# Patient Record
Sex: MALE | Race: BLACK OR AFRICAN AMERICAN | Employment: UNEMPLOYED | URBAN - METROPOLITAN AREA
[De-identification: names, ages, dates, MRNs, and addresses within clinical notes are randomized per-mention and may not be internally consistent; named-entity substitution may affect disease eponyms.]

---

## 2017-01-06 ENCOUNTER — OFFICE VISIT (OUTPATIENT)
Dept: FAMILY MEDICINE CLINIC | Age: 57
End: 2017-01-06

## 2017-01-06 VITALS
HEART RATE: 80 BPM | OXYGEN SATURATION: 97 % | DIASTOLIC BLOOD PRESSURE: 100 MMHG | WEIGHT: 277 LBS | BODY MASS INDEX: 33.73 KG/M2 | SYSTOLIC BLOOD PRESSURE: 152 MMHG | RESPIRATION RATE: 12 BRPM | HEIGHT: 76 IN

## 2017-01-06 DIAGNOSIS — I10 ESSENTIAL HYPERTENSION: Chronic | ICD-10-CM

## 2017-01-06 DIAGNOSIS — M54.42 CHRONIC MIDLINE LOW BACK PAIN WITH LEFT-SIDED SCIATICA: Primary | ICD-10-CM

## 2017-01-06 DIAGNOSIS — G89.29 CHRONIC MIDLINE LOW BACK PAIN WITH LEFT-SIDED SCIATICA: Primary | ICD-10-CM

## 2017-01-06 PROCEDURE — 99214 OFFICE O/P EST MOD 30 MIN: CPT | Performed by: FAMILY MEDICINE

## 2017-01-06 RX ORDER — METHOCARBAMOL 500 MG/1
500 TABLET, FILM COATED ORAL 3 TIMES DAILY
Qty: 30 TABLET | Refills: 0 | Status: SHIPPED | OUTPATIENT
Start: 2017-01-06 | End: 2017-03-03 | Stop reason: ALTCHOICE

## 2017-01-08 ASSESSMENT — ENCOUNTER SYMPTOMS
GASTROINTESTINAL NEGATIVE: 1
BACK PAIN: 1
RESPIRATORY NEGATIVE: 1

## 2017-01-13 ENCOUNTER — OFFICE VISIT (OUTPATIENT)
Dept: FAMILY MEDICINE CLINIC | Age: 57
End: 2017-01-13

## 2017-01-13 VITALS
BODY MASS INDEX: 33.46 KG/M2 | OXYGEN SATURATION: 98 % | WEIGHT: 274.8 LBS | HEART RATE: 72 BPM | RESPIRATION RATE: 12 BRPM | DIASTOLIC BLOOD PRESSURE: 100 MMHG | HEIGHT: 76 IN | SYSTOLIC BLOOD PRESSURE: 138 MMHG | TEMPERATURE: 98.2 F

## 2017-01-13 DIAGNOSIS — I10 ESSENTIAL HYPERTENSION: Primary | Chronic | ICD-10-CM

## 2017-01-13 PROCEDURE — 99214 OFFICE O/P EST MOD 30 MIN: CPT | Performed by: FAMILY MEDICINE

## 2017-01-13 RX ORDER — AMLODIPINE BESYLATE 5 MG/1
5 TABLET ORAL DAILY
Qty: 30 TABLET | Refills: 1 | Status: SHIPPED | OUTPATIENT
Start: 2017-01-13 | End: 2017-02-28 | Stop reason: SDUPTHER

## 2017-01-14 ASSESSMENT — ENCOUNTER SYMPTOMS
RESPIRATORY NEGATIVE: 1
EYES NEGATIVE: 1

## 2017-02-01 ENCOUNTER — OFFICE VISIT (OUTPATIENT)
Dept: FAMILY MEDICINE CLINIC | Age: 57
End: 2017-02-01

## 2017-02-01 VITALS
DIASTOLIC BLOOD PRESSURE: 88 MMHG | WEIGHT: 279.2 LBS | HEIGHT: 76 IN | SYSTOLIC BLOOD PRESSURE: 108 MMHG | OXYGEN SATURATION: 97 % | RESPIRATION RATE: 12 BRPM | BODY MASS INDEX: 34 KG/M2 | HEART RATE: 96 BPM

## 2017-02-01 DIAGNOSIS — M54.50 ACUTE BILATERAL LOW BACK PAIN WITHOUT SCIATICA: Primary | ICD-10-CM

## 2017-02-01 DIAGNOSIS — R80.9 PROTEINURIA: ICD-10-CM

## 2017-02-01 DIAGNOSIS — N28.1 RENAL CYST: ICD-10-CM

## 2017-02-01 DIAGNOSIS — I10 ESSENTIAL HYPERTENSION: ICD-10-CM

## 2017-02-01 LAB
BILIRUBIN, POC: ABNORMAL
BLOOD URINE, POC: ABNORMAL
CLARITY, POC: CLEAR
COLOR, POC: YELLOW
GLUCOSE URINE, POC: ABNORMAL
KETONES, POC: ABNORMAL
LEUKOCYTE EST, POC: ABNORMAL
NITRITE, POC: ABNORMAL
PH, POC: 7
PROTEIN, POC: 30
SPECIFIC GRAVITY, POC: 1.02
UROBILINOGEN, POC: 0.2

## 2017-02-01 PROCEDURE — 99214 OFFICE O/P EST MOD 30 MIN: CPT | Performed by: FAMILY MEDICINE

## 2017-02-01 PROCEDURE — 81002 URINALYSIS NONAUTO W/O SCOPE: CPT | Performed by: FAMILY MEDICINE

## 2017-02-01 ASSESSMENT — ENCOUNTER SYMPTOMS
BACK PAIN: 1
ABDOMINAL PAIN: 0
COUGH: 0
CHEST TIGHTNESS: 0
SHORTNESS OF BREATH: 0
BLOOD IN STOOL: 0

## 2017-02-28 ENCOUNTER — OFFICE VISIT (OUTPATIENT)
Dept: FAMILY MEDICINE CLINIC | Age: 57
End: 2017-02-28

## 2017-02-28 ENCOUNTER — TELEPHONE (OUTPATIENT)
Dept: FAMILY MEDICINE CLINIC | Age: 57
End: 2017-02-28

## 2017-02-28 VITALS
WEIGHT: 275.6 LBS | TEMPERATURE: 98.1 F | OXYGEN SATURATION: 99 % | SYSTOLIC BLOOD PRESSURE: 126 MMHG | HEART RATE: 82 BPM | DIASTOLIC BLOOD PRESSURE: 70 MMHG | RESPIRATION RATE: 14 BRPM | BODY MASS INDEX: 33.55 KG/M2

## 2017-02-28 DIAGNOSIS — M54.42 CHRONIC MIDLINE LOW BACK PAIN WITH LEFT-SIDED SCIATICA: ICD-10-CM

## 2017-02-28 DIAGNOSIS — I25.10 CORONARY ARTERY DISEASE INVOLVING NATIVE HEART WITHOUT ANGINA PECTORIS, UNSPECIFIED VESSEL OR LESION TYPE: Chronic | ICD-10-CM

## 2017-02-28 DIAGNOSIS — G89.29 CHRONIC MIDLINE LOW BACK PAIN WITH LEFT-SIDED SCIATICA: ICD-10-CM

## 2017-02-28 DIAGNOSIS — R80.9 PROTEINURIA: ICD-10-CM

## 2017-02-28 DIAGNOSIS — I10 ESSENTIAL HYPERTENSION: Primary | Chronic | ICD-10-CM

## 2017-02-28 DIAGNOSIS — E78.2 MIXED HYPERLIPIDEMIA: Chronic | ICD-10-CM

## 2017-02-28 PROBLEM — N28.1 RENAL CYST: Status: ACTIVE | Noted: 2017-02-28

## 2017-02-28 LAB
BILIRUBIN, POC: ABNORMAL
BLOOD URINE, POC: ABNORMAL
CLARITY, POC: CLEAR
COLOR, POC: YELLOW
CREATININE URINE: 398.3 MG/DL (ref 39–259)
GLUCOSE URINE, POC: ABNORMAL
KETONES, POC: ABNORMAL
LEUKOCYTE EST, POC: ABNORMAL
MICROALBUMIN UR-MCNC: 7.7 MG/DL
MICROALBUMIN/CREAT UR-RTO: 19.3 MG/G (ref 0–30)
NITRITE, POC: ABNORMAL
PH, POC: 6
PROTEIN, POC: 100
SPECIFIC GRAVITY, POC: 1.03
UROBILINOGEN, POC: 0.2

## 2017-02-28 PROCEDURE — 99214 OFFICE O/P EST MOD 30 MIN: CPT | Performed by: FAMILY MEDICINE

## 2017-02-28 PROCEDURE — 81002 URINALYSIS NONAUTO W/O SCOPE: CPT | Performed by: FAMILY MEDICINE

## 2017-02-28 RX ORDER — AMLODIPINE BESYLATE 5 MG/1
5 TABLET ORAL DAILY
Qty: 30 TABLET | Refills: 3 | Status: ON HOLD | OUTPATIENT
Start: 2017-02-28 | End: 2017-04-24 | Stop reason: HOSPADM

## 2017-02-28 RX ORDER — ATORVASTATIN CALCIUM 40 MG/1
40 TABLET, FILM COATED ORAL DAILY
Qty: 30 TABLET | Refills: 3 | Status: SHIPPED | OUTPATIENT
Start: 2017-02-28 | End: 2017-09-08 | Stop reason: SDUPTHER

## 2017-02-28 RX ORDER — NAPROXEN 500 MG/1
500 TABLET ORAL 2 TIMES DAILY
Qty: 20 TABLET | Refills: 0 | Status: CANCELLED | OUTPATIENT
Start: 2017-02-28 | End: 2017-03-10

## 2017-02-28 RX ORDER — LISINOPRIL AND HYDROCHLOROTHIAZIDE 25; 20 MG/1; MG/1
1 TABLET ORAL DAILY
Qty: 30 TABLET | Refills: 3 | Status: SHIPPED | OUTPATIENT
Start: 2017-02-28 | End: 2017-09-08 | Stop reason: SDUPTHER

## 2017-02-28 RX ORDER — TIZANIDINE 4 MG/1
4 TABLET ORAL EVERY 6 HOURS PRN
Qty: 30 TABLET | Refills: 0 | Status: SHIPPED | OUTPATIENT
Start: 2017-02-28 | End: 2017-07-12 | Stop reason: SDUPTHER

## 2017-02-28 RX ORDER — METOPROLOL SUCCINATE 25 MG/1
25 TABLET, EXTENDED RELEASE ORAL DAILY
Qty: 30 TABLET | Refills: 3 | Status: ON HOLD | OUTPATIENT
Start: 2017-02-28 | End: 2017-04-24 | Stop reason: HOSPADM

## 2017-02-28 RX ORDER — METHOCARBAMOL 500 MG/1
500 TABLET, FILM COATED ORAL 3 TIMES DAILY PRN
Qty: 30 TABLET | Refills: 0 | Status: CANCELLED | OUTPATIENT
Start: 2017-02-28

## 2017-03-03 ASSESSMENT — ENCOUNTER SYMPTOMS
GASTROINTESTINAL NEGATIVE: 1
BACK PAIN: 1
EYES NEGATIVE: 1
RESPIRATORY NEGATIVE: 1

## 2017-03-06 DIAGNOSIS — E78.2 MIXED HYPERLIPIDEMIA: Chronic | ICD-10-CM

## 2017-03-06 LAB
CHOLESTEROL, TOTAL: 283 MG/DL (ref 0–199)
HDLC SERPL-MCNC: 31 MG/DL (ref 40–60)
LDL CHOLESTEROL CALCULATED: ABNORMAL MG/DL
LDL CHOLESTEROL DIRECT: 195 MG/DL
TRIGL SERPL-MCNC: 372 MG/DL (ref 0–150)
VLDLC SERPL CALC-MCNC: ABNORMAL MG/DL

## 2017-03-07 DIAGNOSIS — E78.2 MIXED HYPERLIPIDEMIA: Primary | Chronic | ICD-10-CM

## 2017-03-07 RX ORDER — FENOFIBRATE 48 MG/1
48 TABLET, COATED ORAL DAILY
Qty: 30 TABLET | Refills: 3 | Status: SHIPPED | OUTPATIENT
Start: 2017-03-07 | End: 2017-06-20 | Stop reason: ALTCHOICE

## 2017-03-08 ENCOUNTER — OFFICE VISIT (OUTPATIENT)
Dept: CARDIOLOGY CLINIC | Age: 57
End: 2017-03-08

## 2017-03-08 VITALS
WEIGHT: 277 LBS | BODY MASS INDEX: 33.73 KG/M2 | HEIGHT: 76 IN | DIASTOLIC BLOOD PRESSURE: 70 MMHG | SYSTOLIC BLOOD PRESSURE: 124 MMHG | HEART RATE: 77 BPM

## 2017-03-08 DIAGNOSIS — E78.2 MIXED HYPERLIPIDEMIA: Chronic | ICD-10-CM

## 2017-03-08 DIAGNOSIS — I25.10 CORONARY ARTERY DISEASE INVOLVING NATIVE CORONARY ARTERY OF NATIVE HEART WITHOUT ANGINA PECTORIS: Primary | Chronic | ICD-10-CM

## 2017-03-08 DIAGNOSIS — R53.83 FATIGUE, UNSPECIFIED TYPE: ICD-10-CM

## 2017-03-08 DIAGNOSIS — I71.21 ASCENDING AORTIC ANEURYSM: ICD-10-CM

## 2017-03-08 DIAGNOSIS — I10 ESSENTIAL HYPERTENSION: Chronic | ICD-10-CM

## 2017-03-08 DIAGNOSIS — R07.9 EXERTIONAL CHEST PAIN: ICD-10-CM

## 2017-03-08 PROCEDURE — 99215 OFFICE O/P EST HI 40 MIN: CPT | Performed by: INTERNAL MEDICINE

## 2017-03-10 PROBLEM — R07.2 PRECORDIAL PAIN: Status: ACTIVE | Noted: 2017-03-10

## 2017-03-10 PROBLEM — R06.02 SHORTNESS OF BREATH: Status: ACTIVE | Noted: 2017-03-10

## 2017-03-14 ENCOUNTER — OFFICE VISIT (OUTPATIENT)
Dept: CARDIOTHORACIC SURGERY | Age: 57
End: 2017-03-14

## 2017-03-14 VITALS
DIASTOLIC BLOOD PRESSURE: 88 MMHG | BODY MASS INDEX: 33.9 KG/M2 | WEIGHT: 278.4 LBS | OXYGEN SATURATION: 98 % | SYSTOLIC BLOOD PRESSURE: 138 MMHG | HEART RATE: 85 BPM | HEIGHT: 76 IN | TEMPERATURE: 98.1 F

## 2017-03-14 DIAGNOSIS — I10 ESSENTIAL HYPERTENSION: ICD-10-CM

## 2017-03-14 DIAGNOSIS — I71.21 ASCENDING AORTIC ANEURYSM: Primary | ICD-10-CM

## 2017-03-14 DIAGNOSIS — I25.10 CORONARY ARTERY DISEASE INVOLVING NATIVE CORONARY ARTERY OF NATIVE HEART WITHOUT ANGINA PECTORIS: ICD-10-CM

## 2017-03-14 DIAGNOSIS — E78.5 DYSLIPIDEMIA: ICD-10-CM

## 2017-03-14 PROCEDURE — 99244 OFF/OP CNSLTJ NEW/EST MOD 40: CPT | Performed by: THORACIC SURGERY (CARDIOTHORACIC VASCULAR SURGERY)

## 2017-03-24 ENCOUNTER — OFFICE VISIT (OUTPATIENT)
Dept: CARDIOLOGY CLINIC | Age: 57
End: 2017-03-24

## 2017-03-24 VITALS
HEART RATE: 78 BPM | SYSTOLIC BLOOD PRESSURE: 128 MMHG | WEIGHT: 281 LBS | HEIGHT: 76 IN | OXYGEN SATURATION: 99 % | BODY MASS INDEX: 34.22 KG/M2 | DIASTOLIC BLOOD PRESSURE: 72 MMHG

## 2017-03-24 DIAGNOSIS — I71.21 ASCENDING AORTIC ANEURYSM: ICD-10-CM

## 2017-03-24 DIAGNOSIS — I10 ESSENTIAL HYPERTENSION: ICD-10-CM

## 2017-03-24 DIAGNOSIS — E78.2 MIXED HYPERLIPIDEMIA: ICD-10-CM

## 2017-03-24 DIAGNOSIS — I25.10 CORONARY ARTERY DISEASE INVOLVING NATIVE CORONARY ARTERY OF NATIVE HEART WITHOUT ANGINA PECTORIS: Primary | ICD-10-CM

## 2017-03-24 PROCEDURE — 99214 OFFICE O/P EST MOD 30 MIN: CPT | Performed by: NURSE PRACTITIONER

## 2017-04-17 ENCOUNTER — OFFICE VISIT (OUTPATIENT)
Dept: ORTHOPEDIC SURGERY | Age: 57
End: 2017-04-17

## 2017-04-17 VITALS
BODY MASS INDEX: 34.23 KG/M2 | HEIGHT: 76 IN | SYSTOLIC BLOOD PRESSURE: 115 MMHG | HEART RATE: 68 BPM | WEIGHT: 281.09 LBS | DIASTOLIC BLOOD PRESSURE: 82 MMHG

## 2017-04-17 DIAGNOSIS — R20.2 ARM PARESTHESIA, LEFT: ICD-10-CM

## 2017-04-17 DIAGNOSIS — Z98.1 HISTORY OF LUMBAR FUSION: ICD-10-CM

## 2017-04-17 DIAGNOSIS — M47.812 SPONDYLOSIS OF CERVICAL REGION WITHOUT MYELOPATHY OR RADICULOPATHY: ICD-10-CM

## 2017-04-17 DIAGNOSIS — M54.50 PAIN OF LUMBAR SPINE: ICD-10-CM

## 2017-04-17 DIAGNOSIS — M47.816 SPONDYLOSIS OF LUMBAR REGION WITHOUT MYELOPATHY OR RADICULOPATHY: Primary | ICD-10-CM

## 2017-04-17 PROCEDURE — 99243 OFF/OP CNSLTJ NEW/EST LOW 30: CPT | Performed by: PHYSICAL MEDICINE & REHABILITATION

## 2017-04-17 PROCEDURE — 72100 X-RAY EXAM L-S SPINE 2/3 VWS: CPT | Performed by: PHYSICAL MEDICINE & REHABILITATION

## 2017-04-21 ENCOUNTER — TELEPHONE (OUTPATIENT)
Dept: SLEEP MEDICINE | Age: 57
End: 2017-04-21

## 2017-04-23 PROBLEM — I48.91 ATRIAL FIBRILLATION WITH RVR (HCC): Status: ACTIVE | Noted: 2017-04-23

## 2017-04-25 ENCOUNTER — OFFICE VISIT (OUTPATIENT)
Dept: FAMILY MEDICINE CLINIC | Age: 57
End: 2017-04-25

## 2017-04-25 VITALS
TEMPERATURE: 97.7 F | HEART RATE: 78 BPM | SYSTOLIC BLOOD PRESSURE: 128 MMHG | BODY MASS INDEX: 33.63 KG/M2 | RESPIRATION RATE: 14 BRPM | OXYGEN SATURATION: 98 % | WEIGHT: 276.2 LBS | HEIGHT: 76 IN | DIASTOLIC BLOOD PRESSURE: 82 MMHG

## 2017-04-25 DIAGNOSIS — I48.0 PAROXYSMAL ATRIAL FIBRILLATION (HCC): Primary | ICD-10-CM

## 2017-04-25 DIAGNOSIS — J30.2 SEASONAL ALLERGIC RHINITIS, UNSPECIFIED ALLERGIC RHINITIS TRIGGER: ICD-10-CM

## 2017-04-25 PROCEDURE — 99214 OFFICE O/P EST MOD 30 MIN: CPT | Performed by: FAMILY MEDICINE

## 2017-04-25 RX ORDER — CETIRIZINE HYDROCHLORIDE 10 MG/1
TABLET ORAL
COMMUNITY
Start: 2017-03-19 | End: 2017-12-11

## 2017-04-27 ASSESSMENT — ENCOUNTER SYMPTOMS
RESPIRATORY NEGATIVE: 1
GASTROINTESTINAL NEGATIVE: 1
EYES NEGATIVE: 1

## 2017-05-01 ENCOUNTER — OFFICE VISIT (OUTPATIENT)
Dept: SLEEP MEDICINE | Age: 57
End: 2017-05-01

## 2017-05-01 ENCOUNTER — HOSPITAL ENCOUNTER (OUTPATIENT)
Dept: PHYSICAL THERAPY | Age: 57
Discharge: HOME | End: 2017-05-31
Admitting: PHYSICAL MEDICINE & REHABILITATION

## 2017-05-01 ENCOUNTER — HOSPITAL ENCOUNTER (OUTPATIENT)
Dept: PHYSICAL THERAPY | Age: 57
Discharge: HOME | End: 2017-04-30
Admitting: PHYSICAL MEDICINE & REHABILITATION

## 2017-05-01 VITALS
BODY MASS INDEX: 33.97 KG/M2 | HEART RATE: 79 BPM | HEIGHT: 76 IN | SYSTOLIC BLOOD PRESSURE: 124 MMHG | OXYGEN SATURATION: 98 % | WEIGHT: 279 LBS | DIASTOLIC BLOOD PRESSURE: 86 MMHG

## 2017-05-01 DIAGNOSIS — G43.009 MIGRAINE WITHOUT AURA AND WITHOUT STATUS MIGRAINOSUS, NOT INTRACTABLE: Chronic | ICD-10-CM

## 2017-05-01 DIAGNOSIS — I25.10 CORONARY ARTERY DISEASE INVOLVING NATIVE CORONARY ARTERY OF NATIVE HEART WITHOUT ANGINA PECTORIS: Chronic | ICD-10-CM

## 2017-05-01 DIAGNOSIS — G47.10 HYPERSOMNIA: Primary | ICD-10-CM

## 2017-05-01 DIAGNOSIS — I10 HYPERTENSION, ESSENTIAL: Chronic | ICD-10-CM

## 2017-05-01 DIAGNOSIS — I71.9 AORTIC ANEURYSM WITHOUT RUPTURE (HCC): ICD-10-CM

## 2017-05-01 DIAGNOSIS — I48.0 PAROXYSMAL ATRIAL FIBRILLATION (HCC): Chronic | ICD-10-CM

## 2017-05-01 DIAGNOSIS — E66.9 NON MORBID OBESITY, UNSPECIFIED OBESITY TYPE: Chronic | ICD-10-CM

## 2017-05-01 DIAGNOSIS — R06.83 SNORING: ICD-10-CM

## 2017-05-01 PROBLEM — I48.91 ATRIAL FIBRILLATION (HCC): Chronic | Status: ACTIVE | Noted: 2017-04-23

## 2017-05-01 PROCEDURE — 99244 OFF/OP CNSLTJ NEW/EST MOD 40: CPT | Performed by: INTERNAL MEDICINE

## 2017-05-01 ASSESSMENT — ENCOUNTER SYMPTOMS
ABDOMINAL DISTENTION: 0
NAUSEA: 0
ABDOMINAL PAIN: 0
VOMITING: 0
ALLERGIC/IMMUNOLOGIC NEGATIVE: 1
CHOKING: 0
CHEST TIGHTNESS: 0
APNEA: 1
PHOTOPHOBIA: 0
SHORTNESS OF BREATH: 0
EYE PAIN: 0
RHINORRHEA: 0

## 2017-05-01 ASSESSMENT — SLEEP AND FATIGUE QUESTIONNAIRES
HOW LIKELY ARE YOU TO NOD OFF OR FALL ASLEEP WHILE SITTING QUIETLY AFTER LUNCH WITHOUT ALCOHOL: 1
HOW LIKELY ARE YOU TO NOD OFF OR FALL ASLEEP WHILE LYING DOWN TO REST IN THE AFTERNOON WHEN CIRCUMSTANCES PERMIT: 1
HOW LIKELY ARE YOU TO NOD OFF OR FALL ASLEEP IN A CAR, WHILE STOPPED FOR A FEW MINUTES IN TRAFFIC: 0
ESS TOTAL SCORE: 4
HOW LIKELY ARE YOU TO NOD OFF OR FALL ASLEEP WHILE SITTING AND READING: 2
NECK CIRCUMFERENCE (INCHES): 17.5
HOW LIKELY ARE YOU TO NOD OFF OR FALL ASLEEP WHEN YOU ARE A PASSENGER IN A CAR FOR AN HOUR WITHOUT A BREAK: 0
HOW LIKELY ARE YOU TO NOD OFF OR FALL ASLEEP WHILE SITTING INACTIVE IN A PUBLIC PLACE: 0
HOW LIKELY ARE YOU TO NOD OFF OR FALL ASLEEP WHILE WATCHING TV: 0
HOW LIKELY ARE YOU TO NOD OFF OR FALL ASLEEP WHILE SITTING AND TALKING TO SOMEONE: 0

## 2017-05-03 ENCOUNTER — OFFICE VISIT (OUTPATIENT)
Dept: CARDIOLOGY CLINIC | Age: 57
End: 2017-05-03

## 2017-05-03 ENCOUNTER — HOSPITAL ENCOUNTER (OUTPATIENT)
Dept: SLEEP MEDICINE | Age: 57
Discharge: HOME | End: 2017-05-03
Attending: INTERNAL MEDICINE | Admitting: INTERNAL MEDICINE

## 2017-05-03 VITALS
HEART RATE: 74 BPM | HEIGHT: 76 IN | BODY MASS INDEX: 33.49 KG/M2 | WEIGHT: 275 LBS | DIASTOLIC BLOOD PRESSURE: 82 MMHG | SYSTOLIC BLOOD PRESSURE: 122 MMHG

## 2017-05-03 DIAGNOSIS — I10 ESSENTIAL HYPERTENSION: ICD-10-CM

## 2017-05-03 DIAGNOSIS — G47.10 HYPERSOMNIA: ICD-10-CM

## 2017-05-03 DIAGNOSIS — I25.10 CORONARY ARTERY DISEASE INVOLVING NATIVE CORONARY ARTERY OF NATIVE HEART WITHOUT ANGINA PECTORIS: ICD-10-CM

## 2017-05-03 DIAGNOSIS — I48.0 PAROXYSMAL ATRIAL FIBRILLATION (HCC): Primary | ICD-10-CM

## 2017-05-03 DIAGNOSIS — R06.83 SNORING: ICD-10-CM

## 2017-05-03 DIAGNOSIS — I71.21 ASCENDING AORTIC ANEURYSM: ICD-10-CM

## 2017-05-03 PROCEDURE — 99214 OFFICE O/P EST MOD 30 MIN: CPT | Performed by: NURSE PRACTITIONER

## 2017-05-03 PROCEDURE — 93000 ELECTROCARDIOGRAM COMPLETE: CPT | Performed by: NURSE PRACTITIONER

## 2017-05-03 PROCEDURE — 95810 POLYSOM 6/> YRS 4/> PARAM: CPT | Performed by: INTERNAL MEDICINE

## 2017-05-03 RX ORDER — AMLODIPINE BESYLATE 5 MG/1
5 TABLET ORAL DAILY
COMMUNITY
Start: 2017-03-27 | End: 2017-06-20 | Stop reason: ALTCHOICE

## 2017-05-04 ENCOUNTER — HOSPITAL ENCOUNTER (OUTPATIENT)
Dept: PHYSICAL THERAPY | Age: 57
Discharge: HOME | End: 2017-05-04
Admitting: PHYSICAL MEDICINE & REHABILITATION

## 2017-05-04 DIAGNOSIS — I71.9 AORTIC ANEURYSM WITHOUT RUPTURE (HCC): ICD-10-CM

## 2017-05-08 ENCOUNTER — HOSPITAL ENCOUNTER (OUTPATIENT)
Dept: SLEEP MEDICINE | Age: 57
Discharge: HOME | End: 2017-05-08
Attending: INTERNAL MEDICINE | Admitting: INTERNAL MEDICINE

## 2017-05-08 DIAGNOSIS — R06.83 SNORING: ICD-10-CM

## 2017-05-08 DIAGNOSIS — G47.10 HYPERSOMNIA: ICD-10-CM

## 2017-05-08 PROCEDURE — 95811 POLYSOM 6/>YRS CPAP 4/> PARM: CPT | Performed by: INTERNAL MEDICINE

## 2017-05-09 ENCOUNTER — HOSPITAL ENCOUNTER (OUTPATIENT)
Dept: PHYSICAL THERAPY | Age: 57
Discharge: HOME | End: 2017-05-09
Admitting: PHYSICAL MEDICINE & REHABILITATION

## 2017-05-09 DIAGNOSIS — I71.9 AORTIC ANEURYSM WITHOUT RUPTURE (HCC): ICD-10-CM

## 2017-05-11 ENCOUNTER — HOSPITAL ENCOUNTER (OUTPATIENT)
Dept: PHYSICAL THERAPY | Age: 57
Discharge: HOME | End: 2017-05-11
Admitting: PHYSICAL MEDICINE & REHABILITATION

## 2017-05-11 DIAGNOSIS — I71.9 AORTIC ANEURYSM WITHOUT RUPTURE (HCC): ICD-10-CM

## 2017-05-12 ENCOUNTER — TELEPHONE (OUTPATIENT)
Dept: PULMONOLOGY | Age: 57
End: 2017-05-12

## 2017-05-15 ENCOUNTER — HOSPITAL ENCOUNTER (OUTPATIENT)
Dept: PHYSICAL THERAPY | Age: 57
Discharge: HOME | End: 2017-05-15
Admitting: PHYSICAL MEDICINE & REHABILITATION

## 2017-05-15 DIAGNOSIS — I71.9 AORTIC ANEURYSM WITHOUT RUPTURE (HCC): ICD-10-CM

## 2017-05-22 ENCOUNTER — HOSPITAL ENCOUNTER (OUTPATIENT)
Dept: PHYSICAL THERAPY | Age: 57
Discharge: HOME | End: 2017-05-22
Admitting: PHYSICAL MEDICINE & REHABILITATION

## 2017-05-22 DIAGNOSIS — I71.9 AORTIC ANEURYSM WITHOUT RUPTURE (HCC): ICD-10-CM

## 2017-05-26 ENCOUNTER — OFFICE VISIT (OUTPATIENT)
Dept: ORTHOPEDIC SURGERY | Age: 57
End: 2017-05-26

## 2017-05-26 VITALS
BODY MASS INDEX: 34.1 KG/M2 | WEIGHT: 280 LBS | DIASTOLIC BLOOD PRESSURE: 80 MMHG | HEIGHT: 76 IN | SYSTOLIC BLOOD PRESSURE: 135 MMHG

## 2017-05-26 DIAGNOSIS — G89.29 CHRONIC PAIN OF BOTH KNEES: ICD-10-CM

## 2017-05-26 DIAGNOSIS — M48.02 CERVICAL STENOSIS OF SPINE: Primary | ICD-10-CM

## 2017-05-26 DIAGNOSIS — M96.1 LUMBAR POST-LAMINECTOMY SYNDROME: ICD-10-CM

## 2017-05-26 DIAGNOSIS — M54.12 CERVICAL RADICULOPATHY: ICD-10-CM

## 2017-05-26 DIAGNOSIS — M54.16 LUMBAR RADICULOPATHY: ICD-10-CM

## 2017-05-26 DIAGNOSIS — M25.561 CHRONIC PAIN OF BOTH KNEES: ICD-10-CM

## 2017-05-26 DIAGNOSIS — M25.562 CHRONIC PAIN OF BOTH KNEES: ICD-10-CM

## 2017-05-26 PROCEDURE — 99214 OFFICE O/P EST MOD 30 MIN: CPT | Performed by: PHYSICAL MEDICINE & REHABILITATION

## 2017-05-30 ENCOUNTER — TELEPHONE (OUTPATIENT)
Dept: ORTHOPEDIC SURGERY | Age: 57
End: 2017-05-30

## 2017-06-12 ENCOUNTER — OFFICE VISIT (OUTPATIENT)
Dept: ORTHOPEDIC SURGERY | Age: 57
End: 2017-06-12

## 2017-06-12 VITALS
HEIGHT: 76 IN | BODY MASS INDEX: 34.1 KG/M2 | SYSTOLIC BLOOD PRESSURE: 127 MMHG | DIASTOLIC BLOOD PRESSURE: 82 MMHG | WEIGHT: 280 LBS

## 2017-06-12 DIAGNOSIS — M25.561 ACUTE PAIN OF BOTH KNEES: Primary | ICD-10-CM

## 2017-06-12 DIAGNOSIS — M25.562 ACUTE PAIN OF BOTH KNEES: Primary | ICD-10-CM

## 2017-06-12 PROCEDURE — 99204 OFFICE O/P NEW MOD 45 MIN: CPT | Performed by: ORTHOPAEDIC SURGERY

## 2017-06-12 PROCEDURE — 73564 X-RAY EXAM KNEE 4 OR MORE: CPT | Performed by: ORTHOPAEDIC SURGERY

## 2017-06-15 ENCOUNTER — HOSPITAL ENCOUNTER (OUTPATIENT)
Dept: PHYSICAL THERAPY | Age: 57
Discharge: HOME | End: 2017-06-15
Admitting: PHYSICAL MEDICINE & REHABILITATION

## 2017-06-15 DIAGNOSIS — I71.9 AORTIC ANEURYSM WITHOUT RUPTURE (HCC): ICD-10-CM

## 2017-06-20 RX ORDER — SILDENAFIL 50 MG/1
50 TABLET, FILM COATED ORAL PRN
Qty: 10 TABLET | Refills: 0 | Status: SHIPPED | OUTPATIENT
Start: 2017-06-20 | End: 2017-12-11

## 2017-07-05 ENCOUNTER — TELEPHONE (OUTPATIENT)
Dept: ORTHOPEDIC SURGERY | Age: 57
End: 2017-07-05

## 2017-07-05 ENCOUNTER — TELEPHONE (OUTPATIENT)
Dept: FAMILY MEDICINE CLINIC | Age: 57
End: 2017-07-05

## 2017-07-12 DIAGNOSIS — M54.42 CHRONIC MIDLINE LOW BACK PAIN WITH LEFT-SIDED SCIATICA: ICD-10-CM

## 2017-07-12 DIAGNOSIS — G89.29 CHRONIC MIDLINE LOW BACK PAIN WITH LEFT-SIDED SCIATICA: ICD-10-CM

## 2017-07-12 RX ORDER — TIZANIDINE 4 MG/1
4 TABLET ORAL EVERY 6 HOURS PRN
Qty: 30 TABLET | Refills: 0 | Status: SHIPPED | OUTPATIENT
Start: 2017-07-12 | End: 2017-08-25 | Stop reason: SDUPTHER

## 2017-07-13 ENCOUNTER — HOSPITAL ENCOUNTER (OUTPATIENT)
Dept: MRI IMAGING | Age: 57
Discharge: HOME | End: 2017-07-13
Attending: PHYSICAL MEDICINE & REHABILITATION | Admitting: PHYSICAL MEDICINE & REHABILITATION

## 2017-07-13 DIAGNOSIS — M48.02 SPINAL STENOSIS OF CERVICAL REGION: ICD-10-CM

## 2017-07-13 DIAGNOSIS — M96.1 LUMBAR POSTLAMINECTOMY SYNDROME: ICD-10-CM

## 2017-07-13 DIAGNOSIS — M48.02 CERVICAL SPINAL STENOSIS: ICD-10-CM

## 2017-07-13 DIAGNOSIS — M54.12 CERVICAL RADICULITIS: ICD-10-CM

## 2017-07-13 DIAGNOSIS — M54.16 LUMBAR RADICULOPATHY: ICD-10-CM

## 2017-07-13 LAB
BUN BLDV-MCNC: 14 MG/DL (ref 7–20)
CREAT SERPL-MCNC: 0.9 MG/DL (ref 0.9–1.3)
GFR AFRICAN AMERICAN: >60
GFR NON-AFRICAN AMERICAN: >60

## 2017-07-13 RX ORDER — SODIUM CHLORIDE 0.9 % (FLUSH) 0.9 %
10 SYRINGE (ML) INJECTION ONCE
Status: COMPLETED | OUTPATIENT
Start: 2017-07-13 | End: 2017-07-13

## 2017-07-13 RX ADMIN — Medication 10 ML: at 15:53

## 2017-07-14 ENCOUNTER — HOSPITAL ENCOUNTER (OUTPATIENT)
Dept: MRI IMAGING | Age: 57
Discharge: HOME | End: 2017-07-14
Attending: ORTHOPAEDIC SURGERY | Admitting: ORTHOPAEDIC SURGERY

## 2017-07-14 DIAGNOSIS — M48.02 SPINAL STENOSIS OF CERVICAL REGION: ICD-10-CM

## 2017-07-14 DIAGNOSIS — M25.561 RIGHT KNEE PAIN, UNSPECIFIED CHRONICITY: ICD-10-CM

## 2017-07-17 ENCOUNTER — OFFICE VISIT (OUTPATIENT)
Dept: FAMILY MEDICINE CLINIC | Age: 57
End: 2017-07-17

## 2017-07-17 VITALS
HEIGHT: 76 IN | RESPIRATION RATE: 12 BRPM | WEIGHT: 281.4 LBS | OXYGEN SATURATION: 98 % | TEMPERATURE: 98.4 F | SYSTOLIC BLOOD PRESSURE: 122 MMHG | HEART RATE: 76 BPM | DIASTOLIC BLOOD PRESSURE: 80 MMHG | BODY MASS INDEX: 34.27 KG/M2

## 2017-07-17 DIAGNOSIS — Z00.00 ANNUAL PHYSICAL EXAM: ICD-10-CM

## 2017-07-17 DIAGNOSIS — Z00.00 ANNUAL PHYSICAL EXAM: Primary | ICD-10-CM

## 2017-07-17 LAB
HEPATITIS C ANTIBODY INTERPRETATION: NORMAL
PROSTATE SPECIFIC ANTIGEN: 0.75 NG/ML (ref 0–4)

## 2017-07-17 PROCEDURE — 90715 TDAP VACCINE 7 YRS/> IM: CPT | Performed by: FAMILY MEDICINE

## 2017-07-17 PROCEDURE — 90471 IMMUNIZATION ADMIN: CPT | Performed by: FAMILY MEDICINE

## 2017-07-17 PROCEDURE — 99396 PREV VISIT EST AGE 40-64: CPT | Performed by: FAMILY MEDICINE

## 2017-07-18 LAB — HIV-1 AND HIV-2 ANTIBODIES: NORMAL

## 2017-07-22 ASSESSMENT — ENCOUNTER SYMPTOMS
RESPIRATORY NEGATIVE: 1
EYES NEGATIVE: 1
GASTROINTESTINAL NEGATIVE: 1

## 2017-07-31 ENCOUNTER — OFFICE VISIT (OUTPATIENT)
Dept: SLEEP MEDICINE | Age: 57
End: 2017-07-31

## 2017-07-31 VITALS
BODY MASS INDEX: 34.82 KG/M2 | HEIGHT: 75 IN | HEART RATE: 74 BPM | DIASTOLIC BLOOD PRESSURE: 86 MMHG | WEIGHT: 280 LBS | OXYGEN SATURATION: 97 % | SYSTOLIC BLOOD PRESSURE: 120 MMHG

## 2017-07-31 DIAGNOSIS — I10 HYPERTENSION, ESSENTIAL: Chronic | ICD-10-CM

## 2017-07-31 DIAGNOSIS — G47.33 OBSTRUCTIVE SLEEP APNEA (ADULT) (PEDIATRIC): Primary | ICD-10-CM

## 2017-07-31 DIAGNOSIS — E66.9 NON MORBID OBESITY, UNSPECIFIED OBESITY TYPE: Chronic | ICD-10-CM

## 2017-07-31 DIAGNOSIS — I48.0 PAROXYSMAL ATRIAL FIBRILLATION (HCC): Chronic | ICD-10-CM

## 2017-07-31 DIAGNOSIS — G47.33 OBSTRUCTIVE SLEEP APNEA SYNDROME: Primary | ICD-10-CM

## 2017-07-31 DIAGNOSIS — G43.009 MIGRAINE WITHOUT AURA AND WITHOUT STATUS MIGRAINOSUS, NOT INTRACTABLE: Chronic | ICD-10-CM

## 2017-07-31 DIAGNOSIS — I25.10 CORONARY ARTERY DISEASE INVOLVING NATIVE CORONARY ARTERY OF NATIVE HEART WITHOUT ANGINA PECTORIS: Chronic | ICD-10-CM

## 2017-07-31 PROCEDURE — 99214 OFFICE O/P EST MOD 30 MIN: CPT | Performed by: INTERNAL MEDICINE

## 2017-07-31 ASSESSMENT — SLEEP AND FATIGUE QUESTIONNAIRES
HOW LIKELY ARE YOU TO NOD OFF OR FALL ASLEEP WHILE WATCHING TV: 1
HOW LIKELY ARE YOU TO NOD OFF OR FALL ASLEEP IN A CAR, WHILE STOPPED FOR A FEW MINUTES IN TRAFFIC: 0
ESS TOTAL SCORE: 8
HOW LIKELY ARE YOU TO NOD OFF OR FALL ASLEEP WHEN YOU ARE A PASSENGER IN A CAR FOR AN HOUR WITHOUT A BREAK: 0
HOW LIKELY ARE YOU TO NOD OFF OR FALL ASLEEP WHILE SITTING AND READING: 2
HOW LIKELY ARE YOU TO NOD OFF OR FALL ASLEEP WHILE SITTING INACTIVE IN A PUBLIC PLACE: 0
HOW LIKELY ARE YOU TO NOD OFF OR FALL ASLEEP WHILE SITTING AND TALKING TO SOMEONE: 0
HOW LIKELY ARE YOU TO NOD OFF OR FALL ASLEEP WHILE SITTING QUIETLY AFTER LUNCH WITHOUT ALCOHOL: 2
HOW LIKELY ARE YOU TO NOD OFF OR FALL ASLEEP WHILE LYING DOWN TO REST IN THE AFTERNOON WHEN CIRCUMSTANCES PERMIT: 3

## 2017-07-31 ASSESSMENT — ENCOUNTER SYMPTOMS
EYE PAIN: 0
SINUS PRESSURE: 0
STRIDOR: 0
PHOTOPHOBIA: 0
CHEST TIGHTNESS: 0
SHORTNESS OF BREATH: 0

## 2017-08-25 DIAGNOSIS — G89.29 CHRONIC MIDLINE LOW BACK PAIN WITH LEFT-SIDED SCIATICA: ICD-10-CM

## 2017-08-25 DIAGNOSIS — M54.42 CHRONIC MIDLINE LOW BACK PAIN WITH LEFT-SIDED SCIATICA: ICD-10-CM

## 2017-08-27 RX ORDER — TIZANIDINE 4 MG/1
TABLET ORAL
Qty: 30 TABLET | Refills: 0 | Status: SHIPPED | OUTPATIENT
Start: 2017-08-27 | End: 2017-11-21 | Stop reason: SDUPTHER

## 2017-09-08 ENCOUNTER — OFFICE VISIT (OUTPATIENT)
Dept: FAMILY MEDICINE CLINIC | Age: 57
End: 2017-09-08

## 2017-09-08 VITALS
DIASTOLIC BLOOD PRESSURE: 102 MMHG | HEART RATE: 80 BPM | TEMPERATURE: 97.4 F | BODY MASS INDEX: 35.11 KG/M2 | SYSTOLIC BLOOD PRESSURE: 150 MMHG | WEIGHT: 282.4 LBS | HEIGHT: 75 IN | OXYGEN SATURATION: 98 % | RESPIRATION RATE: 16 BRPM

## 2017-09-08 DIAGNOSIS — I71.21 ASCENDING AORTIC ANEURYSM: Chronic | ICD-10-CM

## 2017-09-08 DIAGNOSIS — G47.33 OBSTRUCTIVE SLEEP APNEA (ADULT) (PEDIATRIC): ICD-10-CM

## 2017-09-08 DIAGNOSIS — E78.2 MIXED HYPERLIPIDEMIA: Chronic | ICD-10-CM

## 2017-09-08 DIAGNOSIS — I48.0 PAROXYSMAL ATRIAL FIBRILLATION (HCC): ICD-10-CM

## 2017-09-08 DIAGNOSIS — Z12.11 COLON CANCER SCREENING: ICD-10-CM

## 2017-09-08 DIAGNOSIS — N50.89 TESTICULAR SWELLING, RIGHT: ICD-10-CM

## 2017-09-08 DIAGNOSIS — I10 ESSENTIAL HYPERTENSION: Primary | Chronic | ICD-10-CM

## 2017-09-08 PROCEDURE — 99215 OFFICE O/P EST HI 40 MIN: CPT | Performed by: FAMILY MEDICINE

## 2017-09-08 PROCEDURE — 81002 URINALYSIS NONAUTO W/O SCOPE: CPT | Performed by: FAMILY MEDICINE

## 2017-09-08 RX ORDER — ATORVASTATIN CALCIUM 40 MG/1
40 TABLET, FILM COATED ORAL EVERY EVENING
Qty: 30 TABLET | Refills: 3 | Status: SHIPPED | OUTPATIENT
Start: 2017-09-08 | End: 2017-12-15 | Stop reason: SDUPTHER

## 2017-09-08 RX ORDER — AMLODIPINE BESYLATE 5 MG/1
5 TABLET ORAL DAILY
Qty: 30 TABLET | Refills: 3 | Status: SHIPPED | OUTPATIENT
Start: 2017-09-08 | End: 2017-12-15 | Stop reason: SDUPTHER

## 2017-09-08 RX ORDER — METOPROLOL SUCCINATE 50 MG/1
50 TABLET, EXTENDED RELEASE ORAL DAILY
Qty: 30 TABLET | Refills: 3 | Status: SHIPPED | OUTPATIENT
Start: 2017-09-08 | End: 2017-12-15 | Stop reason: SDUPTHER

## 2017-09-08 RX ORDER — LISINOPRIL AND HYDROCHLOROTHIAZIDE 25; 20 MG/1; MG/1
1 TABLET ORAL DAILY
Qty: 30 TABLET | Refills: 3 | Status: SHIPPED | OUTPATIENT
Start: 2017-09-08 | End: 2017-12-15 | Stop reason: SDUPTHER

## 2017-09-13 PROBLEM — I71.20 THORACIC AORTIC ANEURYSM WITHOUT RUPTURE (HCC): Status: ACTIVE | Noted: 2017-09-13

## 2017-09-13 ASSESSMENT — ENCOUNTER SYMPTOMS
GASTROINTESTINAL NEGATIVE: 1
EYES NEGATIVE: 1
RESPIRATORY NEGATIVE: 1

## 2017-09-15 ENCOUNTER — OFFICE VISIT (OUTPATIENT)
Dept: FAMILY MEDICINE CLINIC | Age: 57
End: 2017-09-15

## 2017-09-15 ENCOUNTER — HOSPITAL ENCOUNTER (OUTPATIENT)
Dept: ULTRASOUND IMAGING | Age: 57
Discharge: HOME | End: 2017-09-15
Attending: FAMILY MEDICINE | Admitting: FAMILY MEDICINE

## 2017-09-15 VITALS
HEIGHT: 75 IN | SYSTOLIC BLOOD PRESSURE: 136 MMHG | OXYGEN SATURATION: 96 % | TEMPERATURE: 97.8 F | BODY MASS INDEX: 35.19 KG/M2 | DIASTOLIC BLOOD PRESSURE: 88 MMHG | HEART RATE: 88 BPM | WEIGHT: 283 LBS | RESPIRATION RATE: 16 BRPM

## 2017-09-15 DIAGNOSIS — I10 ESSENTIAL HYPERTENSION: Primary | Chronic | ICD-10-CM

## 2017-09-15 DIAGNOSIS — N50.811 RIGHT TESTICULAR PAIN: ICD-10-CM

## 2017-09-15 DIAGNOSIS — I71.9 AORTIC ANEURYSM WITHOUT RUPTURE (HCC): ICD-10-CM

## 2017-09-15 PROCEDURE — 99213 OFFICE O/P EST LOW 20 MIN: CPT | Performed by: FAMILY MEDICINE

## 2017-09-19 DIAGNOSIS — N50.3 EPIDIDYMAL CYST: Primary | ICD-10-CM

## 2017-09-19 ASSESSMENT — ENCOUNTER SYMPTOMS: RESPIRATORY NEGATIVE: 1

## 2017-09-21 ENCOUNTER — OFFICE VISIT (OUTPATIENT)
Dept: CARDIOLOGY CLINIC | Age: 57
End: 2017-09-21

## 2017-09-21 VITALS
BODY MASS INDEX: 33.97 KG/M2 | HEART RATE: 82 BPM | WEIGHT: 279 LBS | SYSTOLIC BLOOD PRESSURE: 116 MMHG | HEIGHT: 76 IN | DIASTOLIC BLOOD PRESSURE: 70 MMHG | OXYGEN SATURATION: 96 %

## 2017-09-21 DIAGNOSIS — I71.21 ASCENDING AORTIC ANEURYSM: Chronic | ICD-10-CM

## 2017-09-21 DIAGNOSIS — I25.10 CORONARY ARTERY DISEASE DUE TO LIPID RICH PLAQUE: Primary | ICD-10-CM

## 2017-09-21 DIAGNOSIS — I48.0 PAROXYSMAL ATRIAL FIBRILLATION (HCC): Chronic | ICD-10-CM

## 2017-09-21 DIAGNOSIS — R42 LIGHTHEADEDNESS: ICD-10-CM

## 2017-09-21 DIAGNOSIS — I25.83 CORONARY ARTERY DISEASE DUE TO LIPID RICH PLAQUE: Primary | ICD-10-CM

## 2017-09-21 DIAGNOSIS — I10 ESSENTIAL HYPERTENSION: ICD-10-CM

## 2017-09-21 PROBLEM — I71.20 THORACIC AORTIC ANEURYSM WITHOUT RUPTURE: Chronic | Status: ACTIVE | Noted: 2017-09-13

## 2017-09-21 PROCEDURE — 99214 OFFICE O/P EST MOD 30 MIN: CPT | Performed by: INTERNAL MEDICINE

## 2017-10-13 ENCOUNTER — OFFICE VISIT (OUTPATIENT)
Dept: NEUROLOGY | Age: 57
End: 2017-10-13

## 2017-10-13 VITALS
HEART RATE: 60 BPM | WEIGHT: 278 LBS | BODY MASS INDEX: 33.85 KG/M2 | HEIGHT: 76 IN | DIASTOLIC BLOOD PRESSURE: 87 MMHG | SYSTOLIC BLOOD PRESSURE: 126 MMHG

## 2017-10-13 DIAGNOSIS — R42 DIZZINESS: ICD-10-CM

## 2017-10-13 DIAGNOSIS — R55 SYNCOPE AND COLLAPSE: Primary | ICD-10-CM

## 2017-10-13 DIAGNOSIS — I48.0 PAROXYSMAL ATRIAL FIBRILLATION (HCC): Chronic | ICD-10-CM

## 2017-10-13 PROCEDURE — 99245 OFF/OP CONSLTJ NEW/EST HI 55: CPT | Performed by: PSYCHIATRY & NEUROLOGY

## 2017-10-13 NOTE — PATIENT INSTRUCTIONS
Please call with any questions or concerns:   SSGAYATHRI [facility]  @ [phone]. LAB RESULTS:  Please obtain any labs or diagnostic tests as discussed today. You may call the office to check the results. Please allow  3 to 7 days for us to get these results. MEDICATION LIST:  Please bring an accurate list of your medications to every visit. APPOINTMENT CONFIRMATION:  We will call you the day before your scheduled appointment to confirm. If we are unable to reach you, you MUST call back by the end of the day to confirm the appointment or we may be forced to cancel.

## 2017-10-13 NOTE — LETTER
Hearing loss       Hoarseness        Snoring      Tinnitus        Denies all of the above     Respiratory:     Cough      Shortness of breath          Denies all of the above     Cardiovascular:     Chest pain      Exertional chest pressure/discomfort            Palpitations      Syncope      Denies all of the above    Gastrointestinal:     Abdominal pain     Constipation      Diarrhea       Dysphagia                       Denies all of the above    Genitourinary:        Frequency     Hematuria       Urinary incontinence            Denies all of the above     Hematologic/lymphatic:    Bleeding      Easy bruising     Anemia   Denies all of the above     Musculoskeletal:    Back pain         Myalgias      Neck pain            Denies all of the above    Neurological: As noted in HPI    Behavioral/Psych:     Anxiety      Depression       Mood swings      Denies all of the above     Endocrine:     Temperature intolerance      Fatigue       Denies all of the above     Allergic/Immunologic:    Hay fever     Denies all of the above     Past Medical History:   Diagnosis Date    Aneurysm of ascending aorta (HCC)     Atopic dermatitis     Back pain     Facial paralysis/[place] palsy     Hyperlipidemia     Hypertension     Imprisonment and other incarceration [id number]    Migraine     Obstructive sleep apnea (adult) (pediatric)     Prediabetes     Psoriasis-like skin disease     Tinea cruris      Family History   Problem Relation Age of Onset    Other Mother      Polio    Thyroid Disease Mother     Heart Failure Father     Stroke Father      Social History     Social History    Marital status:      Spouse name: N/A    Number of children: 0    Years of education: N/A     Occupational History    unemployed     former [employer] work       Social History Main Topics    Smoking status: Never Smoker    Smokeless tobacco: Never Used    Alcohol use 0.6 oz/week     1 Standard drinks or equivalent per week Comment: Rarely; socially    Drug use: No    Sexual activity: Yes     Partners: Female     Birth control/ protection: Condom     Other Topics Concern    None     Social History Narrative    None       PHYSICAL EXAMINATION:  /87   Pulse 60   Ht 6' 4\" (1.93 m)   Wt 278 lb (126.1 kg)   BMI 33.84 kg/m²    Appearance: Well appearing, well nourished and in no distress  Mental Status Exam: Patient is alert, oriented to person, place and time. Recent and remote memory is normal  Fund of Knowledge is normal  Attention/concentration is normal.   Speech : No dysarthria  Language : No aphasia  Funduscopic Exam: sharp disc margins  Cranial Nerves:   II: Visual fields:  Full to confrontation  III: Pupils:  equal, round, reactive to light  III,IV,VI: Extra Ocular Movements are intact. No nystagmus  V: Facial sensation is intact to pin prick and light touch  VII: Facial strength and movements: intact and symmetric smile,cheek puffing and eyebrow elevation  VIII: Hearing:  Intact to finger rub bilaterally  IX: Palate  elevation is symmetric  XI: Shoulder shrug is intact  XII: Tongue movements are normal  Motor:  Muscle tone and bulk are normal.   Strength is symmetrical 5/5 in all four extremities. Sensory: Intact to light touch and  pin prick in all four extremities  Coordination:  Normal  Finger to Nose and Heel to Shin bilaterally    . Reflexes:  DTR 1 and symmetric bilaterally  Plantar response: Flexor bilaterally  Gait: Gait and station is normal.   Romberg: negative  Vascular: No carotid bruit bilaterally        DATA:  LABS:  General Labs:    CBC:   Lab Results   Component Value Date    WBC 7.7 04/24/2017    RBC 4.24 04/24/2017    HGB 12.2 04/24/2017    HCT 36.6 04/24/2017    MCV 86.3 04/24/2017    MCH 28.9 04/24/2017    MCHC 33.5 04/24/2017    RDW 14.0 04/24/2017     04/24/2017    MPV 8.2 04/24/2017     BMP:    Lab Results   Component Value Date     04/24/2017    K 4.0 04/24/2017

## 2017-10-13 NOTE — PROGRESS NOTES
NEUROLOGY CONSULTATION     Chief Complaint   Patient presents with    Dizziness     Patient is here today to establish care. Patient has been having some dizziness. Patient has a hx of neck problems. HISTORY OF PRESENT ILLNESS :    Jackie Box is a 56 y.o. male who is referred by Dr. Germain Montoya   History was obtained from patient  Patient was referred for evaluation of episodes of dizziness and blackout spells. The first episode happened about 7 years ago  Since then patient has had 3 episodes where he totally blacked out  He has had multiple other episodes in which he gets dizziness lightheadedness and feels as if he is going to lose consciousness. Most of the spells happen when he is standing in the upright position. He cannot remember any spells in the sitting position or lying position.   Symptom onset is fairly acute abrupt and moderately severe without any other aggravating or relieving factors  Patient has had previous back surgery  Denies any focal weakness numbness true vertigo or diplopia  Patient has known atrial fibrillation and is on anticoagulation with Xarelto      REVIEW OF SYSTEMS    Constitutional:  []   Chills   [x]  Fatigue   []  Fevers   []  Malaise   []  Weight loss     [] Denies all of the above    Eyes:  []  Double vision   []  Blurry vision     [x] Denies all of the above    Ears, nose, mouth, throat, and face:   [] Hearing loss    []   Hoarseness      [x]  Snoring    []  Tinnitus       [] Denies all of the above     Respiratory:   [x]  Cough    [x]  Shortness of breath         [] Denies all of the above     Cardiovascular:   []  Chest pain    [x]  Exertional chest pressure/discomfort           [x] Palpitations    []  Syncope     [] Denies all of the above    Gastrointestinal:   []  Abdominal pain   [x]  Constipation    []  Diarrhea    []   Dysphagia                      [] Denies all of the above    Genitourinary:      []  Frequency   []  Hematuria     []  Urinary incontinence           [x] Denies all of the above     Hematologic/lymphatic:  []  Bleeding    []  Easy bruising   []  Anemia  [x] Denies all of the above     Musculoskeletal:   [x] Back pain       []  Myalgias    [x]  Neck pain           [] Denies all of the above    Neurological: As noted in HPI    Behavioral/Psych:   [] Anxiety    []  Depression     []  Mood swings     [x] Denies all of the above     Endocrine:   []  Temperature intolerance     [x] Fatigue      [] Denies all of the above     Allergic/Immunologic:   [x] Hay fever    [] Denies all of the above     Past Medical History:   Diagnosis Date    Aneurysm of ascending aorta (HCC)     Atopic dermatitis     Back pain     Facial paralysis/[place] palsy     Hyperlipidemia     Hypertension     Imprisonment and other incarceration [id number]    Migraine     Obstructive sleep apnea (adult) (pediatric)     Prediabetes     Psoriasis-like skin disease     Tinea cruris      Family History   Problem Relation Age of Onset    Other Mother      Polio    Thyroid Disease Mother     Heart Failure Father     Stroke Father      Social History     Social History    Marital status:      Spouse name: N/A    Number of children: 0    Years of education: N/A     Occupational History    unemployed     former [employer] work       Social History Main Topics    Smoking status: Never Smoker    Smokeless tobacco: Never Used    Alcohol use 0.6 oz/week     1 Standard drinks or equivalent per week      Comment: Rarely; socially    Drug use: No    Sexual activity: Yes     Partners: Female     Birth control/ protection: Condom     Other Topics Concern    None     Social History Narrative    None       PHYSICAL EXAMINATION:  /87   Pulse 60   Ht 6' 4\" (1.93 m)   Wt 278 lb (126.1 kg)   BMI 33.84 kg/m²   Appearance: Well appearing, well nourished and in no distress  Mental Status Exam: Patient is alert, oriented to person, place and time. Recent and remote memory is normal  Fund of Knowledge is normal  Attention/concentration is normal.   Speech : No dysarthria  Language : No aphasia  Funduscopic Exam: sharp disc margins  Cranial Nerves:   II: Visual fields:  Full to confrontation  III: Pupils:  equal, round, reactive to light  III,IV,VI: Extra Ocular Movements are intact. No nystagmus  V: Facial sensation is intact to pin prick and light touch  VII: Facial strength and movements: intact and symmetric smile,cheek puffing and eyebrow elevation  VIII: Hearing:  Intact to finger rub bilaterally  IX: Palate  elevation is symmetric  XI: Shoulder shrug is intact  XII: Tongue movements are normal  Motor:  Muscle tone and bulk are normal.   Strength is symmetrical 5/5 in all four extremities. Sensory: Intact to light touch and  pin prick in all four extremities  Coordination:  Normal  Finger to Nose and Heel to Shin bilaterally    . Reflexes:  DTR 1 and symmetric bilaterally  Plantar response: Flexor bilaterally  Gait: Gait and station is normal.   Romberg: negative  Vascular: No carotid bruit bilaterally        DATA:  LABS:  General Labs:    CBC:   Lab Results   Component Value Date    WBC 7.7 04/24/2017    RBC 4.24 04/24/2017    HGB 12.2 04/24/2017    HCT 36.6 04/24/2017    MCV 86.3 04/24/2017    MCH 28.9 04/24/2017    MCHC 33.5 04/24/2017    RDW 14.0 04/24/2017     04/24/2017    MPV 8.2 04/24/2017     BMP:    Lab Results   Component Value Date     04/24/2017    K 4.0 04/24/2017     04/24/2017    CO2 28 04/24/2017    BUN 14 07/13/2017    LABALBU 4.4 04/23/2017    CREATININE 0.9 07/13/2017    CALCIUM 8.8 04/24/2017    GFRAA >60 07/13/2017    LABGLOM >60 07/13/2017    GLUCOSE 91 04/24/2017       IMPRESSION :  Recurrent episodes of dizziness and syncope  Vertebrobasilar ischemia seems less likely but should be considered and ruled out  Partial seizures will be considered in the

## 2017-10-30 ENCOUNTER — OFFICE VISIT (OUTPATIENT)
Dept: SLEEP MEDICINE | Age: 57
End: 2017-10-30

## 2017-10-30 VITALS
HEART RATE: 105 BPM | BODY MASS INDEX: 34.34 KG/M2 | DIASTOLIC BLOOD PRESSURE: 86 MMHG | WEIGHT: 282 LBS | SYSTOLIC BLOOD PRESSURE: 132 MMHG | HEIGHT: 76 IN | OXYGEN SATURATION: 98 %

## 2017-10-30 DIAGNOSIS — G43.009 MIGRAINE WITHOUT AURA AND WITHOUT STATUS MIGRAINOSUS, NOT INTRACTABLE: Chronic | ICD-10-CM

## 2017-10-30 DIAGNOSIS — G47.33 OBSTRUCTIVE SLEEP APNEA SYNDROME: Primary | Chronic | ICD-10-CM

## 2017-10-30 DIAGNOSIS — I10 HYPERTENSION, ESSENTIAL: Chronic | ICD-10-CM

## 2017-10-30 DIAGNOSIS — I48.0 PAROXYSMAL ATRIAL FIBRILLATION (HCC): Chronic | ICD-10-CM

## 2017-10-30 DIAGNOSIS — I25.10 CORONARY ARTERY DISEASE INVOLVING NATIVE CORONARY ARTERY OF NATIVE HEART WITHOUT ANGINA PECTORIS: Chronic | ICD-10-CM

## 2017-10-30 DIAGNOSIS — E66.9 NON MORBID OBESITY, UNSPECIFIED OBESITY TYPE: Chronic | ICD-10-CM

## 2017-10-30 PROCEDURE — 3017F COLORECTAL CA SCREEN DOC REV: CPT | Performed by: NURSE PRACTITIONER

## 2017-10-30 PROCEDURE — 1036F TOBACCO NON-USER: CPT | Performed by: NURSE PRACTITIONER

## 2017-10-30 PROCEDURE — G8427 DOCREV CUR MEDS BY ELIG CLIN: HCPCS | Performed by: NURSE PRACTITIONER

## 2017-10-30 PROCEDURE — G8598 ASA/ANTIPLAT THER USED: HCPCS | Performed by: NURSE PRACTITIONER

## 2017-10-30 PROCEDURE — 99214 OFFICE O/P EST MOD 30 MIN: CPT | Performed by: NURSE PRACTITIONER

## 2017-10-30 PROCEDURE — G8417 CALC BMI ABV UP PARAM F/U: HCPCS | Performed by: NURSE PRACTITIONER

## 2017-10-30 PROCEDURE — G8484 FLU IMMUNIZE NO ADMIN: HCPCS | Performed by: NURSE PRACTITIONER

## 2017-10-30 ASSESSMENT — ENCOUNTER SYMPTOMS
COUGH: 0
ABDOMINAL PAIN: 0
APNEA: 0
SINUS PRESSURE: 0
ABDOMINAL DISTENTION: 0
RHINORRHEA: 0
SHORTNESS OF BREATH: 0

## 2017-10-30 ASSESSMENT — SLEEP AND FATIGUE QUESTIONNAIRES
HOW LIKELY ARE YOU TO NOD OFF OR FALL ASLEEP WHILE SITTING AND READING: 1
HOW LIKELY ARE YOU TO NOD OFF OR FALL ASLEEP WHEN YOU ARE A PASSENGER IN A CAR FOR AN HOUR WITHOUT A BREAK: 0
HOW LIKELY ARE YOU TO NOD OFF OR FALL ASLEEP WHILE SITTING AND TALKING TO SOMEONE: 1
ESS TOTAL SCORE: 6
HOW LIKELY ARE YOU TO NOD OFF OR FALL ASLEEP WHILE LYING DOWN TO REST IN THE AFTERNOON WHEN CIRCUMSTANCES PERMIT: 1
HOW LIKELY ARE YOU TO NOD OFF OR FALL ASLEEP WHILE SITTING INACTIVE IN A PUBLIC PLACE: 1
HOW LIKELY ARE YOU TO NOD OFF OR FALL ASLEEP IN A CAR, WHILE STOPPED FOR A FEW MINUTES IN TRAFFIC: 0
HOW LIKELY ARE YOU TO NOD OFF OR FALL ASLEEP WHILE WATCHING TV: 0
HOW LIKELY ARE YOU TO NOD OFF OR FALL ASLEEP WHILE SITTING QUIETLY AFTER LUNCH WITHOUT ALCOHOL: 2

## 2017-10-30 NOTE — PROGRESS NOTES
Review of Systems   Constitutional: Positive for fatigue. Negative for appetite change, chills and fever. HENT: Negative for congestion, nosebleeds, rhinorrhea and sinus pressure. Respiratory: Negative for apnea, cough and shortness of breath. Cardiovascular: Negative for chest pain and palpitations. Gastrointestinal: Negative for abdominal distention and abdominal pain. Neurological: Negative for dizziness and headaches. Social History     Social History    Marital status:      Spouse name: N/A    Number of children: 0    Years of education: N/A     Occupational History    unemployed     former [employer] work       Social History Main Topics    Smoking status: Never Smoker    Smokeless tobacco: Never Used    Alcohol use 0.6 oz/week     1 Standard drinks or equivalent per week      Comment: Rarely; socially    Drug use: No    Sexual activity: Yes     Partners: Female     Birth control/ protection: Condom     Other Topics Concern    Not on file     Social History Narrative    No narrative on file       Prior to Admission medications    Medication Sig Start Date End Date Taking?  Authorizing Provider   lisinopril-hydrochlorothiazide (PRINZIDE;ZESTORETIC) 20-25 MG per tablet Take 1 tablet by mouth daily 9/8/17  Yes Amaury Julien MD   metoprolol succinate (TOPROL XL) 50 MG extended release tablet Take 1 tablet by mouth daily 9/8/17  Yes Amaury Julien MD   rivaroxaban (XARELTO) 20 MG TABS tablet Take 1 tablet by mouth daily 9/8/17  Yes Amaury Julien MD   atorvastatin (LIPITOR) 40 MG tablet Take 1 tablet by mouth every evening 9/8/17  Yes Amaury Julien MD   amLODIPine (NORVASC) 5 MG tablet Take 1 tablet by mouth daily 9/8/17  Yes Amaury Julien MD   tiZANidine (ZANAFLEX) 4 MG tablet TAKE ONE TABLET BY MOUTH EVERY 6 HOURS AS NEEDED FOR MUSCLE SPASMS 8/27/17  Yes Amaury Julien MD   sildenafil (VIAGRA) 50 MG tablet Take 1 tablet by mouth as needed for Erectile Dysfunction 6/20/17  Yes Yong Garcia MD   cetirizine (ZYRTEC) 10 MG tablet  3/19/17  Yes Historical Provider, MD   acetaminophen (TYLENOL) 325 MG tablet Take 2 tablets by mouth every 4 hours as needed for Pain 4/24/17  Yes Jacquelyn Alexander MD   Blood Pressure KIT Use as directed to check blood pressure once a daily and as needed. 10/3/16  Yes Yong Garcia MD       Allergies as of 10/30/2017 - Review Complete 10/30/2017   Allergen Reaction Noted    Sugar-protein-starch  11/25/2016       Patient Active Problem List   Diagnosis    Essential hypertension    Psoriasis-like skin disease    Hyperlipidemia    Prediabetes    Ascending aortic aneurysm ([facility] [address]    Coronary artery disease due to lipid rich plaque    Migraine without aura and without status migrainosus, not intractable    Chronic midline low back pain with sciatica    Proteinuria    Renal cysts    Atrial fibrillation (HCC)    Obstructive sleep apnea    Aortic aneurysm without rupture (HCC)    Epididymal cyst    Lightheadedness       Past Medical History:   Diagnosis Date    Aneurysm of ascending aorta (HCC)     Atopic dermatitis     Back pain     Facial paralysis/[place] palsy     Hyperlipidemia     Hypertension     Imprisonment and other incarceration [id number]    Migraine     Obstructive sleep apnea (adult) (pediatric)     Prediabetes     Psoriasis-like skin disease     Tinea cruris        Past Surgical History:   Procedure Laterality Date    BACK SURGERY  February and October 2008    CORONARY ANGIOPLASTY         Family History   Problem Relation Age of Onset    Other Mother      Polio    Thyroid Disease Mother     Heart Failure Father     Stroke Father        Vitals:  Weight BMI   Wt Readings from Last 3 Encounters:   10/30/17 282 lb (127.9 kg)   10/13/17 278 lb (126.1 kg)   09/21/17 279 lb (126.6 kg)    Body mass index is 34.33 kg/m².      BP HR SaO2   BP Readings from Last 3 Encounters:   10/30/17 132/86   10/13/17

## 2017-11-09 ENCOUNTER — HOSPITAL ENCOUNTER (OUTPATIENT)
Dept: CT IMAGING | Age: 57
Discharge: HOME | End: 2017-11-09
Attending: PSYCHIATRY & NEUROLOGY | Admitting: PSYCHIATRY & NEUROLOGY

## 2017-11-09 DIAGNOSIS — R55 SYNCOPE AND COLLAPSE: ICD-10-CM

## 2017-11-09 DIAGNOSIS — R42 DIZZINESS: ICD-10-CM

## 2017-11-09 DIAGNOSIS — I71.9 AORTIC ANEURYSM WITHOUT RUPTURE (HCC): ICD-10-CM

## 2017-11-13 ENCOUNTER — OFFICE VISIT (OUTPATIENT)
Dept: NEUROLOGY | Age: 57
End: 2017-11-13

## 2017-11-13 VITALS
SYSTOLIC BLOOD PRESSURE: 151 MMHG | DIASTOLIC BLOOD PRESSURE: 93 MMHG | HEIGHT: 76 IN | HEART RATE: 87 BPM | WEIGHT: 283 LBS | BODY MASS INDEX: 34.46 KG/M2

## 2017-11-13 DIAGNOSIS — R55 SYNCOPE AND COLLAPSE: ICD-10-CM

## 2017-11-13 DIAGNOSIS — G47.33 OBSTRUCTIVE SLEEP APNEA: ICD-10-CM

## 2017-11-13 DIAGNOSIS — M54.81 OCCIPITAL NEURALGIA OF RIGHT SIDE: Primary | ICD-10-CM

## 2017-11-13 PROCEDURE — 1036F TOBACCO NON-USER: CPT | Performed by: PSYCHIATRY & NEUROLOGY

## 2017-11-13 PROCEDURE — 3017F COLORECTAL CA SCREEN DOC REV: CPT | Performed by: PSYCHIATRY & NEUROLOGY

## 2017-11-13 PROCEDURE — 99214 OFFICE O/P EST MOD 30 MIN: CPT | Performed by: PSYCHIATRY & NEUROLOGY

## 2017-11-13 PROCEDURE — G8417 CALC BMI ABV UP PARAM F/U: HCPCS | Performed by: PSYCHIATRY & NEUROLOGY

## 2017-11-13 PROCEDURE — G8484 FLU IMMUNIZE NO ADMIN: HCPCS | Performed by: PSYCHIATRY & NEUROLOGY

## 2017-11-13 PROCEDURE — G8598 ASA/ANTIPLAT THER USED: HCPCS | Performed by: PSYCHIATRY & NEUROLOGY

## 2017-11-13 PROCEDURE — G8427 DOCREV CUR MEDS BY ELIG CLIN: HCPCS | Performed by: PSYCHIATRY & NEUROLOGY

## 2017-11-14 PROCEDURE — 93228 REMOTE 30 DAY ECG REV/REPORT: CPT | Performed by: INTERNAL MEDICINE

## 2017-11-20 ENCOUNTER — TELEPHONE (OUTPATIENT)
Dept: CARDIOLOGY CLINIC | Age: 57
End: 2017-11-20

## 2017-11-20 DIAGNOSIS — I10 ESSENTIAL HYPERTENSION: ICD-10-CM

## 2017-11-20 DIAGNOSIS — I71.21 ASCENDING AORTIC ANEURYSM: Chronic | ICD-10-CM

## 2017-11-20 DIAGNOSIS — I48.0 PAROXYSMAL ATRIAL FIBRILLATION (HCC): Chronic | ICD-10-CM

## 2017-11-20 DIAGNOSIS — R42 LIGHTHEADEDNESS: ICD-10-CM

## 2017-11-20 DIAGNOSIS — I25.10 CORONARY ARTERY DISEASE DUE TO LIPID RICH PLAQUE: ICD-10-CM

## 2017-11-20 DIAGNOSIS — I25.83 CORONARY ARTERY DISEASE DUE TO LIPID RICH PLAQUE: ICD-10-CM

## 2017-11-20 NOTE — TELEPHONE ENCOUNTER
I called pt and let him know what [facility] said about his MCOT. I let him know IR heart rhythm at times and that he should see EP. PER [facility]. I let him know that we do not have any openings right now. Pt gave a verbal understanding.       please schedule pt when schedule becomes available

## 2017-11-21 ENCOUNTER — OFFICE VISIT (OUTPATIENT)
Dept: FAMILY MEDICINE CLINIC | Age: 57
End: 2017-11-21

## 2017-11-21 VITALS
DIASTOLIC BLOOD PRESSURE: 100 MMHG | TEMPERATURE: 97.5 F | SYSTOLIC BLOOD PRESSURE: 138 MMHG | HEIGHT: 76 IN | OXYGEN SATURATION: 97 % | HEART RATE: 70 BPM | WEIGHT: 283.8 LBS | BODY MASS INDEX: 34.56 KG/M2 | RESPIRATION RATE: 16 BRPM

## 2017-11-21 DIAGNOSIS — I10 ESSENTIAL HYPERTENSION: Chronic | ICD-10-CM

## 2017-11-21 DIAGNOSIS — R51.9 CHRONIC INTRACTABLE HEADACHE, UNSPECIFIED HEADACHE TYPE: Primary | ICD-10-CM

## 2017-11-21 DIAGNOSIS — G89.29 CHRONIC INTRACTABLE HEADACHE, UNSPECIFIED HEADACHE TYPE: Primary | ICD-10-CM

## 2017-11-21 PROCEDURE — 99214 OFFICE O/P EST MOD 30 MIN: CPT | Performed by: FAMILY MEDICINE

## 2017-11-21 PROCEDURE — G8598 ASA/ANTIPLAT THER USED: HCPCS | Performed by: FAMILY MEDICINE

## 2017-11-21 PROCEDURE — 3017F COLORECTAL CA SCREEN DOC REV: CPT | Performed by: FAMILY MEDICINE

## 2017-11-21 PROCEDURE — 1036F TOBACCO NON-USER: CPT | Performed by: FAMILY MEDICINE

## 2017-11-21 PROCEDURE — G8427 DOCREV CUR MEDS BY ELIG CLIN: HCPCS | Performed by: FAMILY MEDICINE

## 2017-11-21 PROCEDURE — G8417 CALC BMI ABV UP PARAM F/U: HCPCS | Performed by: FAMILY MEDICINE

## 2017-11-21 PROCEDURE — G8484 FLU IMMUNIZE NO ADMIN: HCPCS | Performed by: FAMILY MEDICINE

## 2017-11-21 RX ORDER — AMITRIPTYLINE HYDROCHLORIDE 25 MG/1
25 TABLET, FILM COATED ORAL NIGHTLY
Qty: 30 TABLET | Refills: 1 | Status: SHIPPED | OUTPATIENT
Start: 2017-11-21 | End: 2017-12-15 | Stop reason: SDUPTHER

## 2017-11-21 RX ORDER — TIZANIDINE 4 MG/1
4 TABLET ORAL EVERY 6 HOURS PRN
Qty: 30 TABLET | Refills: 1 | Status: SHIPPED | OUTPATIENT
Start: 2017-11-21 | End: 2017-12-15 | Stop reason: SDUPTHER

## 2017-11-26 ASSESSMENT — ENCOUNTER SYMPTOMS
RESPIRATORY NEGATIVE: 1
EYES NEGATIVE: 1

## 2017-11-26 NOTE — PROGRESS NOTES
Jai Bermudez   YOB: 1960    Date of Visit:  11/21/2017        Chief Complaint   Patient presents with    Headache     Cant seem to get rid of headache x for months now    Hypertension         HPI:    Patient presents for follow-up of chronic right-sided headache that has been ongoing for months. Headache is unchanged in nature. Pain occurs daily and varies in intensity. He currently follows with neurology. CT angiogram of head and neck unremarkable. Is anticipating brain MRI and EEG. Reports to some improvement with tizanidine. Has been off medication for the past few days due to running out. Reports to some elevated BP readings with headache. Endorsing adherence to antihypertensive medication regimen. He denies new neurological or cardiac symptom. Allergies   Allergen Reactions    Sugar-Protein-Starch          Outpatient Prescriptions Marked as Taking for the 11/21/17 encounter (Office Visit) with Luh Polanco MD   Medication Sig Dispense Refill    tiZANidine (ZANAFLEX) 4 MG tablet Take 1 tablet by mouth every 6 hours as needed (for muscle spasm) 30 tablet 1    lisinopril-hydrochlorothiazide (PRINZIDE;ZESTORETIC) 20-25 MG per tablet Take 1 tablet by mouth daily 30 tablet 3    metoprolol succinate (TOPROL XL) 50 MG extended release tablet Take 1 tablet by mouth daily 30 tablet 3    rivaroxaban (XARELTO) 20 MG TABS tablet Take 1 tablet by mouth daily 30 tablet 3    atorvastatin (LIPITOR) 40 MG tablet Take 1 tablet by mouth every evening 30 tablet 3    amLODIPine (NORVASC) 5 MG tablet Take 1 tablet by mouth daily 30 tablet 3    sildenafil (VIAGRA) 50 MG tablet Take 1 tablet by mouth as needed for Erectile Dysfunction 10 tablet 0    cetirizine (ZYRTEC) 10 MG tablet       acetaminophen (TYLENOL) 325 MG tablet Take 2 tablets by mouth every 4 hours as needed for Pain 120 tablet 3    Blood Pressure KIT Use as directed to check blood pressure once a daily and as needed.  1 about 2 months (around 1/21/2018) for migraine.

## 2017-11-30 ENCOUNTER — HOSPITAL ENCOUNTER (OUTPATIENT)
Dept: NEUROLOGY | Age: 57
Discharge: HOME | End: 2017-11-30
Attending: PSYCHIATRY & NEUROLOGY | Admitting: PSYCHIATRY & NEUROLOGY

## 2017-11-30 DIAGNOSIS — R55 SYNCOPE AND COLLAPSE: ICD-10-CM

## 2017-11-30 DIAGNOSIS — I71.9 AORTIC ANEURYSM WITHOUT RUPTURE (HCC): ICD-10-CM

## 2017-11-30 DIAGNOSIS — R42 DIZZINESS: ICD-10-CM

## 2017-12-11 ENCOUNTER — OFFICE VISIT (OUTPATIENT)
Dept: NEUROLOGY | Age: 57
End: 2017-12-11

## 2017-12-11 VITALS
SYSTOLIC BLOOD PRESSURE: 125 MMHG | HEIGHT: 76 IN | DIASTOLIC BLOOD PRESSURE: 81 MMHG | BODY MASS INDEX: 34.83 KG/M2 | HEART RATE: 80 BPM | WEIGHT: 286 LBS

## 2017-12-11 DIAGNOSIS — M54.81 OCCIPITAL NEURALGIA OF RIGHT SIDE: Primary | ICD-10-CM

## 2017-12-11 DIAGNOSIS — I48.0 PAROXYSMAL ATRIAL FIBRILLATION (HCC): Chronic | ICD-10-CM

## 2017-12-11 DIAGNOSIS — R55 SYNCOPE AND COLLAPSE: ICD-10-CM

## 2017-12-11 PROCEDURE — G8417 CALC BMI ABV UP PARAM F/U: HCPCS | Performed by: PSYCHIATRY & NEUROLOGY

## 2017-12-11 PROCEDURE — 99214 OFFICE O/P EST MOD 30 MIN: CPT | Performed by: PSYCHIATRY & NEUROLOGY

## 2017-12-11 PROCEDURE — G8427 DOCREV CUR MEDS BY ELIG CLIN: HCPCS | Performed by: PSYCHIATRY & NEUROLOGY

## 2017-12-11 PROCEDURE — 3017F COLORECTAL CA SCREEN DOC REV: CPT | Performed by: PSYCHIATRY & NEUROLOGY

## 2017-12-11 PROCEDURE — G8598 ASA/ANTIPLAT THER USED: HCPCS | Performed by: PSYCHIATRY & NEUROLOGY

## 2017-12-11 PROCEDURE — G8484 FLU IMMUNIZE NO ADMIN: HCPCS | Performed by: PSYCHIATRY & NEUROLOGY

## 2017-12-11 PROCEDURE — 1036F TOBACCO NON-USER: CPT | Performed by: PSYCHIATRY & NEUROLOGY

## 2017-12-11 RX ORDER — POLYETHYLENE GLYCOL-3350, SODIUM CHLORIDE, POTASSIUM CHLORIDE AND SODIUM BICARBONATE 420; 11.2; 5.72; 1.48 G/438.4G; G/438.4G; G/438.4G; G/438.4G
POWDER, FOR SOLUTION ORAL
COMMUNITY
Start: 2017-12-05 | End: 2018-06-15 | Stop reason: ALTCHOICE

## 2017-12-11 NOTE — PROGRESS NOTES
Tinea judits      Family History   Problem Relation Age of Onset    Other Mother      Polio    Thyroid Disease Mother     Heart Failure Father     Stroke Father      Social History     Social History    Marital status:      Spouse name: N/A    Number of children: 0    Years of education: N/A     Occupational History    unemployed     former [employer] work       Social History Main Topics    Smoking status: Never Smoker    Smokeless tobacco: Never Used    Alcohol use 0.6 oz/week     1 Standard drinks or equivalent per week      Comment: Rarely; socially    Drug use: No    Sexual activity: Yes     Partners: Female     Birth control/ protection: Condom     Other Topics Concern    None     Social History Narrative    None        Objective:  Exam:  /81   Pulse 80   Ht 6' 4\" (1.93 m)   Wt 286 lb (129.7 kg)   BMI 34.81 kg/m²   This is a well-nourished patient in no acute distress  Patient is awake, alert and oriented x3. Speech is normal.  Pupils are equal round reacting to light. Extraocular movements intact. Face symmetrical. Tongue midline. Motor exam shows normal symmetrical strength. Deep tendon reflexes normal. Plantar reflexes downgoing. Sensory exam normal. Coordination normal. Gait normal. No carotid bruit. No neck stiffness.       Data :  LABS:  General Labs:    CBC:   Lab Results   Component Value Date    WBC 7.7 04/24/2017    RBC 4.24 04/24/2017    HGB 12.2 04/24/2017    HCT 36.6 04/24/2017    MCV 86.3 04/24/2017    MCH 28.9 04/24/2017    MCHC 33.5 04/24/2017    RDW 14.0 04/24/2017     04/24/2017    MPV 8.2 04/24/2017     BMP:    Lab Results   Component Value Date     04/24/2017    K 4.0 04/24/2017     04/24/2017    CO2 28 04/24/2017    BUN 14 07/13/2017    LABALBU 4.4 04/23/2017    CREATININE 0.9 07/13/2017    CALCIUM 8.8 04/24/2017    GFRAA >60 07/13/2017    LABGLOM >60 07/13/2017    GLUCOSE 91 04/24/2017     RADIOLOGY REVIEW:  I have reviewed radiology image(s) and reports(s) of:  MRI brain    Impression :  Occipital neuralgia, especially on the right side with tenderness over the right greater occipital nerve   MRI brain images were independently reviewed and showed minimal white matter changes. EEG was normal.    Recurrent episodes of dizziness and syncope, Now resolved  Vertebrobasilar ischemia was considered. However this seems less likely since the CT angiogram images did not show any significant vascular stenosis   Partial seizures will be considered in the differential diagnosis but again or less likely  Nonfocal neurological examination  Atrial fibrillation on anticoagulation with the Xarelto    Plan :  Discussed with patient  Explained MRI brain and EEG results to patient. I'm reluctant to do an occipital nerve block since patient is on anticoagulation with Xarelto. I have recommended that he continue Tizanidine 4 mg at nighttime. He will call me for refills. I will see him back in 4 months for follow-up. Please note a portion of  this chart was generated using dragon dictation software. Although every effort was made to ensure the accuracy of this automated transcription, some errors in transcription may have occurred.

## 2017-12-15 ENCOUNTER — OFFICE VISIT (OUTPATIENT)
Dept: FAMILY MEDICINE CLINIC | Age: 57
End: 2017-12-15

## 2017-12-15 VITALS
BODY MASS INDEX: 34.78 KG/M2 | RESPIRATION RATE: 16 BRPM | WEIGHT: 285.6 LBS | HEART RATE: 67 BPM | DIASTOLIC BLOOD PRESSURE: 90 MMHG | SYSTOLIC BLOOD PRESSURE: 138 MMHG | HEIGHT: 76 IN | OXYGEN SATURATION: 97 % | TEMPERATURE: 98.6 F

## 2017-12-15 DIAGNOSIS — I48.0 PAROXYSMAL ATRIAL FIBRILLATION (HCC): ICD-10-CM

## 2017-12-15 DIAGNOSIS — E78.2 MIXED HYPERLIPIDEMIA: Chronic | ICD-10-CM

## 2017-12-15 DIAGNOSIS — Z12.11 COLON CANCER SCREENING: ICD-10-CM

## 2017-12-15 DIAGNOSIS — I10 ESSENTIAL HYPERTENSION: Chronic | ICD-10-CM

## 2017-12-15 DIAGNOSIS — G89.29 CHRONIC INTRACTABLE HEADACHE, UNSPECIFIED HEADACHE TYPE: Primary | ICD-10-CM

## 2017-12-15 DIAGNOSIS — R51.9 CHRONIC INTRACTABLE HEADACHE, UNSPECIFIED HEADACHE TYPE: Primary | ICD-10-CM

## 2017-12-15 LAB
A/G RATIO: 1.3 (ref 1.1–2.2)
ALBUMIN SERPL-MCNC: 4.3 G/DL (ref 3.4–5)
ALP BLD-CCNC: 71 U/L (ref 40–129)
ALT SERPL-CCNC: 30 U/L (ref 10–40)
ANION GAP SERPL CALCULATED.3IONS-SCNC: 14 MMOL/L (ref 3–16)
AST SERPL-CCNC: 27 U/L (ref 15–37)
BASOPHILS ABSOLUTE: 0.1 K/UL (ref 0–0.2)
BASOPHILS RELATIVE PERCENT: 0.7 %
BILIRUB SERPL-MCNC: <0.2 MG/DL (ref 0–1)
BUN BLDV-MCNC: 14 MG/DL (ref 7–20)
CALCIUM SERPL-MCNC: 9.5 MG/DL (ref 8.3–10.6)
CHLORIDE BLD-SCNC: 100 MMOL/L (ref 99–110)
CHOLESTEROL, TOTAL: 301 MG/DL (ref 0–199)
CO2: 28 MMOL/L (ref 21–32)
CREAT SERPL-MCNC: 1 MG/DL (ref 0.9–1.3)
EOSINOPHILS ABSOLUTE: 0.5 K/UL (ref 0–0.6)
EOSINOPHILS RELATIVE PERCENT: 6.9 %
GFR AFRICAN AMERICAN: >60
GFR NON-AFRICAN AMERICAN: >60
GLOBULIN: 3.2 G/DL
GLUCOSE BLD-MCNC: 91 MG/DL (ref 70–99)
HCT VFR BLD CALC: 42.4 % (ref 40.5–52.5)
HDLC SERPL-MCNC: 35 MG/DL (ref 40–60)
HEMOGLOBIN: 13.9 G/DL (ref 13.5–17.5)
LDL CHOLESTEROL CALCULATED: ABNORMAL MG/DL
LDL CHOLESTEROL DIRECT: 183 MG/DL
LYMPHOCYTES ABSOLUTE: 3.8 K/UL (ref 1–5.1)
LYMPHOCYTES RELATIVE PERCENT: 51.6 %
MCH RBC QN AUTO: 29 PG (ref 26–34)
MCHC RBC AUTO-ENTMCNC: 32.9 G/DL (ref 31–36)
MCV RBC AUTO: 88.1 FL (ref 80–100)
MONOCYTES ABSOLUTE: 0.3 K/UL (ref 0–1.3)
MONOCYTES RELATIVE PERCENT: 4.6 %
NEUTROPHILS ABSOLUTE: 2.7 K/UL (ref 1.7–7.7)
NEUTROPHILS RELATIVE PERCENT: 36.2 %
PDW BLD-RTO: 14.2 % (ref 12.4–15.4)
PLATELET # BLD: 192 K/UL (ref 135–450)
PMV BLD AUTO: 9.1 FL (ref 5–10.5)
POTASSIUM SERPL-SCNC: 4.1 MMOL/L (ref 3.5–5.1)
RBC # BLD: 4.81 M/UL (ref 4.2–5.9)
SODIUM BLD-SCNC: 142 MMOL/L (ref 136–145)
TOTAL PROTEIN: 7.5 G/DL (ref 6.4–8.2)
TRIGL SERPL-MCNC: 464 MG/DL (ref 0–150)
VLDLC SERPL CALC-MCNC: ABNORMAL MG/DL
WBC # BLD: 7.4 K/UL (ref 4–11)

## 2017-12-15 PROCEDURE — G8417 CALC BMI ABV UP PARAM F/U: HCPCS | Performed by: FAMILY MEDICINE

## 2017-12-15 PROCEDURE — G8484 FLU IMMUNIZE NO ADMIN: HCPCS | Performed by: FAMILY MEDICINE

## 2017-12-15 PROCEDURE — 1036F TOBACCO NON-USER: CPT | Performed by: FAMILY MEDICINE

## 2017-12-15 PROCEDURE — 99214 OFFICE O/P EST MOD 30 MIN: CPT | Performed by: FAMILY MEDICINE

## 2017-12-15 PROCEDURE — G8598 ASA/ANTIPLAT THER USED: HCPCS | Performed by: FAMILY MEDICINE

## 2017-12-15 PROCEDURE — G8427 DOCREV CUR MEDS BY ELIG CLIN: HCPCS | Performed by: FAMILY MEDICINE

## 2017-12-15 PROCEDURE — 3017F COLORECTAL CA SCREEN DOC REV: CPT | Performed by: FAMILY MEDICINE

## 2017-12-15 RX ORDER — LISINOPRIL AND HYDROCHLOROTHIAZIDE 25; 20 MG/1; MG/1
1 TABLET ORAL DAILY
Qty: 30 TABLET | Refills: 5 | Status: SHIPPED | OUTPATIENT
Start: 2017-12-15 | End: 2018-06-15 | Stop reason: SDUPTHER

## 2017-12-15 RX ORDER — METOPROLOL SUCCINATE 50 MG/1
50 TABLET, EXTENDED RELEASE ORAL DAILY
Qty: 30 TABLET | Refills: 5 | Status: SHIPPED | OUTPATIENT
Start: 2017-12-15 | End: 2018-06-15 | Stop reason: SDUPTHER

## 2017-12-15 RX ORDER — AMLODIPINE BESYLATE 5 MG/1
5 TABLET ORAL DAILY
Qty: 30 TABLET | Refills: 5 | Status: SHIPPED | OUTPATIENT
Start: 2017-12-15 | End: 2018-06-15 | Stop reason: SDUPTHER

## 2017-12-15 RX ORDER — ATORVASTATIN CALCIUM 40 MG/1
40 TABLET, FILM COATED ORAL EVERY EVENING
Qty: 30 TABLET | Refills: 5 | Status: SHIPPED | OUTPATIENT
Start: 2017-12-15 | End: 2018-06-15 | Stop reason: SDUPTHER

## 2017-12-15 RX ORDER — AMITRIPTYLINE HYDROCHLORIDE 25 MG/1
25 TABLET, FILM COATED ORAL NIGHTLY
Qty: 30 TABLET | Refills: 5 | Status: SHIPPED | OUTPATIENT
Start: 2017-12-15 | End: 2018-04-02 | Stop reason: CLARIF

## 2017-12-15 RX ORDER — TIZANIDINE 4 MG/1
4 TABLET ORAL EVERY 6 HOURS PRN
Qty: 30 TABLET | Refills: 5 | Status: SHIPPED | OUTPATIENT
Start: 2017-12-15 | End: 2018-06-15 | Stop reason: SDUPTHER

## 2017-12-15 ASSESSMENT — ENCOUNTER SYMPTOMS
EYES NEGATIVE: 1
RESPIRATORY NEGATIVE: 1

## 2017-12-15 NOTE — PROGRESS NOTES
Sodium (mmol/L)   Date Value   04/24/2017 140    BUN (mg/dL)   Date Value   07/13/2017 14    Glucose (mg/dL)   Date Value   04/24/2017 91      Potassium (mmol/L)   Date Value   04/24/2017 4.0    CREATININE (mg/dL)   Date Value   07/13/2017 0.9           BP Readings from Last 2 Encounters:   12/11/17 125/81   11/21/17 (!) 138/100       Is patient currently taking any antihypertensive medications? Yes   If yes, see med list as above    Is the patient reporting any side effects of antihypertensive medications? No    Is the patient taking any over the counter medications? No   If yes, see med list as above    Is the patient taking a daily aspirin? No  Self- Management Goals for the Patient with Hypertension: It is important to have goals to work towards when you have Hypertension. Below is a list of goals your doctor would like you to work towards to help control your hypertension and also maintain and improve your overall health. Please select one of these goals to try before your next follow up visit:    Goal: I will take all medications as prescribed by my doctor, and I will call the office if I am having any medication problems. Guess your barriers before they happen. Everyone runs into barriers to their goals. You may already know what's going to get in your way. Write down these problems (cost? time? stress? fear?), and think of ways to get around them.      Barriers to success: none  Plan for overcoming my barriers: N/A     Confidence: 10/10  Date goal set: 12/15/17  Date goal attained:

## 2017-12-15 NOTE — PROGRESS NOTES
Cardiovascular: Negative. Genitourinary: Negative. Musculoskeletal: Negative. Neurological: Negative. Physical Exam   Constitutional: He appears well-developed and well-nourished. No distress. HENT:   Head: Normocephalic and atraumatic. Right Ear: Hearing and external ear normal.   Left Ear: Hearing and external ear normal.   Nose: Nose normal. No rhinorrhea. Eyes: Conjunctivae and EOM are normal. No scleral icterus. Neck: Neck supple. No JVD present. No thyromegaly present. Cardiovascular: Normal rate, regular rhythm and intact distal pulses. Pulmonary/Chest: Effort normal and breath sounds normal.   Abdominal: Soft. Bowel sounds are normal.   Musculoskeletal: He exhibits no edema or tenderness. Neurological: He is alert. He is not disoriented. Coordination normal.   Psychiatric: He has a normal mood and affect. His behavior is normal.   Vitals reviewed. Vitals:    12/15/17 1024   BP: (!) 138/90   Site: Left Arm   Position: Sitting   Cuff Size: Large Adult   Pulse: 67   Resp: 16   Temp: 98.6 °F (37 °C)   TempSrc: Oral   SpO2: 97%   Weight: 285 lb 9.6 oz (129.5 kg)   Height: 6' 4\" (1.93 m)     Body mass index is 34.76 kg/m². Wt Readings from Last 3 Encounters:   12/15/17 285 lb 9.6 oz (129.5 kg)   12/11/17 286 lb (129.7 kg)   11/21/17 283 lb 12.8 oz (128.7 kg)     BP Readings from Last 3 Encounters:   12/15/17 (!) 138/90   12/11/17 125/81   11/21/17 (!) 138/100            Assessment/Plan     1. Chronic intractable headache  - Manageable on tizanidine and amitriptyline. - Follows with neurology. 2. Essential hypertension  - Borderline elevated today. Goal is less than 140/90. Observe for now given report of normal readings at home and at prior in-office checks. - Continue lisinopril-hydrochlorothiazide, metoprolol succinate and amlodipine. - Check labs.      3. Mixed hyperlipidemia  - Uncontrolled based on lab from 3/2017, recheck lipid panel.  - Continue atorvastatin, dose adjustment based on updated results. 4. Paroxysmal atrial fibrillation (HCC)  - Rate and rhythm controlled on exam.  - Continue metoprolol succinate and rivaroxaban. 5. Colon cancer screening  - Scheduled for 1/30/18. Discussed medications with patient, who voiced understanding of their use and indications. All questions answered. Return in about 6 months (around 6/15/2018) for chronic conditions. Please note that some or all of this record was generated using voice recognition software. If there are any questions about the content of this document, please contact the author as some errors in transcription may have occurred.

## 2017-12-27 ENCOUNTER — OFFICE VISIT (OUTPATIENT)
Dept: CARDIOLOGY CLINIC | Age: 57
End: 2017-12-27

## 2017-12-27 VITALS
DIASTOLIC BLOOD PRESSURE: 86 MMHG | HEIGHT: 76 IN | HEART RATE: 80 BPM | OXYGEN SATURATION: 97 % | WEIGHT: 286 LBS | BODY MASS INDEX: 34.83 KG/M2 | SYSTOLIC BLOOD PRESSURE: 134 MMHG

## 2017-12-27 DIAGNOSIS — I25.10 CORONARY ARTERY DISEASE DUE TO LIPID RICH PLAQUE: Chronic | ICD-10-CM

## 2017-12-27 DIAGNOSIS — E78.2 MIXED HYPERLIPIDEMIA: Chronic | ICD-10-CM

## 2017-12-27 DIAGNOSIS — I10 ESSENTIAL HYPERTENSION: Primary | Chronic | ICD-10-CM

## 2017-12-27 DIAGNOSIS — I71.21 ASCENDING AORTIC ANEURYSM: Chronic | ICD-10-CM

## 2017-12-27 DIAGNOSIS — I25.83 CORONARY ARTERY DISEASE DUE TO LIPID RICH PLAQUE: Chronic | ICD-10-CM

## 2017-12-27 DIAGNOSIS — I48.0 PAROXYSMAL ATRIAL FIBRILLATION (HCC): Chronic | ICD-10-CM

## 2017-12-27 PROCEDURE — G8427 DOCREV CUR MEDS BY ELIG CLIN: HCPCS | Performed by: INTERNAL MEDICINE

## 2017-12-27 PROCEDURE — G8417 CALC BMI ABV UP PARAM F/U: HCPCS | Performed by: INTERNAL MEDICINE

## 2017-12-27 PROCEDURE — 99244 OFF/OP CNSLTJ NEW/EST MOD 40: CPT | Performed by: INTERNAL MEDICINE

## 2017-12-27 PROCEDURE — 3017F COLORECTAL CA SCREEN DOC REV: CPT | Performed by: INTERNAL MEDICINE

## 2017-12-27 PROCEDURE — 93000 ELECTROCARDIOGRAM COMPLETE: CPT | Performed by: INTERNAL MEDICINE

## 2017-12-27 PROCEDURE — G8484 FLU IMMUNIZE NO ADMIN: HCPCS | Performed by: INTERNAL MEDICINE

## 2017-12-27 NOTE — PATIENT INSTRUCTIONS
Patient Education        Fainting: Care Instructions  Your Care Instructions    When you faint, or pass out, you lose consciousness for a short time. A brief drop in blood flow to the brain often causes it. When you fall or lie down, more blood flows to your brain and you regain consciousness. Emotional stress, pain, or overheating-especially if you have been standing-can make you faint. In these cases, fainting is usually not serious. But fainting can be a sign of a more serious problem. Your doctor may want you to have more tests to rule out other causes. The treatment you need depends on the reason why you fainted. The doctor has checked you carefully, but problems can develop later. If you notice any problems or new symptoms, get medical treatment right away. Follow-up care is a key part of your treatment and safety. Be sure to make and go to all appointments, and call your doctor if you are having problems. It's also a good idea to know your test results and keep a list of the medicines you take. How can you care for yourself at home? · Drink plenty of fluids to prevent dehydration. If you have kidney, heart, or liver disease and have to limit fluids, talk with your doctor before you increase your fluid intake. When should you call for help? Call 911 anytime you think you may need emergency care. For example, call if:  ? · You have symptoms of a heart problem. These may include:  ¨ Chest pain or pressure. ¨ Severe trouble breathing. ¨ A fast or irregular heartbeat. ¨ Lightheadedness or sudden weakness. ¨ Coughing up pink, foamy mucus. ¨ Passing out. After you call 911, the  may tell you to chew 1 adult-strength or 2 to 4 low-dose aspirin. Wait for an ambulance. Do not try to drive yourself. ? · You have symptoms of a stroke. These may include:  ¨ Sudden numbness, tingling, weakness, or loss of movement in your face, arm, or leg, especially on only one side of your body.   ¨ Sudden vision

## 2017-12-27 NOTE — PROGRESS NOTES
has a past medical history of Aneurysm of ascending aorta ([facility] [address] Atopic dermatitis; Back pain; Facial paralysis/[place] palsy; Hyperlipidemia; Hypertension; Imprisonment and other incarceration; Migraine; Obstructive sleep apnea (adult) (pediatric); Prediabetes; Psoriasis-like skin disease; and Tinea cruris. Surgical History:   has a past surgical history that includes Coronary angioplasty and back surgery (February and October 2008). Social History:   reports that he has never smoked. He has never used smokeless tobacco. He reports that he drinks about 0.6 oz of alcohol per week . He reports that he does not use drugs. Family History:  family history includes Heart Failure in his father; Other in his mother; Stroke in his father; Thyroid Disease in his mother. Home Medications:  Outpatient Encounter Prescriptions as of 12/27/2017   Medication Sig Dispense Refill    tiZANidine (ZANAFLEX) 4 MG tablet Take 1 tablet by mouth every 6 hours as needed (for muscle spasm) 30 tablet 5    amitriptyline (ELAVIL) 25 MG tablet Take 1 tablet by mouth nightly for migraine prophylaxis 30 tablet 5    lisinopril-hydrochlorothiazide (PRINZIDE;ZESTORETIC) 20-25 MG per tablet Take 1 tablet by mouth daily 30 tablet 5    metoprolol succinate (TOPROL XL) 50 MG extended release tablet Take 1 tablet by mouth daily 30 tablet 5    rivaroxaban (XARELTO) 20 MG TABS tablet Take 1 tablet by mouth daily 30 tablet 5    atorvastatin (LIPITOR) 40 MG tablet Take 1 tablet by mouth every evening 30 tablet 5    amLODIPine (NORVASC) 5 MG tablet Take 1 tablet by mouth daily 30 tablet 5    GAVILYTE-N WITH FLAVOR PACK 420 g solution       Blood Pressure KIT Use as directed to check blood pressure once a daily and as needed. 1 kit 0     No facility-administered encounter medications on file as of 12/27/2017. Allergies:  Sugar-protein-starch     Review of Systems   Constitutional: Negative for activity change and appetite change. lipitor    Plan:    -Follow up with Bharathi Sams for chest pain/sob  -Follow up with Vern Cochran as arranged  ILR if agrees     Elisabeth Gee.  Herbert RUSS

## 2018-01-02 DIAGNOSIS — E78.2 MIXED HYPERLIPIDEMIA: Primary | Chronic | ICD-10-CM

## 2018-01-02 RX ORDER — FENOFIBRATE 48 MG/1
48 TABLET, COATED ORAL DAILY
Qty: 30 TABLET | Refills: 5 | Status: SHIPPED | OUTPATIENT
Start: 2018-01-02 | End: 2018-01-18 | Stop reason: ALTCHOICE

## 2018-01-17 ENCOUNTER — TELEPHONE (OUTPATIENT)
Dept: CARDIOTHORACIC SURGERY | Age: 58
End: 2018-01-17

## 2018-01-17 DIAGNOSIS — I77.810 DILATATION OF THORACIC AORTA (HCC): Primary | ICD-10-CM

## 2018-01-17 NOTE — TELEPHONE ENCOUNTER
Spoke with patient regarding schedule of surveillance CT chest without contrast for assessment of dilated ascending aorta on 2/6/2018 @ 1:30 pm with arrival time of 12:45 pm @ [facility]. Patient instructed to avoid wearing metal or jewelry.

## 2018-01-17 NOTE — TELEPHONE ENCOUNTER
Pt is in CT surveillance for dilated ascending aorta. He is scheduled for CT chest w/o contrast on 2/6/18 at 12:45 at [facility]. Order placed in [address] [place] [address] Rd.

## 2018-01-18 ENCOUNTER — OFFICE VISIT (OUTPATIENT)
Dept: FAMILY MEDICINE CLINIC | Age: 58
End: 2018-01-18

## 2018-01-18 VITALS
RESPIRATION RATE: 16 BRPM | OXYGEN SATURATION: 98 % | DIASTOLIC BLOOD PRESSURE: 80 MMHG | BODY MASS INDEX: 34.73 KG/M2 | HEIGHT: 76 IN | SYSTOLIC BLOOD PRESSURE: 122 MMHG | WEIGHT: 285.2 LBS | HEART RATE: 82 BPM

## 2018-01-18 DIAGNOSIS — R51.9 HEADACHE DISORDER: Primary | ICD-10-CM

## 2018-01-18 DIAGNOSIS — E78.2 MIXED HYPERLIPIDEMIA: Chronic | ICD-10-CM

## 2018-01-18 PROCEDURE — 3017F COLORECTAL CA SCREEN DOC REV: CPT | Performed by: FAMILY MEDICINE

## 2018-01-18 PROCEDURE — G8427 DOCREV CUR MEDS BY ELIG CLIN: HCPCS | Performed by: FAMILY MEDICINE

## 2018-01-18 PROCEDURE — 99214 OFFICE O/P EST MOD 30 MIN: CPT | Performed by: FAMILY MEDICINE

## 2018-01-18 PROCEDURE — G8484 FLU IMMUNIZE NO ADMIN: HCPCS | Performed by: FAMILY MEDICINE

## 2018-01-18 PROCEDURE — G8598 ASA/ANTIPLAT THER USED: HCPCS | Performed by: FAMILY MEDICINE

## 2018-01-18 PROCEDURE — 1036F TOBACCO NON-USER: CPT | Performed by: FAMILY MEDICINE

## 2018-01-18 PROCEDURE — G8417 CALC BMI ABV UP PARAM F/U: HCPCS | Performed by: FAMILY MEDICINE

## 2018-01-18 NOTE — PATIENT INSTRUCTIONS
for good. How is high cholesterol treated? The goal of treatment is to reduce your chances of having a heart attack or stroke. The goal is not to lower your cholesterol numbers only. · You may make lifestyle changes, such as eating healthy foods, not smoking, losing weight, and being more active. · You may have to take medicine. Follow-up care is a key part of your treatment and safety. Be sure to make and go to all appointments, and call your doctor if you are having problems. It's also a good idea to know your test results and keep a list of the medicines you take. Where can you learn more? Go to https://chpepiceweb.[email or web address] org and sign in to your [employer] account. Enter Z169 in the [employer] box to learn more about \"Learning About High Cholesterol. \"     If you do not have an account, please click on the \"Sign Up Now\" link. Current as of: September 21, 2016  Content Version: 11.5  © 3836-3422 [employer]. Care instructions adapted under license by [facility] ([facility]). If you have questions about a medical condition or this instruction, always ask your healthcare professional. [place] [address] any warranty or liability for your use of this information. Patient Education        High Cholesterol: Care Instructions  Your Care Instructions    Cholesterol is a type of fat in your blood. It is needed for many body functions, such as making new cells. Cholesterol is made by your body. It also comes from food you eat. High cholesterol means that you have too much of the fat in your blood. This raises your risk of a heart attack and stroke. LDL and HDL are part of your total cholesterol. LDL is the \"bad\" cholesterol. High LDL can raise your risk for heart disease, heart attack, and stroke. HDL is the \"good\" cholesterol. It helps clear bad cholesterol from the body. High HDL is linked with a lower risk of heart disease, heart attack, and stroke.   Your cholesterol your medicine. Where can you learn more? Go to https://chpepiceweb.[email or web address]. org and sign in to your [employer] account. Enter E802 in the [employer] box to learn more about \"High Cholesterol: Care Instructions. \"     If you do not have an account, please click on the \"Sign Up Now\" link. Current as of: September 21, 2016  Content Version: 11.5  © 3385-5130 Healthwise[employer] Inc[employer]. Care instructions adapted under license by [facility] ([facility]). If you have questions about a medical condition or this instruction, always ask your healthcare professional. [address] any warranty or liability for your use of this information.

## 2018-01-24 ASSESSMENT — ENCOUNTER SYMPTOMS: RESPIRATORY NEGATIVE: 1

## 2018-01-24 NOTE — PROGRESS NOTES
mass index is 34.72 kg/m². Wt Readings from Last 3 Encounters:   01/18/18 285 lb 3.2 oz (129.4 kg)   12/27/17 286 lb (129.7 kg)   12/15/17 285 lb 9.6 oz (129.5 kg)     BP Readings from Last 3 Encounters:   01/18/18 122/80   12/27/17 134/86   12/15/17 (!) 138/90            Assessment/Plan     1. Headache disorder  - Improved on tizanidine and amitriptyline. Follows with neurology. 2. Mixed hyperlipidemia  - Worsened from previous. Previously prescribed Lipitor. Lengthy discussion regarding importance of medication adherence. Follow-up in office as scheduled.

## 2018-01-30 ENCOUNTER — HOSPITAL ENCOUNTER (OUTPATIENT)
Dept: ENDOSCOPY | Age: 58
Discharge: HOME | End: 2018-01-30
Attending: INTERNAL MEDICINE | Admitting: INTERNAL MEDICINE

## 2018-01-30 DIAGNOSIS — I71.9 AORTIC ANEURYSM WITHOUT RUPTURE (HCC): ICD-10-CM

## 2018-01-30 NOTE — ANESTHESIA PRE-OP
Department of Anesthesiology  Preprocedure Note       Name:  Francois Matthew   Age:  56 y.o.  :  1960                                          MRN:  [id number]         Date:  2018      Surgeon:    Procedure:    Medications prior to admission:   Prior to Admission medications    Medication Sig Start Date End Date Taking? Authorizing Provider   tiZANidine (ZANAFLEX) 4 MG tablet Take 1 tablet by mouth every 6 hours as needed (for muscle spasm) 12/15/17   Jacques Victor MD   amitriptyline (ELAVIL) 25 MG tablet Take 1 tablet by mouth nightly for migraine prophylaxis 12/15/17   Jacques Victor MD   lisinopril-hydrochlorothiazide (PRINZIDE;ZESTORETIC) 20-25 MG per tablet Take 1 tablet by mouth daily 12/15/17   Jacques Victor MD   metoprolol succinate (TOPROL XL) 50 MG extended release tablet Take 1 tablet by mouth daily 12/15/17   Jacques Victor MD   rivaroxaban (XARELTO) 20 MG TABS tablet Take 1 tablet by mouth daily 12/15/17   Jacques Victor MD   atorvastatin (LIPITOR) 40 MG tablet Take 1 tablet by mouth every evening 12/15/17   Jacques Victor MD   amLODIPine (NORVASC) 5 MG tablet Take 1 tablet by mouth daily 12/15/17   Jacques Victor MD   GAVILYTE-N WITH FLAVOR PACK 420 g solution  17   Historical Provider, MD   Blood Pressure KIT Use as directed to check blood pressure once a daily and as needed.  10/3/16   Jacques Victor MD       Current medications:    Current Outpatient Prescriptions   Medication Sig Dispense Refill    tiZANidine (ZANAFLEX) 4 MG tablet Take 1 tablet by mouth every 6 hours as needed (for muscle spasm) 30 tablet 5    amitriptyline (ELAVIL) 25 MG tablet Take 1 tablet by mouth nightly for migraine prophylaxis 30 tablet 5    lisinopril-hydrochlorothiazide (PRINZIDE;ZESTORETIC) 20-25 MG per tablet Take 1 tablet by mouth daily 30 tablet 5    metoprolol succinate (TOPROL XL) 50 MG extended release tablet Take 1 tablet by mouth daily 30 tablet 5    rivaroxaban risks discussed with patient. Plan discussed with CRNA.                   Nicolás Dennison MD   1/30/2018

## 2018-02-05 ENCOUNTER — TELEPHONE (OUTPATIENT)
Dept: CARDIOTHORACIC SURGERY | Age: 58
End: 2018-02-05

## 2018-02-05 NOTE — TELEPHONE ENCOUNTER
Call received from [facility] with Erna Hidden [phone] stating that the CT chest without contrast sched for pt tomorrow now requires a peer to peer review. Contacted  Peer lorna, Dr. Samuel Lubin, who agreed to discuss tracking #[id number]. Following conversation, provision of information, it was determined that the pt is authorized to proceed with the CT. PA #[id number]. Call placed to Sofya to notify her of #.   Requested call back.  Garrett Giang

## 2018-02-06 ENCOUNTER — HOSPITAL ENCOUNTER (OUTPATIENT)
Dept: CT IMAGING | Age: 58
Discharge: HOME | End: 2018-02-06
Attending: THORACIC SURGERY (CARDIOTHORACIC VASCULAR SURGERY) | Admitting: THORACIC SURGERY (CARDIOTHORACIC VASCULAR SURGERY)

## 2018-02-06 DIAGNOSIS — I77.810 DILATATION OF THORACIC AORTA (HCC): ICD-10-CM

## 2018-02-06 DIAGNOSIS — I71.9 AORTIC ANEURYSM WITHOUT RUPTURE (HCC): ICD-10-CM

## 2018-02-11 NOTE — PROGRESS NOTES
[facility]   Cardiac Consultation    Referring Provider:  Mary Anne Tong MD     Chief Complaint   Patient presents with    Follow-up     no cardiac complaints     Coronary Artery Disease        History of Present Illness:   Mr. Marley Muñiz presents for evaluation for Ascending aortic aneurysm and CAD. The Aneurysm was found in 2011. It was being followed yearly thru 2015 with MRA/CTA. He had a prior angiogram in 2014 while hospitalized in [facility] (? Results). 11/2016 MFF nuclear stress test done to evaluate chest pain was normal. Due to persistent chest discomfort, LHC was done 3/2017 that showed nonobstructive CAD. He was seen by Dr. Alma Hall, CVT surgery, for his aortic aneurysm and recommended blood pressure control and serial CTs. He was hospitalized with shortness of breath and heart racing in 4/2017 and found to be in AF with RVR. He spontaneously converted to SR after IV Dilt gtt started. He is anticoagulated with Xarelto. Sleep apnea is treated with CPAP. He has a history of dizziness and black out spells over the past 7 years. Neuro evaluation by Dr. Arcadio Juarez showed no significant vascular stenosis, normal EEG, and MRI of brain showing minimal white matter. He was evaluated by Dr. Wagner Syed as well. MCOT showed dizziness associated with NSR/ST (had NSVT and short PAT not associated with symptoms). ILR was offered to further evaluate for arrhythmias as the etiology of symptoms. Today, he is doing well from the cardiac standpoint. He denies chest pain, shortness of breath, dizziness or black out spells. He feels occasional flip flop or heart rate speeds up for 1-2 seconds. He is tolerating Xarelto well without bleeding or unusual bruising. He is working on weight loss by drinking honey and vinegar nightly. He uses CPAP machine regularly. He admits he was not taking Lipitor regularly at the time of December lipid check.   He is changing is diet and trying to reduce red meat

## 2018-02-12 ENCOUNTER — OFFICE VISIT (OUTPATIENT)
Dept: CARDIOLOGY CLINIC | Age: 58
End: 2018-02-12

## 2018-02-12 VITALS
DIASTOLIC BLOOD PRESSURE: 90 MMHG | OXYGEN SATURATION: 98 % | BODY MASS INDEX: 34.33 KG/M2 | WEIGHT: 282 LBS | HEART RATE: 78 BPM | SYSTOLIC BLOOD PRESSURE: 140 MMHG

## 2018-02-12 DIAGNOSIS — I71.21 ASCENDING AORTIC ANEURYSM: Chronic | ICD-10-CM

## 2018-02-12 DIAGNOSIS — I25.83 CORONARY ARTERY DISEASE DUE TO LIPID RICH PLAQUE: Primary | Chronic | ICD-10-CM

## 2018-02-12 DIAGNOSIS — I48.0 PAROXYSMAL ATRIAL FIBRILLATION (HCC): Chronic | ICD-10-CM

## 2018-02-12 DIAGNOSIS — E78.2 MIXED HYPERLIPIDEMIA: Chronic | ICD-10-CM

## 2018-02-12 DIAGNOSIS — I10 ESSENTIAL HYPERTENSION: Chronic | ICD-10-CM

## 2018-02-12 DIAGNOSIS — I25.10 CORONARY ARTERY DISEASE DUE TO LIPID RICH PLAQUE: Primary | Chronic | ICD-10-CM

## 2018-02-12 DIAGNOSIS — G47.33 OBSTRUCTIVE SLEEP APNEA: ICD-10-CM

## 2018-02-12 PROCEDURE — G8484 FLU IMMUNIZE NO ADMIN: HCPCS | Performed by: INTERNAL MEDICINE

## 2018-02-12 PROCEDURE — 1036F TOBACCO NON-USER: CPT | Performed by: INTERNAL MEDICINE

## 2018-02-12 PROCEDURE — G8417 CALC BMI ABV UP PARAM F/U: HCPCS | Performed by: INTERNAL MEDICINE

## 2018-02-12 PROCEDURE — G8427 DOCREV CUR MEDS BY ELIG CLIN: HCPCS | Performed by: INTERNAL MEDICINE

## 2018-02-12 PROCEDURE — 3017F COLORECTAL CA SCREEN DOC REV: CPT | Performed by: INTERNAL MEDICINE

## 2018-02-12 PROCEDURE — G8598 ASA/ANTIPLAT THER USED: HCPCS | Performed by: INTERNAL MEDICINE

## 2018-02-12 PROCEDURE — 99214 OFFICE O/P EST MOD 30 MIN: CPT | Performed by: INTERNAL MEDICINE

## 2018-02-12 NOTE — PATIENT INSTRUCTIONS
-12/2017 TC- 301, TG- 464, HDL- 35. LCL (direct)- 183.   -Goal LDL < 100. - BP goal goal < 130/80, preferably around 120/70.    - Work on diet, weight loss, and exercise to improve BP and bad cholesterol.

## 2018-02-15 ENCOUNTER — TELEPHONE (OUTPATIENT)
Dept: CARDIOTHORACIC SURGERY | Age: 58
End: 2018-02-15

## 2018-04-02 ENCOUNTER — OFFICE VISIT (OUTPATIENT)
Dept: NEUROLOGY | Age: 58
End: 2018-04-02

## 2018-04-02 VITALS
BODY MASS INDEX: 34.95 KG/M2 | SYSTOLIC BLOOD PRESSURE: 150 MMHG | WEIGHT: 287 LBS | HEIGHT: 76 IN | HEART RATE: 90 BPM | DIASTOLIC BLOOD PRESSURE: 95 MMHG

## 2018-04-02 DIAGNOSIS — M54.81 OCCIPITAL NEURALGIA OF RIGHT SIDE: Primary | ICD-10-CM

## 2018-04-02 PROCEDURE — 3017F COLORECTAL CA SCREEN DOC REV: CPT | Performed by: PSYCHIATRY & NEUROLOGY

## 2018-04-02 PROCEDURE — 99213 OFFICE O/P EST LOW 20 MIN: CPT | Performed by: PSYCHIATRY & NEUROLOGY

## 2018-04-02 PROCEDURE — G8427 DOCREV CUR MEDS BY ELIG CLIN: HCPCS | Performed by: PSYCHIATRY & NEUROLOGY

## 2018-04-02 PROCEDURE — 1036F TOBACCO NON-USER: CPT | Performed by: PSYCHIATRY & NEUROLOGY

## 2018-04-02 PROCEDURE — G8417 CALC BMI ABV UP PARAM F/U: HCPCS | Performed by: PSYCHIATRY & NEUROLOGY

## 2018-04-02 PROCEDURE — G8598 ASA/ANTIPLAT THER USED: HCPCS | Performed by: PSYCHIATRY & NEUROLOGY

## 2018-06-15 ENCOUNTER — OFFICE VISIT (OUTPATIENT)
Dept: FAMILY MEDICINE CLINIC | Age: 58
End: 2018-06-15

## 2018-06-15 VITALS
BODY MASS INDEX: 34.33 KG/M2 | RESPIRATION RATE: 16 BRPM | HEART RATE: 78 BPM | TEMPERATURE: 98.7 F | WEIGHT: 282 LBS | SYSTOLIC BLOOD PRESSURE: 138 MMHG | DIASTOLIC BLOOD PRESSURE: 86 MMHG

## 2018-06-15 DIAGNOSIS — E78.2 MIXED HYPERLIPIDEMIA: Chronic | ICD-10-CM

## 2018-06-15 DIAGNOSIS — I48.0 PAROXYSMAL ATRIAL FIBRILLATION (HCC): ICD-10-CM

## 2018-06-15 DIAGNOSIS — M54.81 OCCIPITAL NEURALGIA, UNSPECIFIED LATERALITY: ICD-10-CM

## 2018-06-15 DIAGNOSIS — I10 ESSENTIAL HYPERTENSION: Chronic | ICD-10-CM

## 2018-06-15 DIAGNOSIS — I10 ESSENTIAL HYPERTENSION: Primary | Chronic | ICD-10-CM

## 2018-06-15 LAB
A/G RATIO: 1.5 (ref 1.1–2.2)
ALBUMIN SERPL-MCNC: 4.4 G/DL (ref 3.4–5)
ALP BLD-CCNC: 67 U/L (ref 40–129)
ALT SERPL-CCNC: 37 U/L (ref 10–40)
ANION GAP SERPL CALCULATED.3IONS-SCNC: 15 MMOL/L (ref 3–16)
AST SERPL-CCNC: 34 U/L (ref 15–37)
BASOPHILS ABSOLUTE: 0 K/UL (ref 0–0.2)
BASOPHILS RELATIVE PERCENT: 0.7 %
BILIRUB SERPL-MCNC: 0.3 MG/DL (ref 0–1)
BUN BLDV-MCNC: 12 MG/DL (ref 7–20)
CALCIUM SERPL-MCNC: 9.7 MG/DL (ref 8.3–10.6)
CHLORIDE BLD-SCNC: 99 MMOL/L (ref 99–110)
CHOLESTEROL, TOTAL: 283 MG/DL (ref 0–199)
CO2: 26 MMOL/L (ref 21–32)
CREAT SERPL-MCNC: 0.9 MG/DL (ref 0.9–1.3)
EOSINOPHILS ABSOLUTE: 0.4 K/UL (ref 0–0.6)
EOSINOPHILS RELATIVE PERCENT: 6.9 %
GFR AFRICAN AMERICAN: >60
GFR NON-AFRICAN AMERICAN: >60
GLOBULIN: 2.9 G/DL
GLUCOSE BLD-MCNC: 98 MG/DL (ref 70–99)
HCT VFR BLD CALC: 42.3 % (ref 40.5–52.5)
HDLC SERPL-MCNC: 32 MG/DL (ref 40–60)
HEMOGLOBIN: 14.2 G/DL (ref 13.5–17.5)
LDL CHOLESTEROL CALCULATED: ABNORMAL MG/DL
LYMPHOCYTES ABSOLUTE: 2.8 K/UL (ref 1–5.1)
LYMPHOCYTES RELATIVE PERCENT: 51.4 %
MCH RBC QN AUTO: 29.4 PG (ref 26–34)
MCHC RBC AUTO-ENTMCNC: 33.6 G/DL (ref 31–36)
MCV RBC AUTO: 87.4 FL (ref 80–100)
MONOCYTES ABSOLUTE: 0.3 K/UL (ref 0–1.3)
MONOCYTES RELATIVE PERCENT: 6 %
NEUTROPHILS ABSOLUTE: 1.9 K/UL (ref 1.7–7.7)
NEUTROPHILS RELATIVE PERCENT: 35 %
PDW BLD-RTO: 13.7 % (ref 12.4–15.4)
PLATELET # BLD: 160 K/UL (ref 135–450)
PMV BLD AUTO: 9.2 FL (ref 5–10.5)
POTASSIUM SERPL-SCNC: 3.9 MMOL/L (ref 3.5–5.1)
RBC # BLD: 4.84 M/UL (ref 4.2–5.9)
SODIUM BLD-SCNC: 140 MMOL/L (ref 136–145)
TOTAL PROTEIN: 7.3 G/DL (ref 6.4–8.2)
TRIGL SERPL-MCNC: 329 MG/DL (ref 0–150)
VLDLC SERPL CALC-MCNC: ABNORMAL MG/DL
WBC # BLD: 5.4 K/UL (ref 4–11)

## 2018-06-15 PROCEDURE — G8598 ASA/ANTIPLAT THER USED: HCPCS | Performed by: FAMILY MEDICINE

## 2018-06-15 PROCEDURE — 1036F TOBACCO NON-USER: CPT | Performed by: FAMILY MEDICINE

## 2018-06-15 PROCEDURE — 99214 OFFICE O/P EST MOD 30 MIN: CPT | Performed by: FAMILY MEDICINE

## 2018-06-15 PROCEDURE — 3017F COLORECTAL CA SCREEN DOC REV: CPT | Performed by: FAMILY MEDICINE

## 2018-06-15 PROCEDURE — G8428 CUR MEDS NOT DOCUMENT: HCPCS | Performed by: FAMILY MEDICINE

## 2018-06-15 PROCEDURE — G8417 CALC BMI ABV UP PARAM F/U: HCPCS | Performed by: FAMILY MEDICINE

## 2018-06-15 RX ORDER — AMITRIPTYLINE HYDROCHLORIDE 25 MG/1
TABLET, FILM COATED ORAL
COMMUNITY
Start: 2018-04-15 | End: 2018-06-15 | Stop reason: ALTCHOICE

## 2018-06-15 RX ORDER — AMLODIPINE BESYLATE 5 MG/1
5 TABLET ORAL DAILY
Qty: 90 TABLET | Refills: 1 | Status: SHIPPED | OUTPATIENT
Start: 2018-06-15 | End: 2019-06-03 | Stop reason: SDUPTHER

## 2018-06-15 RX ORDER — TIZANIDINE 4 MG/1
4 TABLET ORAL EVERY 8 HOURS PRN
Qty: 90 TABLET | Refills: 1 | Status: SHIPPED | OUTPATIENT
Start: 2018-06-15 | End: 2019-06-03 | Stop reason: SDUPTHER

## 2018-06-15 RX ORDER — ATORVASTATIN CALCIUM 40 MG/1
40 TABLET, FILM COATED ORAL EVERY EVENING
Qty: 90 TABLET | Refills: 1 | Status: SHIPPED | OUTPATIENT
Start: 2018-06-15 | End: 2018-06-20 | Stop reason: SDUPTHER

## 2018-06-15 RX ORDER — LISINOPRIL AND HYDROCHLOROTHIAZIDE 25; 20 MG/1; MG/1
1 TABLET ORAL DAILY
Qty: 90 TABLET | Refills: 1 | Status: SHIPPED | OUTPATIENT
Start: 2018-06-15 | End: 2019-06-03 | Stop reason: SDUPTHER

## 2018-06-15 RX ORDER — METOPROLOL SUCCINATE 50 MG/1
50 TABLET, EXTENDED RELEASE ORAL DAILY
Qty: 90 TABLET | Refills: 1 | Status: SHIPPED | OUTPATIENT
Start: 2018-06-15 | End: 2019-06-03 | Stop reason: SDUPTHER

## 2018-06-16 LAB
ESTIMATED AVERAGE GLUCOSE: 125.5 MG/DL
HBA1C MFR BLD: 6 %
LDL CHOLESTEROL DIRECT: 191 MG/DL
TSH REFLEX: 1.26 UIU/ML (ref 0.27–4.2)

## 2018-06-20 DIAGNOSIS — E78.2 MIXED HYPERLIPIDEMIA: Chronic | ICD-10-CM

## 2018-06-20 RX ORDER — ATORVASTATIN CALCIUM 80 MG/1
80 TABLET, FILM COATED ORAL EVERY EVENING
Qty: 90 TABLET | Refills: 1 | Status: SHIPPED | OUTPATIENT
Start: 2018-06-20 | End: 2019-06-03 | Stop reason: SDUPTHER

## 2018-06-21 ASSESSMENT — ENCOUNTER SYMPTOMS
GASTROINTESTINAL NEGATIVE: 1
RESPIRATORY NEGATIVE: 1
EYES NEGATIVE: 1

## 2018-10-22 ENCOUNTER — OFFICE VISIT (OUTPATIENT)
Dept: PULMONOLOGY | Age: 58
End: 2018-10-22
Payer: COMMERCIAL

## 2018-10-22 VITALS
DIASTOLIC BLOOD PRESSURE: 90 MMHG | WEIGHT: 283 LBS | BODY MASS INDEX: 34.46 KG/M2 | OXYGEN SATURATION: 98 % | HEART RATE: 92 BPM | HEIGHT: 76 IN | SYSTOLIC BLOOD PRESSURE: 150 MMHG

## 2018-10-22 DIAGNOSIS — I10 ESSENTIAL HYPERTENSION: Chronic | ICD-10-CM

## 2018-10-22 DIAGNOSIS — I25.10 CORONARY ARTERY DISEASE DUE TO LIPID RICH PLAQUE: Chronic | ICD-10-CM

## 2018-10-22 DIAGNOSIS — I25.83 CORONARY ARTERY DISEASE DUE TO LIPID RICH PLAQUE: Chronic | ICD-10-CM

## 2018-10-22 DIAGNOSIS — E66.9 CLASS 1 OBESITY WITH BODY MASS INDEX (BMI) OF 34.0 TO 34.9 IN ADULT, UNSPECIFIED OBESITY TYPE, UNSPECIFIED WHETHER SERIOUS COMORBIDITY PRESENT: Chronic | ICD-10-CM

## 2018-10-22 DIAGNOSIS — G47.33 OBSTRUCTIVE SLEEP APNEA: Primary | Chronic | ICD-10-CM

## 2018-10-22 PROCEDURE — G8484 FLU IMMUNIZE NO ADMIN: HCPCS | Performed by: NURSE PRACTITIONER

## 2018-10-22 PROCEDURE — G8598 ASA/ANTIPLAT THER USED: HCPCS | Performed by: NURSE PRACTITIONER

## 2018-10-22 PROCEDURE — 3017F COLORECTAL CA SCREEN DOC REV: CPT | Performed by: NURSE PRACTITIONER

## 2018-10-22 PROCEDURE — 1036F TOBACCO NON-USER: CPT | Performed by: NURSE PRACTITIONER

## 2018-10-22 PROCEDURE — G8427 DOCREV CUR MEDS BY ELIG CLIN: HCPCS | Performed by: NURSE PRACTITIONER

## 2018-10-22 PROCEDURE — 99214 OFFICE O/P EST MOD 30 MIN: CPT | Performed by: NURSE PRACTITIONER

## 2018-10-22 PROCEDURE — G8417 CALC BMI ABV UP PARAM F/U: HCPCS | Performed by: NURSE PRACTITIONER

## 2018-10-22 ASSESSMENT — SLEEP AND FATIGUE QUESTIONNAIRES
HOW LIKELY ARE YOU TO NOD OFF OR FALL ASLEEP WHILE WATCHING TV: 1
HOW LIKELY ARE YOU TO NOD OFF OR FALL ASLEEP WHEN YOU ARE A PASSENGER IN A CAR FOR AN HOUR WITHOUT A BREAK: 0
HOW LIKELY ARE YOU TO NOD OFF OR FALL ASLEEP WHILE SITTING INACTIVE IN A PUBLIC PLACE: 0
HOW LIKELY ARE YOU TO NOD OFF OR FALL ASLEEP WHILE LYING DOWN TO REST IN THE AFTERNOON WHEN CIRCUMSTANCES PERMIT: 1
HOW LIKELY ARE YOU TO NOD OFF OR FALL ASLEEP IN A CAR, WHILE STOPPED FOR A FEW MINUTES IN TRAFFIC: 0
HOW LIKELY ARE YOU TO NOD OFF OR FALL ASLEEP WHILE SITTING AND TALKING TO SOMEONE: 0
HOW LIKELY ARE YOU TO NOD OFF OR FALL ASLEEP WHILE SITTING QUIETLY AFTER LUNCH WITHOUT ALCOHOL: 0
ESS TOTAL SCORE: 4
HOW LIKELY ARE YOU TO NOD OFF OR FALL ASLEEP WHILE SITTING AND READING: 2

## 2018-10-22 ASSESSMENT — ENCOUNTER SYMPTOMS
EYE REDNESS: 0
EYE PAIN: 0
SINUS PRESSURE: 0
SHORTNESS OF BREATH: 0
RHINORRHEA: 0
ABDOMINAL DISTENTION: 0
APNEA: 0
ABDOMINAL PAIN: 0
COUGH: 0

## 2018-10-22 NOTE — PROGRESS NOTES
former [employer] work       Social History Main Topics    Smoking status: Never Smoker    Smokeless tobacco: Never Used    Alcohol use 0.6 oz/week     1 Standard drinks or equivalent per week      Comment: Rarely; socially    Drug use: No    Sexual activity: Yes     Partners: Female     Birth control/ protection: Condom     Other Topics Concern    Not on file     Social History Narrative    No narrative on file       Prior to Admission medications    Medication Sig Start Date End Date Taking?  Authorizing Provider   atorvastatin (LIPITOR) 80 MG tablet Take 1 tablet by mouth every evening for cholesterol 6/20/18  Yes Gail Hernandez MD   tiZANidine (ZANAFLEX) 4 MG tablet Take 1 tablet by mouth every 8 hours as needed (for muscle spasm) 6/15/18  Yes Gail Hernandez MD   lisinopril-hydrochlorothiazide (PRINZIDE;ZESTORETIC) 20-25 MG per tablet Take 1 tablet by mouth daily 6/15/18  Yes Gail Hernandez MD   metoprolol succinate (TOPROL XL) 50 MG extended release tablet Take 1 tablet by mouth daily 6/15/18  Yes Gail Hernandez MD   rivaroxaban (XARELTO) 20 MG TABS tablet Take 1 tablet by mouth daily 6/15/18  Yes Gail Hernandez MD   amLODIPine (NORVASC) 5 MG tablet Take 1 tablet by mouth daily 6/15/18  Yes Gail Hernandez MD       Allergies as of 10/22/2018 - Review Complete 10/22/2018   Allergen Reaction Noted    Sugar-protein-starch  11/25/2016       Patient Active Problem List   Diagnosis    Essential hypertension    Psoriasis-like skin disease    Hyperlipidemia    Prediabetes    [facility]    Coronary artery disease due to lipid rich plaque    Migraine without aura and without status migrainosus, not intractable    Chronic midline low back pain with sciatica    Proteinuria    Renal cysts    Atrial fibrillation ([address]    Obstructive sleep apnea    Epididymal cyst    Lightheadedness    Facial paralysis/[place] palsy       Past Medical History:   Diagnosis Date    Aneurysm of ascending aorta (HCC)     Atopic dermatitis     Back pain     Facial paralysis/[place] palsy     Hyperlipidemia     Hypertension     Imprisonment and other incarceration [id number]    Migraine     Obstructive sleep apnea (adult) (pediatric)     Prediabetes     Psoriasis-like skin disease     Tinea cruris        Past Surgical History:   Procedure Laterality Date    BACK SURGERY  February and October 2008    COLONOSCOPY  01/28/2018    CORONARY ANGIOPLASTY         Family History   Problem Relation Age of Onset    Other Mother         [place]    Thyroid Disease Mother     Heart Failure Father     Stroke Father        Vitals:  Weight BMI   Wt Readings from Last 3 Encounters:   10/22/18 283 lb (128.4 kg)   08/22/18 280 lb (127 kg)   06/15/18 282 lb (127.9 kg)    Body mass index is 34.45 kg/m². BP HR SaO2   BP Readings from Last 3 Encounters:   10/22/18 (!) 150/90   08/22/18 135/78   06/15/18 138/86    Pulse Readings from Last 3 Encounters:   10/22/18 92   08/22/18 91   06/15/18 78    SpO2 Readings from Last 3 Encounters:   10/22/18 98%   08/22/18 98%   02/12/18 98%        Assessment:     Visit Diagnoses and Associated Orders     Obstructive sleep apnea    -  Primary    Needing to use the machine nightly. Will work with DME on mask fit          Essential hypertension   (Stable)      Controlled by primary service. Maintain medication regimen         Coronary artery disease due to lipid rich plaque   (Stable)      Controlled by primary service. Maintain medication regimen         Class 1 obesity with body mass index (BMI) of 34.0 to 34.9 in adult, unspecified obesity type, unspecified whether serious comorbidity present   (Stable)      needs to work on protion control                The primary encounter diagnosis was Obstructive sleep apnea.  Diagnoses of Essential hypertension, Coronary artery disease due to lipid rich plaque, and Class 1 obesity with body mass index (BMI) of 34.0 to 34.9 in

## 2018-11-06 NOTE — PROGRESS NOTES
significant change when compared to the study of 02/04/2017.  Maximum   transverse diameter of the ascending aorta is 4.9 cm as described above.  No   evidence of aortic dissection.         Cardiac Cath 3/10/17:  Anatomy:   LM-normal  LAD-normal  Cx-40% mid  OM1- normal  RCA-normal  RPDA- normal   LVEF- 55%, LVEDP 9 mmHg     Impression:  1. Mild non-obstructive CAD  2. Normal LV systolic function     CTA aorta 2/4/17:  1. Aneurysm of the ascending aorta has decreased in size.  Remainder of the   aorta demonstrates normal caliber.  There is no dissection. 2. No acute findings.      Myoview stress test 11/15/16:  Summary    There is normal isotope uptake at stress and rest. There is no evidence of    myocardial ischemia or scar.    Normal LV function.    Low risk scan.      CT chest 11/6/16:  Aneurysmal dilatation of the ascending aorta to approximately 5.2 cm.  No   evidence of dissection. 2 mm indeterminate left upper lobe pulmonary nodule and indeterminate   hyperdense lesion at the upper pole of the right kidney, compatible with   hyperdense cyst or potentially renal neoplasm.  Comparison with any outside   prior would be helpful to determine any interval change.  Otherwise, in the   nonacute setting renal protocol CT could be performed for further   characterization of the renal lesion.      Echo 10/14/16:  Summary  Normal left ventricle size, wall thickness and systolic function with an  estimated ejection fraction of 55%. here is mild left ventricular hypertrophy. There is reversal of E/A inflow velocities across the mitral valve  suggesting impaired left ventricular relaxation. Trivial mitral regurgitation is present. There is trivial tricuspid regurgitation with RVSP estimated at 21 mmHg.       ASSESSMENT/PLAN:  1. Coronary artery disease due to lipid rich plaque  ~ 3/2017 [facility] showed Lcx with 40% stenosis, otherwise normal cors.     ~ no ASA due to Xarelto, on statin and Metoprolol XL.    ~ no was scribed in the presence of Allyn Jara M.D. by Sharon Jon RN    Physician Attestation: The scribe's documentation has been prepared under my direction and personally reviewed by me in its entirety. I confirm that the note above accurately reflects all work, treatment, procedures, and medical decision making performed by me. An  electronic signature was used to authenticate this note. Salma Yo MD, [facility] [place], [address]

## 2018-11-07 ENCOUNTER — OFFICE VISIT (OUTPATIENT)
Dept: CARDIOLOGY CLINIC | Age: 58
End: 2018-11-07
Payer: COMMERCIAL

## 2018-11-07 VITALS
HEART RATE: 88 BPM | SYSTOLIC BLOOD PRESSURE: 138 MMHG | HEIGHT: 76 IN | DIASTOLIC BLOOD PRESSURE: 96 MMHG | BODY MASS INDEX: 34.22 KG/M2 | WEIGHT: 281 LBS

## 2018-11-07 DIAGNOSIS — I10 ESSENTIAL HYPERTENSION: Chronic | ICD-10-CM

## 2018-11-07 DIAGNOSIS — I48.0 PAROXYSMAL ATRIAL FIBRILLATION (HCC): Chronic | ICD-10-CM

## 2018-11-07 DIAGNOSIS — E78.2 MIXED HYPERLIPIDEMIA: Chronic | ICD-10-CM

## 2018-11-07 DIAGNOSIS — I25.10 CORONARY ARTERY DISEASE DUE TO LIPID RICH PLAQUE: Primary | Chronic | ICD-10-CM

## 2018-11-07 DIAGNOSIS — I25.83 CORONARY ARTERY DISEASE DUE TO LIPID RICH PLAQUE: Primary | Chronic | ICD-10-CM

## 2018-11-07 DIAGNOSIS — I71.21 ASCENDING AORTIC ANEURYSM: Chronic | ICD-10-CM

## 2018-11-07 PROCEDURE — 99214 OFFICE O/P EST MOD 30 MIN: CPT | Performed by: INTERNAL MEDICINE

## 2018-11-07 PROCEDURE — 1036F TOBACCO NON-USER: CPT | Performed by: INTERNAL MEDICINE

## 2018-11-07 PROCEDURE — G8427 DOCREV CUR MEDS BY ELIG CLIN: HCPCS | Performed by: INTERNAL MEDICINE

## 2018-11-07 PROCEDURE — G8417 CALC BMI ABV UP PARAM F/U: HCPCS | Performed by: INTERNAL MEDICINE

## 2018-11-07 PROCEDURE — G8484 FLU IMMUNIZE NO ADMIN: HCPCS | Performed by: INTERNAL MEDICINE

## 2018-11-07 PROCEDURE — G8598 ASA/ANTIPLAT THER USED: HCPCS | Performed by: INTERNAL MEDICINE

## 2018-11-07 PROCEDURE — 3017F COLORECTAL CA SCREEN DOC REV: CPT | Performed by: INTERNAL MEDICINE

## 2019-02-20 ENCOUNTER — TELEPHONE (OUTPATIENT)
Dept: CARDIOTHORACIC SURGERY | Age: 59
End: 2019-02-20

## 2019-02-26 ENCOUNTER — TELEPHONE (OUTPATIENT)
Dept: CARDIOTHORACIC SURGERY | Age: 59
End: 2019-02-26

## 2019-02-26 DIAGNOSIS — I77.819 AORTIC DILATATION (HCC): Primary | ICD-10-CM

## 2019-03-01 ENCOUNTER — HOSPITAL ENCOUNTER (OUTPATIENT)
Dept: CT IMAGING | Age: 59
Discharge: HOME | End: 2019-03-01
Payer: COMMERCIAL

## 2019-03-01 DIAGNOSIS — I77.819 AORTIC DILATATION (HCC): ICD-10-CM

## 2019-03-01 PROCEDURE — 71250 CT THORAX DX C-: CPT

## 2019-04-09 ENCOUNTER — TELEPHONE (OUTPATIENT)
Dept: CARDIOTHORACIC SURGERY | Age: 59
End: 2019-04-09

## 2019-04-09 NOTE — TELEPHONE ENCOUNTER
Called Mr. Kirke Kawasaki, notified that Dr. Terrance Ceron has reviewed his scan, aortic dilation unchanged at 4.8, to get repeat CT of chest WO in 6 months. Will ask Mirna Lopez to put in recall.

## 2019-05-27 NOTE — PROGRESS NOTES
[address]    2019    Angelique Hernandez (:  1960) is a 64 y.o. male who is here for six month follow up for the management of coronary artery disease, modifiable risk factors, and paroxysmal atrial fibrillation. Referring Provider: No primary care provider on file. HISTORY:  Mr. Lesli Rivera has a history of coronary artery disease (nonobstructive by [facility] 3/2017), ascending aortic aneurysm (blood pressure management and surveillance with CT scans per Dr. Rama Barbour). Additional history includes HTN, Dyslipidemia, BUSTER treated with CPAP, PAF (dx 2017, self limiting, anticoagulated with Xarelto). He has a history of dizziness/black out spells over the past 7 years. Neuro evaluation by Dr. Casey Franz showed no significant vascular stenosis, normal EEG, and MRI of brain showing minimal white matter. EP evaluation included MCOT which showed dizziness associated with NSR/ST although patient had NSVT and short PAT not associated with symptoms. Today, he denies exertional chest pain, but has had breathing difficulties related to sinus congestion. BP usually runs 130's/low to mid 90's at home. He did not take his medications this morning as he was rushing taking care of his grandchildren. Patient is compliant with medications for CAD, HTN, HLD, PAF and is tolerating them well without side effects. He continues to take Xarelto and bleeds easily when he scraps or cuts himself. He had burger and french fries yesterday which likely had salt on them. He does not sleep well because of significant back pain. He questions whether back pain was related to his kidney cyst.  He will be seeing a new PCP in early . He has not used CPAP over the past few days because he needs a new filter. He also admits not being consistently compliant with CPAP use. He has occasional palpitations.      REVIEW OF SYSTEMS:  A complete review of systems was reviewed and is negative except as noted in the history of 05/29/19 1145 05/29/19 1215   BP: (!) 158/110 (!) 148/110 (!) 160/100   Site: Right Upper Arm Right Upper Arm    Position: Sitting Sitting    Cuff Size: Large Adult Large Adult    Pulse: 84     SpO2: 96%     Weight: 281 lb (127.5 kg)     Height: 6' 4\" (1.93 m)       Estimated body mass index is 34.2 kg/m² as calculated from the following:    Height as of this encounter: 6' 4\" (1.93 m). Weight as of this encounter: 281 lb (127.5 kg). General Appearance: No apparent distress  Eyes:  · Conjunctiva clear  · Pupils equal, round, reactive to light  ENT:  · External Ears and Nose unremarkable  · Oral mucosa is moist  Respiratory:  · Normal excursion and expansion without use of accessory muscles  · Resp Auscultation: Normal breath sounds without dullness  Cardiovascular:  · JVD is normal  · The carotid upstroke is normal in amplitude and contour without delay or bruit  · Apical impulse is not displaced  · Normal S1, S2. No S3. No Murmur. Regular rate and rhythm  · No edema  · Pedal Pulses: 2+ and equal   Abdomen:  · No masses or tenderness  · Liver/Spleen: No Abnormalities Noted  Musculoskeletal:  · Fingers without clubbing or cyanosis  · Normal Gait  Skin:  · No rash  · Normal skin turgor   Neurologic/Psychiatric:  · Alert and oriented in all spheres  · Normal mood and affect  · Memory and mentation intact    I have reviewed all pertinent lab results and diagnostic testing.         Lab Results   Component Value Date    CHOL 283 06/15/2018    TRIG 329 06/15/2018    HDL 32 06/15/2018    LDLCALC see below 06/15/2018     Lab Results   Component Value Date     06/15/2018    K 3.9 06/15/2018    CL 99 06/15/2018    CO2 26 06/15/2018    GLUCOSE 98 06/15/2018    BUN 12 06/15/2018    CREATININE 0.9 06/15/2018    CALCIUM 9.7 06/15/2018    GFRAA >60 06/15/2018     Lab Results   Component Value Date    ALT 37 06/15/2018    AST 34 06/15/2018       CT of chest wo contrast 3/1/19:   1. Unchanged 4.8 cm ascending thoracic renal lesion.      Echo 10/14/16:  Summary  Normal left ventricle size, wall thickness and systolic function with an  estimated ejection fraction of 55%. here is mild left ventricular hypertrophy. There is reversal of E/A inflow velocities across the mitral valve  suggesting impaired left ventricular relaxation. Trivial mitral regurgitation is present. There is trivial tricuspid regurgitation with RVSP estimated at 21 mmHg.       ASSESSMENT/PLAN:    1. Coronary artery disease due to lipid rich plaque  ~ 3/2017 LHC showed Lcx with 40% stenosis, otherwise normal cors. ~ no ASA due to Xarelto, on statin and Metoprolol XL.    ~ no exertional chest pain, breathing issues likely related to respiratory congestion    Plan > monitor for anginal symptoms, no change in medication    2. Ascending aortic aneurysm ([address]  ~4/2017 CTA of chest and 2/2018 CT of chest showed maximum transverse diameter of Asc Aorta 4.9 cm.     ~ 3/1/19 CT of chest - 4.8 cm ascending thoracic aortic aneurysm    Plan> surveillance per Dr. Letty Naranjo    3. Paroxysmal atrial fibrillation ([address]  ~ Hospitalized 4/2017 with SOB and palpitations - found to be in AF with RVR, converted after IV Dilt gtt started  ~ Anticoagulated with Xarelto. ~ patient admits to palpitations and is not using CPAP consistently (discussed that consistent use of CPAP machine will help control palpitations)    Plan> continue Xarelto, reinforced use of CPAP, monitor for worsening palpitations. 4. Essential hypertension  ~ treated with Lisinopril- HCTZ, amlodipine, Metoprolol XL  ~ goal BP < 130/80 but definitely want BP < 140/90  ~ stressed importance of improving BP given his history of aortic aneurysm  ~ Blood pressure (!) 160/100, pulse 84, height 6' 4\" (1.93 m), weight 281 lb (127.5 kg), SpO2 96 %. ~ patient has not taken medication yet today    Plan> no change in medications, continue to monitor BP at home.       5. Mixed hyperlipidemia  ~ treated with Atorvastatin 80 mg although was not taking statin when lipids checked in 12/2017 (direct )  ~ 6/2018 TC- 283, TG- 329, HDL- 32, LDL- 191 - he admits he does not take this medication regularly. ~ recommended repeat fasting lipids after three months of consistently use of Atorvastatin    Plan> repeat fasting lipids and liver enzymes soon. Recommend waiting to check labs until after he sees new physician. Plan:  1. Goal BP is less than 130/80, but we definitely want BP less than 140/90.    2.  Call office in three weeks with an update on BP, but call sooner if BP runs consistently higher than 140/90  3. Repeat fasting lipids and liver enzymes soon  4. Discuss back pain to new PCP   5. Follow up in six months     Scribe's attestation: This note was scribed in the presence of Legih Lopez M.D. by Raquel Zhang RN    Physician Attestation: The scribe's documentation has been prepared under my direction and personally reviewed by me in its entirety. I confirm that the note above accurately reflects all work, treatment, procedures, and medical decision making performed by me. An  electronic signature was used to authenticate this note. Karly Prakash MD, [facility], [address]

## 2019-05-29 ENCOUNTER — OFFICE VISIT (OUTPATIENT)
Dept: CARDIOLOGY CLINIC | Age: 59
End: 2019-05-29
Payer: COMMERCIAL

## 2019-05-29 VITALS
DIASTOLIC BLOOD PRESSURE: 100 MMHG | HEART RATE: 84 BPM | BODY MASS INDEX: 34.22 KG/M2 | WEIGHT: 281 LBS | OXYGEN SATURATION: 96 % | HEIGHT: 76 IN | SYSTOLIC BLOOD PRESSURE: 160 MMHG

## 2019-05-29 DIAGNOSIS — I10 ESSENTIAL HYPERTENSION: Chronic | ICD-10-CM

## 2019-05-29 DIAGNOSIS — I25.10 CORONARY ARTERY DISEASE DUE TO LIPID RICH PLAQUE: Primary | Chronic | ICD-10-CM

## 2019-05-29 DIAGNOSIS — E78.2 MIXED HYPERLIPIDEMIA: Chronic | ICD-10-CM

## 2019-05-29 DIAGNOSIS — I48.0 PAROXYSMAL ATRIAL FIBRILLATION (HCC): Chronic | ICD-10-CM

## 2019-05-29 DIAGNOSIS — I25.83 CORONARY ARTERY DISEASE DUE TO LIPID RICH PLAQUE: Primary | Chronic | ICD-10-CM

## 2019-05-29 DIAGNOSIS — I71.21 ASCENDING AORTIC ANEURYSM: Chronic | ICD-10-CM

## 2019-05-29 PROCEDURE — 3017F COLORECTAL CA SCREEN DOC REV: CPT | Performed by: INTERNAL MEDICINE

## 2019-05-29 PROCEDURE — G8417 CALC BMI ABV UP PARAM F/U: HCPCS | Performed by: INTERNAL MEDICINE

## 2019-05-29 PROCEDURE — 99213 OFFICE O/P EST LOW 20 MIN: CPT | Performed by: INTERNAL MEDICINE

## 2019-05-29 PROCEDURE — 1036F TOBACCO NON-USER: CPT | Performed by: INTERNAL MEDICINE

## 2019-05-29 PROCEDURE — G8427 DOCREV CUR MEDS BY ELIG CLIN: HCPCS | Performed by: INTERNAL MEDICINE

## 2019-05-29 PROCEDURE — G8599 NO ASA/ANTIPLAT THER USE RNG: HCPCS | Performed by: INTERNAL MEDICINE

## 2019-05-29 SDOH — HEALTH STABILITY: MENTAL HEALTH: HOW OFTEN DO YOU HAVE A DRINK CONTAINING ALCOHOL?: MONTHLY OR LESS

## 2019-05-29 SDOH — HEALTH STABILITY: MENTAL HEALTH: HOW MANY STANDARD DRINKS CONTAINING ALCOHOL DO YOU HAVE ON A TYPICAL DAY?: 1 OR 2

## 2019-06-02 ASSESSMENT — ENCOUNTER SYMPTOMS
COUGH: 0
WHEEZING: 0
SHORTNESS OF BREATH: 0
CONSTIPATION: 0
ABDOMINAL PAIN: 0
CHEST TIGHTNESS: 0
VOMITING: 0
NAUSEA: 0
DIARRHEA: 0

## 2019-06-03 ENCOUNTER — OFFICE VISIT (OUTPATIENT)
Dept: FAMILY MEDICINE CLINIC | Age: 59
End: 2019-06-03
Payer: COMMERCIAL

## 2019-06-03 VITALS
TEMPERATURE: 98.1 F | DIASTOLIC BLOOD PRESSURE: 80 MMHG | OXYGEN SATURATION: 98 % | BODY MASS INDEX: 34.46 KG/M2 | HEART RATE: 74 BPM | SYSTOLIC BLOOD PRESSURE: 150 MMHG | RESPIRATION RATE: 17 BRPM | WEIGHT: 283 LBS | HEIGHT: 76 IN

## 2019-06-03 DIAGNOSIS — I48.0 PAROXYSMAL ATRIAL FIBRILLATION (HCC): Chronic | ICD-10-CM

## 2019-06-03 DIAGNOSIS — G47.33 OBSTRUCTIVE SLEEP APNEA: ICD-10-CM

## 2019-06-03 DIAGNOSIS — I10 ESSENTIAL HYPERTENSION: Primary | Chronic | ICD-10-CM

## 2019-06-03 DIAGNOSIS — N28.1 RENAL CYST: ICD-10-CM

## 2019-06-03 DIAGNOSIS — I25.83 CORONARY ARTERY DISEASE DUE TO LIPID RICH PLAQUE: Chronic | ICD-10-CM

## 2019-06-03 DIAGNOSIS — G89.29 CHRONIC MIDLINE LOW BACK PAIN WITH BILATERAL SCIATICA: ICD-10-CM

## 2019-06-03 DIAGNOSIS — Z13.21 ENCOUNTER FOR VITAMIN DEFICIENCY SCREENING: ICD-10-CM

## 2019-06-03 DIAGNOSIS — E78.2 MIXED HYPERLIPIDEMIA: Chronic | ICD-10-CM

## 2019-06-03 DIAGNOSIS — I71.21 ASCENDING AORTIC ANEURYSM: Chronic | ICD-10-CM

## 2019-06-03 DIAGNOSIS — I25.10 CORONARY ARTERY DISEASE DUE TO LIPID RICH PLAQUE: Chronic | ICD-10-CM

## 2019-06-03 DIAGNOSIS — Z76.89 ENCOUNTER TO ESTABLISH CARE: ICD-10-CM

## 2019-06-03 DIAGNOSIS — M54.42 CHRONIC MIDLINE LOW BACK PAIN WITH BILATERAL SCIATICA: ICD-10-CM

## 2019-06-03 DIAGNOSIS — M54.41 CHRONIC MIDLINE LOW BACK PAIN WITH BILATERAL SCIATICA: ICD-10-CM

## 2019-06-03 DIAGNOSIS — R73.03 PREDIABETES: ICD-10-CM

## 2019-06-03 PROCEDURE — G8417 CALC BMI ABV UP PARAM F/U: HCPCS | Performed by: CLINICAL NURSE SPECIALIST

## 2019-06-03 PROCEDURE — 99203 OFFICE O/P NEW LOW 30 MIN: CPT | Performed by: CLINICAL NURSE SPECIALIST

## 2019-06-03 PROCEDURE — G8427 DOCREV CUR MEDS BY ELIG CLIN: HCPCS | Performed by: CLINICAL NURSE SPECIALIST

## 2019-06-03 PROCEDURE — 1036F TOBACCO NON-USER: CPT | Performed by: CLINICAL NURSE SPECIALIST

## 2019-06-03 PROCEDURE — 3017F COLORECTAL CA SCREEN DOC REV: CPT | Performed by: CLINICAL NURSE SPECIALIST

## 2019-06-03 PROCEDURE — G8598 ASA/ANTIPLAT THER USED: HCPCS | Performed by: CLINICAL NURSE SPECIALIST

## 2019-06-03 RX ORDER — LISINOPRIL AND HYDROCHLOROTHIAZIDE 25; 20 MG/1; MG/1
1 TABLET ORAL DAILY
Qty: 90 TABLET | Refills: 0 | Status: SHIPPED | OUTPATIENT
Start: 2019-06-03 | End: 2019-12-17 | Stop reason: SDUPTHER

## 2019-06-03 RX ORDER — AMLODIPINE BESYLATE 5 MG/1
5 TABLET ORAL DAILY
Qty: 90 TABLET | Refills: 0 | Status: SHIPPED | OUTPATIENT
Start: 2019-06-03 | End: 2019-12-17 | Stop reason: SDUPTHER

## 2019-06-03 RX ORDER — TIZANIDINE 4 MG/1
4 TABLET ORAL EVERY 8 HOURS PRN
Qty: 90 TABLET | Refills: 0 | Status: SHIPPED | OUTPATIENT
Start: 2019-06-03 | End: 2019-09-12 | Stop reason: SDUPTHER

## 2019-06-03 RX ORDER — METOPROLOL SUCCINATE 50 MG/1
50 TABLET, EXTENDED RELEASE ORAL DAILY
Qty: 90 TABLET | Refills: 0 | Status: SHIPPED | OUTPATIENT
Start: 2019-06-03 | End: 2019-12-17 | Stop reason: SDUPTHER

## 2019-06-03 RX ORDER — ATORVASTATIN CALCIUM 80 MG/1
80 TABLET, FILM COATED ORAL EVERY EVENING
Qty: 90 TABLET | Refills: 0 | Status: SHIPPED | OUTPATIENT
Start: 2019-06-03 | End: 2019-10-09

## 2019-06-03 ASSESSMENT — ENCOUNTER SYMPTOMS
BOWEL INCONTINENCE: 0
BACK PAIN: 1

## 2019-06-03 NOTE — PROGRESS NOTES
SUBJECTIVE:    Patient ID:  Carlitos Arce is a 64 y.o. male      Patient is here to establish care and for medication check for hypertension, hyperlipidemia, prediabetes, CAD, atrial fibrillation, AAA and BUSTER. He is doing well on current regimen and has multiple other concerns including chronic back pain, renal cysts and rash. States he was in an accident at while working in [place], which required back surgery approximately 12 years ago. He has seen Dr. Felix Morales in the past. He is concerned that the renal cyst is causing back pain. He is followed by cardiology on a regular bases. Patient's allergies, medication, medical, surgical, family and social history were reviewed and updated accordingly. Hypertension   This is a chronic problem. The current episode started more than 1 year ago. The problem is unchanged. The problem is controlled. Pertinent negatives include no anxiety, blurred vision, chest pain, headaches, malaise/fatigue, orthopnea, palpitations, peripheral edema or shortness of breath. Agents associated with hypertension include NSAIDs. Risk factors for coronary artery disease include obesity, male gender, dyslipidemia and sedentary lifestyle (prediabetes). Past treatments include angiotensin blockers, diuretics, beta blockers and calcium channel blockers. The current treatment provides significant improvement. Hypertensive end-organ damage includes CAD/MI. Hyperlipidemia   This is a chronic problem. The current episode started more than 1 year ago. The problem is controlled. Recent lipid tests were reviewed and are normal. Exacerbating diseases include obesity. He has no history of hypothyroidism, liver disease or nephrotic syndrome. prediabetes. Associated symptoms include leg pain (  occ). Pertinent negatives include no chest pain, myalgias or shortness of breath. Current antihyperlipidemic treatment includes statins. The current treatment provides significant improvement of lipids.  Risk factors for coronary artery disease include hypertension, male sex and dyslipidemia (prediabetes). Back Pain   This is a chronic problem. Episode onset: years. The problem occurs constantly. The problem has been gradually worsening since onset. The pain is present in the lumbar spine. The quality of the pain is described as aching. The pain radiates to the left knee, left thigh, left foot and right foot. The pain is moderate. The pain is the same all the time. Associated symptoms include leg pain (  occ). Pertinent negatives include no abdominal pain, bladder incontinence, bowel incontinence, chest pain, dysuria, fever, headaches, numbness, paresthesias, pelvic pain, perianal numbness, tingling, weakness or weight loss. Risk factors include obesity and sedentary lifestyle. He has tried muscle relaxant, ice and analgesics for the symptoms. The treatment provided mild relief. No current outpatient medications on file prior to visit. No current facility-administered medications on file prior to visit.        Past Medical History:   Diagnosis Date    Aneurysm of ascending aorta (HCC)     Atopic dermatitis     Back pain     Facial paralysis/[place] palsy     Hyperlipidemia     Hypertension     Imprisonment and other incarceration [id number]    Migraine     Obstructive sleep apnea (adult) (pediatric)     Prediabetes     Psoriasis-like skin disease     Tinea cruris      Past Surgical History:   Procedure Laterality Date    BACK SURGERY  February and October 2008    COLONOSCOPY  01/28/2018    CORONARY ANGIOPLASTY       Family History   Problem Relation Age of Onset    Other Mother         [place]    Thyroid Disease Mother     Heart Failure Father     Stroke Father      Social History     Socioeconomic History    Marital status:      Spouse name: Not on file    Number of children: 0    Years of education: Not on file    Highest education level: Not on file   Occupational History    Occupation: unemployed    Occupation: former [employer] work    Social Needs    Financial resource strain: Not on file    Food insecurity:     Worry: Not on file     Inability: Not on file   [employer] needs:     Medical: Not on file     Non-medical: Not on file   Tobacco Use    Smoking status: Never Smoker    Smokeless tobacco: Never Used   Substance and Sexual Activity    Alcohol use: Yes     Alcohol/week: 1.2 oz     Types: 1 Standard drinks or equivalent, 1 Cans of beer per week     Frequency: Monthly or less     Drinks per session: 1 or 2     Binge frequency: Less than monthly     Comment: Rarely; socially    Drug use: No    Sexual activity: Yes     Partners: Female     Birth control/protection: Condom   Lifestyle    Physical activity:     Days per week: Not on file     Minutes per session: Not on file    Stress: Not on file   Relationships    Social connections:     Talks on phone: Not on file     Gets together: Not on file     Attends Yazidism service: Not on file     Active member of club or organization: Not on file     Attends meetings of clubs or organizations: Not on file     Relationship status: Not on file    Intimate partner violence:     Fear of current or ex partner: Not on file     Emotionally abused: Not on file     Physically abused: Not on file     Forced sexual activity: Not on file   Other Topics Concern    Not on file   Social History Narrative    Not on file       Review of Systems   Constitutional: Negative for chills, fever, malaise/fatigue and weight loss. Eyes: Negative for blurred vision and visual disturbance. Respiratory: Negative for cough, chest tightness, shortness of breath and wheezing. Cardiovascular: Negative for chest pain, palpitations and orthopnea. Gastrointestinal: Negative for abdominal pain, bowel incontinence, constipation, diarrhea, nausea and vomiting. Genitourinary: Negative for bladder incontinence, dysuria and pelvic pain. Musculoskeletal: Positive for back pain. Negative for arthralgias and myalgias. Skin: Negative for rash. Neurological: Negative for tingling, weakness, numbness, headaches and paresthesias. OBJECTIVE:    Physical Exam   Constitutional: He is oriented to person, place, and time. He appears well-developed and well-nourished. No distress. HENT:   Head: Normocephalic and atraumatic. Right Ear: External ear normal.   Left Ear: External ear normal.   Nose: Nose normal.   Eyes: Pupils are equal, round, and reactive to light. Conjunctivae are normal.   Neck: Normal range of motion. Neck supple. No tracheal deviation present. Cardiovascular: Normal rate, regular rhythm and normal heart sounds. Pulmonary/Chest: Effort normal and breath sounds normal. No respiratory distress. He has no wheezes. He has no rales. He exhibits no tenderness. Abdominal: Soft. Bowel sounds are normal. He exhibits no distension and no mass. There is no tenderness. There is no guarding. Musculoskeletal: Normal range of motion. He exhibits no edema. Decreased ROM of the back due to discomfort, able to walk on heels and toes, no spinal tenderness with palpation   Neurological: He is alert and oriented to person, place, and time. Skin: Skin is warm and dry. No rash noted. Psychiatric: He has a normal mood and affect. His behavior is normal.   Nursing note and vitals reviewed. BP (!) 150/80 (Site: Left Upper Arm, Position: Sitting, Cuff Size: Large Adult)   Pulse 74   Temp 98.1 °F (36.7 °C)   Resp 17   Ht 6' 4\" (1.93 m)   Wt 283 lb (128.4 kg)   SpO2 98%   BMI 34.45 kg/m²    BP Readings from Last 3 Encounters:   06/07/19 122/84   06/03/19 (!) 150/80   05/29/19 (!) 160/100      Wt Readings from Last 3 Encounters:   06/07/19 281 lb 3.2 oz (127.6 kg)   06/03/19 283 lb (128.4 kg)   05/29/19 281 lb (127.5 kg)       ASSESSMENT & PLAN:    1.  Essential hypertension  - Stable, continue current regimen  - CBC Auto Differential; Future  - Comprehensive Metabolic Panel; Future  - TSH with Reflex; Future  - lisinopril-hydrochlorothiazide (PRINZIDE;ZESTORETIC) 20-25 MG per tablet; Take 1 tablet by mouth daily  Dispense: 90 tablet; Refill: 0  - metoprolol succinate (TOPROL XL) 50 MG extended release tablet; Take 1 tablet by mouth daily  Dispense: 90 tablet; Refill: 0  - amLODIPine (NORVASC) 5 MG tablet; Take 1 tablet by mouth daily  Dispense: 90 tablet; Refill: 0    2. Mixed hyperlipidemia  - Stable, continue current regimen  - CBC Auto Differential; Future  - Comprehensive Metabolic Panel; Future  - Lipid Panel; Future  - CK; Future  - Vitamin D 25 Hydroxy; Future  - atorvastatin (LIPITOR) 80 MG tablet; Take 1 tablet by mouth every evening for cholesterol  Dispense: 90 tablet; Refill: 0    3. Prediabetes  - Stable, continue lifestyle modifications  - CBC Auto Differential; Future  - Comprehensive Metabolic Panel; Future  - Hemoglobin A1C; Future    4. Coronary artery disease due to lipid rich plaque  - atorvastatin (LIPITOR) 80 MG tablet; Take 1 tablet by mouth every evening for cholesterol  Dispense: 90 tablet; Refill: 0  - lisinopril-hydrochlorothiazide (PRINZIDE;ZESTORETIC) 20-25 MG per tablet; Take 1 tablet by mouth daily  Dispense: 90 tablet; Refill: 0  - metoprolol succinate (TOPROL XL) 50 MG extended release tablet; Take 1 tablet by mouth daily  Dispense: 90 tablet; Refill: 0  - rivaroxaban (XARELTO) 20 MG TABS tablet; Take 1 tablet by mouth daily  Dispense: 90 tablet; Refill: 0  - amLODIPine (NORVASC) 5 MG tablet; Take 1 tablet by mouth daily  Dispense: 90 tablet; Refill: 0  - Follow-up with cardiology as directed    5. Paroxysmal atrial fibrillation (HCC)  - metoprolol succinate (TOPROL XL) 50 MG extended release tablet; Take 1 tablet by mouth daily  Dispense: 90 tablet; Refill: 0  - rivaroxaban (XARELTO) 20 MG TABS tablet; Take 1 tablet by mouth daily  Dispense: 90 tablet;  Refill: 0  - Follow up with cardiology as directed    6. Ascending aortic aneurysm (HCC)  - Stable, last CT was 3/1/19   - Follow-up with cardiology as directed    7. Obstructive sleep apnea  - Encourage nightly CPAP usage  - Follow-up with pulmonology as directed    8. Encounter for vitamin deficiency screening  - Vitamin D 25 Hydroxy; Future    9. Encounter to establish care  - Records reviewed and updated accordingly    10. Chronic midline low back pain with bilateral sciatica  - tiZANidine (ZANAFLEX) 4 MG tablet; Take 1 tablet by mouth every 8 hours as needed (for muscle spasm)  Dispense: 90 tablet; Refill: 0  - XR LUMBAR SPINE (2-3 VIEWS); Future  - Ambulatory referral to Spine Surgery    11. Renal cyst  - Continue to monitor       Continue current treatment plan. Current Outpatient Medications   Medication Sig Dispense Refill    atorvastatin (LIPITOR) 80 MG tablet Take 1 tablet by mouth every evening for cholesterol 90 tablet 0    tiZANidine (ZANAFLEX) 4 MG tablet Take 1 tablet by mouth every 8 hours as needed (for muscle spasm) 90 tablet 0    lisinopril-hydrochlorothiazide (PRINZIDE;ZESTORETIC) 20-25 MG per tablet Take 1 tablet by mouth daily 90 tablet 0    metoprolol succinate (TOPROL XL) 50 MG extended release tablet Take 1 tablet by mouth daily 90 tablet 0    rivaroxaban (XARELTO) 20 MG TABS tablet Take 1 tablet by mouth daily 90 tablet 0    amLODIPine (NORVASC) 5 MG tablet Take 1 tablet by mouth daily 90 tablet 0    hydrocortisone (WESTCORT) 0.2 % cream Apply topically 2 times daily. 45 g 0     No current facility-administered medications for this visit. Return in about 1 month (around 7/1/2019), or if symptoms worsen or fail to improve, for HTN, lipidemia, prediabetes, CAD, A-fib, AAA, BUSTER, screening, establish care, back pain, renal cysts.     Rosalia Burkitt received counseling on the following healthy behaviors: nutrition, exercise and medication adherence    Patient given educational materials on Diabetes, Hyperlipidemia, Nutrition, Exercise and Hypertension    Discussed use, benefit, and side effects of prescribed medications. Barriers to medication compliance addressed. All patient questions answered. Pt voiced understanding. Call office if experience side effects from medications. Please note that some or all of this record was generated using voice recognition software. If there are any questions about the content of this document, please contact the author as some errors in transcription may have occurred.

## 2019-06-03 NOTE — PATIENT INSTRUCTIONS
Fasting labs ordered    Medications refilled    Continue with cardiologist    Lumbar x ray    Tylenol arthritis as needed/directed for back pain    Referral to [id number] [facility]     Trial of Claritin 10 mg daily for allergies    Referral to opthalmology   Patient Education        Atrial Fibrillation: Care Instructions  Your Care Instructions    Atrial fibrillation is an irregular and often fast heartbeat. Treating this condition is important for several reasons. It can cause blood clots, which can travel from your heart to your brain and cause a stroke. If you have a fast heartbeat, you may feel lightheaded, dizzy, and weak. An irregular heartbeat can also increase your risk for heart failure. Atrial fibrillation is often the result of another heart condition, such as high blood pressure or coronary artery disease. Making changes to improve your heart condition will help you stay healthy and active. Follow-up care is a key part of your treatment and safety. Be sure to make and go to all appointments, and call your doctor if you are having problems. It's also a good idea to know your test results and keep a list of the medicines you take. How can you care for yourself at home? Medicines    · Take your medicines exactly as prescribed. Call your doctor if you think you are having a problem with your medicine. You will get more details on the specific medicines your doctor prescribes.     · If your doctor has given you a blood thinner to prevent a stroke, be sure you get instructions about how to take your medicine safely. Blood thinners can cause serious bleeding problems.     · Do not take any vitamins, over-the-counter drugs, or herbal products without talking to your doctor first.    Lifestyle changes    · Do not smoke. Smoking can increase your chance of a stroke and heart attack. If you need help quitting, talk to your doctor about stop-smoking programs and medicines.  These can increase your chances of quitting for good.     · Eat a heart-healthy diet.     · Stay at a healthy weight. Lose weight if you need to.     · Limit alcohol to 2 drinks a day for men and 1 drink a day for women. Too much alcohol can cause health problems.     · Avoid colds and flu. Get a pneumococcal vaccine shot. If you have had one before, ask your doctor whether you need another dose. Get a flu shot every year. If you must be around people with colds or flu, wash your hands often. Activity    · If your doctor recommends it, get more exercise. Walking is a good choice. Bit by bit, increase the amount you walk every day. Try for at least 30 minutes on most days of the week. You also may want to swim, bike, or do other activities. Your doctor may suggest that you join a cardiac rehabilitation program so that you can have help increasing your physical activity safely.     · Start light exercise if your doctor says it is okay. Even a small amount will help you get stronger, have more energy, and manage stress. Walking is an easy way to get exercise. Start out by walking a little more than you did in the hospital. Gradually increase the amount you walk.     · When you exercise, watch for signs that your heart is working too hard. You are pushing too hard if you cannot talk while you are exercising. If you become short of breath or dizzy or have chest pain, sit down and rest immediately.     · Check your pulse regularly. Place two fingers on the artery at the palm side of your wrist, in line with your thumb. If your heartbeat seems uneven or fast, talk to your doctor. When should you call for help? Call 911 anytime you think you may need emergency care. For example, call if:    · You have symptoms of a heart attack. These may include:  ? Chest pain or pressure, or a strange feeling in the chest.  ? Sweating. ? Shortness of breath. ? Nausea or vomiting.   ? Pain, pressure, or a strange feeling in the back, neck, jaw, or upper belly or in one or both shoulders or arms. ? Lightheadedness or sudden weakness. ? A fast or irregular heartbeat. After you call 911, the  may tell you to chew 1 adult-strength or 2 to 4 low-dose aspirin. Wait for an ambulance. Do not try to drive yourself.     · You have symptoms of a stroke. These may include:  ? Sudden numbness, tingling, weakness, or loss of movement in your face, arm, or leg, especially on only one side of your body. ? Sudden vision changes. ? Sudden trouble speaking. ? Sudden confusion or trouble understanding simple statements. ? Sudden problems with walking or balance. ? A sudden, severe headache that is different from past headaches.     · You passed out (lost consciousness).    Call your doctor now or seek immediate medical care if:    · You have new or increased shortness of breath.     · You feel dizzy or lightheaded, or you feel like you may faint.     · Your heart rate becomes irregular.     · You can feel your heart flutter in your chest or skip heartbeats. Tell your doctor if these symptoms are new or worse.    Watch closely for changes in your health, and be sure to contact your doctor if you have any problems. Where can you learn more? Go to https://Carlotz.Glooko. org and sign in to your Cinemur account. Enter U020 in the [employer] Machines box to learn more about \"Atrial Fibrillation: Care Instructions. \"     If you do not have an account, please click on the \"Sign Up Now\" link. Current as of: July 22, 2018  Content Version: 12.0  © 5066-0097 Healthwise, Incorporated. Care instructions adapted under license by [facility] ([facility]). If you have questions about a medical condition or this instruction, always ask your healthcare professional. [place] [address] any warranty or liability for your use of this information. Patient Education        Learning About Coronary Artery Disease (CAD)  What is coronary artery disease?     Coronary artery disease (CAD) occurs when plaque builds up in the arteries that bring oxygen-rich blood to your heart. Plaque is a fatty substance made of cholesterol, calcium, and other substances in the blood. This process is called hardening of the arteries, or atherosclerosis. What happens when you have coronary artery disease? · Plaque may narrow the coronary arteries. Narrowed arteries cause poor blood flow. This can lead to angina symptoms such as chest pain or discomfort. If blood flow is completely blocked, you could have a heart attack. · You can slow CAD and reduce the risk of future problems by making changes in your lifestyle. These include quitting smoking and eating heart-healthy foods. · Treatments for CAD, along with changes in your lifestyle, can help you live a longer and healthier life. How can you prevent coronary artery disease? · Do not smoke. It may be the best thing you can do to prevent heart disease. If you need help quitting, talk to your doctor about stop-smoking programs and medicines. These can increase your chances of quitting for good. · Be active. Get at least 30 minutes of exercise on most days of the week. Walking is a good choice. You also may want to do other activities, such as running, swimming, cycling, or playing tennis or team sports. · Eat heart-healthy foods. Eat more fruits and vegetables and less foods that contain saturated and trans fats. Limit alcohol, sodium, and sweets. · Stay at a healthy weight. Lose weight if you need to. · Manage other health problems such as diabetes, high blood pressure, and high cholesterol. · Manage stress. Stress can hurt your heart. To keep stress low, talk about your problems and feelings. Don't keep your feelings hidden. · If you have talked about it with your doctor, take a low-dose aspirin every day. Aspirin can help certain people lower their risk of a heart attack or stroke.  But taking aspirin isn't right for everyone, because it can cause serious bleeding. Do not start taking daily aspirin unless your doctor knows about it. How is coronary artery disease treated? · Your doctor will suggest that you make lifestyle changes. For example, your doctor may ask you to eat healthy foods, quit smoking, lose extra weight, and be more active. · You will have to take medicines. · Your doctor may suggest a procedure to open narrowed or blocked arteries. This is called angioplasty. Or your doctor may suggest using healthy blood vessels to create detours around narrowed or blocked arteries. This is called bypass surgery. Follow-up care is a key part of your treatment and safety. Be sure to make and go to all appointments, and call your doctor if you are having problems. It's also a good idea to know your test results and keep a list of the medicines you take. Where can you learn more? Go to https://Prime Focus Technologiesadam.besomebody.. org and sign in to your Open Source Food account. Enter [phone] in the [facility] box to learn more about \"Learning About Coronary Artery Disease (CAD). \"     If you do not have an account, please click on the \"Sign Up Now\" link. Current as of: July 22, 2018  Content Version: 12.0  © 1455-2600 Healthwise, [employer]. Care instructions adapted under license by [facility] ([facility]). If you have questions about a medical condition or this instruction, always ask your healthcare professional. [place] [address] any warranty or liability for your use of this information. Patient Education        DASH Diet: Care Instructions  Your Care Instructions    The DASH diet is an eating plan that can help lower your blood pressure. DASH stands for Dietary Approaches to Stop Hypertension. Hypertension is high blood pressure. The DASH diet focuses on eating foods that are high in calcium, potassium, and magnesium. These nutrients can lower blood pressure.  The foods that are highest in these nutrients are fruits, vegetables, low-fat dairy products, nuts, seeds, and legumes. But taking calcium, potassium, and magnesium supplements instead of eating foods that are high in those nutrients does not have the same effect. The DASH diet also includes whole grains, fish, and poultry. The DASH diet is one of several lifestyle changes your doctor may recommend to lower your high blood pressure. Your doctor may also want you to decrease the amount of sodium in your diet. Lowering sodium while following the DASH diet can lower blood pressure even further than just the DASH diet alone. Follow-up care is a key part of your treatment and safety. Be sure to make and go to all appointments, and call your doctor if you are having problems. It's also a good idea to know your test results and keep a list of the medicines you take. How can you care for yourself at home? Following the DASH diet  · Eat 4 to 5 servings of fruit each day. A serving is 1 medium-sized piece of fruit, ½ cup chopped or canned fruit, 1/4 cup dried fruit, or 4 ounces (½ cup) of fruit juice. Choose fruit more often than fruit juice. · Eat 4 to 5 servings of vegetables each day. A serving is 1 cup of lettuce or raw leafy vegetables, ½ cup of chopped or cooked vegetables, or 4 ounces (½ cup) of vegetable juice. Choose vegetables more often than vegetable juice. · Get 2 to 3 servings of low-fat and fat-free dairy each day. A serving is 8 ounces of milk, 1 cup of yogurt, or 1 ½ ounces of cheese. · Eat 6 to 8 servings of grains each day. A serving is 1 slice of bread, 1 ounce of dry cereal, or ½ cup of cooked rice, pasta, or cooked cereal. Try to choose whole-grain products as much as possible. · Limit lean meat, poultry, and fish to 2 servings each day. A serving is 3 ounces, about the size of a deck of cards. · Eat 4 to 5 servings of nuts, seeds, and legumes (cooked dried beans, lentils, and split peas) each week.  A serving is 1/3 cup of nuts, 2 tablespoons of seeds, or ½ cup of cooked beans or peas. · Limit fats and oils to 2 to 3 servings each day. A serving is 1 teaspoon of vegetable oil or 2 tablespoons of salad dressing. · Limit sweets and added sugars to 5 servings or less a week. A serving is 1 tablespoon jelly or jam, ½ cup sorbet, or 1 cup of lemonade. · Eat less than 2,300 milligrams (mg) of sodium a day. If you limit your sodium to 1,500 mg a day, you can lower your blood pressure even more. Tips for success  · Start small. Do not try to make dramatic changes to your diet all at once. You might feel that you are missing out on your favorite foods and then be more likely to not follow the plan. Make small changes, and stick with them. Once those changes become habit, add a few more changes. · Try some of the following:  ? Make it a goal to eat a fruit or vegetable at every meal and at snacks. This will make it easy to get the recommended amount of fruits and vegetables each day. ? Try yogurt topped with fruit and nuts for a snack or healthy dessert. ? Add lettuce, tomato, cucumber, and onion to sandwiches. ? Combine a ready-made pizza crust with low-fat mozzarella cheese and lots of vegetable toppings. Try using tomatoes, squash, spinach, broccoli, carrots, cauliflower, and onions. ? Have a variety of cut-up vegetables with a low-fat dip as an appetizer instead of chips and dip. ? Sprinkle sunflower seeds or chopped almonds over salads. Or try adding chopped walnuts or almonds to cooked vegetables. ? Try some vegetarian meals using beans and peas. Add garbanzo or kidney beans to salads. Make burritos and tacos with mashed person beans or black beans. Where can you learn more? Go to https://rodrigue.Usbek & Rica. org and sign in to your B[employer]hez [employer] account. Enter J551 in the [employer] box to learn more about \"DASH Diet: Care Instructions. \"     If you do not have an account, please click on the \"Sign Up Now\" link.   Current as of: July 22, 2018  Content Version: 12.0  © 2738-9264 Healthwise, [employer]. Care instructions adapted under license by [facility] ([facility]). If you have questions about a medical condition or this instruction, always ask your healthcare professional. [address] any warranty or liability for your use of this information. Patient Education        Low Sodium Diet (2,000 Milligram): Care Instructions  Your Care Instructions    Too much sodium causes your body to hold on to extra water. This can raise your blood pressure and force your heart and kidneys to work harder. In very serious cases, this could cause you to be put in the hospital. It might even be life-threatening. By limiting sodium, you will feel better and lower your risk of serious problems. The most common source of sodium is salt. People get most of the salt in their diet from canned, prepared, and packaged foods. Fast food and restaurant meals also are very high in sodium. Your doctor will probably limit your sodium to less than 2,000 milligrams (mg) a day. This limit counts all the sodium in prepared and packaged foods and any salt you add to your food. Follow-up care is a key part of your treatment and safety. Be sure to make and go to all appointments, and call your doctor if you are having problems. It's also a good idea to know your test results and keep a list of the medicines you take. How can you care for yourself at home? Read food labels  · Read labels on cans and food packages. The labels tell you how much sodium is in each serving. Make sure that you look at the serving size. If you eat more than the serving size, you have eaten more sodium. · Food labels also tell you the Percent Daily Value for sodium. Choose products with low Percent Daily Values for sodium. · Be aware that sodium can come in forms other than salt, including monosodium glutamate (MSG), sodium citrate, and sodium bicarbonate (baking soda).  MSG is often added to Asian food. When you eat out, you can sometimes ask for food without MSG or added salt. Buy low-sodium foods  · Buy foods that are labeled \"unsalted\" (no salt added), \"sodium-free\" (less than 5 mg of sodium per serving), or \"low-sodium\" (less than 140 mg of sodium per serving). Foods labeled \"reduced-sodium\" and \"light sodium\" may still have too much sodium. Be sure to read the label to see how much sodium you are getting. · Buy fresh vegetables, or frozen vegetables without added sauces. Buy low-sodium versions of canned vegetables, soups, and other canned goods. Prepare low-sodium meals  · Cut back on the amount of salt you use in cooking. This will help you adjust to the taste. Do not add salt after cooking. One teaspoon of salt has about 2,300 mg of sodium. · Take the salt shaker off the table. · Flavor your food with garlic, lemon juice, onion, vinegar, herbs, and spices. Do not use soy sauce, lite soy sauce, steak sauce, onion salt, garlic salt, celery salt, mustard, or ketchup on your food. · Use low-sodium salad dressings, sauces, and ketchup. Or make your own salad dressings and sauces without adding salt. · Use less salt (or none) when recipes call for it. You can often use half the salt a recipe calls for without losing flavor. Other foods such as rice, pasta, and grains do not need added salt. · Rinse canned vegetables, and cook them in fresh water. This removes some--but not all--of the salt. · Avoid water that is naturally high in sodium or that has been treated with water softeners, which add sodium. Call your local water company to find out the sodium content of your water supply. If you buy bottled water, read the label and choose a sodium-free brand. Avoid high-sodium foods  · Avoid eating:  ? Smoked, cured, salted, and canned meat, fish, and poultry. ? Ham, willis, hot dogs, and luncheon meats. ? Regular, hard, and processed cheese and regular peanut butter.   ? Crackers with salted tops, and other salted snack foods such as pretzels, chips, and salted popcorn. ? Frozen prepared meals, unless labeled low-sodium. ? Canned and dried soups, broths, and bouillon, unless labeled sodium-free or low-sodium. ? Canned vegetables, unless labeled sodium-free or low-sodium. ? Western Shayna fries, pizza, tacos, and other fast foods. ? Pickles, olives, ketchup, and other condiments, especially soy sauce, unless labeled sodium-free or low-sodium. Where can you learn more? Go to https://chpepiceweb.Power2SME. org and sign in to your Silverpop account. Enter F484 in the ApplyKit box to learn more about \"Low Sodium Diet (2,000 Milligram): Care Instructions. \"     If you do not have an account, please click on the \"Sign Up Now\" link. Current as of: November 7, 2018  Content Version: 12.0  © 0351-7606 [employer]. Care instructions adapted under license by [facility] ([facility]). If you have questions about a medical condition or this instruction, always ask your healthcare professional. [place] [address] any warranty or liability for your use of this information. Patient Education        High Cholesterol: Care Instructions  Your Care Instructions    Cholesterol is a type of fat in your blood. It is needed for many body functions, such as making new cells. Cholesterol is made by your body. It also comes from food you eat. High cholesterol means that you have too much of the fat in your blood. This raises your risk of a heart attack and stroke. LDL and HDL are part of your total cholesterol. LDL is the \"bad\" cholesterol. High LDL can raise your risk for heart disease, heart attack, and stroke. HDL is the \"good\" cholesterol. It helps clear bad cholesterol from the body. High HDL is linked with a lower risk of heart disease, heart attack, and stroke. Your cholesterol levels help your doctor find out your risk for having a heart attack or stroke.  You and your doctor can talk about whether you need to lower your risk and what treatment is best for you. A heart-healthy lifestyle along with medicines can help lower your cholesterol and your risk. The way you choose to lower your risk will depend on how high your risk is for heart attack and stroke. It will also depend on how you feel about taking medicines. Follow-up care is a key part of your treatment and safety. Be sure to make and go to all appointments, and call your doctor if you are having problems. It's also a good idea to know your test results and keep a list of the medicines you take. How can you care for yourself at home? · Eat a variety of foods every day. Good choices include fruits, vegetables, whole grains (like oatmeal), dried beans and peas, nuts and seeds, soy products (like tofu), and fat-free or low-fat dairy products. · Replace butter, margarine, and hydrogenated or partially hydrogenated oils with olive and canola oils. (Canola oil margarine without trans fat is fine.)  · Replace red meat with fish, poultry, and soy protein (like tofu). · Limit processed and packaged foods like chips, crackers, and cookies. · Bake, broil, or steam foods. Don't dao them. · Be physically active. Get at least 30 minutes of exercise on most days of the week. Walking is a good choice. You also may want to do other activities, such as running, swimming, cycling, or playing tennis or team sports. · Stay at a healthy weight or lose weight by making the changes in eating and physical activity listed above. Losing just a small amount of weight, even 5 to 10 pounds, can reduce your risk for having a heart attack or stroke. · Do not smoke. When should you call for help? Watch closely for changes in your health, and be sure to contact your doctor if:    · You need help making lifestyle changes.     · You have questions about your medicine. Where can you learn more? Go to https://chdayanaeb.health-partners. org and sign in to your eXelate account. Enter H557 in the [facility] box to learn more about \"High Cholesterol: Care Instructions. \"     If you do not have an account, please click on the \"Sign Up Now\" link. Current as of: July 22, 2018  Content Version: 12.0  © 0967-2119 [employer]. Care instructions adapted under license by [facility] ([facility]). If you have questions about a medical condition or this instruction, always ask your healthcare professional. [address] any warranty or liability for your use of this information. Patient Education        Learning About High Cholesterol  What is high cholesterol? Cholesterol is a type of fat in your blood. It is needed for many body functions, such as making new cells. Cholesterol is made by your body. It also comes from food you eat. If you have too much cholesterol, it starts to build up in your arteries. This is called hardening of the arteries, or atherosclerosis. High cholesterol raises your risk of a heart attack and stroke. There are different types of cholesterol. LDL is the \"bad\" cholesterol. High LDL can raise your risk for heart disease, heart attack, and stroke. HDL is the \"good\" cholesterol. High HDL is linked with a lower risk for heart disease, heart attack, and stroke. Your cholesterol levels help your doctor find out your risk for having a heart attack or stroke. How can you prevent high cholesterol? A heart-healthy lifestyle can help you prevent high cholesterol. This lifestyle helps lower your risk for a heart attack and stroke. · Eat heart-healthy foods. ? Eat fruits, vegetables, whole grains (like oatmeal), dried beans and peas, nuts and seeds, soy products (like tofu), and fat-free or low-fat dairy products. ? Replace butter, margarine, and hydrogenated or partially hydrogenated oils with olive and canola oils. (Canola oil margarine without trans fat is fine.)  ?  Replace red meat with fish, poultry, and soy stays up, you have high blood pressure. Another name for high blood pressure is hypertension. Despite what a lot of people think, high blood pressure usually doesn't cause headaches or make you feel dizzy or lightheaded. It usually has no symptoms. But it does increase your risk of stroke, heart attack, and other problems. You and your doctor will talk about your risks of these problems based on your blood pressure. Your doctor will give you a goal for your blood pressure. Your goal will be based on your health and your age. Lifestyle changes, such as eating healthy and being active, are always important to help lower blood pressure. You might also take medicine to reach your blood pressure goal.  Follow-up care is a key part of your treatment and safety. Be sure to make and go to all appointments, and call your doctor if you are having problems. It's also a good idea to know your test results and keep a list of the medicines you take. How can you care for yourself at home? Medical treatment  · If you stop taking your medicine, your blood pressure will go back up. You may take one or more types of medicine to lower your blood pressure. Be safe with medicines. Take your medicine exactly as prescribed. Call your doctor if you think you are having a problem with your medicine. · Talk to your doctor before you start taking aspirin every day. Aspirin can help certain people lower their risk of a heart attack or stroke. But taking aspirin isn't right for everyone, because it can cause serious bleeding. · See your doctor regularly. You may need to see the doctor more often at first or until your blood pressure comes down. · If you are taking blood pressure medicine, talk to your doctor before you take decongestants or anti-inflammatory medicine, such as ibuprofen. Some of these medicines can raise blood pressure. · Learn how to check your blood pressure at home. Lifestyle changes  · Stay at a healthy weight.  This especially on only one side of your body. ? Sudden vision changes. ? Sudden trouble speaking. ? Sudden confusion or trouble understanding simple statements. ? Sudden problems with walking or balance. ? A sudden, severe headache that is different from past headaches.     · You have severe back or belly pain.    Do not wait until your blood pressure comes down on its own. Get help right away.   Call your doctor now or seek immediate care if:    · Your blood pressure is much higher than normal (such as 180/120 or higher), but you don't have symptoms.     · You think high blood pressure is causing symptoms, such as:  ? Severe headache.  ? Blurry vision.    Watch closely for changes in your health, and be sure to contact your doctor if:    · Your blood pressure measures higher than your doctor recommends at least 2 times. That means the top number is higher or the bottom number is higher, or both.     · You think you may be having side effects from your blood pressure medicine. Where can you learn more? Go to https://LokalitepeBay Dynamics.Phantom. org and sign in to your [employer] account. Enter D433 in the Blendspace box to learn more about \"High Blood Pressure: Care Instructions. \"     If you do not have an account, please click on the \"Sign Up Now\" link. Current as of: July 22, 2018  Content Version: 12.0  © 9779-9103 Healthwise, Incorporated. Care instructions adapted under license by [facility] ([facility]). If you have questions about a medical condition or this instruction, always ask your healthcare professional. [place] [address] any warranty or liability for your use of this information. Patient Education        Migraine Headache: Care Instructions  Your Care Instructions  Migraines are painful, throbbing headaches that often start on one side of the head. They may cause nausea and vomiting and make you sensitive to light, sound, or smell.   Without treatment, migraines can last from 4 hours to a few days. Medicines can help prevent migraines or stop them after they have started. Your doctor can help you find which ones work best for you. Follow-up care is a key part of your treatment and safety. Be sure to make and go to all appointments, and call your doctor if you are having problems. It's also a good idea to know your test results and keep a list of the medicines you take. How can you care for yourself at home? · Do not drive if you have taken a prescription pain medicine. · Rest in a quiet, dark room until your headache is gone. Close your eyes, and try to relax or go to sleep. Don't watch TV or read. · Put a cold, moist cloth or cold pack on the painful area for 10 to 20 minutes at a time. Put a thin cloth between the cold pack and your skin. · Use a warm, moist towel or a heating pad set on low to relax tight shoulder and neck muscles. · Have someone gently massage your neck and shoulders. · Take your medicines exactly as prescribed. Call your doctor if you think you are having a problem with your medicine. You will get more details on the specific medicines your doctor prescribes. · Be careful not to take pain medicine more often than the instructions allow. You could get worse or more frequent headaches when the medicine wears off. To prevent migraines  · Keep a headache diary so you can figure out what triggers your headaches. Avoiding triggers may help you prevent headaches. Record when each headache began, how long it lasted, and what the pain was like. (Was it throbbing, aching, stabbing, or dull?) Write down any other symptoms you had with the headache, such as nausea, flashing lights or dark spots, or sensitivity to bright light or loud noise. Note if the headache occurred near your period. List anything that might have triggered the headache. Triggers may include certain foods (chocolate, cheese, wine) or odors, smoke, bright light, stress, or lack of sleep.   · If your doctor has prescribed medicine for your migraines, take it as directed. You may have medicine that you take only when you get a migraine and medicine that you take all the time to help prevent migraines. ? If your doctor has prescribed medicine for when you get a headache, take it at the first sign of a migraine, unless your doctor has given you other instructions. ? If your doctor has prescribed medicine to prevent migraines, take it exactly as prescribed. Call your doctor if you think you are having a problem with your medicine. · Find healthy ways to deal with stress. Migraines are most common during or right after stressful times. Take time to relax before and after you do something that has caused a migraine in the past.  · Try to keep your muscles relaxed by keeping good posture. Check your jaw, face, neck, and shoulder muscles for tension. Try to relax them. When you sit at a desk, change positions often. And make sure to stretch for 30 seconds each hour. · Get plenty of sleep and exercise. · Eat meals on a regular schedule. Avoid foods and drinks that often trigger migraines. These include chocolate, alcohol (especially red wine and port), aspartame, monosodium glutamate (MSG), and some additives found in foods (such as hot dogs, willis, cold cuts, aged cheeses, and pickled foods). · Limit caffeine. Don't drink too much coffee, tea, or soda. But don't quit caffeine suddenly. That can also give you migraines. · Do not smoke or allow others to smoke around you. If you need help quitting, talk to your doctor about stop-smoking programs and medicines. These can increase your chances of quitting for good. · If you are taking birth control pills or hormone therapy, talk to your doctor about whether they are triggering your migraines. When should you call for help? Call 911 anytime you think you may need emergency care. For example, call if:    · You have signs of a stroke.  These may include:  ? Sudden numbness, paralysis, or weakness in your face, arm, or leg, especially on only one side of your body. ? Sudden vision changes. ? Sudden trouble speaking. ? Sudden confusion or trouble understanding simple statements. ? Sudden problems with walking or balance. ? A sudden, severe headache that is different from past headaches.    Call your doctor now or seek immediate medical care if:    · You have new or worse nausea and vomiting.     · You have a new or higher fever.     · Your headache gets much worse.    Watch closely for changes in your health, and be sure to contact your doctor if:    · You are not getting better after 2 days (48 hours). Where can you learn more? Go to https://AkaRx.CoinJar. org and sign in to your [employer] account. Enter L365 in the SolarReserve box to learn more about \"Migraine Headache: Care Instructions. \"     If you do not have an account, please click on the \"Sign Up Now\" link. Current as of: Latanya 3, 2018  Content Version: 12.0  © 2415-7246 [employer]undance Generation. Care instructions adapted under license by [facility] ([facility]). If you have questions about a medical condition or this instruction, always ask your healthcare professional. [place] [address] any warranty or liability for your use of this information. Patient Education        Learning About Diabetes Food Guidelines  Your Care Instructions    Meal planning is important to manage diabetes. It helps keep your blood sugar at a target level (which you set with your doctor). You don't have to eat special foods. You can eat what your family eats, including sweets once in a while. But you do have to pay attention to how often you eat and how much you eat of certain foods. You may want to work with a dietitian or a certified diabetes educator (CDE) to help you plan meals and snacks. A dietitian or CDE can also help you lose weight if that is one of your goals.   What carbs per serving. Examples of proteins are beef, chicken, turkey, fish, eggs, tofu, cheese, cottage cheese, and peanut butter. A serving size of meat is 3 ounces, which is about the size of a deck of cards. Examples of meat substitute serving sizes (equal to 1 ounce of meat) are 1/4 cup of cottage cheese, 1 egg, 1 tablespoon of peanut butter, and ½ cup of tofu. How can you eat out and still eat healthy? · Learn to estimate the serving sizes of foods that have carbohydrate. If you measure food at home, it will be easier to estimate the amount in a serving of restaurant food. · If the meal you order has too much carbohydrate (such as potatoes, corn, or baked beans), ask to have a low-carbohydrate food instead. Ask for a salad or green vegetables. · If you use insulin, check your blood sugar before and after eating out to help you plan how much to eat in the future. · If you eat more carbohydrate at a meal than you had planned, take a walk or do other exercise. This will help lower your blood sugar. What else should you know? · Limit saturated fat, such as the fat from meat and dairy products. This is a healthy choice because people who have diabetes are at higher risk of heart disease. So choose lean cuts of meat and nonfat or low-fat dairy products. Use olive or canola oil instead of butter or shortening when cooking. · Don't skip meals. Your blood sugar may drop too low if you skip meals and take insulin or certain medicines for diabetes. · Check with your doctor before you drink alcohol. Alcohol can cause your blood sugar to drop too low. Alcohol can also cause a bad reaction if you take certain diabetes medicines. Follow-up care is a key part of your treatment and safety. Be sure to make and go to all appointments, and call your doctor if you are having problems. It's also a good idea to know your test results and keep a list of the medicines you take. Where can you learn more?   Go to https://chpepiceweb.healthY Combinator. org and sign in to your Jumping Nuts account. Enter B833 in the [facility] box to learn more about \"Learning About Diabetes Food Guidelines. \"     If you do not have an account, please click on the \"Sign Up Now\" link. Current as of: July 25, 2018  Content Version: 12.0  © 6721-1841 [employer]ani Works. Care instructions adapted under license by [facility] ([facility]). If you have questions about a medical condition or this instruction, always ask your healthcare professional. [address] any warranty or liability for your use of this information. Patient Education        Learning About Meal Planning for Diabetes  Why plan your meals? Meal planning can be a key part of managing diabetes. Planning meals and snacks with the right balance of carbohydrate, protein, and fat can help you keep your blood sugar at the target level you set with your doctor. You don't have to eat special foods. You can eat what your family eats, including sweets once in a while. But you do have to pay attention to how often you eat and how much you eat of certain foods. You may want to work with a dietitian or a certified diabetes educator. He or she can give you tips and meal ideas and can answer your questions about meal planning. This health professional can also help you reach a healthy weight if that is one of your goals. What plan is right for you? Your dietitian or diabetes educator may suggest that you start with the plate format or carbohydrate counting. The plate format  The plate format is a simple way to help you manage how you eat. You plan meals by learning how much space each food should take on a plate. Using the plate format helps you spread carbohydrate throughout the day. It can make it easier to keep your blood sugar level within your target range. It also helps you see if you're eating healthy portion sizes.   To use the plate format, you put food label is based on that serving size. So if you eat more or less than that, you'll need to adjust the other numbers. Total carbohydrate is the next thing you need to look for on the label. If you count carbohydrate servings, one serving of carbohydrate is 15 grams. For foods that don't come with labels, such as fresh fruits and vegetables, you'll need a guide that lists carbohydrate in these foods. Ask your doctor, dietitian, or diabetes educator about books or other nutrition guides you can use. If you take insulin, you need to know how many grams of carbohydrate are in a meal. This lets you know how much rapid-acting insulin to take before you eat. If you use an insulin pump, you get a constant rate of insulin during the day. So the pump must be programmed at meals to give you extra insulin to cover the rise in blood sugar after meals. When you know how much carbohydrate you will eat, you can take the right amount of insulin. Or, if you always use the same amount of insulin, you need to make sure that you eat the same amount of carbohydrate at meals. If you need more help to understand carbohydrate counting and food labels, ask your doctor, dietitian, or diabetes educator. How do you get started with meal planning? Here are some tips to get started:  · Plan your meals a week at a time. Don't forget to include snacks too. · Use cookbooks or online recipes to plan several main meals. Plan some quick meals for busy nights. You also can double some recipes that freeze well. Then you can save half for other busy nights when you don't have time to cook. · Make sure you have the ingredients you need for your recipes. If you're running low on basic items, put these items on your shopping list too. · List foods that you use to make breakfasts, lunches, and snacks. List plenty of fruits and vegetables. · Post this list on the refrigerator. Add to it as you think of more things you need.   · Take the list to the store to do your weekly shopping. Follow-up care is a key part of your treatment and safety. Be sure to make and go to all appointments, and call your doctor if you are having problems. It's also a good idea to know your test results and keep a list of the medicines you take. Where can you learn more? Go to https://chpepiceweb.One World Virtual. org and sign in to your [employer] account. Enter O206 in the Punchd box to learn more about \"Learning About Meal Planning for Diabetes. \"     If you do not have an account, please click on the \"Sign Up Now\" link. Current as of: July 25, 2018  Content Version: 12.0  © 2229-4087 Healthwise, Clipsure. Care instructions adapted under license by [facility] ([facility]). If you have questions about a medical condition or this instruction, always ask your healthcare professional. [place] [address] any warranty or liability for your use of this information. Patient Education        Counting Carbohydrates: Care Instructions  Your Care Instructions    You don't have to eat special foods when you have diabetes. You just have to be careful to eat healthy foods. Carbohydrates (carbs) raise blood sugar higher and quicker than any other nutrient. Carbs are found in desserts, breads and cereals, and fruit. They're also in starchy vegetables. These include potatoes, corn, and grains such as rice and pasta. Carbs are also in milk and yogurt. The more carbs you eat at one time, the higher your blood sugar will rise. Spreading carbs all through the day helps keep your blood sugar levels within your target range. Counting carbs is one of the best ways to keep your blood sugar under control. If you use insulin, counting carbs helps you match the right amount of insulin to the number of grams of carbs in a meal. Then you can change your diet and insulin dose as needed.  Testing your blood sugar several times a day can help you learn how carbs affect your blood sugar.  A registered dietitian or certified diabetes educator can help you plan meals and snacks. Follow-up care is a key part of your treatment and safety. Be sure to make and go to all appointments, and call your doctor if you are having problems. It's also a good idea to know your test results and keep a list of the medicines you take. How can you care for yourself at home? Know your daily amount of carbohydrates  Your daily amount depends on several things, such as your weight, how active you are, which diabetes medicines you take, and what your goals are for your blood sugar levels. A registered dietitian or certified diabetes educator can help you plan how many carbs to include in each meal and snack. For most adults, a guideline for the daily amount of carbs is:  · 45 to 60 grams at each meal. That's about the same as 3 to 4 carbohydrate servings. · 15 to 20 grams at each snack. That's about the same as 1 carbohydrate serving. Count carbs  Counting carbs lets you know how much rapid-acting insulin to take before you eat. If you use an insulin pump, you get a constant rate of insulin during the day. So the pump must be programmed at meals. This gives you extra insulin to cover the rise in blood sugar after meals. If you take insulin:  · Learn your own insulin-to-carb ratio. You and your diabetes health professional will figure out the ratio. You can do this by testing your blood sugar after meals. For example, you may need a certain amount of insulin for every 15 grams of carbs. · Add up the carb grams in a meal. Then you can figure out how many units of insulin to take based on your insulin-to-carb ratio. · Exercise lowers blood sugar. You can use less insulin than you would if you were not doing exercise. Keep in mind that timing matters.  If you exercise within 1 hour after a meal, your body may need less insulin for that meal than it would if you exercised 3 hours after the meal. Test your blood sugar to find out how exercise affects your need for insulin. If you do or don't take insulin:  · Look at labels on packaged foods. This can tell you how many carbs are in a serving. You can also use guides from the American Diabetes Association. · Be aware of portions, or serving sizes. If a package has two servings and you eat the whole package, you need to double the number of grams of carbohydrate listed for one serving. · Protein, fat, and fiber do not raise blood sugar as much as carbs do. If you eat a lot of these nutrients in a meal, your blood sugar will rise more slowly than it would otherwise. Eat from all food groups  · Eat at least three meals a day. · Plan meals to include food from all the food groups. The food groups include grains, fruits, dairy, proteins, and vegetables. · Talk to your dietitian or diabetes educator about ways to add limited amounts of sweets into your meal plan. · If you drink alcohol, talk to your doctor. It may not be recommended when you are taking certain diabetes medicines. Where can you learn more? Go to https://OmniPV."Beartooth Radio, INC". org and sign in to your M360LOHAS outdoors account. Enter I477 in the NVC Lighting box to learn more about \"Counting Carbohydrates: Care Instructions. \"     If you do not have an account, please click on the \"Sign Up Now\" link. Current as of: July 25, 2018  Content Version: 12.0  © 0299-4477 [employer]ani. Care instructions adapted under license by [facility] ([facility]). If you have questions about a medical condition or this instruction, always ask your healthcare professional. [place] [address] any warranty or liability for your use of this information. Patient Education        Learning About How to Have a Healthy Back  What causes back pain? Back pain is often caused by overuse, strain, or injury.  For example, people often hurt their backs playing sports or working in the yard, being jolted in a drive with both hands on the steering wheel. Your arms should be in a slightly bent position. Reduce stress on your back through careful lifting  · Squat down, bending at the hips and knees only. If you need to, put one knee to the floor and extend your other knee in front of you, bent at a right angle (half kneeling). · Press your chest straight forward. This helps keep your upper back straight while keeping a slight arch in your low back. · Hold the load as close to your body as possible, at the level of your belly button (navel). · Use your feet to change direction, taking small steps. · Lead with your hips as you change direction. Keep your shoulders in line with your hips as you move. · Set down your load carefully, squatting with your knees and hips only. Exercise and stretch your back  · Do some exercise on most days of the week, if your doctor says it is okay. You can walk, run, swim, or cycle. · Stretch your back muscles. Here are a few exercises to try:  ? Lie on your back, and gently pull one bent knee to your chest. Put that foot back on the floor, and then pull the other knee to your chest.  ? Do pelvic tilts. Lie on your back with your knees bent. Tighten your stomach muscles. Pull your belly button (navel) in and up toward your ribs. You should feel like your back is pressing to the floor and your hips and pelvis are slightly lifting off the floor. Hold for 6 seconds while breathing smoothly. ? Sit with your back flat against a wall. · Keep your core muscles strong. The muscles of your back, belly (abdomen), and buttocks support your spine. ? Pull in your belly and imagine pulling your navel toward your spine. Hold this for 6 seconds, then relax. Remember to keep breathing normally as you tense your muscles. ? Do curl-ups. Always do them with your knees bent. Keep your low back on the floor, and curl your shoulders toward your knees using a smooth, slow motion.  Keep your arms folded across your chest. If this bothers your neck, try putting your hands behind your neck (not your head), with your elbows spread apart. ? Lie on your back with your knees bent and your feet flat on the floor. Tighten your belly muscles, and then push with your feet and raise your buttocks up a few inches. Hold this position 6 seconds as you continue to breathe normally, then lower yourself slowly to the floor. Repeat 8 to 12 times. ? If you like group exercise, try Pilates or yoga. These classes have poses that strengthen the core muscles. Lead a healthy lifestyle  · Stay at a healthy weight to avoid strain on your back. · Do not smoke. Smoking increases the risk of osteoporosis, which weakens the spine. If you need help quitting, talk to your doctor about stop-smoking programs and medicines. These can increase your chances of quitting for good. Where can you learn more? Go to https://DiabetOmicspeClubJumpr.com.Bandcamp. org and sign in to your [employer] account. Enter L315 in the mmCHANNEL box to learn more about \"Learning About How to Have a Healthy Back. \"     If you do not have an account, please click on the \"Sign Up Now\" link. Current as of: September 20, 2018  Content Version: 12.0  © 2526-2121 Healthwise, Incorporated. Care instructions adapted under license by [facility] ([facility]). If you have questions about a medical condition or this instruction, always ask your healthcare professional. [place] [address] any warranty or liability for your use of this information. Patient Education        Learning About How to Have a Healthy Back  What causes back pain? Back pain is often caused by overuse, strain, or injury. For example, people often hurt their backs playing sports or working in the yard, being jolted in a car accident, or lifting something too heavy. Aging plays a part too. Your bones and muscles tend to lose strength as you age, which makes injury more likely.  The spongy discs between the bones of the spine (vertebrae) may suffer from wear and tear and no longer provide enough cushion between the bones. A disc that bulges or breaks open (herniated disc) can press on nerves, causing back pain. In some people, back pain is the result of arthritis, broken vertebrae caused by bone loss (osteoporosis), illness, or a spine problem. Although most people have back pain at one time or another, there are steps you can take to make it less likely. How can you have a healthy back? Reduce stress on your back through good posture  Slumping or slouching alone may not cause low back pain. But after the back has been strained or injured, bad posture can make pain worse. · Sleep in a position that maintains your back's normal curves and on a mattress that feels comfortable. Sleep on your side with a pillow between your knees, or sleep on your back with a pillow under your knees. These positions can reduce strain on your back. · Stand and sit up straight. \"Good posture\" generally means your ears, shoulders, and hips are in a straight line. · If you must stand for a long time, put one foot on a stool, ledge, or box. Switch feet every now and then. · Sit in a chair that is low enough to let you place both feet flat on the floor with both knees nearly level with your hips. If your chair or desk is too high, use a footrest to raise your knees. Place a small pillow, a rolled-up towel, or a lumbar roll in the curve of your back if you need extra support. · Try a kneeling chair, which helps tilt your hips forward. This takes pressure off your lower back. · Try sitting on an exercise ball. It can rock from side to side, which helps keep your back loose. · When driving, keep your knees nearly level with your hips. Sit straight, and drive with both hands on the steering wheel. Your arms should be in a slightly bent position.   Reduce stress on your back through careful lifting  · Squat down, bending at the hips and knees only. If you need to, put one knee to the floor and extend your other knee in front of you, bent at a right angle (half kneeling). · Press your chest straight forward. This helps keep your upper back straight while keeping a slight arch in your low back. · Hold the load as close to your body as possible, at the level of your belly button (navel). · Use your feet to change direction, taking small steps. · Lead with your hips as you change direction. Keep your shoulders in line with your hips as you move. · Set down your load carefully, squatting with your knees and hips only. Exercise and stretch your back  · Do some exercise on most days of the week, if your doctor says it is okay. You can walk, run, swim, or cycle. · Stretch your back muscles. Here are a few exercises to try:  ? Lie on your back, and gently pull one bent knee to your chest. Put that foot back on the floor, and then pull the other knee to your chest.  ? Do pelvic tilts. Lie on your back with your knees bent. Tighten your stomach muscles. Pull your belly button (navel) in and up toward your ribs. You should feel like your back is pressing to the floor and your hips and pelvis are slightly lifting off the floor. Hold for 6 seconds while breathing smoothly. ? Sit with your back flat against a wall. · Keep your core muscles strong. The muscles of your back, belly (abdomen), and buttocks support your spine. ? Pull in your belly and imagine pulling your navel toward your spine. Hold this for 6 seconds, then relax. Remember to keep breathing normally as you tense your muscles. ? Do curl-ups. Always do them with your knees bent. Keep your low back on the floor, and curl your shoulders toward your knees using a smooth, slow motion. Keep your arms folded across your chest. If this bothers your neck, try putting your hands behind your neck (not your head), with your elbows spread apart.   ? Lie on your back with your knees bent and your feet flat on the floor. Tighten your belly muscles, and then push with your feet and raise your buttocks up a few inches. Hold this position 6 seconds as you continue to breathe normally, then lower yourself slowly to the floor. Repeat 8 to 12 times. ? If you like group exercise, try Pilates or yoga. These classes have poses that strengthen the core muscles. Lead a healthy lifestyle  · Stay at a healthy weight to avoid strain on your back. · Do not smoke. Smoking increases the risk of osteoporosis, which weakens the spine. If you need help quitting, talk to your doctor about stop-smoking programs and medicines. These can increase your chances of quitting for good. Where can you learn more? Go to https://Upstart LabspeFathomDBeb.TheTake. org and sign in to your [employer] account. Enter L315 in the [place] box to learn more about \"Learning About How to Have a Healthy Back. \"     If you do not have an account, please click on the \"Sign Up Now\" link. Current as of: September 20, 2018  Content Version: 12.0  © 4121-6242 Healthwise, Incorporated. Care instructions adapted under license by [facility] ([facility]). If you have questions about a medical condition or this instruction, always ask your healthcare professional. [address] any warranty or liability for your use of this information.

## 2019-06-05 DIAGNOSIS — R73.03 PREDIABETES: ICD-10-CM

## 2019-06-05 DIAGNOSIS — Z13.21 ENCOUNTER FOR VITAMIN DEFICIENCY SCREENING: ICD-10-CM

## 2019-06-05 DIAGNOSIS — E78.2 MIXED HYPERLIPIDEMIA: Chronic | ICD-10-CM

## 2019-06-05 DIAGNOSIS — I10 ESSENTIAL HYPERTENSION: Chronic | ICD-10-CM

## 2019-06-05 LAB
A/G RATIO: 1.5 (ref 1.1–2.2)
ALBUMIN SERPL-MCNC: 4.6 G/DL (ref 3.4–5)
ALP BLD-CCNC: 79 U/L (ref 40–129)
ALT SERPL-CCNC: 21 U/L (ref 10–40)
ANION GAP SERPL CALCULATED.3IONS-SCNC: 15 MMOL/L (ref 3–16)
AST SERPL-CCNC: 20 U/L (ref 15–37)
BASOPHILS ABSOLUTE: 0 K/UL (ref 0–0.2)
BASOPHILS RELATIVE PERCENT: 0.5 %
BILIRUB SERPL-MCNC: 0.4 MG/DL (ref 0–1)
BUN BLDV-MCNC: 17 MG/DL (ref 7–20)
CALCIUM SERPL-MCNC: 9.9 MG/DL (ref 8.3–10.6)
CHLORIDE BLD-SCNC: 102 MMOL/L (ref 99–110)
CHOLESTEROL, TOTAL: 230 MG/DL (ref 0–199)
CO2: 26 MMOL/L (ref 21–32)
CREAT SERPL-MCNC: 1 MG/DL (ref 0.9–1.3)
EOSINOPHILS ABSOLUTE: 0.4 K/UL (ref 0–0.6)
EOSINOPHILS RELATIVE PERCENT: 6.5 %
GFR AFRICAN AMERICAN: >60
GFR NON-AFRICAN AMERICAN: >60
GLOBULIN: 3 G/DL
GLUCOSE BLD-MCNC: 103 MG/DL (ref 70–99)
HCT VFR BLD CALC: 42.8 % (ref 40.5–52.5)
HDLC SERPL-MCNC: 34 MG/DL (ref 40–60)
HEMOGLOBIN: 13.8 G/DL (ref 13.5–17.5)
LDL CHOLESTEROL CALCULATED: 143 MG/DL
LYMPHOCYTES ABSOLUTE: 2.8 K/UL (ref 1–5.1)
LYMPHOCYTES RELATIVE PERCENT: 50.8 %
MCH RBC QN AUTO: 28.4 PG (ref 26–34)
MCHC RBC AUTO-ENTMCNC: 32.3 G/DL (ref 31–36)
MCV RBC AUTO: 87.7 FL (ref 80–100)
MONOCYTES ABSOLUTE: 0.3 K/UL (ref 0–1.3)
MONOCYTES RELATIVE PERCENT: 5.2 %
NEUTROPHILS ABSOLUTE: 2 K/UL (ref 1.7–7.7)
NEUTROPHILS RELATIVE PERCENT: 37 %
PDW BLD-RTO: 13.7 % (ref 12.4–15.4)
PLATELET # BLD: 188 K/UL (ref 135–450)
PMV BLD AUTO: 9.2 FL (ref 5–10.5)
POTASSIUM SERPL-SCNC: 4.4 MMOL/L (ref 3.5–5.1)
RBC # BLD: 4.88 M/UL (ref 4.2–5.9)
SODIUM BLD-SCNC: 143 MMOL/L (ref 136–145)
TOTAL CK: 544 U/L (ref 39–308)
TOTAL PROTEIN: 7.6 G/DL (ref 6.4–8.2)
TRIGL SERPL-MCNC: 265 MG/DL (ref 0–150)
TSH REFLEX: 1.46 UIU/ML (ref 0.27–4.2)
VITAMIN D 25-HYDROXY: 11.8 NG/ML
VLDLC SERPL CALC-MCNC: 53 MG/DL
WBC # BLD: 5.4 K/UL (ref 4–11)

## 2019-06-06 LAB
ESTIMATED AVERAGE GLUCOSE: 125.5 MG/DL
HBA1C MFR BLD: 6 %

## 2019-06-06 ASSESSMENT — ENCOUNTER SYMPTOMS
DIARRHEA: 0
CONSTIPATION: 0
VOMITING: 0
COUGH: 0
WHEEZING: 0
SHORTNESS OF BREATH: 0
CHEST TIGHTNESS: 0
NAUSEA: 0
ABDOMINAL PAIN: 0

## 2019-06-06 NOTE — PROGRESS NOTES
file     Gets together: Not on file     Attends Yazdanism service: Not on file     Active member of club or organization: Not on file     Attends meetings of clubs or organizations: Not on file     Relationship status: Not on file    Intimate partner violence:     Fear of current or ex partner: Not on file     Emotionally abused: Not on file     Physically abused: Not on file     Forced sexual activity: Not on file   Other Topics Concern    Not on file   Social History Narrative    Not on file       Review of Systems   Constitutional: Negative for chills, fatigue and fever. HENT: Negative for congestion and rhinorrhea. Eyes: Negative for pain and visual disturbance. Dry eyes   Respiratory: Negative for cough, chest tightness, shortness of breath and wheezing. Cardiovascular: Negative for chest pain and palpitations. Gastrointestinal: Negative for abdominal pain, anorexia, constipation, diarrhea, nausea and vomiting. Musculoskeletal: Positive for back pain (unchanged). Negative for arthralgias and myalgias. Skin: Positive for rash. Neurological: Negative for headaches. OBJECTIVE:    Physical Exam   Constitutional: He is oriented to person, place, and time. He appears well-developed and well-nourished. No distress. HENT:   Head: Normocephalic and atraumatic. Right Ear: External ear normal.   Left Ear: External ear normal.   Nose: Nose normal.   Eyes: Pupils are equal, round, and reactive to light. Conjunctivae are normal.   Neck: Normal range of motion. Neck supple. No tracheal deviation present. Cardiovascular: Normal rate, regular rhythm and normal heart sounds. Pulmonary/Chest: Effort normal and breath sounds normal. No respiratory distress. He has no wheezes. He has no rales. He exhibits no tenderness. Abdominal: Soft. Bowel sounds are normal. He exhibits no distension. There is no tenderness. There is no guarding. Musculoskeletal: Normal range of motion.  He exhibits no edema. Slightly decreased range of motion due to discomfort, able to walk on heels and toes, no spinal tenderness with palpation   Neurological: He is alert and oriented to person, place, and time. Skin: Skin is warm and dry. Rash noted. Dry flaky rash noted of the left antecubital   Psychiatric: He has a normal mood and affect. His behavior is normal.   Nursing note and vitals reviewed. /84 (Site: Left Upper Arm, Position: Sitting, Cuff Size: Large Adult)   Pulse 88   Temp 97.7 °F (36.5 °C)   Resp 17   Ht 6' 4\" (1.93 m)   Wt 281 lb 3.2 oz (127.6 kg)   SpO2 98%   BMI 34.23 kg/m²     BP Readings from Last 3 Encounters:   06/07/19 122/84   06/03/19 (!) 150/80   05/29/19 (!) 160/100      Wt Readings from Last 3 Encounters:   06/07/19 281 lb 3.2 oz (127.6 kg)   06/03/19 283 lb (128.4 kg)   05/29/19 281 lb (127.5 kg)       ASSESSMENT & PLAN:    1. Eczema, unspecified type  - hydrocortisone (WESTCORT) 0.2 % cream; Apply topically 2 times daily. Dispense: 45 g; Refill: 0  - Westcort cream twice a day for 1-2 days  - Luke warm showers, pat dry, moisturize right after with Cetaphil lotion or cream  - Can use Eucerin original for really dry areas    2. Renal cysts  - US RENAL COMPLETE; Future    3. Dry eyes, bilateral  - Continue wetting drops as needed (GEnteal)  - Encourage eye exam    4. Chronic bilateral low back pain, with sciatica presence unspecified  - Lalitha Brown MD, Spine Surgery, [place]    5. Essential hypertension  - Stable, continue current regimen      Continue current treatment plan. Current Outpatient Medications   Medication Sig Dispense Refill    hydrocortisone (WESTCORT) 0.2 % cream Apply topically 2 times daily.  45 g 0    atorvastatin (LIPITOR) 80 MG tablet Take 1 tablet by mouth every evening for cholesterol 90 tablet 0    tiZANidine (ZANAFLEX) 4 MG tablet Take 1 tablet by mouth every 8 hours as needed (for muscle spasm) 90 tablet 0    lisinopril-hydrochlorothiazide (PRINZIDE;ZESTORETIC) 20-25 MG per tablet Take 1 tablet by mouth daily 90 tablet 0    metoprolol succinate (TOPROL XL) 50 MG extended release tablet Take 1 tablet by mouth daily 90 tablet 0    rivaroxaban (XARELTO) 20 MG TABS tablet Take 1 tablet by mouth daily 90 tablet 0    amLODIPine (NORVASC) 5 MG tablet Take 1 tablet by mouth daily 90 tablet 0     No current facility-administered medications for this visit. Return in about 1 month (around 7/5/2019), or if symptoms worsen or fail to improve, for eczema, renal cyst, dry eyes, chronic back pain, HTN. Danny Borden received counseling on the following healthy behaviors: nutrition, exercise and medication adherence    Patient given educational materials on Nutrition, Exercise and Hypertension    Discussed use, benefit, and side effects of prescribed medications. Barriers to medication compliance addressed. All patient questions answered. Pt voiced understanding. Call office if experience side effects from medications. Please note that some or all of this record was generated using voice recognition software. If there are any questions about the content of this document, please contact the author as some errors in transcription may have occurred.

## 2019-06-07 ENCOUNTER — HOSPITAL ENCOUNTER (OUTPATIENT)
Dept: GENERAL RADIOLOGY | Age: 59
Discharge: HOME | End: 2019-06-07
Payer: COMMERCIAL

## 2019-06-07 ENCOUNTER — OFFICE VISIT (OUTPATIENT)
Dept: FAMILY MEDICINE CLINIC | Age: 59
End: 2019-06-07
Payer: COMMERCIAL

## 2019-06-07 ENCOUNTER — HOSPITAL ENCOUNTER (OUTPATIENT)
Age: 59
Discharge: HOME | End: 2019-06-07
Payer: COMMERCIAL

## 2019-06-07 VITALS
TEMPERATURE: 97.7 F | OXYGEN SATURATION: 98 % | BODY MASS INDEX: 34.24 KG/M2 | SYSTOLIC BLOOD PRESSURE: 122 MMHG | DIASTOLIC BLOOD PRESSURE: 84 MMHG | WEIGHT: 281.2 LBS | HEIGHT: 76 IN | HEART RATE: 88 BPM | RESPIRATION RATE: 17 BRPM

## 2019-06-07 DIAGNOSIS — G89.29 CHRONIC MIDLINE LOW BACK PAIN WITH BILATERAL SCIATICA: ICD-10-CM

## 2019-06-07 DIAGNOSIS — I10 ESSENTIAL HYPERTENSION: Chronic | ICD-10-CM

## 2019-06-07 DIAGNOSIS — M54.5 CHRONIC BILATERAL LOW BACK PAIN, WITH SCIATICA PRESENCE UNSPECIFIED: ICD-10-CM

## 2019-06-07 DIAGNOSIS — L30.9 ECZEMA, UNSPECIFIED TYPE: ICD-10-CM

## 2019-06-07 DIAGNOSIS — M54.42 CHRONIC MIDLINE LOW BACK PAIN WITH BILATERAL SCIATICA: ICD-10-CM

## 2019-06-07 DIAGNOSIS — M54.41 CHRONIC MIDLINE LOW BACK PAIN WITH BILATERAL SCIATICA: ICD-10-CM

## 2019-06-07 DIAGNOSIS — N28.1 RENAL CYST: Primary | ICD-10-CM

## 2019-06-07 DIAGNOSIS — H04.123 DRY EYES, BILATERAL: ICD-10-CM

## 2019-06-07 DIAGNOSIS — G89.29 CHRONIC BILATERAL LOW BACK PAIN, WITH SCIATICA PRESENCE UNSPECIFIED: ICD-10-CM

## 2019-06-07 PROCEDURE — 1036F TOBACCO NON-USER: CPT | Performed by: CLINICAL NURSE SPECIALIST

## 2019-06-07 PROCEDURE — G8427 DOCREV CUR MEDS BY ELIG CLIN: HCPCS | Performed by: CLINICAL NURSE SPECIALIST

## 2019-06-07 PROCEDURE — 99214 OFFICE O/P EST MOD 30 MIN: CPT | Performed by: CLINICAL NURSE SPECIALIST

## 2019-06-07 PROCEDURE — 72100 X-RAY EXAM L-S SPINE 2/3 VWS: CPT

## 2019-06-07 PROCEDURE — 3017F COLORECTAL CA SCREEN DOC REV: CPT | Performed by: CLINICAL NURSE SPECIALIST

## 2019-06-07 PROCEDURE — G8417 CALC BMI ABV UP PARAM F/U: HCPCS | Performed by: CLINICAL NURSE SPECIALIST

## 2019-06-07 PROCEDURE — G8598 ASA/ANTIPLAT THER USED: HCPCS | Performed by: CLINICAL NURSE SPECIALIST

## 2019-06-07 ASSESSMENT — ENCOUNTER SYMPTOMS
RHINORRHEA: 0
EYE PAIN: 0

## 2019-06-07 NOTE — PATIENT INSTRUCTIONS
follow all instructions on the label. When should you call for help? Call your doctor now or seek immediate medical care if:    · Your rash gets worse and you have a fever.     · You have new blisters or bruises, or the rash spreads and looks like a sunburn.     · You have signs of infection, such as:  ? Increased pain, swelling, warmth, or redness. ? Red streaks leading from the rash. ? Pus draining from the rash. ? A fever.     · You have crusting or oozing sores.     · You have joint aches or body aches along with your rash.    Watch closely for changes in your health, and be sure to contact your doctor if:    · Your rash does not clear up after 2 to 3 weeks of home treatment.     · Itching interferes with your sleep or daily activities. Where can you learn more? Go to https://depictpepiceweb.DarkWorks. org and sign in to your Mobile [employer] Software account. Enter M945 in the Command Information box to learn more about \"Atopic Dermatitis: Care Instructions. \"     If you do not have an account, please click on the \"Sign Up Now\" link. Current as of: April 17, 2018  Content Version: 12.0  © 2618-6656 Healthwise, Incorporated. Care instructions adapted under license by [facility] ([facility]). If you have questions about a medical condition or this instruction, always ask your healthcare professional. [address]  any warranty or liability for your use of this information.

## 2019-06-08 ENCOUNTER — TELEPHONE (OUTPATIENT)
Dept: FAMILY MEDICINE CLINIC | Age: 59
End: 2019-06-08

## 2019-06-09 ASSESSMENT — ENCOUNTER SYMPTOMS
BLURRED VISION: 0
ORTHOPNEA: 0

## 2019-06-10 NOTE — TELEPHONE ENCOUNTER
Called patient [phone] (M) advised per [address] ordered an ultrasound to monitor his right renal (kidney) cyst. Patient did not have further questions

## 2019-06-12 ENCOUNTER — HOSPITAL ENCOUNTER (OUTPATIENT)
Dept: ULTRASOUND IMAGING | Age: 59
Discharge: HOME | End: 2019-06-12
Payer: COMMERCIAL

## 2019-06-12 DIAGNOSIS — N28.1 RENAL CYST: ICD-10-CM

## 2019-06-12 PROCEDURE — 76770 US EXAM ABDO BACK WALL COMP: CPT

## 2019-06-13 ASSESSMENT — ENCOUNTER SYMPTOMS: BACK PAIN: 1

## 2019-06-21 ENCOUNTER — TELEPHONE (OUTPATIENT)
Dept: ORTHOPEDIC SURGERY | Age: 59
End: 2019-06-21

## 2019-06-26 ASSESSMENT — ENCOUNTER SYMPTOMS
COUGH: 0
ABDOMINAL PAIN: 0
WHEEZING: 0
NAUSEA: 0
CHEST TIGHTNESS: 0
DIARRHEA: 0
VOMITING: 0
CONSTIPATION: 0

## 2019-06-27 ENCOUNTER — OFFICE VISIT (OUTPATIENT)
Dept: FAMILY MEDICINE CLINIC | Age: 59
End: 2019-06-27
Payer: COMMERCIAL

## 2019-06-27 ENCOUNTER — APPOINTMENT (OUTPATIENT)
Dept: GENERAL RADIOLOGY | Age: 59
End: 2019-06-27
Payer: COMMERCIAL

## 2019-06-27 ENCOUNTER — TELEPHONE (OUTPATIENT)
Dept: CARDIOLOGY CLINIC | Age: 59
End: 2019-06-27

## 2019-06-27 ENCOUNTER — HOSPITAL ENCOUNTER (OUTPATIENT)
Age: 59
Setting detail: OBSERVATION
Discharge: HOME | End: 2019-06-28
Attending: EMERGENCY MEDICINE | Admitting: INTERNAL MEDICINE
Payer: COMMERCIAL

## 2019-06-27 VITALS
OXYGEN SATURATION: 98 % | HEART RATE: 105 BPM | BODY MASS INDEX: 34.15 KG/M2 | WEIGHT: 280.4 LBS | DIASTOLIC BLOOD PRESSURE: 74 MMHG | RESPIRATION RATE: 17 BRPM | HEIGHT: 76 IN | TEMPERATURE: 98.5 F | SYSTOLIC BLOOD PRESSURE: 128 MMHG

## 2019-06-27 DIAGNOSIS — R07.9 ACUTE CHEST PAIN: Primary | ICD-10-CM

## 2019-06-27 DIAGNOSIS — R07.9 CHEST PAIN, UNSPECIFIED TYPE: Primary | ICD-10-CM

## 2019-06-27 DIAGNOSIS — R00.2 PALPITATION: ICD-10-CM

## 2019-06-27 DIAGNOSIS — R74.8 ELEVATED CK: ICD-10-CM

## 2019-06-27 DIAGNOSIS — N28.1 RENAL CYST: ICD-10-CM

## 2019-06-27 DIAGNOSIS — I71.21 ASCENDING AORTIC ANEURYSM: ICD-10-CM

## 2019-06-27 DIAGNOSIS — R73.03 PREDIABETES: ICD-10-CM

## 2019-06-27 DIAGNOSIS — I48.0 PAROXYSMAL ATRIAL FIBRILLATION (HCC): ICD-10-CM

## 2019-06-27 DIAGNOSIS — E55.9 VITAMIN D DEFICIENCY: ICD-10-CM

## 2019-06-27 DIAGNOSIS — E78.2 MIXED HYPERLIPIDEMIA: ICD-10-CM

## 2019-06-27 DIAGNOSIS — I25.10 CORONARY ARTERY DISEASE DUE TO LIPID RICH PLAQUE: ICD-10-CM

## 2019-06-27 DIAGNOSIS — I25.83 CORONARY ARTERY DISEASE DUE TO LIPID RICH PLAQUE: ICD-10-CM

## 2019-06-27 DIAGNOSIS — I10 ESSENTIAL HYPERTENSION: ICD-10-CM

## 2019-06-27 LAB
A/G RATIO: 1.4 (ref 1.1–2.2)
ALBUMIN SERPL-MCNC: 4.5 G/DL (ref 3.4–5)
ALP BLD-CCNC: 76 U/L (ref 40–129)
ALT SERPL-CCNC: 24 U/L (ref 10–40)
ANION GAP SERPL CALCULATED.3IONS-SCNC: 12 MMOL/L (ref 3–16)
APTT: 52.5 SEC (ref 26–36)
AST SERPL-CCNC: 22 U/L (ref 15–37)
BASOPHILS ABSOLUTE: 0 K/UL (ref 0–0.2)
BASOPHILS RELATIVE PERCENT: 0.7 %
BILIRUB SERPL-MCNC: 0.4 MG/DL (ref 0–1)
BUN BLDV-MCNC: 14 MG/DL (ref 7–20)
CALCIUM SERPL-MCNC: 9.4 MG/DL (ref 8.3–10.6)
CHLORIDE BLD-SCNC: 102 MMOL/L (ref 99–110)
CO2: 25 MMOL/L (ref 21–32)
CREAT SERPL-MCNC: 1.1 MG/DL (ref 0.9–1.3)
EOSINOPHILS ABSOLUTE: 0.4 K/UL (ref 0–0.6)
EOSINOPHILS RELATIVE PERCENT: 6.4 %
GFR AFRICAN AMERICAN: >60
GFR NON-AFRICAN AMERICAN: >60
GLOBULIN: 3.3 G/DL
GLUCOSE BLD-MCNC: 94 MG/DL (ref 70–99)
HCT VFR BLD CALC: 41.1 % (ref 40.5–52.5)
HEMOGLOBIN: 13.6 G/DL (ref 13.5–17.5)
INR BLD: 1.78 (ref 0.86–1.14)
LYMPHOCYTES ABSOLUTE: 3.6 K/UL (ref 1–5.1)
LYMPHOCYTES RELATIVE PERCENT: 52.7 %
MCH RBC QN AUTO: 29 PG (ref 26–34)
MCHC RBC AUTO-ENTMCNC: 33.1 G/DL (ref 31–36)
MCV RBC AUTO: 87.7 FL (ref 80–100)
MONOCYTES ABSOLUTE: 0.4 K/UL (ref 0–1.3)
MONOCYTES RELATIVE PERCENT: 5.1 %
NEUTROPHILS ABSOLUTE: 2.4 K/UL (ref 1.7–7.7)
NEUTROPHILS RELATIVE PERCENT: 35.1 %
PDW BLD-RTO: 13.7 % (ref 12.4–15.4)
PLATELET # BLD: 208 K/UL (ref 135–450)
PMV BLD AUTO: 8.2 FL (ref 5–10.5)
POTASSIUM SERPL-SCNC: 4 MMOL/L (ref 3.5–5.1)
PROTHROMBIN TIME: 20.3 SEC (ref 9.8–13)
RBC # BLD: 4.69 M/UL (ref 4.2–5.9)
SODIUM BLD-SCNC: 139 MMOL/L (ref 136–145)
TOTAL CK: 548 U/L (ref 39–308)
TOTAL PROTEIN: 7.8 G/DL (ref 6.4–8.2)
TROPONIN: <0.01 NG/ML
WBC # BLD: 6.9 K/UL (ref 4–11)

## 2019-06-27 PROCEDURE — G0378 HOSPITAL OBSERVATION PER HR: HCPCS

## 2019-06-27 PROCEDURE — G8417 CALC BMI ABV UP PARAM F/U: HCPCS | Performed by: CLINICAL NURSE SPECIALIST

## 2019-06-27 PROCEDURE — 36415 COLL VENOUS BLD VENIPUNCTURE: CPT

## 2019-06-27 PROCEDURE — 94760 N-INVAS EAR/PLS OXIMETRY 1: CPT

## 2019-06-27 PROCEDURE — 1036F TOBACCO NON-USER: CPT | Performed by: CLINICAL NURSE SPECIALIST

## 2019-06-27 PROCEDURE — 85025 COMPLETE CBC W/AUTO DIFF WBC: CPT

## 2019-06-27 PROCEDURE — 2580000003 HC RX 258: Performed by: HOSPITALIST

## 2019-06-27 PROCEDURE — G8427 DOCREV CUR MEDS BY ELIG CLIN: HCPCS | Performed by: CLINICAL NURSE SPECIALIST

## 2019-06-27 PROCEDURE — 93005 ELECTROCARDIOGRAM TRACING: CPT | Performed by: EMERGENCY MEDICINE

## 2019-06-27 PROCEDURE — 85730 THROMBOPLASTIN TIME PARTIAL: CPT

## 2019-06-27 PROCEDURE — 84484 ASSAY OF TROPONIN QUANT: CPT

## 2019-06-27 PROCEDURE — 85610 PROTHROMBIN TIME: CPT

## 2019-06-27 PROCEDURE — 93000 ELECTROCARDIOGRAM COMPLETE: CPT | Performed by: CLINICAL NURSE SPECIALIST

## 2019-06-27 PROCEDURE — 6370000000 HC RX 637 (ALT 250 FOR IP): Performed by: NURSE PRACTITIONER

## 2019-06-27 PROCEDURE — 99214 OFFICE O/P EST MOD 30 MIN: CPT | Performed by: CLINICAL NURSE SPECIALIST

## 2019-06-27 PROCEDURE — 99285 EMERGENCY DEPT VISIT HI MDM: CPT

## 2019-06-27 PROCEDURE — G8598 ASA/ANTIPLAT THER USED: HCPCS | Performed by: CLINICAL NURSE SPECIALIST

## 2019-06-27 PROCEDURE — 80053 COMPREHEN METABOLIC PANEL: CPT

## 2019-06-27 PROCEDURE — 3017F COLORECTAL CA SCREEN DOC REV: CPT | Performed by: CLINICAL NURSE SPECIALIST

## 2019-06-27 PROCEDURE — 71045 X-RAY EXAM CHEST 1 VIEW: CPT

## 2019-06-27 RX ORDER — ATORVASTATIN CALCIUM 40 MG/1
40 TABLET, FILM COATED ORAL NIGHTLY
Status: DISCONTINUED | OUTPATIENT
Start: 2019-06-27 | End: 2019-06-27 | Stop reason: DRUGHIGH

## 2019-06-27 RX ORDER — AMLODIPINE BESYLATE 5 MG/1
5 TABLET ORAL DAILY
Status: DISCONTINUED | OUTPATIENT
Start: 2019-06-28 | End: 2019-06-28 | Stop reason: HOSPADM

## 2019-06-27 RX ORDER — NITROGLYCERIN 0.4 MG/1
0.4 TABLET SUBLINGUAL EVERY 5 MIN PRN
Status: DISCONTINUED | OUTPATIENT
Start: 2019-06-27 | End: 2019-06-28 | Stop reason: HOSPADM

## 2019-06-27 RX ORDER — SODIUM CHLORIDE 0.9 % (FLUSH) 0.9 %
10 SYRINGE (ML) INJECTION EVERY 12 HOURS SCHEDULED
Status: DISCONTINUED | OUTPATIENT
Start: 2019-06-27 | End: 2019-06-28 | Stop reason: HOSPADM

## 2019-06-27 RX ORDER — ONDANSETRON 2 MG/ML
4 INJECTION INTRAMUSCULAR; INTRAVENOUS EVERY 6 HOURS PRN
Status: DISCONTINUED | OUTPATIENT
Start: 2019-06-27 | End: 2019-06-28 | Stop reason: HOSPADM

## 2019-06-27 RX ORDER — METOPROLOL SUCCINATE 50 MG/1
50 TABLET, EXTENDED RELEASE ORAL DAILY
Status: DISCONTINUED | OUTPATIENT
Start: 2019-06-28 | End: 2019-06-28 | Stop reason: HOSPADM

## 2019-06-27 RX ORDER — TIZANIDINE 4 MG/1
4 TABLET ORAL EVERY 8 HOURS PRN
Status: DISCONTINUED | OUTPATIENT
Start: 2019-06-27 | End: 2019-06-28 | Stop reason: HOSPADM

## 2019-06-27 RX ORDER — ATORVASTATIN CALCIUM 80 MG/1
80 TABLET, FILM COATED ORAL EVERY EVENING
Status: DISCONTINUED | OUTPATIENT
Start: 2019-06-28 | End: 2019-06-28 | Stop reason: HOSPADM

## 2019-06-27 RX ORDER — LISINOPRIL 20 MG/1
20 TABLET ORAL DAILY
Status: DISCONTINUED | OUTPATIENT
Start: 2019-06-28 | End: 2019-06-28 | Stop reason: HOSPADM

## 2019-06-27 RX ORDER — ASPIRIN 81 MG/1
324 TABLET, CHEWABLE ORAL ONCE
Status: COMPLETED | OUTPATIENT
Start: 2019-06-27 | End: 2019-06-27

## 2019-06-27 RX ORDER — LISINOPRIL AND HYDROCHLOROTHIAZIDE 25; 20 MG/1; MG/1
1 TABLET ORAL DAILY
Status: DISCONTINUED | OUTPATIENT
Start: 2019-06-27 | End: 2019-06-27 | Stop reason: SDUPTHER

## 2019-06-27 RX ORDER — ASPIRIN 81 MG/1
81 TABLET, CHEWABLE ORAL DAILY
Status: DISCONTINUED | OUTPATIENT
Start: 2019-06-28 | End: 2019-06-28 | Stop reason: HOSPADM

## 2019-06-27 RX ORDER — HYDROCHLOROTHIAZIDE 25 MG/1
25 TABLET ORAL DAILY
Status: DISCONTINUED | OUTPATIENT
Start: 2019-06-28 | End: 2019-06-28 | Stop reason: HOSPADM

## 2019-06-27 RX ORDER — NITROGLYCERIN 0.4 MG/1
0.4 TABLET SUBLINGUAL ONCE
Status: COMPLETED | OUTPATIENT
Start: 2019-06-27 | End: 2019-06-27

## 2019-06-27 RX ORDER — SODIUM CHLORIDE 0.9 % (FLUSH) 0.9 %
10 SYRINGE (ML) INJECTION PRN
Status: DISCONTINUED | OUTPATIENT
Start: 2019-06-27 | End: 2019-06-28 | Stop reason: HOSPADM

## 2019-06-27 RX ADMIN — Medication 10 ML: at 21:23

## 2019-06-27 RX ADMIN — ASPIRIN 81 MG 324 MG: 81 TABLET ORAL at 16:48

## 2019-06-27 RX ADMIN — NITROGLYCERIN 0.4 MG: 0.4 TABLET, ORALLY DISINTEGRATING SUBLINGUAL at 16:49

## 2019-06-27 ASSESSMENT — ENCOUNTER SYMPTOMS
DIARRHEA: 0
HEMOPTYSIS: 0
VOMITING: 0
NAUSEA: 0
ORTHOPNEA: 0
CHEST TIGHTNESS: 0
SHORTNESS OF BREATH: 1
ABDOMINAL PAIN: 0

## 2019-06-27 ASSESSMENT — PAIN DESCRIPTION - LOCATION: LOCATION: CHEST

## 2019-06-27 ASSESSMENT — HEART SCORE: ECG: 1

## 2019-06-27 ASSESSMENT — PAIN SCALES - GENERAL
PAINLEVEL_OUTOF10: 0
PAINLEVEL_OUTOF10: 0
PAINLEVEL_OUTOF10: 5

## 2019-06-27 ASSESSMENT — PAIN DESCRIPTION - PAIN TYPE: TYPE: ACUTE PAIN

## 2019-06-27 NOTE — PROGRESS NOTES
SUBJECTIVE:    Patient ID:  Cheri Matute is a 64 y.o. male      Patient is here for complaints of chest pain and fluttering that started this morning. States he has gone about daily routine, chest pain and shortness of shortness are present at rest and increase with activity. Denies dizziness, vision changes or weakness. Patient states he was just at his cardiologist on 5/29/19 and everything was stable. Discussed case with patient's cardiologist and he advised to send patient to the ED. Discussed with patient that with given history and current symptoms that would prefer further work up at the ED. Patient is agreeable and will go to the ED as advised. Chest Pain    This is a new problem. The current episode started today. The onset quality is sudden. The problem occurs constantly. The problem has been gradually worsening. The pain is present in the lateral region (right). The pain is at a severity of 5/10. The pain is moderate. The quality of the pain is described as dull (deep bruise soreness). The pain does not radiate. Associated symptoms include irregular heartbeat (flutters), palpitations (fluttering) and shortness of breath (at times). Pertinent negatives include no abdominal pain, claudication, cough, diaphoresis, dizziness, exertional chest pressure, fever, headaches, hemoptysis, lower extremity edema, nausea, near-syncope, numbness, orthopnea, syncope, vomiting or weakness. Back pain: unchanged. The pain is aggravated by deep breathing, lifting arm and exertion. He has tried nothing for the symptoms. Risk factors include male gender and obesity. His past medical history is significant for CAD, hyperlipidemia and hypertension. His family medical history is significant for CAD, hyperlipidemia, hypertension and stroke. Current Outpatient Medications on File Prior to Visit   Medication Sig Dispense Refill    hydrocortisone (WESTCORT) 0.2 % cream Apply topically 2 times daily.  45 g 0   

## 2019-06-27 NOTE — TELEPHONE ENCOUNTER
Spoke to Katarina SANCHEZ from [place] [address] office. She said patient came in to office for appointment complaining of chest discomfort and was holding his chest.  States he appeared uncomfortable. Patient was not having these symptoms when he saw Dr. Magdiel Sams on 5/29/19. He also reported palpitations which he had his recent office visit. ECG was done and was normal from NP assessment. MA said that NP- Karley Gabriel wanted patient to be seen today by Dr. Magdiel Sams or go to ER. Dr. Magdiel Sams does not have office today (rounding in hospital). I advised them to send patient to ER for evaluation.

## 2019-06-27 NOTE — ED PROVIDER NOTES
Non-medical: None   Tobacco Use    Smoking status: Never Smoker    Smokeless tobacco: Never Used   Substance and Sexual Activity    Alcohol use: Yes     Alcohol/week: 1.2 oz     Types: 1 Standard drinks or equivalent, 1 Cans of beer per week     Frequency: Monthly or less     Drinks per session: 1 or 2     Binge frequency: Less than monthly     Comment: Rarely; socially    Drug use: No    Sexual activity: Yes     Partners: Female     Birth control/protection: Condom   Lifestyle    Physical activity:     Days per week: None     Minutes per session: None    Stress: None   Relationships    Social connections:     Talks on phone: None     Gets together: None     Attends Restorationism service: None     Active member of club or organization: None     Attends meetings of clubs or organizations: None     Relationship status: None    Intimate partner violence:     Fear of current or ex partner: None     Emotionally abused: None     Physically abused: None     Forced sexual activity: None   Other Topics Concern    None   Social History Narrative    None       SCREENINGS      Heart Score for chest pain patients  History: Highly Suspicious  ECG: Non-Specifc repolarization disturbance/LBTB/PM  Patient Age: > 39 and < 65 years  *Risk factors for Atherosclerotic disease: Coronary Artery Disease  Risk Factors: > 3 Risk factors or history of atherosclerotic disease*  Troponin: < 1X normal limit  Heart Score Total: 6      PHYSICAL EXAM    (up to 7 for level 4, 8 or more for level 5)     ED Triage Vitals [06/27/19 1525]   BP Temp Temp Source Pulse Resp SpO2 Height Weight   (!) 143/92 98 °F (36.7 °C) Oral 67 17 97 % -- --       Physical Exam   Constitutional: He is oriented to person, place, and time. He appears well-developed and well-nourished. No distress. HENT:   Head: Normocephalic and atraumatic. Right Ear: External ear normal.   Left Ear: External ear normal.   Eyes: Right eye exhibits no discharge.  Left eye

## 2019-06-27 NOTE — PROGRESS NOTES
Patient admitted to [phone] from ER for chest pain that began this morning. Describes the pain as a \"sore\" feeling with inspiration. Denies pain at this time. Vitals are stable. Oriented to unit and routine, admission completed.

## 2019-06-27 NOTE — H&P
effort  Gastrointestinal: Abdomen soft, non-tender, not distended, normal bowel sounds  Musculoskeletal: No cyanosis in digits, neck supple  Neurology: Cranial nerves grossly intact. Alert and oriented in time, place and person. No speech or motor deficits  Psychiatry: Appropriate affect. Not agitated  Skin: Warm, dry, normal turgor, no rash    Labs:  CBC:   Lab Results   Component Value Date    WBC 6.9 06/27/2019    RBC 4.69 06/27/2019    HGB 13.6 06/27/2019    HCT 41.1 06/27/2019    MCV 87.7 06/27/2019    MCH 29.0 06/27/2019    MCHC 33.1 06/27/2019    RDW 13.7 06/27/2019     06/27/2019    MPV 8.2 06/27/2019     BMP:    Lab Results   Component Value Date     06/27/2019    K 4.0 06/27/2019     06/27/2019    CO2 25 06/27/2019    BUN 14 06/27/2019    CREATININE 1.1 06/27/2019    CALCIUM 9.4 06/27/2019    GFRAA >60 06/27/2019    LABGLOM >60 06/27/2019    GLUCOSE 94 06/27/2019       Chest Xray:   EKG:    I visualized CXR images and EKG strips-         Problem List  Active Problems:    Essential hypertension    Coronary artery disease due to lipid rich plaque    Obstructive sleep apnea    Chest pain  Resolved Problems:    * No resolved hospital problems. *        Assessment/Plan:   Assessment:     1. Chest pain  Chest pain  - unclear etilogy. ddx iclude NYDIA, angina, MSK, GI cause  -He does have significant cardiac history with last cardiac cath back in 2017 with 40% occlusion of circumference  - intial work up no acute ischemia  - does have risk factors and warrants further work up  - stress test,  If positive consult cardiogly x  - negative trops. Nikko trops x2.   - ASA, statin BB, morphine and oxygen therapy. - risk factor discussed.   Continue medical management     Resume chronic home medication occluding Xarelto for Austin An MD    6/27/2019 4:43 PM

## 2019-06-27 NOTE — PATIENT INSTRUCTIONS
changes in your lifestyle. These include quitting smoking and eating heart-healthy foods. · Treatments for CAD, along with changes in your lifestyle, can help you live a longer and healthier life. How can you prevent coronary artery disease? · Do not smoke. It may be the best thing you can do to prevent heart disease. If you need help quitting, talk to your doctor about stop-smoking programs and medicines. These can increase your chances of quitting for good. · Be active. Get at least 30 minutes of exercise on most days of the week. Walking is a good choice. You also may want to do other activities, such as running, swimming, cycling, or playing tennis or team sports. · Eat heart-healthy foods. Eat more fruits and vegetables and less foods that contain saturated and trans fats. Limit alcohol, sodium, and sweets. · Stay at a healthy weight. Lose weight if you need to. · Manage other health problems such as diabetes, high blood pressure, and high cholesterol. · Manage stress. Stress can hurt your heart. To keep stress low, talk about your problems and feelings. Don't keep your feelings hidden. · If you have talked about it with your doctor, take a low-dose aspirin every day. Aspirin can help certain people lower their risk of a heart attack or stroke. But taking aspirin isn't right for everyone, because it can cause serious bleeding. Do not start taking daily aspirin unless your doctor knows about it. How is coronary artery disease treated? · Your doctor will suggest that you make lifestyle changes. For example, your doctor may ask you to eat healthy foods, quit smoking, lose extra weight, and be more active. · You will have to take medicines. · Your doctor may suggest a procedure to open narrowed or blocked arteries. This is called angioplasty. Or your doctor may suggest using healthy blood vessels to create detours around narrowed or blocked arteries. This is called bypass surgery.   Follow-up care is a key part of your treatment and safety. Be sure to make and go to all appointments, and call your doctor if you are having problems. It's also a good idea to know your test results and keep a list of the medicines you take. Where can you learn more? Go to https://chpeadonis.GoChime. org and sign in to your Clixtr account. Enter [phone] in the Zoop box to learn more about \"Learning About Coronary Artery Disease (CAD). \"     If you do not have an account, please click on the \"Sign Up Now\" link. Current as of: July 22, 2018  Content Version: 12.0  © 9159-2392 Healthwise, [employer]. Care instructions adapted under license by [facility]. If you have questions about a medical condition or this instruction, always ask your healthcare professional. [address] any warranty or liability for your use of this information. Patient Education        Chest Pain: Care Instructions  Your Care Instructions    There are many things that can cause chest pain. Some are not serious and will get better on their own in a few days. But some kinds of chest pain need more testing and treatment. Your doctor may have recommended a follow-up visit in the next 8 to 12 hours. If you are not getting better, you may need more tests or treatment. Even though your doctor has released you, you still need to watch for any problems. The doctor carefully checked you, but sometimes problems can develop later. If you have new symptoms or if your symptoms do not get better, get medical care right away. If you have worse or different chest pain or pressure that lasts more than 5 minutes or you passed out (lost consciousness), call 911 or seek other emergency help right away. A medical visit is only one step in your treatment.  Even if you feel better, you still need to do what your doctor recommends, such as going to all suggested follow-up appointments and taking medicines exactly as Stretch just after you have applied heat. · As your pain gets better, slowly return to your normal activities. Any increased pain may be a sign that you need to rest a while longer. When should you call for help? Call 911 anytime you think you may need emergency care. For example, call if:    · You have chest pain or pressure. This may occur with:  ? Sweating. ? Shortness of breath. ? Nausea or vomiting. ? Pain that spreads from the chest to the neck, jaw, or one or both shoulders or arms. ? Dizziness or lightheadedness. ? A fast or uneven pulse. After calling 911, chew 1 adult-strength aspirin. Wait for an ambulance. Do not try to drive yourself.     · You have sudden chest pain and shortness of breath, or you cough up blood.    Call your doctor now or seek immediate medical care if:    · You have any trouble breathing.     · Your chest pain gets worse.     · Your chest pain occurs consistently with exercise and is relieved by rest.    Watch closely for changes in your health, and be sure to contact your doctor if:    · Your chest pain does not get better after 1 week. Where can you learn more? Go to https://[place].compemilog.Arithmatica. org and sign in to your Codeanywhere account. Enter 5634 [id number] in the App Annie box to learn more about \"Musculoskeletal Chest Pain: Care Instructions. \"     If you do not have an account, please click on the \"Sign Up Now\" link. Current as of: September 23, 2018  Content Version: 12.0  © 3571-6495 BYTEGRID. Care instructions adapted under license by [facility] ([facility]). If you have questions about a medical condition or this instruction, always ask your healthcare professional. [place] [address] any warranty or liability for your use of this information. Patient Education        DASH Diet: Care Instructions  Your Care Instructions    The DASH diet is an eating plan that can help lower your blood pressure.  DASH stands for Dietary stroke. HDL is the \"good\" cholesterol. It helps clear bad cholesterol from the body. High HDL is linked with a lower risk of heart disease, heart attack, and stroke. Your cholesterol levels help your doctor find out your risk for having a heart attack or stroke. You and your doctor can talk about whether you need to lower your risk and what treatment is best for you. A heart-healthy lifestyle along with medicines can help lower your cholesterol and your risk. The way you choose to lower your risk will depend on how high your risk is for heart attack and stroke. It will also depend on how you feel about taking medicines. Follow-up care is a key part of your treatment and safety. Be sure to make and go to all appointments, and call your doctor if you are having problems. It's also a good idea to know your test results and keep a list of the medicines you take. How can you care for yourself at home? · Eat a variety of foods every day. Good choices include fruits, vegetables, whole grains (like oatmeal), dried beans and peas, nuts and seeds, soy products (like tofu), and fat-free or low-fat dairy products. · Replace butter, margarine, and hydrogenated or partially hydrogenated oils with olive and canola oils. (Canola oil margarine without trans fat is fine.)  · Replace red meat with fish, poultry, and soy protein (like tofu). · Limit processed and packaged foods like chips, crackers, and cookies. · Bake, broil, or steam foods. Don't dao them. · Be physically active. Get at least 30 minutes of exercise on most days of the week. Walking is a good choice. You also may want to do other activities, such as running, swimming, cycling, or playing tennis or team sports. · Stay at a healthy weight or lose weight by making the changes in eating and physical activity listed above. Losing just a small amount of weight, even 5 to 10 pounds, can reduce your risk for having a heart attack or stroke. · Do not smoke.   When should you call for help? Watch closely for changes in your health, and be sure to contact your doctor if:    · You need help making lifestyle changes.     · You have questions about your medicine. Where can you learn more? Go to https://rodrigue.Shoes4you. org and sign in to your GameWorld Assocites account. Enter Z189 in the [employer] box to learn more about \"High Cholesterol: Care Instructions. \"     If you do not have an account, please click on the \"Sign Up Now\" link. Current as of: July 22, 2018  Content Version: 12.0  © 7484-3093 Yorder. Care instructions adapted under license by [facility] ([facility]). If you have questions about a medical condition or this instruction, always ask your healthcare professional. [address] any warranty or liability for your use of this information. Patient Education        Learning About High Cholesterol  What is high cholesterol? Cholesterol is a type of fat in your blood. It is needed for many body functions, such as making new cells. Cholesterol is made by your body. It also comes from food you eat. If you have too much cholesterol, it starts to build up in your arteries. This is called hardening of the arteries, or atherosclerosis. High cholesterol raises your risk of a heart attack and stroke. There are different types of cholesterol. LDL is the \"bad\" cholesterol. High LDL can raise your risk for heart disease, heart attack, and stroke. HDL is the \"good\" cholesterol. High HDL is linked with a lower risk for heart disease, heart attack, and stroke. Your cholesterol levels help your doctor find out your risk for having a heart attack or stroke. How can you prevent high cholesterol? A heart-healthy lifestyle can help you prevent high cholesterol. This lifestyle helps lower your risk for a heart attack and stroke. · Eat heart-healthy foods.   ? Eat fruits, vegetables, whole grains (like oatmeal), dried beans and peas, nuts and seeds, soy products (like tofu), and fat-free or low-fat dairy products. ? Replace butter, margarine, and hydrogenated or partially hydrogenated oils with olive and canola oils. (Canola oil margarine without trans fat is fine.)  ? Replace red meat with fish, poultry, and soy protein (like tofu). ? Limit processed and packaged foods like chips, crackers, and cookies. · Be active. Exercise can improve your cholesterol level. Get at least 30 minutes of exercise on most days of the week. Walking is a good choice. You also may want to do other activities, such as running, swimming, cycling, or playing tennis or team sports. · Stay at a healthy weight. Lose weight if you need to. · Don't smoke. If you need help quitting, talk to your doctor about stop-smoking programs and medicines. These can increase your chances of quitting for good. How is high cholesterol treated? The goal of treatment is to reduce your chances of having a heart attack or stroke. The goal is not to lower your cholesterol numbers only. · You may make lifestyle changes, such as eating healthy foods, not smoking, losing weight, and being more active. · You may have to take medicine. Follow-up care is a key part of your treatment and safety. Be sure to make and go to all appointments, and call your doctor if you are having problems. It's also a good idea to know your test results and keep a list of the medicines you take. Where can you learn more? Go to https://ZeviapesariPrestoSports.Klooff. org and sign in to your CV Ingenuity account. Enter X850 in the [facility] box to learn more about \"Learning About High Cholesterol. \"     If you do not have an account, please click on the \"Sign Up Now\" link. Current as of: July 22, 2018  Content Version: 12.0  © 5859-1622 Healthwise, Incorporated. Care instructions adapted under license by [facility] ([facility]).  If you have questions about a medical condition or this instruction, always ask

## 2019-06-28 VITALS
RESPIRATION RATE: 16 BRPM | HEART RATE: 83 BPM | TEMPERATURE: 97.6 F | HEIGHT: 76 IN | SYSTOLIC BLOOD PRESSURE: 137 MMHG | BODY MASS INDEX: 33.31 KG/M2 | DIASTOLIC BLOOD PRESSURE: 93 MMHG | WEIGHT: 273.5 LBS | OXYGEN SATURATION: 96 %

## 2019-06-28 LAB
ANION GAP SERPL CALCULATED.3IONS-SCNC: 9 MMOL/L (ref 3–16)
BUN BLDV-MCNC: 15 MG/DL (ref 7–20)
CALCIUM SERPL-MCNC: 9.2 MG/DL (ref 8.3–10.6)
CHLORIDE BLD-SCNC: 99 MMOL/L (ref 99–110)
CO2: 29 MMOL/L (ref 21–32)
CREAT SERPL-MCNC: 0.9 MG/DL (ref 0.9–1.3)
EKG ATRIAL RATE: 67 BPM
EKG DIAGNOSIS: NORMAL
EKG P AXIS: 45 DEGREES
EKG P-R INTERVAL: 180 MS
EKG Q-T INTERVAL: 416 MS
EKG QRS DURATION: 108 MS
EKG QTC CALCULATION (BAZETT): 439 MS
EKG R AXIS: -36 DEGREES
EKG T AXIS: 15 DEGREES
EKG VENTRICULAR RATE: 67 BPM
GFR AFRICAN AMERICAN: >60
GFR NON-AFRICAN AMERICAN: >60
GLUCOSE BLD-MCNC: 105 MG/DL (ref 70–99)
HCT VFR BLD CALC: 38.6 % (ref 40.5–52.5)
HEMOGLOBIN: 13 G/DL (ref 13.5–17.5)
INR BLD: 1.22 (ref 0.86–1.14)
LV EF: 62 %
LVEF MODALITY: NORMAL
MCH RBC QN AUTO: 29.3 PG (ref 26–34)
MCHC RBC AUTO-ENTMCNC: 33.8 G/DL (ref 31–36)
MCV RBC AUTO: 86.7 FL (ref 80–100)
PDW BLD-RTO: 13.7 % (ref 12.4–15.4)
PLATELET # BLD: 195 K/UL (ref 135–450)
PMV BLD AUTO: 8.2 FL (ref 5–10.5)
POTASSIUM SERPL-SCNC: 3.8 MMOL/L (ref 3.5–5.1)
PROTHROMBIN TIME: 13.9 SEC (ref 9.8–13)
RBC # BLD: 4.45 M/UL (ref 4.2–5.9)
SODIUM BLD-SCNC: 137 MMOL/L (ref 136–145)
WBC # BLD: 6.5 K/UL (ref 4–11)

## 2019-06-28 PROCEDURE — 3430000000 HC RX DIAGNOSTIC RADIOPHARMACEUTICAL: Performed by: HOSPITALIST

## 2019-06-28 PROCEDURE — G0378 HOSPITAL OBSERVATION PER HR: HCPCS

## 2019-06-28 PROCEDURE — 93010 ELECTROCARDIOGRAM REPORT: CPT | Performed by: INTERNAL MEDICINE

## 2019-06-28 PROCEDURE — 78452 HT MUSCLE IMAGE SPECT MULT: CPT

## 2019-06-28 PROCEDURE — 85610 PROTHROMBIN TIME: CPT

## 2019-06-28 PROCEDURE — 93017 CV STRESS TEST TRACING ONLY: CPT | Performed by: INTERNAL MEDICINE

## 2019-06-28 PROCEDURE — 94760 N-INVAS EAR/PLS OXIMETRY 1: CPT

## 2019-06-28 PROCEDURE — 85027 COMPLETE CBC AUTOMATED: CPT

## 2019-06-28 PROCEDURE — A9502 TC99M TETROFOSMIN: HCPCS | Performed by: HOSPITALIST

## 2019-06-28 PROCEDURE — 80048 BASIC METABOLIC PNL TOTAL CA: CPT

## 2019-06-28 PROCEDURE — 36415 COLL VENOUS BLD VENIPUNCTURE: CPT

## 2019-06-28 PROCEDURE — 6370000000 HC RX 637 (ALT 250 FOR IP): Performed by: HOSPITALIST

## 2019-06-28 PROCEDURE — 2580000003 HC RX 258: Performed by: HOSPITALIST

## 2019-06-28 PROCEDURE — 6360000002 HC RX W HCPCS: Performed by: INTERNAL MEDICINE

## 2019-06-28 PROCEDURE — 94660 CPAP INITIATION&MGMT: CPT

## 2019-06-28 RX ADMIN — TETROFOSMIN 10 MILLICURIE: 1.38 INJECTION, POWDER, LYOPHILIZED, FOR SOLUTION INTRAVENOUS at 08:51

## 2019-06-28 RX ADMIN — REGADENOSON 0.4 MG: 0.08 INJECTION, SOLUTION INTRAVENOUS at 08:43

## 2019-06-28 RX ADMIN — Medication 10 ML: at 10:34

## 2019-06-28 RX ADMIN — ASPIRIN 81 MG 81 MG: 81 TABLET ORAL at 10:33

## 2019-06-28 RX ADMIN — LISINOPRIL 20 MG: 20 TABLET ORAL at 10:33

## 2019-06-28 RX ADMIN — HYDROCHLOROTHIAZIDE 25 MG: 25 TABLET ORAL at 10:33

## 2019-06-28 RX ADMIN — RIVAROXABAN 20 MG: 20 TABLET, FILM COATED ORAL at 10:33

## 2019-06-28 RX ADMIN — AMLODIPINE BESYLATE 5 MG: 5 TABLET ORAL at 10:33

## 2019-06-28 RX ADMIN — TETROFOSMIN 30 MILLICURIE: 1.38 INJECTION, POWDER, LYOPHILIZED, FOR SOLUTION INTRAVENOUS at 08:51

## 2019-06-28 ASSESSMENT — PAIN SCALES - GENERAL: PAINLEVEL_OUTOF10: 0

## 2019-06-28 NOTE — PROGRESS NOTES
Assessment and med pass complete. Ambulates independently, informed to call if needing assist. Prepared patient to take shower. Informed to call when finished. Denies other needs, call light in reach.

## 2019-06-28 NOTE — DISCHARGE SUMMARY
[facility]    Patient Demographics    Patient. Roby Gallagher  Date of Birth. 1960  MRN. [id number]     Primary care provider. Wilda Angelucci, APRN - CNP  (Tel: [phone])    Admit date: 6/27/2019    Discharge date (blank if same as Note Date): 6/28/2019  Note Date: 6/28/2019     Reason for Hospitalization. Chief Complaint   Patient presents with    Chest Pain     chest pain that started this morning and not getting better, seen at PCP and sent here         Significant Findings. Active Problems:    Essential hypertension    Coronary artery disease due to lipid rich plaque    Obstructive sleep apnea    Chest pain  Resolved Problems:    * No resolved hospital problems. *   Musculoskeletal chest pain     Problems and results from this hospitalization that need follow up. 1.     Significant test results and incidental findings. Summary    Normal myocardial perfusion.    Normal LV size and systolic function.         1. NM Cardiac Stress Test Nuclear Imaging   Final Result      XR CHEST PORTABLE   Final Result   1. No acute abnormality. Invasive procedures and treatments. [facility] Course. Patient admitted with chest pain. Stress test negative. On anticoagulation. Pain reproducible suggesting musculoskeletal etiology. Advised patient to take prn zanaflex and if needed prn ibuprofen for couple of days. No other medication adjustments were made. Consults. IP CONSULT TO HOSPITALIST    Physical examination on discharge day. BP (!) 137/93   Pulse 83   Temp 97.6 °F (36.4 °C) (Temporal)   Resp 16   Ht 6' 4\" (1.93 m)   Wt 273 lb 8 oz (124.1 kg)   SpO2 96%   BMI 33.29 kg/m²   General appearance. Alert. Looks comfortable. HEENT. Sclera clear. Moist mucus membranes. Cardiovascular. Regular rate and rhythm, normal S1, S2. No murmur. Respiratory. Not using accessory muscles. Clear to auscultation bilaterally, no

## 2019-07-07 ASSESSMENT — ENCOUNTER SYMPTOMS: SHORTNESS OF BREATH: 1

## 2019-07-18 ASSESSMENT — ENCOUNTER SYMPTOMS
WHEEZING: 0
NAUSEA: 0
VOMITING: 0
ABDOMINAL PAIN: 0
COUGH: 0
SHORTNESS OF BREATH: 0
CONSTIPATION: 0
CHEST TIGHTNESS: 0
DIARRHEA: 0

## 2019-07-19 ENCOUNTER — OFFICE VISIT (OUTPATIENT)
Dept: FAMILY MEDICINE CLINIC | Age: 59
End: 2019-07-19
Payer: COMMERCIAL

## 2019-07-19 VITALS
DIASTOLIC BLOOD PRESSURE: 84 MMHG | WEIGHT: 287.6 LBS | OXYGEN SATURATION: 98 % | RESPIRATION RATE: 17 BRPM | HEIGHT: 76 IN | TEMPERATURE: 97.9 F | HEART RATE: 77 BPM | BODY MASS INDEX: 35.02 KG/M2 | SYSTOLIC BLOOD PRESSURE: 132 MMHG

## 2019-07-19 DIAGNOSIS — I48.0 PAROXYSMAL ATRIAL FIBRILLATION (HCC): ICD-10-CM

## 2019-07-19 DIAGNOSIS — R74.8 ELEVATED CK: ICD-10-CM

## 2019-07-19 DIAGNOSIS — I25.83 CORONARY ARTERY DISEASE DUE TO LIPID RICH PLAQUE: ICD-10-CM

## 2019-07-19 DIAGNOSIS — R73.03 PREDIABETES: ICD-10-CM

## 2019-07-19 DIAGNOSIS — E55.9 VITAMIN D DEFICIENCY: ICD-10-CM

## 2019-07-19 DIAGNOSIS — M77.02 MEDIAL EPICONDYLITIS OF LEFT ELBOW: ICD-10-CM

## 2019-07-19 DIAGNOSIS — I10 ESSENTIAL HYPERTENSION: Primary | ICD-10-CM

## 2019-07-19 DIAGNOSIS — E78.2 MIXED HYPERLIPIDEMIA: ICD-10-CM

## 2019-07-19 DIAGNOSIS — I71.21 ASCENDING AORTIC ANEURYSM: ICD-10-CM

## 2019-07-19 DIAGNOSIS — M25.522 LEFT ELBOW PAIN: ICD-10-CM

## 2019-07-19 DIAGNOSIS — I25.10 CORONARY ARTERY DISEASE DUE TO LIPID RICH PLAQUE: ICD-10-CM

## 2019-07-19 PROCEDURE — G8417 CALC BMI ABV UP PARAM F/U: HCPCS | Performed by: CLINICAL NURSE SPECIALIST

## 2019-07-19 PROCEDURE — 1036F TOBACCO NON-USER: CPT | Performed by: CLINICAL NURSE SPECIALIST

## 2019-07-19 PROCEDURE — G8427 DOCREV CUR MEDS BY ELIG CLIN: HCPCS | Performed by: CLINICAL NURSE SPECIALIST

## 2019-07-19 PROCEDURE — 99214 OFFICE O/P EST MOD 30 MIN: CPT | Performed by: CLINICAL NURSE SPECIALIST

## 2019-07-19 PROCEDURE — G8598 ASA/ANTIPLAT THER USED: HCPCS | Performed by: CLINICAL NURSE SPECIALIST

## 2019-07-19 PROCEDURE — 3017F COLORECTAL CA SCREEN DOC REV: CPT | Performed by: CLINICAL NURSE SPECIALIST

## 2019-07-19 NOTE — PATIENT INSTRUCTIONS
own salad dressings and sauces without adding salt. · Use less salt (or none) when recipes call for it. You can often use half the salt a recipe calls for without losing flavor. Other foods such as rice, pasta, and grains do not need added salt. · Rinse canned vegetables, and cook them in fresh water. This removes some--but not all--of the salt. · Avoid water that is naturally high in sodium or that has been treated with water softeners, which add sodium. Call your local water company to find out the sodium content of your water supply. If you buy bottled water, read the label and choose a sodium-free brand. Avoid high-sodium foods  · Avoid eating:  ? Smoked, cured, salted, and canned meat, fish, and poultry. ? Ham, willis, hot dogs, and luncheon meats. ? Regular, hard, and processed cheese and regular peanut butter. ? Crackers with salted tops, and other salted snack foods such as pretzels, chips, and salted popcorn. ? Frozen prepared meals, unless labeled low-sodium. ? Canned and dried soups, broths, and bouillon, unless labeled sodium-free or low-sodium. ? Canned vegetables, unless labeled sodium-free or low-sodium. ? Western Shayna fries, pizza, tacos, and other fast foods. ? Pickles, olives, ketchup, and other condiments, especially soy sauce, unless labeled sodium-free or low-sodium. Where can you learn more? Go to [email or web address] org and sign in to your MainOne account. Enter K752 in the [facility] box to learn more about \"Low Sodium Diet (2,000 Milligram): Care Instructions. \"     If you do not have an account, please click on the \"Sign Up Now\" link. Current as of: November 7, 2018  Content Version: 12.0  © 3625-8966 Healthwise, Incorporated. Care instructions adapted under license by [facility] ([facility]).  If you have questions about a medical condition or this instruction, always ask your healthcare professional. Rene Chapa disclaims any warranty or liability for your use of this information. Patient Education        High Cholesterol: Care Instructions  Your Care Instructions    Cholesterol is a type of fat in your blood. It is needed for many body functions, such as making new cells. Cholesterol is made by your body. It also comes from food you eat. High cholesterol means that you have too much of the fat in your blood. This raises your risk of a heart attack and stroke. LDL and HDL are part of your total cholesterol. LDL is the \"bad\" cholesterol. High LDL can raise your risk for heart disease, heart attack, and stroke. HDL is the \"good\" cholesterol. It helps clear bad cholesterol from the body. High HDL is linked with a lower risk of heart disease, heart attack, and stroke. Your cholesterol levels help your doctor find out your risk for having a heart attack or stroke. You and your doctor can talk about whether you need to lower your risk and what treatment is best for you. A heart-healthy lifestyle along with medicines can help lower your cholesterol and your risk. The way you choose to lower your risk will depend on how high your risk is for heart attack and stroke. It will also depend on how you feel about taking medicines. Follow-up care is a key part of your treatment and safety. Be sure to make and go to all appointments, and call your doctor if you are having problems. It's also a good idea to know your test results and keep a list of the medicines you take. How can you care for yourself at home? · Eat a variety of foods every day. Good choices include fruits, vegetables, whole grains (like oatmeal), dried beans and peas, nuts and seeds, soy products (like tofu), and fat-free or low-fat dairy products. · Replace butter, margarine, and hydrogenated or partially hydrogenated oils with olive and canola oils. (Canola oil margarine without trans fat is fine.)  · Replace red meat with fish, poultry, and soy protein (like tofu).   · Limit processed and dietitian can help you create meal plans that fit your lifestyle. · Get at least 30 minutes of exercise on most days of the week. Exercise helps control your blood sugar. It also helps you maintain a healthy weight. Walking is a good choice. You also may want to do other activities, such as running, swimming, cycling, or playing tennis or team sports. · Do not smoke. Smoking can make prediabetes worse. If you need help quitting, talk to your doctor about stop-smoking programs and medicines. These can increase your chances of quitting for good. · If your doctor prescribed medicines, take them exactly as prescribed. Call your doctor if you think you are having a problem with your medicine. You will get more details on the specific medicines your doctor prescribes. When should you call for help? Watch closely for changes in your health, and be sure to contact your doctor if:    · You have any symptoms of diabetes. These may include:  ? Being thirsty more often. ? Urinating more. ? Being hungrier. ? Losing weight. ? Being very tired. ? Having blurry vision.     · You have a wound that will not heal.     · You have an infection that will not go away.     · You have problems with your blood pressure.     · You want more information about diabetes and how you can keep from getting it. Where can you learn more? Go to [email or web address] org and sign in to your POTATOSOFT account. Enter I222 in the [facility] box to learn more about \"Prediabetes: Care Instructions. \"     If you do not have an account, please click on the \"Sign Up Now\" link. Current as of: July 25, 2018  Content Version: 12.0  © 2784-5590 Healthwise, Incorporated. Care instructions adapted under license by [facility] ([facility]).  If you have questions about a medical condition or this instruction, always ask your healthcare professional. Coralee Windsor any warranty or liability for your use of this information. Patient Education        Learning About Meal Planning for Diabetes  Why plan your meals? Meal planning can be a key part of managing diabetes. Planning meals and snacks with the right balance of carbohydrate, protein, and fat can help you keep your blood sugar at the target level you set with your doctor. You don't have to eat special foods. You can eat what your family eats, including sweets once in a while. But you do have to pay attention to how often you eat and how much you eat of certain foods. You may want to work with a dietitian or a certified diabetes educator. He or she can give you tips and meal ideas and can answer your questions about meal planning. This health professional can also help you reach a healthy weight if that is one of your goals. What plan is right for you? Your dietitian or diabetes educator may suggest that you start with the plate format or carbohydrate counting. The plate format  The plate format is a simple way to help you manage how you eat. You plan meals by learning how much space each food should take on a plate. Using the plate format helps you spread carbohydrate throughout the day. It can make it easier to keep your blood sugar level within your target range. It also helps you see if you're eating healthy portion sizes. To use the plate format, you put non-starchy vegetables on half your plate. Add meat or meat substitutes on one-quarter of the plate. Put a grain or starchy vegetable (such as brown rice or a potato) on the final quarter of the plate. You can add a small piece of fruit and some low-fat or fat-free milk or yogurt, depending on your carbohydrate goal for each meal.  Here are some tips for using the plate format:  · Make sure that you are not using an oversized plate. A 9-inch plate is best. Many restaurants use larger plates. · Get used to using the plate format at home. Then you can use it when you eat out.   · Write down your 2018  Content Version: 12.0  © 2305-4871 Healthwise, Incorporated. Care instructions adapted under license by [facility] ([facility]). If you have questions about a medical condition or this instruction, always ask your healthcare professional. [address] any warranty or liability for your use of this information. Patient Education        Counting Carbohydrates: Care Instructions  Your Care Instructions    You don't have to eat special foods when you have diabetes. You just have to be careful to eat healthy foods. Carbohydrates (carbs) raise blood sugar higher and quicker than any other nutrient. Carbs are found in desserts, breads and cereals, and fruit. They're also in starchy vegetables. These include potatoes, corn, and grains such as rice and pasta. Carbs are also in milk and yogurt. The more carbs you eat at one time, the higher your blood sugar will rise. Spreading carbs all through the day helps keep your blood sugar levels within your target range. Counting carbs is one of the best ways to keep your blood sugar under control. If you use insulin, counting carbs helps you match the right amount of insulin to the number of grams of carbs in a meal. Then you can change your diet and insulin dose as needed. Testing your blood sugar several times a day can help you learn how carbs affect your blood sugar. A registered dietitian or certified diabetes educator can help you plan meals and snacks. Follow-up care is a key part of your treatment and safety. Be sure to make and go to all appointments, and call your doctor if you are having problems. It's also a good idea to know your test results and keep a list of the medicines you take. How can you care for yourself at home? Know your daily amount of carbohydrates  Your daily amount depends on several things, such as your weight, how active you are, which diabetes medicines you take, and what your goals are for your blood sugar levels.  A would otherwise. Eat from all food groups  · Eat at least three meals a day. · Plan meals to include food from all the food groups. The food groups include grains, fruits, dairy, proteins, and vegetables. · Talk to your dietitian or diabetes educator about ways to add limited amounts of sweets into your meal plan. · If you drink alcohol, talk to your doctor. It may not be recommended when you are taking certain diabetes medicines. Where can you learn more? Go to https://TechniScanpeGamersband.[employer]. org and sign in to your [employer] account. Enter O786 in the [employer] box to learn more about \"Counting Carbohydrates: Care Instructions. \"     If you do not have an account, please click on the \"Sign Up Now\" link. Current as of: July 25, 2018  Content Version: 12.0  © 9178-0455 [employer]. Care instructions adapted under license by [facility] ([facility]). If you have questions about a medical condition or this instruction, always ask your healthcare professional. [place] [address] any warranty or liability for your use of this information. Patient Education        Learning About Diabetes Food Guidelines  Your Care Instructions    Meal planning is important to manage diabetes. It helps keep your blood sugar at a target level (which you set with your doctor). You don't have to eat special foods. You can eat what your family eats, including sweets once in a while. But you do have to pay attention to how often you eat and how much you eat of certain foods. You may want to work with a dietitian or a certified diabetes educator (CDE) to help you plan meals and snacks. A dietitian or CDE can also help you lose weight if that is one of your goals. What should you know about eating carbs? Managing the amount of carbohydrate (carbs) you eat is an important part of healthy meals when you have diabetes. Carbohydrate is found in many foods. · Learn which foods have carbs.  And learn muscles. 4. If you would like a little added stretch, use your hand to gently and steadily pull your head toward your shoulder. For example, if your right elbow is sore, use your left hand to gently pull your head toward your left shoulder. 5. Repeat 2 to 4 times. Resisted forearm pronation    1. Sit leaning forward with your legs slightly spread. Then place your affected forearm on your thigh with your hand and wrist in front of your knee. 2. Grasp one end of an exercise band with your palm up, and step on the other end. 3. Keeping your wrist straight, roll your palm inward toward your thigh for a count of 2, then slowly move your wrist back to the starting position to a count of 5.  4. Repeat 8 to 12 times. Resisted supination    1. Sit leaning forward with your legs slightly spread. Then place your affected forearm on your thigh with your hand and wrist in front of your knee. 2. Grasp one end of an exercise band with your palm down, and step on the other end. 3. Keeping your wrist straight, roll your palm outward and away from your thigh for a count of 2, then slowly move your wrist back to the starting position to a count of 5.  4. Repeat 8 to 12 times. Follow-up care is a key part of your treatment and safety. Be sure to make and go to all appointments, and call your doctor if you are having problems. It's also a good idea to know your test results and keep a list of the medicines you take. Where can you learn more? Go to https://WonderflowmarsewConnect2me.[employer]. org and sign in to your [employer] account. Enter [phone] in the [facility] box to learn more about \"Golfer's Elbow: Exercises. \"     If you do not have an account, please click on the \"Sign Up Now\" link. Current as of: September 20, 2018  Content Version: 12.0  © 8037-1561 Healthwise, Incorporated. Care instructions adapted under license by [facility] ([facility]).  If you have questions about a medical condition or this instruction, always ask your healthcare professional. [place] [address] any warranty or liability for your use of this information. Patient Education        Golzoila's Elbow: Care Instructions  Your Care Instructions  The pain and soreness in the inner part of your elbow is caused by a problem called golfer's elbow. Bending the wrist over and over again has hurt the tendons that attach to your inner elbow. The muscles in your forearm also may hurt. Golzoila's elbow usually gets better with treatment at home. Follow-up care is a key part of your treatment and safety. Be sure to make and go to all appointments, and call your doctor if you are having problems. It's also a good idea to know your test results and keep a list of the medicines you take. How can you care for yourself at home? · Rest your elbow and wrist. Try to avoid movements that are painful. You may have to do this for weeks to months. Follow your doctor's directions for how long to rest.  · Put ice or a cold pack on your elbow for 10 to 20 minutes at a time. Try to do this every 1 to 2 hours for the next 3 days (when you are awake) or until the swelling goes down. Put a thin cloth between the ice and your skin. · Prop up the sore arm on a pillow when you ice it or anytime you sit or lie down during the next 3 days. Try to keep it above the level of your heart. This will help reduce swelling. · Take pain medicine exactly as directed. ? If the doctor gave you a prescription medicine for pain, take it as prescribed. ? If you are not taking a prescription pain medicine, ask your doctor if you can take an over-the-counter medicine. · If your doctor gave you a brace or splint, use it as directed. A \"counterforce\" brace is a strap around the forearm, just below the elbow. It may ease the pressure on the tendon and may spread force throughout the arm. · Follow your doctor's or physical therapist's directions for exercise.   To prevent golzoila's

## 2019-09-11 ASSESSMENT — ENCOUNTER SYMPTOMS
WHEEZING: 0
VOMITING: 0
NAUSEA: 0
SHORTNESS OF BREATH: 0
COUGH: 0
ABDOMINAL PAIN: 0
DIARRHEA: 0
CHEST TIGHTNESS: 0
CONSTIPATION: 0

## 2019-09-12 ENCOUNTER — OFFICE VISIT (OUTPATIENT)
Dept: FAMILY MEDICINE CLINIC | Age: 59
End: 2019-09-12
Payer: COMMERCIAL

## 2019-09-12 VITALS
HEART RATE: 90 BPM | SYSTOLIC BLOOD PRESSURE: 140 MMHG | DIASTOLIC BLOOD PRESSURE: 80 MMHG | WEIGHT: 280.8 LBS | OXYGEN SATURATION: 97 % | BODY MASS INDEX: 34.18 KG/M2

## 2019-09-12 DIAGNOSIS — E78.2 MIXED HYPERLIPIDEMIA: ICD-10-CM

## 2019-09-12 DIAGNOSIS — R73.03 PREDIABETES: ICD-10-CM

## 2019-09-12 DIAGNOSIS — I25.83 CORONARY ARTERY DISEASE DUE TO LIPID RICH PLAQUE: ICD-10-CM

## 2019-09-12 DIAGNOSIS — G89.29 CHRONIC MIDLINE LOW BACK PAIN WITH BILATERAL SCIATICA: ICD-10-CM

## 2019-09-12 DIAGNOSIS — M54.42 CHRONIC MIDLINE LOW BACK PAIN WITH BILATERAL SCIATICA: ICD-10-CM

## 2019-09-12 DIAGNOSIS — I48.0 PAROXYSMAL ATRIAL FIBRILLATION (HCC): ICD-10-CM

## 2019-09-12 DIAGNOSIS — I25.10 CORONARY ARTERY DISEASE DUE TO LIPID RICH PLAQUE: ICD-10-CM

## 2019-09-12 DIAGNOSIS — I10 ESSENTIAL HYPERTENSION: Primary | ICD-10-CM

## 2019-09-12 DIAGNOSIS — M54.41 CHRONIC MIDLINE LOW BACK PAIN WITH BILATERAL SCIATICA: ICD-10-CM

## 2019-09-12 DIAGNOSIS — I71.21 ASCENDING AORTIC ANEURYSM: ICD-10-CM

## 2019-09-12 DIAGNOSIS — E55.9 VITAMIN D DEFICIENCY: ICD-10-CM

## 2019-09-12 PROCEDURE — 99214 OFFICE O/P EST MOD 30 MIN: CPT | Performed by: CLINICAL NURSE SPECIALIST

## 2019-09-12 PROCEDURE — 1036F TOBACCO NON-USER: CPT | Performed by: CLINICAL NURSE SPECIALIST

## 2019-09-12 PROCEDURE — G8427 DOCREV CUR MEDS BY ELIG CLIN: HCPCS | Performed by: CLINICAL NURSE SPECIALIST

## 2019-09-12 PROCEDURE — G8598 ASA/ANTIPLAT THER USED: HCPCS | Performed by: CLINICAL NURSE SPECIALIST

## 2019-09-12 PROCEDURE — 3017F COLORECTAL CA SCREEN DOC REV: CPT | Performed by: CLINICAL NURSE SPECIALIST

## 2019-09-12 PROCEDURE — G8417 CALC BMI ABV UP PARAM F/U: HCPCS | Performed by: CLINICAL NURSE SPECIALIST

## 2019-09-12 RX ORDER — TIZANIDINE 4 MG/1
4 TABLET ORAL EVERY 8 HOURS PRN
Qty: 90 TABLET | Refills: 0 | Status: SHIPPED | OUTPATIENT
Start: 2019-09-12 | End: 2019-12-17 | Stop reason: SDUPTHER

## 2019-09-12 ASSESSMENT — PATIENT HEALTH QUESTIONNAIRE - PHQ9
2. FEELING DOWN, DEPRESSED OR HOPELESS: 0
1. LITTLE INTEREST OR PLEASURE IN DOING THINGS: 0
SUM OF ALL RESPONSES TO PHQ QUESTIONS 1-9: 0
SUM OF ALL RESPONSES TO PHQ QUESTIONS 1-9: 0
SUM OF ALL RESPONSES TO PHQ9 QUESTIONS 1 & 2: 0

## 2019-09-12 ASSESSMENT — ENCOUNTER SYMPTOMS
BLURRED VISION: 0
ORTHOPNEA: 0

## 2019-09-12 NOTE — PATIENT INSTRUCTIONS
smoothly. ? Sit with your back flat against a wall. · Keep your core muscles strong. The muscles of your back, belly (abdomen), and buttocks support your spine. ? Pull in your belly and imagine pulling your navel toward your spine. Hold this for 6 seconds, then relax. Remember to keep breathing normally as you tense your muscles. ? Do curl-ups. Always do them with your knees bent. Keep your low back on the floor, and curl your shoulders toward your knees using a smooth, slow motion. Keep your arms folded across your chest. If this bothers your neck, try putting your hands behind your neck (not your head), with your elbows spread apart. ? Lie on your back with your knees bent and your feet flat on the floor. Tighten your belly muscles, and then push with your feet and raise your buttocks up a few inches. Hold this position 6 seconds as you continue to breathe normally, then lower yourself slowly to the floor. Repeat 8 to 12 times. ? If you like group exercise, try Pilates or yoga. These classes have poses that strengthen the core muscles. Lead a healthy lifestyle  · Stay at a healthy weight to avoid strain on your back. · Do not smoke. Smoking increases the risk of osteoporosis, which weakens the spine. If you need help quitting, talk to your doctor about stop-smoking programs and medicines. These can increase your chances of quitting for good. Where can you learn more? Go to https://Rover [email or web address].[email or web address] org and sign in to your [employer] account. Enter L315 in the ExecNote box to learn more about \"Learning About How to Have a Healthy Back. \"     If you do not have an account, please click on the \"Sign Up Now\" link. Current as of: September 20, 2018  Content Version: 12.1  © 9688-5278 Healthwise, Incorporated. Care instructions adapted under license by [facility] ([facility]).  If you have questions about a medical condition or this instruction, always ask your healthcare professional. Alice Mock repetitions. Hamstring stretch in doorway    1. Lie on your back in a doorway, with one leg through the open door. 2. Slide your leg up the wall to straighten your knee. You should feel a gentle stretch down the back of your leg. 3. Hold the stretch for at least 15 to 30 seconds. Do not arch your back, point your toes, or bend either knee. Keep one heel touching the floor and the other heel touching the wall. 4. Repeat with your other leg. 5. Do 2 to 4 times for each leg. Hip flexor stretch    1. Kneel on the floor with one knee bent and one leg behind you. Place your forward knee over your foot. Keep your other knee touching the floor. 2. Slowly push your hips forward until you feel a stretch in the upper thigh of your rear leg. 3. Hold the stretch for at least 15 to 30 seconds. Repeat with your other leg. 4. Do 2 to 4 times on each side. Wall sit    1. Stand with your back 10 to 12 inches away from a wall. 2. Lean into the wall until your back is flat against it. 3. Slowly slide down until your knees are slightly bent, pressing your lower back into the wall. 4. Hold for about 6 seconds, then slide back up the wall. 5. Repeat 8 to 12 times. Follow-up care is a key part of your treatment and safety. Be sure to make and go to all appointments, and call your doctor if you are having problems. It's also a good idea to know your test results and keep a list of the medicines you take. Where can you learn more? Go to [email or web address] [email or web address].[email or web address] org and sign in to your Simplify account. Enter A970 in the [employer] box to learn more about \"Low Back Pain: Exercises. \"     If you do not have an account, please click on the \"Sign Up Now\" link. Current as of: September 20, 2018  Content Version: 12.1  © 8466-6001 Healthwise, Incorporated. Care instructions adapted under license by [facility] [address]  If you have questions about a medical condition or this instruction, always ask your over salads. Or try adding chopped walnuts or almonds to cooked vegetables. ? Try some vegetarian meals using beans and peas. Add garbanzo or kidney beans to salads. Make burritos and tacos with mashed person beans or black beans. Where can you learn more? Go to https://chperickeweb.[email or web address] org and sign in to your Humanoid account. Enter Q535 in the [employer] box to learn more about \"DASH Diet: Care Instructions. \"     If you do not have an account, please click on the \"Sign Up Now\" link. Current as of: July 22, 2018  Content Version: 12.1  © 3783-1784 Healthwise, Break30. Care instructions adapted under license by [facility] [place] ([facility]). If you have questions about a medical condition or this instruction, always ask your healthcare professional. [place] [address] any warranty or liability for your use of this information. Patient Education        Low Sodium Diet (2,000 Milligram): Care Instructions  Your Care Instructions    Too much sodium causes your body to hold on to extra water. This can raise your blood pressure and force your heart and kidneys to work harder. In very serious cases, this could cause you to be put in the hospital. It might even be life-threatening. By limiting sodium, you will feel better and lower your risk of serious problems. The most common source of sodium is salt. People get most of the salt in their diet from canned, prepared, and packaged foods. Fast food and restaurant meals also are very high in sodium. Your doctor will probably limit your sodium to less than 2,000 milligrams (mg) a day. This limit counts all the sodium in prepared and packaged foods and any salt you add to your food. Follow-up care is a key part of your treatment and safety. Be sure to make and go to all appointments, and call your doctor if you are having problems. It's also a good idea to know your test results and keep a list of the medicines you take.   How can you you prevent high cholesterol? A heart-healthy lifestyle can help you prevent high cholesterol. This lifestyle helps lower your risk for a heart attack and stroke. · Eat heart-healthy foods. ? Eat fruits, vegetables, whole grains (like oatmeal), dried beans and peas, nuts and seeds, soy products (like tofu), and fat-free or low-fat dairy products. ? Replace butter, margarine, and hydrogenated or partially hydrogenated oils with olive and canola oils. (Canola oil margarine without trans fat is fine.)  ? Replace red meat with fish, poultry, and soy protein (like tofu). ? Limit processed and packaged foods like chips, crackers, and cookies. · Be active. Exercise can improve your cholesterol level. Get at least 30 minutes of exercise on most days of the week. Walking is a good choice. You also may want to do other activities, such as running, swimming, cycling, or playing tennis or team sports. · Stay at a healthy weight. Lose weight if you need to. · Don't smoke. If you need help quitting, talk to your doctor about stop-smoking programs and medicines. These can increase your chances of quitting for good. How is high cholesterol treated? The goal of treatment is to reduce your chances of having a heart attack or stroke. The goal is not to lower your cholesterol numbers only. · You may make lifestyle changes, such as eating healthy foods, not smoking, losing weight, and being more active. · You may have to take medicine. Follow-up care is a key part of your treatment and safety. Be sure to make and go to all appointments, and call your doctor if you are having problems. It's also a good idea to know your test results and keep a list of the medicines you take. Where can you learn more? Go to [email or web address] and sign in to your [employer] account. Enter H702 in the Azullo box to learn more about \"Learning About High Cholesterol. \"     If you do not have an account, please click on the \"Sign Up Now\" link. Current as of: July 22, 2018  Content Version: 12.1  © 3764-6099 Facebook. Care instructions adapted under license by [facility] ([facility]). If you have questions about a medical condition or this instruction, always ask your healthcare professional. [address] any warranty or liability for your use of this information. Patient Education        Learning About High Blood Pressure  What is high blood pressure? Blood pressure is a measure of how hard the blood pushes against the walls of your arteries. It's normal for blood pressure to go up and down throughout the day, but if it stays up, you have high blood pressure. Another name for high blood pressure is hypertension. Two numbers tell you your blood pressure. The first number is the systolic pressure. It shows how hard the blood pushes when your heart is pumping. The second number is the diastolic pressure. It shows how hard the blood pushes between heartbeats, when your heart is relaxed and filling with blood. Your doctor will give you a goal for your blood pressure. Your goal will be based on your health and your age. High blood pressure (hypertension) means that the top number stays high, or the bottom number stays high, or both. High blood pressure increases the risk of stroke, heart attack, and other problems. You and your doctor will talk about your risks of these problems based on your blood pressure. What happens when you have high blood pressure? · Blood flows through your arteries with too much force. Over time, this damages the walls of your arteries. But you can't feel it. High blood pressure usually doesn't cause symptoms. · Fat and calcium start to build up in your arteries. This buildup is called plaque. Plaque makes your arteries narrower and stiffer. Blood can't flow through them as easily.   · This lack of good blood flow starts to damage some of the organs in concerns. Carbohydrate counting  With carbohydrate counting, you plan meals based on the amount of carbohydrate in each food. Carbohydrate raises blood sugar higher and more quickly than any other nutrient. It is found in desserts, breads and cereals, and fruit. It's also found in starchy vegetables such as potatoes and corn, grains such as rice and pasta, and milk and yogurt. Spreading carbohydrate throughout the day helps keep your blood sugar levels within your target range. Your daily amount depends on several things, including your weight, how active you are, which diabetes medicines you take, and what your goals are for your blood sugar levels. A registered dietitian or diabetes educator can help you plan how much carbohydrate to include in each meal and snack. A guideline for your daily amount of carbohydrate is:  · 45 to 60 grams at each meal. That's about the same as 3 to 4 carbohydrate servings. · 15 to 20 grams at each snack. That's about the same as 1 carbohydrate serving. The Nutrition Facts label on packaged foods tells you how much carbohydrate is in a serving of the food. First, look at the serving size on the food label. Is that the amount you eat in a serving? All of the nutrition information on a food label is based on that serving size. So if you eat more or less than that, you'll need to adjust the other numbers. Total carbohydrate is the next thing you need to look for on the label. If you count carbohydrate servings, one serving of carbohydrate is 15 grams. For foods that don't come with labels, such as fresh fruits and vegetables, you'll need a guide that lists carbohydrate in these foods. Ask your doctor, dietitian, or diabetes educator about books or other nutrition guides you can use. If you take insulin, you need to know how many grams of carbohydrate are in a meal. This lets you know how much rapid-acting insulin to take before you eat.  If you use an insulin pump, you get a

## 2019-09-16 ENCOUNTER — TELEPHONE (OUTPATIENT)
Dept: CARDIOTHORACIC SURGERY | Age: 59
End: 2019-09-16

## 2019-09-18 DIAGNOSIS — I77.819 AORTIC DILATATION (HCC): Primary | ICD-10-CM

## 2019-09-20 DIAGNOSIS — I10 ESSENTIAL HYPERTENSION: ICD-10-CM

## 2019-09-20 DIAGNOSIS — R73.03 PREDIABETES: ICD-10-CM

## 2019-09-20 DIAGNOSIS — E78.2 MIXED HYPERLIPIDEMIA: ICD-10-CM

## 2019-09-20 DIAGNOSIS — I25.83 CORONARY ARTERY DISEASE DUE TO LIPID RICH PLAQUE: ICD-10-CM

## 2019-09-20 DIAGNOSIS — I25.10 CORONARY ARTERY DISEASE DUE TO LIPID RICH PLAQUE: ICD-10-CM

## 2019-09-23 ENCOUNTER — HOSPITAL ENCOUNTER (OUTPATIENT)
Dept: CT IMAGING | Age: 59
Discharge: HOME | End: 2019-09-23
Payer: COMMERCIAL

## 2019-09-23 DIAGNOSIS — I77.819 AORTIC DILATATION (HCC): ICD-10-CM

## 2019-09-23 LAB
A/G RATIO: 1.6 (ref 1.1–2.2)
ALBUMIN SERPL-MCNC: 4.6 G/DL (ref 3.4–5)
ALP BLD-CCNC: 78 U/L (ref 40–129)
ALT SERPL-CCNC: 24 U/L (ref 10–40)
ANION GAP SERPL CALCULATED.3IONS-SCNC: 13 MMOL/L (ref 3–16)
AST SERPL-CCNC: 21 U/L (ref 15–37)
BILIRUB SERPL-MCNC: 0.3 MG/DL (ref 0–1)
BUN BLDV-MCNC: 14 MG/DL (ref 7–20)
CALCIUM SERPL-MCNC: 9.3 MG/DL (ref 8.3–10.6)
CHLORIDE BLD-SCNC: 102 MMOL/L (ref 99–110)
CHOLESTEROL, TOTAL: 271 MG/DL (ref 0–199)
CO2: 28 MMOL/L (ref 21–32)
CREAT SERPL-MCNC: 0.9 MG/DL (ref 0.9–1.3)
GFR AFRICAN AMERICAN: >60
GFR NON-AFRICAN AMERICAN: >60
GLOBULIN: 2.9 G/DL
GLUCOSE BLD-MCNC: 95 MG/DL (ref 70–99)
HDLC SERPL-MCNC: 33 MG/DL (ref 40–60)
LDL CHOLESTEROL CALCULATED: ABNORMAL MG/DL
LDL CHOLESTEROL DIRECT: 154 MG/DL
POTASSIUM SERPL-SCNC: 4 MMOL/L (ref 3.5–5.1)
SODIUM BLD-SCNC: 143 MMOL/L (ref 136–145)
TOTAL CK: 617 U/L (ref 39–308)
TOTAL PROTEIN: 7.5 G/DL (ref 6.4–8.2)
TRIGL SERPL-MCNC: 469 MG/DL (ref 0–150)
VLDLC SERPL CALC-MCNC: ABNORMAL MG/DL

## 2019-09-23 PROCEDURE — 71250 CT THORAX DX C-: CPT

## 2019-09-24 LAB
ESTIMATED AVERAGE GLUCOSE: 116.9 MG/DL
HBA1C MFR BLD: 5.7 %

## 2019-09-30 ENCOUNTER — OFFICE VISIT (OUTPATIENT)
Dept: ORTHOPEDIC SURGERY | Age: 59
End: 2019-09-30
Payer: COMMERCIAL

## 2019-09-30 VITALS
DIASTOLIC BLOOD PRESSURE: 102 MMHG | HEART RATE: 84 BPM | HEIGHT: 76 IN | WEIGHT: 280.87 LBS | RESPIRATION RATE: 16 BRPM | SYSTOLIC BLOOD PRESSURE: 150 MMHG | BODY MASS INDEX: 34.2 KG/M2

## 2019-09-30 DIAGNOSIS — M50.30 DDD (DEGENERATIVE DISC DISEASE), CERVICAL: ICD-10-CM

## 2019-09-30 DIAGNOSIS — M54.16 LUMBAR RADICULOPATHY: Primary | ICD-10-CM

## 2019-09-30 DIAGNOSIS — M54.12 CERVICAL RADICULOPATHY: ICD-10-CM

## 2019-09-30 DIAGNOSIS — Z98.1 S/P LUMBAR FUSION: ICD-10-CM

## 2019-09-30 DIAGNOSIS — M51.36 DDD (DEGENERATIVE DISC DISEASE), LUMBAR: ICD-10-CM

## 2019-09-30 DIAGNOSIS — M47.812 CERVICAL SPONDYLOSIS WITHOUT MYELOPATHY: ICD-10-CM

## 2019-09-30 PROCEDURE — 99213 OFFICE O/P EST LOW 20 MIN: CPT | Performed by: PHYSICIAN ASSISTANT

## 2019-09-30 PROCEDURE — 3017F COLORECTAL CA SCREEN DOC REV: CPT | Performed by: PHYSICIAN ASSISTANT

## 2019-09-30 PROCEDURE — G8427 DOCREV CUR MEDS BY ELIG CLIN: HCPCS | Performed by: PHYSICIAN ASSISTANT

## 2019-09-30 PROCEDURE — G8598 ASA/ANTIPLAT THER USED: HCPCS | Performed by: PHYSICIAN ASSISTANT

## 2019-09-30 PROCEDURE — 1036F TOBACCO NON-USER: CPT | Performed by: PHYSICIAN ASSISTANT

## 2019-09-30 PROCEDURE — G8417 CALC BMI ABV UP PARAM F/U: HCPCS | Performed by: PHYSICIAN ASSISTANT

## 2019-10-01 ENCOUNTER — HOSPITAL ENCOUNTER (OUTPATIENT)
Dept: PHYSICAL THERAPY | Age: 59
Setting detail: THERAPIES SERIES
Discharge: HOME | End: 2019-10-01
Payer: COMMERCIAL

## 2019-10-01 PROCEDURE — 97530 THERAPEUTIC ACTIVITIES: CPT

## 2019-10-01 PROCEDURE — 97162 PT EVAL MOD COMPLEX 30 MIN: CPT

## 2019-10-01 PROCEDURE — 97110 THERAPEUTIC EXERCISES: CPT

## 2019-10-01 ASSESSMENT — PAIN DESCRIPTION - ORIENTATION: ORIENTATION: RIGHT;LEFT

## 2019-10-01 ASSESSMENT — PAIN DESCRIPTION - PROGRESSION: CLINICAL_PROGRESSION: GRADUALLY WORSENING

## 2019-10-01 ASSESSMENT — PAIN DESCRIPTION - PAIN TYPE: TYPE: CHRONIC PAIN

## 2019-10-01 ASSESSMENT — PAIN DESCRIPTION - FREQUENCY: FREQUENCY: CONTINUOUS

## 2019-10-01 ASSESSMENT — PAIN DESCRIPTION - DESCRIPTORS: DESCRIPTORS: ACHING;CONSTANT

## 2019-10-01 ASSESSMENT — PAIN DESCRIPTION - LOCATION: LOCATION: BACK;KNEE

## 2019-10-01 ASSESSMENT — PAIN - FUNCTIONAL ASSESSMENT: PAIN_FUNCTIONAL_ASSESSMENT: PREVENTS OR INTERFERES WITH ALL ACTIVE AND SOME PASSIVE ACTIVITIES

## 2019-10-01 ASSESSMENT — PAIN SCALES - GENERAL: PAINLEVEL_OUTOF10: 5

## 2019-10-02 ENCOUNTER — TELEPHONE (OUTPATIENT)
Dept: CARDIOTHORACIC SURGERY | Age: 59
End: 2019-10-02

## 2019-10-06 DIAGNOSIS — R74.8 ELEVATED CK: Primary | ICD-10-CM

## 2019-10-06 DIAGNOSIS — E78.2 MIXED HYPERLIPIDEMIA: ICD-10-CM

## 2019-10-06 RX ORDER — ROSUVASTATIN CALCIUM 10 MG/1
10 TABLET, COATED ORAL DAILY
Qty: 30 TABLET | Refills: 1 | Status: SHIPPED | OUTPATIENT
Start: 2019-10-06 | End: 2019-12-17 | Stop reason: SDUPTHER

## 2019-10-07 ENCOUNTER — HOSPITAL ENCOUNTER (OUTPATIENT)
Dept: PHYSICAL THERAPY | Age: 59
Setting detail: THERAPIES SERIES
End: 2019-10-07
Payer: COMMERCIAL

## 2019-10-07 ENCOUNTER — HOSPITAL ENCOUNTER (OUTPATIENT)
Dept: PHYSICAL THERAPY | Age: 59
Setting detail: THERAPIES SERIES
Discharge: HOME | End: 2019-10-07
Payer: COMMERCIAL

## 2019-10-07 PROCEDURE — 97150 GROUP THERAPEUTIC PROCEDURES: CPT

## 2019-10-07 PROCEDURE — 97113 AQUATIC THERAPY/EXERCISES: CPT

## 2019-10-09 ENCOUNTER — OFFICE VISIT (OUTPATIENT)
Dept: RHEUMATOLOGY | Age: 59
End: 2019-10-09
Payer: COMMERCIAL

## 2019-10-09 ENCOUNTER — HOSPITAL ENCOUNTER (OUTPATIENT)
Dept: PHYSICAL THERAPY | Age: 59
Setting detail: THERAPIES SERIES
Discharge: HOME | End: 2019-10-09
Payer: COMMERCIAL

## 2019-10-09 VITALS
HEIGHT: 76 IN | WEIGHT: 283 LBS | BODY MASS INDEX: 34.46 KG/M2 | DIASTOLIC BLOOD PRESSURE: 100 MMHG | SYSTOLIC BLOOD PRESSURE: 149 MMHG

## 2019-10-09 DIAGNOSIS — R74.8 ELEVATED CK: ICD-10-CM

## 2019-10-09 DIAGNOSIS — R74.8 ELEVATED CK: Primary | ICD-10-CM

## 2019-10-09 DIAGNOSIS — M79.10 MYALGIA: ICD-10-CM

## 2019-10-09 LAB
BASOPHILS ABSOLUTE: 0 K/UL (ref 0–0.2)
BASOPHILS RELATIVE PERCENT: 0.8 %
EOSINOPHILS ABSOLUTE: 0.4 K/UL (ref 0–0.6)
EOSINOPHILS RELATIVE PERCENT: 5.8 %
HCT VFR BLD CALC: 41.1 % (ref 40.5–52.5)
HEMOGLOBIN: 13.6 G/DL (ref 13.5–17.5)
LYMPHOCYTES ABSOLUTE: 3.1 K/UL (ref 1–5.1)
LYMPHOCYTES RELATIVE PERCENT: 50.3 %
MCH RBC QN AUTO: 29.1 PG (ref 26–34)
MCHC RBC AUTO-ENTMCNC: 33.1 G/DL (ref 31–36)
MCV RBC AUTO: 87.9 FL (ref 80–100)
MONOCYTES ABSOLUTE: 0.4 K/UL (ref 0–1.3)
MONOCYTES RELATIVE PERCENT: 7 %
NEUTROPHILS ABSOLUTE: 2.2 K/UL (ref 1.7–7.7)
NEUTROPHILS RELATIVE PERCENT: 36.1 %
PDW BLD-RTO: 14.5 % (ref 12.4–15.4)
PLATELET # BLD: 189 K/UL (ref 135–450)
PMV BLD AUTO: 8.9 FL (ref 5–10.5)
RBC # BLD: 4.68 M/UL (ref 4.2–5.9)
WBC # BLD: 6.1 K/UL (ref 4–11)

## 2019-10-09 PROCEDURE — G8417 CALC BMI ABV UP PARAM F/U: HCPCS | Performed by: INTERNAL MEDICINE

## 2019-10-09 PROCEDURE — G8484 FLU IMMUNIZE NO ADMIN: HCPCS | Performed by: INTERNAL MEDICINE

## 2019-10-09 PROCEDURE — 97150 GROUP THERAPEUTIC PROCEDURES: CPT

## 2019-10-09 PROCEDURE — G8427 DOCREV CUR MEDS BY ELIG CLIN: HCPCS | Performed by: INTERNAL MEDICINE

## 2019-10-09 PROCEDURE — 97113 AQUATIC THERAPY/EXERCISES: CPT

## 2019-10-09 PROCEDURE — 99244 OFF/OP CNSLTJ NEW/EST MOD 40: CPT | Performed by: INTERNAL MEDICINE

## 2019-10-10 ENCOUNTER — TELEPHONE (OUTPATIENT)
Dept: RHEUMATOLOGY | Age: 59
End: 2019-10-10

## 2019-10-10 DIAGNOSIS — E55.9 VITAMIN D DEFICIENCY: Primary | ICD-10-CM

## 2019-10-10 LAB
A/G RATIO: 1.6 (ref 1.1–2.2)
ALBUMIN SERPL-MCNC: 4.7 G/DL (ref 3.4–5)
ALP BLD-CCNC: 74 U/L (ref 40–129)
ALT SERPL-CCNC: 25 U/L (ref 10–40)
ANION GAP SERPL CALCULATED.3IONS-SCNC: 13 MMOL/L (ref 3–16)
AST SERPL-CCNC: 24 U/L (ref 15–37)
BILIRUB SERPL-MCNC: 0.3 MG/DL (ref 0–1)
BUN BLDV-MCNC: 12 MG/DL (ref 7–20)
C-REACTIVE PROTEIN: 3 MG/L (ref 0–5.1)
CALCIUM SERPL-MCNC: 9.5 MG/DL (ref 8.3–10.6)
CHLORIDE BLD-SCNC: 101 MMOL/L (ref 99–110)
CO2: 26 MMOL/L (ref 21–32)
CREAT SERPL-MCNC: 0.9 MG/DL (ref 0.9–1.3)
GFR AFRICAN AMERICAN: >60
GFR NON-AFRICAN AMERICAN: >60
GLOBULIN: 2.9 G/DL
GLUCOSE BLD-MCNC: 91 MG/DL (ref 70–99)
POTASSIUM SERPL-SCNC: 4.1 MMOL/L (ref 3.5–5.1)
SEDIMENTATION RATE, ERYTHROCYTE: 10 MM/HR (ref 0–20)
SODIUM BLD-SCNC: 140 MMOL/L (ref 136–145)
TOTAL CK: 703 U/L (ref 39–308)
TOTAL PROTEIN: 7.6 G/DL (ref 6.4–8.2)
TSH REFLEX FT4: 1.79 UIU/ML (ref 0.27–4.2)
VITAMIN B-12: 319 PG/ML (ref 211–911)
VITAMIN D 25-HYDROXY: 9 NG/ML

## 2019-10-10 RX ORDER — CHOLECALCIFEROL (VITAMIN D3) 50 MCG
2000 TABLET ORAL DAILY
Qty: 30 TABLET | Refills: 11 | Status: SHIPPED | OUTPATIENT
Start: 2019-10-10 | End: 2020-01-24 | Stop reason: SDUPTHER

## 2019-10-10 RX ORDER — ERGOCALCIFEROL 1.25 MG/1
50000 CAPSULE ORAL WEEKLY
Qty: 4 CAPSULE | Refills: 2 | Status: SHIPPED | OUTPATIENT
Start: 2019-10-10 | End: 2021-06-03

## 2019-10-16 ENCOUNTER — HOSPITAL ENCOUNTER (OUTPATIENT)
Dept: PHYSICAL THERAPY | Age: 59
Setting detail: THERAPIES SERIES
Discharge: HOME | End: 2019-10-16
Payer: COMMERCIAL

## 2019-10-16 PROCEDURE — 97150 GROUP THERAPEUTIC PROCEDURES: CPT

## 2019-10-16 PROCEDURE — 97113 AQUATIC THERAPY/EXERCISES: CPT

## 2019-10-21 ENCOUNTER — HOSPITAL ENCOUNTER (OUTPATIENT)
Dept: PHYSICAL THERAPY | Age: 59
Setting detail: THERAPIES SERIES
Discharge: HOME | End: 2019-10-21
Payer: COMMERCIAL

## 2019-10-21 PROCEDURE — 97150 GROUP THERAPEUTIC PROCEDURES: CPT

## 2019-10-21 PROCEDURE — 97113 AQUATIC THERAPY/EXERCISES: CPT

## 2019-10-23 ENCOUNTER — HOSPITAL ENCOUNTER (OUTPATIENT)
Dept: PHYSICAL THERAPY | Age: 59
Setting detail: THERAPIES SERIES
Discharge: HOME | End: 2019-10-23
Payer: COMMERCIAL

## 2019-10-25 ENCOUNTER — PROCEDURE VISIT (OUTPATIENT)
Dept: NEUROLOGY | Age: 59
End: 2019-10-25
Payer: COMMERCIAL

## 2019-10-25 DIAGNOSIS — R74.8 ELEVATED CPK: Primary | ICD-10-CM

## 2019-10-25 PROCEDURE — 95910 NRV CNDJ TEST 7-8 STUDIES: CPT | Performed by: PSYCHIATRY & NEUROLOGY

## 2019-10-25 PROCEDURE — 95886 MUSC TEST DONE W/N TEST COMP: CPT | Performed by: PSYCHIATRY & NEUROLOGY

## 2019-10-28 ENCOUNTER — HOSPITAL ENCOUNTER (OUTPATIENT)
Dept: PHYSICAL THERAPY | Age: 59
Setting detail: THERAPIES SERIES
Discharge: HOME | End: 2019-10-28
Payer: COMMERCIAL

## 2019-10-30 ENCOUNTER — HOSPITAL ENCOUNTER (OUTPATIENT)
Dept: PHYSICAL THERAPY | Age: 59
Setting detail: THERAPIES SERIES
Discharge: HOME | End: 2019-10-30
Payer: COMMERCIAL

## 2019-10-30 PROCEDURE — 97110 THERAPEUTIC EXERCISES: CPT

## 2019-10-30 PROCEDURE — 97112 NEUROMUSCULAR REEDUCATION: CPT

## 2019-11-04 ENCOUNTER — HOSPITAL ENCOUNTER (OUTPATIENT)
Dept: PHYSICAL THERAPY | Age: 59
Setting detail: THERAPIES SERIES
Discharge: HOME | End: 2019-11-04
Payer: COMMERCIAL

## 2019-11-06 ENCOUNTER — HOSPITAL ENCOUNTER (OUTPATIENT)
Dept: PHYSICAL THERAPY | Age: 59
Setting detail: THERAPIES SERIES
Discharge: HOME | End: 2019-11-06
Payer: COMMERCIAL

## 2019-11-11 ENCOUNTER — APPOINTMENT (OUTPATIENT)
Dept: PHYSICAL THERAPY | Age: 59
End: 2019-11-11
Payer: COMMERCIAL

## 2019-11-11 ENCOUNTER — OFFICE VISIT (OUTPATIENT)
Dept: ORTHOPEDIC SURGERY | Age: 59
End: 2019-11-11
Payer: COMMERCIAL

## 2019-11-11 VITALS
BODY MASS INDEX: 34.47 KG/M2 | WEIGHT: 283.07 LBS | DIASTOLIC BLOOD PRESSURE: 86 MMHG | SYSTOLIC BLOOD PRESSURE: 138 MMHG | HEIGHT: 76 IN

## 2019-11-11 DIAGNOSIS — M51.36 DDD (DEGENERATIVE DISC DISEASE), LUMBAR: ICD-10-CM

## 2019-11-11 DIAGNOSIS — M47.812 CERVICAL SPONDYLOSIS WITHOUT MYELOPATHY: ICD-10-CM

## 2019-11-11 DIAGNOSIS — M54.16 LUMBAR RADICULOPATHY: Primary | ICD-10-CM

## 2019-11-11 DIAGNOSIS — M54.12 CERVICAL RADICULOPATHY: ICD-10-CM

## 2019-11-11 DIAGNOSIS — M50.30 DDD (DEGENERATIVE DISC DISEASE), CERVICAL: ICD-10-CM

## 2019-11-11 DIAGNOSIS — Z98.1 S/P LUMBAR FUSION: ICD-10-CM

## 2019-11-11 PROCEDURE — 99213 OFFICE O/P EST LOW 20 MIN: CPT | Performed by: PHYSICIAN ASSISTANT

## 2019-11-11 PROCEDURE — G8598 ASA/ANTIPLAT THER USED: HCPCS | Performed by: PHYSICIAN ASSISTANT

## 2019-11-11 PROCEDURE — G8427 DOCREV CUR MEDS BY ELIG CLIN: HCPCS | Performed by: PHYSICIAN ASSISTANT

## 2019-11-11 PROCEDURE — G8417 CALC BMI ABV UP PARAM F/U: HCPCS | Performed by: PHYSICIAN ASSISTANT

## 2019-11-11 PROCEDURE — 3017F COLORECTAL CA SCREEN DOC REV: CPT | Performed by: PHYSICIAN ASSISTANT

## 2019-11-11 PROCEDURE — 1036F TOBACCO NON-USER: CPT | Performed by: PHYSICIAN ASSISTANT

## 2019-11-11 PROCEDURE — G8484 FLU IMMUNIZE NO ADMIN: HCPCS | Performed by: PHYSICIAN ASSISTANT

## 2019-11-20 ENCOUNTER — HOSPITAL ENCOUNTER (OUTPATIENT)
Dept: PHYSICAL THERAPY | Age: 59
Setting detail: THERAPIES SERIES
Discharge: HOME | End: 2019-11-20
Payer: COMMERCIAL

## 2019-11-20 PROCEDURE — 97150 GROUP THERAPEUTIC PROCEDURES: CPT

## 2019-11-20 PROCEDURE — 97113 AQUATIC THERAPY/EXERCISES: CPT

## 2019-11-25 ENCOUNTER — HOSPITAL ENCOUNTER (OUTPATIENT)
Dept: PHYSICAL THERAPY | Age: 59
Setting detail: THERAPIES SERIES
Discharge: HOME | End: 2019-11-25
Payer: COMMERCIAL

## 2019-11-25 PROCEDURE — 97113 AQUATIC THERAPY/EXERCISES: CPT

## 2019-12-02 ENCOUNTER — HOSPITAL ENCOUNTER (OUTPATIENT)
Dept: PHYSICAL THERAPY | Age: 59
Setting detail: THERAPIES SERIES
Discharge: HOME | End: 2019-12-02
Payer: COMMERCIAL

## 2019-12-02 PROCEDURE — 97110 THERAPEUTIC EXERCISES: CPT

## 2019-12-10 ENCOUNTER — HOSPITAL ENCOUNTER (OUTPATIENT)
Dept: PHYSICAL THERAPY | Age: 59
Setting detail: THERAPIES SERIES
Discharge: HOME | End: 2019-12-10
Payer: COMMERCIAL

## 2019-12-10 PROCEDURE — 97150 GROUP THERAPEUTIC PROCEDURES: CPT

## 2019-12-10 PROCEDURE — 97113 AQUATIC THERAPY/EXERCISES: CPT

## 2019-12-11 ENCOUNTER — HOSPITAL ENCOUNTER (OUTPATIENT)
Dept: PHYSICAL THERAPY | Age: 59
Setting detail: THERAPIES SERIES
Discharge: HOME | End: 2019-12-11
Payer: COMMERCIAL

## 2019-12-11 PROCEDURE — 97110 THERAPEUTIC EXERCISES: CPT

## 2019-12-16 ENCOUNTER — HOSPITAL ENCOUNTER (OUTPATIENT)
Dept: PHYSICAL THERAPY | Age: 59
Setting detail: THERAPIES SERIES
Discharge: HOME | End: 2019-12-16
Payer: COMMERCIAL

## 2019-12-16 ASSESSMENT — ENCOUNTER SYMPTOMS
DIARRHEA: 0
SHORTNESS OF BREATH: 0
CONSTIPATION: 0
COUGH: 0
BLURRED VISION: 0
NAUSEA: 0
CHEST TIGHTNESS: 0
WHEEZING: 0
VOMITING: 0
ORTHOPNEA: 0
ABDOMINAL PAIN: 0

## 2019-12-17 ENCOUNTER — HOSPITAL ENCOUNTER (OUTPATIENT)
Dept: PHYSICAL THERAPY | Age: 59
Setting detail: THERAPIES SERIES
Discharge: HOME | End: 2019-12-17
Payer: COMMERCIAL

## 2019-12-17 ENCOUNTER — OFFICE VISIT (OUTPATIENT)
Dept: FAMILY MEDICINE CLINIC | Age: 59
End: 2019-12-17
Payer: COMMERCIAL

## 2019-12-17 VITALS
HEART RATE: 105 BPM | OXYGEN SATURATION: 95 % | SYSTOLIC BLOOD PRESSURE: 130 MMHG | BODY MASS INDEX: 35.98 KG/M2 | TEMPERATURE: 98.2 F | DIASTOLIC BLOOD PRESSURE: 92 MMHG | WEIGHT: 289.4 LBS | HEIGHT: 75 IN

## 2019-12-17 DIAGNOSIS — G89.29 CHRONIC MIDLINE LOW BACK PAIN WITH BILATERAL SCIATICA: ICD-10-CM

## 2019-12-17 DIAGNOSIS — R73.03 PREDIABETES: ICD-10-CM

## 2019-12-17 DIAGNOSIS — I25.83 CORONARY ARTERY DISEASE DUE TO LIPID RICH PLAQUE: ICD-10-CM

## 2019-12-17 DIAGNOSIS — I25.10 CORONARY ARTERY DISEASE DUE TO LIPID RICH PLAQUE: ICD-10-CM

## 2019-12-17 DIAGNOSIS — I10 ESSENTIAL HYPERTENSION: Primary | ICD-10-CM

## 2019-12-17 DIAGNOSIS — I48.0 PAROXYSMAL ATRIAL FIBRILLATION (HCC): ICD-10-CM

## 2019-12-17 DIAGNOSIS — I71.21 ASCENDING AORTIC ANEURYSM: ICD-10-CM

## 2019-12-17 DIAGNOSIS — M54.42 CHRONIC MIDLINE LOW BACK PAIN WITH BILATERAL SCIATICA: ICD-10-CM

## 2019-12-17 DIAGNOSIS — E55.9 VITAMIN D DEFICIENCY: ICD-10-CM

## 2019-12-17 DIAGNOSIS — M54.41 CHRONIC MIDLINE LOW BACK PAIN WITH BILATERAL SCIATICA: ICD-10-CM

## 2019-12-17 DIAGNOSIS — E78.2 MIXED HYPERLIPIDEMIA: ICD-10-CM

## 2019-12-17 PROCEDURE — 1036F TOBACCO NON-USER: CPT | Performed by: CLINICAL NURSE SPECIALIST

## 2019-12-17 PROCEDURE — 99214 OFFICE O/P EST MOD 30 MIN: CPT | Performed by: CLINICAL NURSE SPECIALIST

## 2019-12-17 PROCEDURE — 3017F COLORECTAL CA SCREEN DOC REV: CPT | Performed by: CLINICAL NURSE SPECIALIST

## 2019-12-17 PROCEDURE — G8417 CALC BMI ABV UP PARAM F/U: HCPCS | Performed by: CLINICAL NURSE SPECIALIST

## 2019-12-17 PROCEDURE — 97150 GROUP THERAPEUTIC PROCEDURES: CPT

## 2019-12-17 PROCEDURE — G8598 ASA/ANTIPLAT THER USED: HCPCS | Performed by: CLINICAL NURSE SPECIALIST

## 2019-12-17 PROCEDURE — G8427 DOCREV CUR MEDS BY ELIG CLIN: HCPCS | Performed by: CLINICAL NURSE SPECIALIST

## 2019-12-17 PROCEDURE — G8484 FLU IMMUNIZE NO ADMIN: HCPCS | Performed by: CLINICAL NURSE SPECIALIST

## 2019-12-17 RX ORDER — METOPROLOL SUCCINATE 50 MG/1
50 TABLET, EXTENDED RELEASE ORAL DAILY
Qty: 90 TABLET | Refills: 0 | Status: SHIPPED | OUTPATIENT
Start: 2019-12-17 | End: 2020-03-17 | Stop reason: SDUPTHER

## 2019-12-17 RX ORDER — LISINOPRIL AND HYDROCHLOROTHIAZIDE 25; 20 MG/1; MG/1
1 TABLET ORAL DAILY
Qty: 90 TABLET | Refills: 0 | Status: SHIPPED | OUTPATIENT
Start: 2019-12-17 | End: 2020-03-17 | Stop reason: SDUPTHER

## 2019-12-17 RX ORDER — TIZANIDINE 4 MG/1
4 TABLET ORAL EVERY 8 HOURS PRN
Qty: 90 TABLET | Refills: 0 | Status: SHIPPED | OUTPATIENT
Start: 2019-12-17 | End: 2020-03-17 | Stop reason: SDUPTHER

## 2019-12-17 RX ORDER — ROSUVASTATIN CALCIUM 10 MG/1
10 TABLET, COATED ORAL DAILY
Qty: 90 TABLET | Refills: 0 | Status: SHIPPED | OUTPATIENT
Start: 2019-12-17 | End: 2020-03-17 | Stop reason: SDUPTHER

## 2019-12-17 RX ORDER — AMLODIPINE BESYLATE 5 MG/1
5 TABLET ORAL DAILY
Qty: 90 TABLET | Refills: 0 | Status: SHIPPED | OUTPATIENT
Start: 2019-12-17 | End: 2020-03-17 | Stop reason: SDUPTHER

## 2019-12-21 ASSESSMENT — ENCOUNTER SYMPTOMS: BLOOD IN STOOL: 0

## 2019-12-23 ENCOUNTER — OFFICE VISIT (OUTPATIENT)
Dept: ORTHOPEDIC SURGERY | Age: 59
End: 2019-12-23
Payer: COMMERCIAL

## 2019-12-23 ENCOUNTER — HOSPITAL ENCOUNTER (OUTPATIENT)
Dept: PHYSICAL THERAPY | Age: 59
Setting detail: THERAPIES SERIES
Discharge: HOME | End: 2019-12-23
Payer: COMMERCIAL

## 2019-12-23 VITALS
SYSTOLIC BLOOD PRESSURE: 153 MMHG | HEIGHT: 75 IN | HEART RATE: 73 BPM | BODY MASS INDEX: 35.99 KG/M2 | DIASTOLIC BLOOD PRESSURE: 99 MMHG | WEIGHT: 289.46 LBS

## 2019-12-23 DIAGNOSIS — M47.812 CERVICAL SPONDYLOSIS WITHOUT MYELOPATHY: ICD-10-CM

## 2019-12-23 DIAGNOSIS — Z98.1 S/P LUMBAR FUSION: ICD-10-CM

## 2019-12-23 DIAGNOSIS — M54.12 CERVICAL RADICULOPATHY: ICD-10-CM

## 2019-12-23 DIAGNOSIS — M54.16 LUMBAR RADICULOPATHY: Primary | ICD-10-CM

## 2019-12-23 DIAGNOSIS — M50.30 DDD (DEGENERATIVE DISC DISEASE), CERVICAL: ICD-10-CM

## 2019-12-23 DIAGNOSIS — M51.36 DDD (DEGENERATIVE DISC DISEASE), LUMBAR: ICD-10-CM

## 2019-12-23 PROCEDURE — 99214 OFFICE O/P EST MOD 30 MIN: CPT | Performed by: PHYSICIAN ASSISTANT

## 2019-12-23 PROCEDURE — 97113 AQUATIC THERAPY/EXERCISES: CPT

## 2019-12-23 PROCEDURE — 3017F COLORECTAL CA SCREEN DOC REV: CPT | Performed by: PHYSICIAN ASSISTANT

## 2019-12-23 PROCEDURE — 1036F TOBACCO NON-USER: CPT | Performed by: PHYSICIAN ASSISTANT

## 2019-12-23 PROCEDURE — G8598 ASA/ANTIPLAT THER USED: HCPCS | Performed by: PHYSICIAN ASSISTANT

## 2019-12-23 PROCEDURE — 97150 GROUP THERAPEUTIC PROCEDURES: CPT

## 2019-12-23 PROCEDURE — G8484 FLU IMMUNIZE NO ADMIN: HCPCS | Performed by: PHYSICIAN ASSISTANT

## 2019-12-23 PROCEDURE — G8427 DOCREV CUR MEDS BY ELIG CLIN: HCPCS | Performed by: PHYSICIAN ASSISTANT

## 2019-12-23 PROCEDURE — G8417 CALC BMI ABV UP PARAM F/U: HCPCS | Performed by: PHYSICIAN ASSISTANT

## 2019-12-23 RX ORDER — METHYLPREDNISOLONE 4 MG/1
TABLET ORAL
Qty: 1 KIT | Refills: 0 | Status: SHIPPED | OUTPATIENT
Start: 2019-12-23 | End: 2019-12-29

## 2019-12-30 ENCOUNTER — HOSPITAL ENCOUNTER (OUTPATIENT)
Dept: PHYSICAL THERAPY | Age: 59
Setting detail: THERAPIES SERIES
Discharge: HOME | End: 2019-12-30
Payer: COMMERCIAL

## 2019-12-30 PROCEDURE — 97150 GROUP THERAPEUTIC PROCEDURES: CPT

## 2019-12-30 PROCEDURE — 97113 AQUATIC THERAPY/EXERCISES: CPT

## 2020-01-02 ENCOUNTER — TELEPHONE (OUTPATIENT)
Dept: ORTHOPEDIC SURGERY | Age: 60
End: 2020-01-02

## 2020-01-06 ENCOUNTER — HOSPITAL ENCOUNTER (OUTPATIENT)
Dept: PHYSICAL THERAPY | Age: 60
Setting detail: THERAPIES SERIES
Discharge: HOME | End: 2020-01-06
Payer: COMMERCIAL

## 2020-01-06 PROCEDURE — 97150 GROUP THERAPEUTIC PROCEDURES: CPT

## 2020-01-06 PROCEDURE — 97113 AQUATIC THERAPY/EXERCISES: CPT

## 2020-01-06 NOTE — FLOWSHEET NOTE
Physical Therapy Daily/Aquatic Flow Sheet   Date:  2020    Patient Name:  Seven Valladares    :  1960  MRN: [id number]  Restrictions/Precautions:    Medical/Treatment Diagnosis Information:   · Diagnosis: Cervical radiculopathy,DDD (degenerative disc disease), cervical, cervical spondylosis/ LBP  · Treatment Diagnosis: decreased ROM and increased mm stiffness and pain    Tracking Information:  Physician Information Referring Practitioner: ALLISON Brooks/ Juan Jose Price CNP     Plan of Care Sent Date: 10/1/19 Signed Received:    Visit Count / Total Visits     +3/12    Insurance Approved Visits  /   Approved Dates:     Insurance Information [facility]  [facility]     Progress Note/G-codes   []  Yes  [x]  No Next Due: 20     Pain level: 6/10     Subjective:  Aggravated in lumbar spine, helped someone move this weekend, also c/o pain going from right hip to anterior thigh   Objective:    Observation:   Pt with more AROM with hip abduction with LLE compared to RLE    : pt a little tired, woozy due to pain meds today.    Pt states he called stating he would be late today, arrived back to pool area unchanged 30 mins past PT appt time.   Able to get in [place] with 20 mins left of PT gym time  [facility] posture/     Test measurements:    Land-based Therapy Dates of Service:  10/1, 10/30, ,   Land-based Visits Exercises/Activities:  Exercise/Equipment Resistance/Repetitions Other comments    bike   x 5 minutes    Chin tuck  Towel on wall   scap squeeze  Many cues to do correctly   tband on wall star Pt given green band for HEP   Cable walk outs  4.5 pl x 3 each direction    HEP: hip flex/quad/lumbar roll,prone lumbar ex  Bridges    Pt given written copy of this   SB blue A/P, M/L, CW/CCW x 10  Marching x 10  Alt UE/LE lift x 10                                            AquaticTherapy Dates of Service: 10/7, 10/9, 10/16, 10/21, , , 12/10, 12/17, 12/23, 12/30, 1/6   Aquatic Visits Exercises/Activities:   Transfers:          % Immersion:            Ambulation:   UE Exercises:       Forwards X 2  Shoulder Shrugs      Lateral  X 2  Shoulder Circles  x 20    Retro  X 2   Scapular Retraction   x 20    Marching   Push Downs       Cariocas   Punching     Jog    Rowing     Multifidi walkouts with paddle  x Elbow Flex/Ext X 20      Shldr Flex/Ext X 20      Shldr aBd/aDd X 20   LE Exercises:  Shldr Horiz aBd/aDd X 20   HR/TR X 25 Shldr IR/ER X 20   Marches X 25 Arm Circles X 20   Squats X 25  PNF Diagonals    Hamstring Curls X 25 Wall Push Ups X 20   Hip Flexion (SLR) X 25 Figure 8's    Hip aBduction (SLR) X 25 Bilateral Pull Downs    Hip Extension (SLR) X 25      Hip aDduction (SLR)      Hip Circles X 25  Functional:    Hip IR/Er x Step up forward X 20    TrA Set   Step up lateral  X 20   Pelvic Tilts   5\" x 10  Step down     Fig 8's   Lunges Forward    LE PNF  Lunges Retro      Lunges Lateral     Balance:   Lower ab curl with noodle     SLS 30\" x 2       Tandem Stance 30\" x 2      NBOS eyes open  Seated:     NBOS eyes closed  Ankle pumps     Hand to Opposite Knee X 20  Ankle Circles     Fwd Step ups to SLS  Knee Flex/Ext    Lateral Step ups to SLS  Hip aBd/aDd    Stop/Go Gait   Bicycle       Ankle DF/PF      Ankle Inv/Ev    Stretching:       Gastroc/Soleus x     Hamstring  2 x 30\" B Noodle:     Knee Flex Stretch  Leg Press    Piriformis  x Noodle Hang at Wall    Hip Flexor 2 x 30\" B Noodle Hang Deep Water    SKTC  Noodle Bicycle     DKTC       ITB      Quad  Deep Water:    Mid Back   Jog    UT  Jumping Jacks    Post Shoulder  Heel to Toe    Ladder Pull  Hand to Opposite Knee    Pec Stretch  Rocking Horse      [employer] Skier          Cervical:   Other:     AROM Flexion      AROM Extension      AROM Side Bending      AROM Rotation      Chin Tucks      Chin Nods        Aquatic Abbreviation Key  B= Belt DB= Dumbells T= Theratube   H= Hydrotone N=

## 2020-01-08 ENCOUNTER — HOSPITAL ENCOUNTER (OUTPATIENT)
Dept: PHYSICAL THERAPY | Age: 60
Setting detail: THERAPIES SERIES
Discharge: HOME | End: 2020-01-08
Payer: COMMERCIAL

## 2020-01-08 PROCEDURE — 97110 THERAPEUTIC EXERCISES: CPT

## 2020-01-13 ENCOUNTER — HOSPITAL ENCOUNTER (OUTPATIENT)
Dept: PHYSICAL THERAPY | Age: 60
Setting detail: THERAPIES SERIES
Discharge: HOME | End: 2020-01-13
Payer: COMMERCIAL

## 2020-01-13 DIAGNOSIS — R73.03 PREDIABETES: ICD-10-CM

## 2020-01-13 DIAGNOSIS — E78.2 MIXED HYPERLIPIDEMIA: ICD-10-CM

## 2020-01-13 DIAGNOSIS — I10 ESSENTIAL HYPERTENSION: ICD-10-CM

## 2020-01-13 DIAGNOSIS — E55.9 VITAMIN D DEFICIENCY: ICD-10-CM

## 2020-01-13 LAB
A/G RATIO: 1.5 (ref 1.1–2.2)
ALBUMIN SERPL-MCNC: 4.6 G/DL (ref 3.4–5)
ALP BLD-CCNC: 69 U/L (ref 40–129)
ALT SERPL-CCNC: 20 U/L (ref 10–40)
ANION GAP SERPL CALCULATED.3IONS-SCNC: 14 MMOL/L (ref 3–16)
AST SERPL-CCNC: 19 U/L (ref 15–37)
BASOPHILS ABSOLUTE: 0 K/UL (ref 0–0.2)
BASOPHILS RELATIVE PERCENT: 0.6 %
BILIRUB SERPL-MCNC: 0.3 MG/DL (ref 0–1)
BUN BLDV-MCNC: 19 MG/DL (ref 7–20)
CALCIUM SERPL-MCNC: 9.6 MG/DL (ref 8.3–10.6)
CHLORIDE BLD-SCNC: 102 MMOL/L (ref 99–110)
CHOLESTEROL, TOTAL: 287 MG/DL (ref 0–199)
CO2: 25 MMOL/L (ref 21–32)
CREAT SERPL-MCNC: 1 MG/DL (ref 0.9–1.3)
EOSINOPHILS ABSOLUTE: 0.3 K/UL (ref 0–0.6)
EOSINOPHILS RELATIVE PERCENT: 5.8 %
GFR AFRICAN AMERICAN: >60
GFR NON-AFRICAN AMERICAN: >60
GLOBULIN: 3.1 G/DL
GLUCOSE BLD-MCNC: 83 MG/DL (ref 70–99)
HCT VFR BLD CALC: 41.7 % (ref 40.5–52.5)
HDLC SERPL-MCNC: 37 MG/DL (ref 40–60)
HEMOGLOBIN: 14 G/DL (ref 13.5–17.5)
LDL CHOLESTEROL CALCULATED: ABNORMAL MG/DL
LDL CHOLESTEROL DIRECT: 197 MG/DL
LYMPHOCYTES ABSOLUTE: 2.6 K/UL (ref 1–5.1)
LYMPHOCYTES RELATIVE PERCENT: 43.5 %
MCH RBC QN AUTO: 29.5 PG (ref 26–34)
MCHC RBC AUTO-ENTMCNC: 33.5 G/DL (ref 31–36)
MCV RBC AUTO: 87.9 FL (ref 80–100)
MONOCYTES ABSOLUTE: 0.3 K/UL (ref 0–1.3)
MONOCYTES RELATIVE PERCENT: 4.8 %
NEUTROPHILS ABSOLUTE: 2.7 K/UL (ref 1.7–7.7)
NEUTROPHILS RELATIVE PERCENT: 45.3 %
PDW BLD-RTO: 13.7 % (ref 12.4–15.4)
PLATELET # BLD: 180 K/UL (ref 135–450)
PMV BLD AUTO: 9.3 FL (ref 5–10.5)
POTASSIUM SERPL-SCNC: 4.5 MMOL/L (ref 3.5–5.1)
RBC # BLD: 4.75 M/UL (ref 4.2–5.9)
SODIUM BLD-SCNC: 141 MMOL/L (ref 136–145)
TOTAL CK: 445 U/L (ref 39–308)
TOTAL PROTEIN: 7.7 G/DL (ref 6.4–8.2)
TRIGL SERPL-MCNC: 378 MG/DL (ref 0–150)
VITAMIN D 25-HYDROXY: 12.4 NG/ML
VLDLC SERPL CALC-MCNC: ABNORMAL MG/DL
WBC # BLD: 6 K/UL (ref 4–11)

## 2020-01-13 PROCEDURE — 97150 GROUP THERAPEUTIC PROCEDURES: CPT

## 2020-01-13 PROCEDURE — 97113 AQUATIC THERAPY/EXERCISES: CPT

## 2020-01-13 NOTE — FLOWSHEET NOTE
Physical Therapy Daily/Aquatic Flow Sheet   Date:  2020    Patient Name:  Augusto Hooper    :  1960  MRN: [id number]  Restrictions/Precautions:    Medical/Treatment Diagnosis Information:   · Diagnosis: Cervical radiculopathy,DDD (degenerative disc disease), cervical, cervical spondylosis/ LBP  · Treatment Diagnosis: decreased ROM and increased mm stiffness and pain    Tracking Information:  Physician Information Referring Practitioner: ALLISON Queen/ Nicole Bonner CNP     Plan of Care Sent Date: 10/1/19 Signed Received:    Visit Count / Total Visits     +    Insurance Approved Visits  /30   Approved Dates:     Ins[facility] Information [facility]     Progress Note/G-codes   []  Yes  [x]  No Next Due: 20     Pain level: 4-7/10 in LB, neck     Subjective:  Said he had a rough weekend  Objective:    Observation:   Pt with more AROM with hip abduction with LLE compared to RLE    : pt a little tired, woozy due to pain meds today.    Pt states he called stating he would be late today, arrived back to pool area unchanged 30 mins past PT appt time.   Able to get in pool with 20 mins left of PT gym time  [facility] posture/     Test measurements:    Land-based Therapy Dates of Service:  10/1, 10/30, , , ,  Land-based Visits Exercises/Activities:  Exercise/Equipment Resistance/Repetitions Other comments   UBE  X 2 minutes each way     Chin tuck  Towel on wall   scap squeeze  Many cues to do correctly   tband on wall star Pt given green band for HEP   Cable walk outs   multifudus 5 pl x 3 fwd/back  2 pl with palinoff press x 5     HEP: hip flex/quad/lumbar roll,prone lumbar ex  Bridges    Pt given written copy of this   SB blue A/P, M/L, CW/CCW x 10  Marching x 10  Alt UE/LE lift x 10    Shuttle balance Step up to SLS x 10 B                                       AquaticTherapy Dates of Service: 10/7, 10/9, 10/16, 10/21, , 11/25, 12/10, 12/17, 12/23, 12/30, 1/6, 1/13   Aquatic Visits Exercises/Activities:   Transfers:          % Immersion:            Ambulation:   UE Exercises:       Forwards X 2  Shoulder Shrugs      Lateral  X 2  Shoulder Circles  x 20    Retro  X 2   Scapular Retraction   x 20    Marching   Push Downs       Cariocas   Punching     Jog    Rowing     Multifidi walkouts with paddle  x Elbow Flex/Ext X 20      Shldr Flex/Ext X 20      Shldr aBd/aDd X 20   LE Exercises:  Shldr Horiz aBd/aDd X 20   HR/TR X 25 Shldr IR/ER X 20   Marches X 25 Arm Circles X 20   Squats X 25  PNF Diagonals    Hamstring Curls X 25 Wall Push Ups X 20   Hip Flexion (SLR) X 25 Figure 8's    Hip aBduction (SLR) X 25 Bilateral Pull Downs    Hip Extension (SLR) X 25      Hip aDduction (SLR)      Hip Circles X 25  Functional:    Hip IR/Er x Step up forward X 20    TrA Set   Step up lateral  X 20   Pelvic Tilts   5\" x 10  Step down     Fig 8's   Lunges Forward    LE PNF  Lunges Retro      Lunges Lateral     Balance:   Lower ab curl with noodle     SLS 30\" x 2       Tandem Stance 30\" x 2      NBOS eyes open  Seated:     NBOS eyes closed  Ankle pumps     Hand to Opposite Knee X 20  Ankle Circles     Fwd Step ups to SLS  Knee Flex/Ext    Lateral Step ups to SLS  Hip aBd/aDd    Stop/Go Gait   Bicycle       Ankle DF/PF      Ankle Inv/Ev    Stretching:       Gastroc/Soleus x     Hamstring  2 x 30\" B Noodle:     Knee Flex Stretch  Leg Press    Piriformis  x Noodle Hang at Wall    Hip Flexor 2 x 30\" B Noodle Hang Deep Water    SKTC  Noodle Bicycle     DKTC       ITB      Quad  Deep Water:    Mid Back   Jog    UT  Jumping Jacks    Post Shoulder  Heel to Toe    Ladder Pull  Hand to Opposite Knee    Pec Stretch  Rocking Horse      [employer] Skier          Cervical:   Other:     AROM Flexion      AROM Extension      AROM Side Bending      AROM Rotation      Chin Tucks      Chin Nods        Aquatic Abbreviation Key  B= Belt DB= Dumbells T= Theratube   H=

## 2020-01-14 LAB
ESTIMATED AVERAGE GLUCOSE: 111.2 MG/DL
HBA1C MFR BLD: 5.5 %

## 2020-01-15 ENCOUNTER — HOSPITAL ENCOUNTER (OUTPATIENT)
Dept: PHYSICAL THERAPY | Age: 60
Setting detail: THERAPIES SERIES
Discharge: HOME | End: 2020-01-15
Payer: COMMERCIAL

## 2020-01-15 PROCEDURE — 97110 THERAPEUTIC EXERCISES: CPT

## 2020-01-15 NOTE — FLOWSHEET NOTE
Rocking Horse      [place] Skier          Cervical:   Other:     AROM Flexion      AROM Extension      AROM Side Bending      AROM Rotation      Chin Tucks      Chin Nods        Aquatic Abbreviation Key  B= Belt DB= Dumbells T= Theratube   H= Hydrotone N= Noodles W= Weights   P= Paddles S= Speedo equipment K= Kickboard     Other Therapeutic Activities:  10/1/19 Pt was educated on aquatic therapy intervention and POC as well as taken on tour of pool area in which pt was shown family changing rooms, how to utilize lockers what to wear for sessions as well as emphasized treatments take place in group setting. Pt was also educated on process of checking in at  and reporting back to pool area taking care with transition to pool area due to wet floors. Pt was also issued pool handout which outlines process, reiterates cancellation/no show policy as well as further instructions on accessing lockers.       Home Exercise Program: 10/30: added wall stars with green band, pt issued green band, prone quad stretch, supine hip flex stretch 3 x 1 min, bridges x 10   10/1: SKTC 3 x 30', piriformis stretch 3 x 30', HS stretch 3 x 30'    Manual Treatments:        Modalities:       Timed Code Treatment Minutes: 31    Total Treatment Minutes:  31    Treatment/Activity Tolerance:  [x] Patient tolerated treatment well [] Patient limited by fatigue  [] Patient limited by pain  [] Patient limited by other medical complications  [] Other: Pt demonstrates more difficulty with performing RLE exercises as compared to right, with increased pain and decreased ROM    Prognosis: [x] Good [] Fair  [] Poor    Patient Requires Follow-up: [x] Yes  [] No    Plan:   [x] Continue per plan of care [] Alter current plan (see comments)  [] Plan of care initiated [] Hold pending MD visit [] Discharge  Plan for Next Session:      Electronically signed by:  Trenton Preston, PT

## 2020-01-16 ENCOUNTER — OFFICE VISIT (OUTPATIENT)
Dept: ORTHOPEDIC SURGERY | Age: 60
End: 2020-01-16
Payer: COMMERCIAL

## 2020-01-16 VITALS
WEIGHT: 289.46 LBS | HEIGHT: 75 IN | HEART RATE: 76 BPM | BODY MASS INDEX: 35.99 KG/M2 | DIASTOLIC BLOOD PRESSURE: 102 MMHG | SYSTOLIC BLOOD PRESSURE: 147 MMHG

## 2020-01-16 PROCEDURE — G8417 CALC BMI ABV UP PARAM F/U: HCPCS | Performed by: PHYSICIAN ASSISTANT

## 2020-01-16 PROCEDURE — G8427 DOCREV CUR MEDS BY ELIG CLIN: HCPCS | Performed by: PHYSICIAN ASSISTANT

## 2020-01-16 PROCEDURE — 1036F TOBACCO NON-USER: CPT | Performed by: PHYSICIAN ASSISTANT

## 2020-01-16 PROCEDURE — 99214 OFFICE O/P EST MOD 30 MIN: CPT | Performed by: PHYSICIAN ASSISTANT

## 2020-01-16 PROCEDURE — G8484 FLU IMMUNIZE NO ADMIN: HCPCS | Performed by: PHYSICIAN ASSISTANT

## 2020-01-16 PROCEDURE — 3017F COLORECTAL CA SCREEN DOC REV: CPT | Performed by: PHYSICIAN ASSISTANT

## 2020-01-16 NOTE — PROGRESS NOTES
Follow up: Onelia Cunningham  1960  [id number]         Chief Complaint   Patient presents with    Lower Back Pain     TR MRI LSP         HISTORY OF PRESENT ILLNESS:  Mr. Devon Daley is a 64 y.o. male returns for a follow up visit for multiple medical problems. His current presenting problems are   1. Lumbar radiculopathy    2. DDD (degenerative disc disease), lumbar    3. S/P lumbar fusion    . As per information/history obtained from the PADT(patient assessment and documentation tool) - He complains of pain in the lower back with radiation to the upper leg Bilateral and lower leg Bilateral He rates the pain 5/10 and describes it as sharp, aching, throbbing, tingling, pins and needles. Pain is made worse by: movement, walking. He denies side effects from the current pain regimen. Patient reports that since the last follow-up visit the physical functioning is worse, family/social relationships are worse, mood is worse and sleep patterns are worse, and that overall functioning is worse. Patient denies neurological bowel or bladder. The patient presents today in follow-up for review of the MRI lumbar spine results. The patient describes that his pain is relatively the same however some days his pain will be worsening in the lower back and bilateral legs. His pain does radiate down into the foot however majority of his pain is in the upper leg to the knee. He also has a lot of knee tenderness. The patient has had a lumbar fusion at L4-S1. We will try to attempt pain relief above the level of the fusion through an epidural steroid injection.     Associated signs and symptoms:   Neurogenic bowel or bladder symptoms:  no   Perceived weakness:  no   Difficulty walking:  yes              Past Medical History:   Past Medical History:   Diagnosis Date    Aneurysm of ascending aorta (HCC)     Atopic dermatitis     Back pain     Facial paralysis/[place] palsy     Hyperlipidemia     Hypertension      Stroke Father        REVIEW OF SYSTEMS:   CONSTITUTIONAL: Denies unexplained weight loss, fevers, chills or fatigue  NEUROLOGICAL: Denies unsteady gait or progressive weakness  MUSCULOSKELETAL: Denies joint swelling or redness  GI: Denies nausea, vomiting, diarrhea   : Denies bowel or bladder issues       PHYSICAL EXAM:    Vitals: Blood pressure (!) 147/102, pulse 76, height 6' 3\" (1.905 m), weight 289 lb 7.4 oz (131.3 kg). GENERAL EXAM:  · General Apparence: Patient is adequately groomed with no evidence of malnutrition. · Psychiatric: Orientation: The patient is oriented to time, place and person. The patient's mood and affect are appropriate   · Vascular: Examination reveals no swelling and palpation reveals no tenderness in upper or lower extremities. Good capillary refill. · The lymphatic examination of the neck, axillae and groin reveals all areas to be without enlargement or induration  · Sensation is intact without deficit in the upper and lower extremities to light touch and pinprick  · Coordination of the upper and lower extremities are normal.  · RIGHT UPPER EXTREMITY:  Inspection/examination of the right upper extremity does not show any tenderness, deformity or injury. Range of motion is unremarkable and pain-free. There is no gross instability. There are no rashes, ulcerations or lesions. Strength and tone are normal. No atrophy or abnormal movements are noted. · LEFT UPPER EXTREMITY: Inspection/examination of the left upper extremity does not show any tenderness, deformity or injury. Range of motion is unremarkable and pain-free. There is no gross instability. There are no rashes, ulcerations or lesions. Strength and tone are normal. No atrophy or abnormal movements are noted. LUMBAR/SACRAL EXAMINATION:  · Inspection: Local inspection shows no step-off or bruising. Lumbar alignment is normal. No instability is noted. · Palpation:   No evidence of tenderness at the midline.   Lumbar paraspinal tenderness: Mild L4/5 and L5/S1 tenderness  Bursal tenderness No tenderness bilaterally  There is no paraspinal spasm. · Range of Motion: limited by 50% in all planes due to pain  · Strength:   Strength testing is 5/5 in all muscle groups tested. · Special Tests:   Straight leg raise positive bilaterally and crossed SLR negative. Jonathon's testing is negative bilaterally. FADIR's testing is negative bilaterally. · Skin: There are no rashes, ulcerations or lesions. · Reflexes: Reflexes are symmetrically 1+ at the patellar and ankle tendons. Clonus absent bilaterally at the feet. · Gait & station: normal, patient ambulates without assistance and no ataxia  · Additional Examinations:  · RIGHT LOWER EXTREMITY: Inspection/examination of the right lower extremity does not show any tenderness, deformity or injury. Range of motion is normal and pain-free. There is no gross instability. There are no rashes, ulcerations or lesions. Strength and tone are normal. No atrophy or abnormal movements are noted. · LEFT LOWER EXTREMITY:  Inspection/examination of the left lower extremity does not show any tenderness, deformity or injury. Range of motion is normal and pain-free. There is no gross instability. There are no rashes, ulcerations or lesions. Strength and tone are normal. No atrophy or abnormal movements are noted. Diagnostic Testing:    MR Lumbar spine shows  1/6/20:  CONCLUSION:   1.  L3-4 mild to moderate canal stenosis due to 2 mm retrolisthesis with mild diffuse    spondylotic disc displacement and moderate facet and ligamentum flavum hypertrophy. Gentle    abutment of both descending L4 nerve roots. Minor bilateral foramen stenosis. 2.  L4-5 intervertebral and posterior osseous fusion. Mild to moderate right foramen osseous    stenosis. No soft disc herniation. 3. L5-S1 intervertebral fusion. Mild to moderate facet hypertrophy. Minor bilateral foramen    stenosis.        Results for orders MAGDA.      The physical examination was performed between the patient and Megan Gray. ALLISON Mesa. All counseling during the appointment was performed between the patient and the provider. Babak Mesa PA-C, personally performed the services described in this documentation as scribed by Danisha Salcido ATC in my presence and it is both accurate and complete. SABINO Blum PA-C  Board Certified by the [employer] on Certification of Physician Assistants  [address]  Partner of [facility] ([facility])        This dictation was performed with a verbal recognition program [facility]) and it was checked for errors. It is possible that there are still dictated errors within this office note. If so, please bring any errors to my attention for an addendum. All efforts were made to ensure that this office note is accurate.

## 2020-01-16 NOTE — LETTER
Please schedule the following with:     Date:   20 @  10:20    Account: [de-identified]  Patient: Seven Valladares    : 1960  Address:  [address] [place] [address] Ave    Phone (H):  [phone] (home)      ----------------------------------------------------------------------------------------------  Diagnosis:     ICD-10-CM    1. Lumbar radiculopathy M54.16    2. DDD (degenerative disc disease), lumbar M51.36    3. S/P lumbar fusion Z98.1      Procedure: Interlaminar Epidural Steroid Injection     Levels: L3-4   Side: Midline   CPT Codes 18862    ----------------------------------------------------------------------------------------------  Injection # 1  [facility]    Attending Physician       Lauren Ivy.  Sonam Johnson MD.  ----------------------------------------------------------------------------------------------  Injection Scheduled For:    At:    [address]  Pre-Cert#    [employer]     Pre-Cert#    Comments:    · Infection control  · Tested positive for MRSA in past 12 months:  no  · Tested positive for MSSA \"staph infection\" in past 12 months: no  · Tested positive for VRE (Vancomycin Resistant Enterococci) in past 12 months:   no  · Currently on any antibiotics for an infection: no  · Anticoagulants:  · On a blood thinner:  yes , Cedric Sandy, APRN- CNP  · Any history of bleeding disorder: no   · Advanced Liver disease: no   · Advanced Renal disease: no   · Glaucoma: no   · Diabetes: no     Sedation:  Yes  -----------------------------------------------------------------------------------------------  Allergies   Allergen Reactions    Sugar-Protein-Starch      Runny nose

## 2020-01-22 ENCOUNTER — HOSPITAL ENCOUNTER (OUTPATIENT)
Dept: PHYSICAL THERAPY | Age: 60
Setting detail: THERAPIES SERIES
Discharge: HOME | End: 2020-01-22
Payer: COMMERCIAL

## 2020-01-22 PROCEDURE — 97110 THERAPEUTIC EXERCISES: CPT

## 2020-01-22 PROCEDURE — 97530 THERAPEUTIC ACTIVITIES: CPT

## 2020-01-22 NOTE — FLOWSHEET NOTE
Physical Therapy Daily/Aquatic Flow Sheet   Date:  2020    Patient Name:  Mannie Santiago    :  1960  MRN: [id number]  Restrictions/Precautions:    Medical/Treatment Diagnosis Information:   · Diagnosis: Cervical radiculopathy,DDD (degenerative disc disease), cervical, cervical spondylosis/ LBP  · Treatment Diagnosis: decreased ROM and increased mm stiffness and pain    Tracking Information:  Physician Information Referring Practitioner: ALLISON Adames/ Marino Tillman CNP     Plan of Care Sent Date: 10/1/19 Signed Received:    Visit Count / Total Visits     +    Insurance Approved Visits  /30   Approved Dates:     Insurance Information [facility]  [facility]     Progress Note/G-codes   []  Yes  [x]  No Next Due: 20     Pain level: 4/10 in LB, neck     Subjective:  Said his tear ducts are blocked. Needs surgery. Is having pain that goes down into his testicles. 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 Objective:    Observation:   Pt with more AROM with hip abduction with LLE compared to RLE    : pt a little tired, woozy due to pain meds today.    Pt states he called stating he would be late today, arrived back to pool area unchanged 30 mins past PT appt time.   Able to get in pool with 20 mins left of PT gym time  [facility] posture/     Test measurements:    Land-based Therapy Dates of Service:  10/1, 10/30, , , , 1/15, ,  Land-based Visits Exercises/Activities:  Exercise/Equipment Resistance/Repetitions Other comments    bike   x 5 minutes    Chin tuck  Towel on wall   scap squeeze  Many cues to do correctly   tband on wall star Pt given green band for HEP   Cable walk outs 5 pl x 3 fwd/back   2 pl with palinoff press x 5     HEP: hip flex/quad/lumbar roll,prone lumbar ex  Bridges    Pt given written copy of this   A/P, M/L, CW/CCW x 20  Marching x 20  Alt UE/LE lift x 20  4000 gr [place] lift x 20          Diagonal lifts at cables B (chops) 5 plates x 10 Manual cues for keeping weight back on heels, core tight, hips and knees in proper form    Lifting crate from floor chair to 3.5 ft and back  Lifting crate from stool to 4.5 ft and back  Lifting crate from 4.5 ft to body - standing a foot away from table 50 lbs: 1x10    50 lbs: 1x10    25 lbs: 1x15 Cues to keep hips back for proper body mechanics    SB strengthening: band rows, lifts, gait pattern with UE weights                        AquaticTherapy Dates of Service: 10/7, 10/9, 10/16, 10/21, 11/20, 11/25, 12/10, 12/17, 12/23, 12/30, 1/6, 1/13   Aquatic Visits Exercises/Activities:   Transfers:          % Immersion:            Ambulation:   UE Exercises:       Forwards X 2  Shoulder Shrugs      Lateral  X 2  Shoulder Circles  x 20    Retro  X 2   Scapular Retraction   x 20    Marching   Push Downs       [place]as   Punching     Jog    Rowing     Multifidi walkouts with paddle  x Elbow Flex/Ext X 20      Shldr Flex/Ext X 20      Shldr aBd/aDd X 20   LE Exercises:  Shldr Horiz aBd/aDd X 20   HR/TR X 25 Shldr IR/ER X 20   Marches X 25 Arm Circles X 20   Squats X 25  PNF Diagonals    Hamstring Curls X 25 Wall Push Ups X 20   Hip Flexion (SLR) X 25 Figure 8's    Hip aBduction (SLR) X 25 Bilateral Pull Downs    Hip Extension (SLR) X 25      Hip aDduction (SLR)      Hip Circles X 25  Functional:    Hip IR/Er x Step up forward X 20    TrA Set   Step up lateral  X 20   Pelvic Tilts   5\" x 10  Step down     Fig 8's   Lunges Forward    LE PNF  Lunges Retro      Lunges Lateral     Balance:   Lower ab curl with noodle     SLS 30\" x 2       Tandem Stance 30\" x 2      NBOS eyes open  Seated:     NBOS eyes closed  Ankle pumps     Hand to Opposite Knee X 20  Ankle Circles     Fwd Step ups to SLS  Knee Flex/Ext    Lateral Step ups to SLS  Hip aBd/aDd    Stop/Go Gait   Bicycle       Ankle DF/PF      Ankle Inv/Ev    Stretching:       Gastroc/Soleus x     Hamstring  2 x

## 2020-01-24 NOTE — PROGRESS NOTES
PATIENT REACHED   YES_X___NO____    PREOP INSTUCTIONS       DATE_2/5/20________ TIME__1030_______ARRIVAL_0930_______PLACE__masc__________  NOTHING TO EAT OR DRINK  AFTER MIDNIGHT THE EVENING PRIOR OR AS INSTRUCTED BY YOUR DR.  Angelina Berry NEED A RESPONSIBLE ADULT AGE 18 OR OLDER TO DRIVE YOU HOME  PLEASE BRING INSURANCE CARD. PICTURE ID AND COMPLETE LIST OF MEDS  WEAR LOOSE COMFORTABLE CLOTHING  FOLLOW ANY INSTRUCTIONS YOUR DRS OFFICE HAS GIVEN YOU,INCLUDING WHAT MEDICATIONS TO TAKE THE AM OF PROCEDURE AND WHEN AND IF YOU NEED TO STOP ANY BLOOD THINNERS. IF YOU HAVE QUESTIONS REGARDING THIS CALL THE OFFICE  THE GOAL BLOOD SUGAR THE AM OF PROCEDURE  OR LESS ABOVE THAT THE PROCEDURE MAY BE CANCELLED  ANY QUESTIONS CALL YOUR DOCTOR. ALSO,PLEASE READ THE INSTRUCTION PACKET FROM YOUR DR IF YOU RECEIVED ONE.   SPINE INTERVENTION NUMBER [phone]

## 2020-01-29 ENCOUNTER — HOSPITAL ENCOUNTER (OUTPATIENT)
Dept: PHYSICAL THERAPY | Age: 60
Setting detail: THERAPIES SERIES
Discharge: HOME | End: 2020-01-29
Payer: COMMERCIAL

## 2020-02-03 ENCOUNTER — TELEPHONE (OUTPATIENT)
Dept: ORTHOPEDIC SURGERY | Age: 60
End: 2020-02-03

## 2020-02-03 NOTE — TELEPHONE ENCOUNTER
DOS   02/05/2020  CPT   32155  DX   M54.16   M51.36   Z98.1  OP SX AUTH  [id number]  VALID   02/05/2020 - 03/05/2020    LEVELS   L3  L4   PROCEDURE   INTERLAMINAR CHRISTY    [facility]  INSURANCE:   [address] [place] [address]

## 2020-02-05 ENCOUNTER — HOSPITAL ENCOUNTER (OUTPATIENT)
Age: 60
Setting detail: OUTPATIENT SURGERY
Discharge: HOME | End: 2020-02-05
Attending: PHYSICAL MEDICINE & REHABILITATION | Admitting: PHYSICAL MEDICINE & REHABILITATION
Payer: COMMERCIAL

## 2020-02-05 ENCOUNTER — APPOINTMENT (OUTPATIENT)
Dept: GENERAL RADIOLOGY | Age: 60
End: 2020-02-05
Attending: PHYSICAL MEDICINE & REHABILITATION
Payer: COMMERCIAL

## 2020-02-05 VITALS
HEART RATE: 69 BPM | SYSTOLIC BLOOD PRESSURE: 139 MMHG | TEMPERATURE: 98.1 F | HEIGHT: 75 IN | WEIGHT: 288 LBS | OXYGEN SATURATION: 99 % | BODY MASS INDEX: 35.81 KG/M2 | RESPIRATION RATE: 16 BRPM | DIASTOLIC BLOOD PRESSURE: 125 MMHG

## 2020-02-05 PROCEDURE — 2709999900 HC NON-CHARGEABLE SUPPLY: Performed by: PHYSICAL MEDICINE & REHABILITATION

## 2020-02-05 PROCEDURE — 99152 MOD SED SAME PHYS/QHP 5/>YRS: CPT | Performed by: PHYSICAL MEDICINE & REHABILITATION

## 2020-02-05 PROCEDURE — 2500000003 HC RX 250 WO HCPCS: Performed by: PHYSICAL MEDICINE & REHABILITATION

## 2020-02-05 PROCEDURE — 3610000056 HC PAIN LEVEL 4 BASE (NON-OR): Performed by: PHYSICAL MEDICINE & REHABILITATION

## 2020-02-05 PROCEDURE — 6360000002 HC RX W HCPCS: Performed by: PHYSICAL MEDICINE & REHABILITATION

## 2020-02-05 PROCEDURE — 77003 FLUOROGUIDE FOR SPINE INJECT: CPT

## 2020-02-05 RX ORDER — LIDOCAINE HYDROCHLORIDE 10 MG/ML
INJECTION, SOLUTION INFILTRATION; PERINEURAL
Status: COMPLETED | OUTPATIENT
Start: 2020-02-05 | End: 2020-02-05

## 2020-02-05 RX ORDER — MIDAZOLAM HYDROCHLORIDE 1 MG/ML
INJECTION INTRAMUSCULAR; INTRAVENOUS
Status: COMPLETED | OUTPATIENT
Start: 2020-02-05 | End: 2020-02-05

## 2020-02-05 RX ORDER — BETAMETHASONE SODIUM PHOSPHATE AND BETAMETHASONE ACETATE 3; 3 MG/ML; MG/ML
INJECTION, SUSPENSION INTRA-ARTICULAR; INTRALESIONAL; INTRAMUSCULAR; SOFT TISSUE
Status: COMPLETED | OUTPATIENT
Start: 2020-02-05 | End: 2020-02-05

## 2020-02-05 RX ORDER — BUPIVACAINE HYDROCHLORIDE 5 MG/ML
INJECTION, SOLUTION EPIDURAL; INTRACAUDAL
Status: COMPLETED | OUTPATIENT
Start: 2020-02-05 | End: 2020-02-05

## 2020-02-05 ASSESSMENT — PAIN - FUNCTIONAL ASSESSMENT: PAIN_FUNCTIONAL_ASSESSMENT: 0-10

## 2020-02-05 ASSESSMENT — PAIN SCALES - GENERAL
PAINLEVEL_OUTOF10: 0
PAINLEVEL_OUTOF10: 0

## 2020-02-05 ASSESSMENT — PAIN DESCRIPTION - DESCRIPTORS: DESCRIPTORS: ACHING

## 2020-02-05 NOTE — PROGRESS NOTES
Post-procedure education reviewed with patient and visitor, and they verbalized understanding. IV was d/c,  No bleeding or complications. Gauze dressing applied to IV site.

## 2020-02-05 NOTE — H&P
mouth daily 12/17/19  Yes NURIS Chavez CNP   vitamin D (ERGOCALCIFEROL) 50243 units CAPS capsule Take 1 capsule by mouth once a week 10/10/19  Yes Vish Marks MD   diclofenac sodium 1 % GEL Apply 2 g topically 4 times daily 12/17/19   NURIS Chavez CNP   hydrocortisone (WESTCORT) 0.2 % cream Apply topically 2 times daily. 12/17/19   NURIS Chavez CNP     Allergies:  Sugar-protein-starch  Social History:    reports that he has never smoked. He has never used smokeless tobacco. He reports current alcohol use of about 2.0 standard drinks of alcohol per week. He reports that he does not use drugs. Family History:   Family History   Problem Relation Age of Onset    Other Mother         Polio    Thyroid Disease Mother     Heart Failure Father     Stroke Father        Vitals: Height 6' 3\" (1.905 m), weight 288 lb (130.6 kg). PHYSICAL EXAM:including affected areas  HENT: Airway patent and reviewed  Cardiovascular: Normal rate, regular rhythm, normal heart sounds. Pulmonary/Chest: No wheezes. No rhonchi. No rales. Abdominal: Soft. Bowel sounds are normal. No distension. Extremities: Moves all extremities equally  Cervical and Lumbar Spine: Painful range of motion, no midline tenderness       Diagnosis:Lumbar radiculopathy  M54.16  M51.36  Z98.1    Plan: Proceed with planned procedure      ASA CLASS:         []   I. Normal, healthy adult           [x]   II.  Mild systemic disease            []   III. Severe systemic disease      Mallampati: Mallampati Class II - (soft palate, fauces & uvula are visible)      Sedation plan:   [x]  Local              []  Minimal                  []  General anesthesia    Patient's condition acceptable for planned procedure/sedation. Post Procedure Plan   Return to same level of care   ______________________     The risks and benefits as well as alternatives to the procedure have been discussed with the patient and or family.   The patient and or next of kin

## 2020-02-05 NOTE — PROGRESS NOTES
Patient states he has not taken his BP medication for 3 days. Patient was informed of high BP and instructed to resume his BP medication as soon as he gets home.   Patient states understanding

## 2020-02-21 ENCOUNTER — OFFICE VISIT (OUTPATIENT)
Dept: ORTHOPEDIC SURGERY | Age: 60
End: 2020-02-21
Payer: COMMERCIAL

## 2020-02-21 VITALS
DIASTOLIC BLOOD PRESSURE: 106 MMHG | WEIGHT: 287.92 LBS | HEIGHT: 75 IN | SYSTOLIC BLOOD PRESSURE: 154 MMHG | BODY MASS INDEX: 35.8 KG/M2 | HEART RATE: 73 BPM

## 2020-02-21 PROCEDURE — G8427 DOCREV CUR MEDS BY ELIG CLIN: HCPCS | Performed by: PHYSICIAN ASSISTANT

## 2020-02-21 PROCEDURE — 99214 OFFICE O/P EST MOD 30 MIN: CPT | Performed by: PHYSICIAN ASSISTANT

## 2020-02-21 PROCEDURE — G8484 FLU IMMUNIZE NO ADMIN: HCPCS | Performed by: PHYSICIAN ASSISTANT

## 2020-02-21 PROCEDURE — G8417 CALC BMI ABV UP PARAM F/U: HCPCS | Performed by: PHYSICIAN ASSISTANT

## 2020-02-21 PROCEDURE — 1036F TOBACCO NON-USER: CPT | Performed by: PHYSICIAN ASSISTANT

## 2020-02-21 PROCEDURE — 3017F COLORECTAL CA SCREEN DOC REV: CPT | Performed by: PHYSICIAN ASSISTANT

## 2020-02-21 NOTE — PROGRESS NOTES
Follow up: Lars Youssef  1960  [id number]         Chief Complaint   Patient presents with    Lower Back Pain     2/5: TF R L3 + L L3         HISTORY OF PRESENT ILLNESS:  Mr. Joel Douglas is a 64 y.o. male returns for a follow up visit for multiple medical problems. His current presenting problems are   1. Spondylosis without myelopathy or radiculopathy, lumbar region    2. DDD (degenerative disc disease), lumbar    3. Lumbar radiculopathy    4. S/P lumbar fusion    . As per information/history obtained from the PADT(patient assessment and documentation tool) - He complains of pain in the lower back with radiation to the hips Bilateral He rates the pain 4/10 and describes it as dull, aching, throbbing. Pain is made worse by: standing, bending. He denies side effects from the current pain regimen. Patient reports that since the last follow up visit the physical functioning is unchanged, family/social relationships are unchanged, mood is unchanged and sleep patterns are unchanged, and that the overall functioning is unchanged. Patient denies neurological bowel or bladder. The patient presents today as a follow-up after a bilateral L3 transforaminal epidural steroid injection completed on 2/5/2020. He describes that his pain is 30-40% improved however he continues to have lower back and bilateral hip pain. He describes that his leg pain is much improved. The patient describes his pain to be a 4/10 in severity and describes it as a dull, aching, throbbing type pain. His symptoms are made worse by bending backwards or sideways and also with standing. His symptoms improve using a Jacuzzi, steam room, and medication. He describes overall his leg pain is better however his lower back pain is persistent and constant. He describes that he can sit for 2 hours, stand for 15-20 minutes, and walk for a few hours without pain. He is inquiring about a handicap placard today.       Associated signs and symptoms:   Neurogenic bowel or bladder symptoms:  no   Perceived weakness:  no   Difficulty walking:  yes              Past Medical History:   Past Medical History:   Diagnosis Date    Aneurysm of ascending aorta (HCC)     Atopic dermatitis     Back pain     Facial paralysis/[place] palsy     Hyperlipidemia     Hypertension     Imprisonment and other incarceration [id number]    Migraine     Obstructive sleep apnea (adult) (pediatric)     Prediabetes     Psoriasis-like skin disease     Tinea cruris       Past Surgical History:     Past Surgical History:   Procedure Laterality Date    BACK SURGERY  February and October 2008    COLONOSCOPY  01/28/2018    CORONARY ANGIOPLASTY      CYSTOSCOPY  2001    PAIN MANAGEMENT PROCEDURE Bilateral 2/5/2020    BILATERAL L3 TRANSFORAMINAL  EPIDURAL STEROID INJECTION WITH FLUOROSCOPY performed by John Livingston MD at [facility]     Current Medications:     Current Outpatient Medications:     rosuvastatin (CRESTOR) 10 MG tablet, Take 1 tablet by mouth daily, Disp: 90 tablet, Rfl: 0    tiZANidine (ZANAFLEX) 4 MG tablet, Take 1 tablet by mouth every 8 hours as needed (for muscle spasm), Disp: 90 tablet, Rfl: 0    diclofenac sodium 1 % GEL, Apply 2 g topically 4 times daily, Disp: 2 Tube, Rfl: 1    hydrocortisone (WESTCORT) 0.2 % cream, Apply topically 2 times daily. , Disp: 45 g, Rfl: 0    lisinopril-hydrochlorothiazide (PRINZIDE;ZESTORETIC) 20-25 MG per tablet, Take 1 tablet by mouth daily, Disp: 90 tablet, Rfl: 0    metoprolol succinate (TOPROL XL) 50 MG extended release tablet, Take 1 tablet by mouth daily, Disp: 90 tablet, Rfl: 0    rivaroxaban (XARELTO) 20 MG TABS tablet, Take 1 tablet by mouth daily, Disp: 90 tablet, Rfl: 0    amLODIPine (NORVASC) 5 MG tablet, Take 1 tablet by mouth daily, Disp: 90 tablet, Rfl: 0    vitamin D (ERGOCALCIFEROL) 89193 units CAPS capsule, Take 1 capsule by mouth once a week, Disp: 4 capsule, Rfl: 2  Allergies: Sugar-protein-starch  Social History:    reports that he has never smoked. He has never used smokeless tobacco. He reports current alcohol use of about 2.0 standard drinks of alcohol per week. He reports that he does not use drugs. Family History:   Family History   Problem Relation Age of Onset    Other Mother         Polio    Thyroid Disease Mother     Heart Failure Father     Stroke Father        REVIEW OF SYSTEMS:   CONSTITUTIONAL: Denies unexplained weight loss, fevers, chills or fatigue  NEUROLOGICAL: Denies unsteady gait or progressive weakness  MUSCULOSKELETAL: Denies joint swelling or redness  GI: Denies nausea, vomiting, diarrhea   : Denies bowel or bladder issues       PHYSICAL EXAM:    Vitals: Blood pressure (!) 154/106, pulse 73, height 6' 3\" (1.905 m), weight 287 lb 14.7 oz (130.6 kg). GENERAL EXAM:  · General Apparence: Patient is adequately groomed with no evidence of malnutrition. · Psychiatric: Orientation: The patient is oriented to time, place and person. The patient's mood and affect are appropriate   · Vascular: Examination reveals no swelling and palpation reveals no tenderness in upper or lower extremities. Good capillary refill. · The lymphatic examination of the neck, axillae and groin reveals all areas to be without enlargement or induration  · Sensation is intact without deficit in the upper and lower extremities to light touch and pinprick  · Coordination of the upper and lower extremities are normal.  · RIGHT UPPER EXTREMITY:  Inspection/examination of the right upper extremity does not show any tenderness, deformity or injury. Range of motion is unremarkable and pain-free. There is no gross instability. There are no rashes, ulcerations or lesions. Strength and tone are normal. No atrophy or abnormal movements are noted. · LEFT UPPER EXTREMITY: Inspection/examination of the left upper extremity does not show any tenderness, deformity or injury.  Range of motion is unremarkable and pain-free. There is no gross instability. There are no rashes, ulcerations or lesions. Strength and tone are normal. No atrophy or abnormal movements are noted. LUMBAR/SACRAL EXAMINATION:  · Inspection: Local inspection shows no step-off or bruising. Lumbar alignment is normal. No instability is noted. · Palpation:   No evidence of tenderness at the midline. Lumbar paraspinal tenderness: Mild L4/5 and L5/S1 tenderness  Bursal tenderness No tenderness bilaterally  There is no paraspinal spasm. · Range of Motion: limited by 50% in all planes due to pain with extension and facet loading bilaterally. · Strength:   Strength testing is 5/5 in all muscle groups tested. · Special Tests:   Straight leg raise and crossed SLR negative. Jonathon's testing is negative bilaterally. FADIR's testing is negative bilaterally. Bowstring test negative. Slump test negative. · Skin: There are no rashes, ulcerations or lesions. · Reflexes: Reflexes are symmetrically 1+ at the patellar and ankle tendons. Clonus absent bilaterally at the feet. · Gait & station: normal, patient ambulates without assistance and no ataxia  · Additional Examinations:  · RIGHT LOWER EXTREMITY: Inspection/examination of the right lower extremity does not show any tenderness, deformity or injury. Range of motion is normal and pain-free. There is no gross instability. There are no rashes, ulcerations or lesions. Strength and tone are normal. No atrophy or abnormal movements are noted. · LEFT LOWER EXTREMITY:  Inspection/examination of the left lower extremity does not show any tenderness, deformity or injury. Range of motion is normal and pain-free. There is no gross instability. There are no rashes, ulcerations or lesions. Strength and tone are normal. No atrophy or abnormal movements are noted.       Diagnostic Testing:    MR Lumbar spine shows from 1/7/20:   FINDINGS:  No substantial scoliosis.  No abnormal enhancement in the lumbar spine.     Subcutaneous edema over the lower back involving musculature lower lumbar to upper sacral    regions.       Post metallic intervertebral disc cages at L4-5 and L5-S1.       The conus is normal terminating at L1.       Findings at individual levels demonstrate:       T11-12, T12-L1 unremarkable.       L1-2 2 mm posterior displacement.  Minor bilobed spondylotic disc displacement with no neural    compression.       L2-3 no neural compression.       L3-4 mild to moderate canal stenosis due to 2 mm retrolisthesis with mild diffuse spondylotic    disc displacement and moderate facet and ligamentum flavum hypertrophy.  Gentle abutment of    both descending L4 nerve roots.  Minor bilateral foramen stenosis.       L4-5 intervertebral and posterior osseous fusion.  Mild to moderate right foramen osseous    stenosis.  No soft disc herniation.       L5-S1 intervertebral fusion.  Mild to moderate facet hypertrophy.  Minor bilateral foramen    stenosis.       No hydronephrosis.       No aortic aneurysm or retroperitoneal adenopathy.               CONCLUSION:   1.  L3-4 mild to moderate canal stenosis due to 2 mm retrolisthesis with mild diffuse    spondylotic disc displacement and moderate facet and ligamentum flavum hypertrophy. Gentle    abutment of both descending L4 nerve roots. Minor bilateral foramen stenosis. 2.  L4-5 intervertebral and posterior osseous fusion. Mild to moderate right foramen osseous    stenosis. No soft disc herniation. 3. L5-S1 intervertebral fusion. Mild to moderate facet hypertrophy. Minor bilateral foramen    stenosis.      Results for orders placed or performed in visit on 01/13/20   CK   Result Value Ref Range    Total  (H) 39 - 308 U/L   Vitamin D 25 Hydroxy   Result Value Ref Range    Vit D, 25-Hydroxy 12.4 (L) >=30 ng/mL   Lipid Panel   Result Value Ref Range    Cholesterol, Total 287 (H) 0 - 199 mg/dL    Triglycerides 378 (H) 0 - 150 mg/dL    HDL 37 (L) 40 - 60 mg/dL LDL Calculated see below <100 mg/dL    VLDL Cholesterol Calculated see below Not Established mg/dL   Hemoglobin A1C   Result Value Ref Range    Hemoglobin A1C 5.5 See comment %    eAG 111.2 mg/dL   Comprehensive Metabolic Panel   Result Value Ref Range    Sodium 141 136 - 145 mmol/L    Potassium 4.5 3.5 - 5.1 mmol/L    Chloride 102 99 - 110 mmol/L    CO2 25 21 - 32 mmol/L    Anion Gap 14 3 - 16    Glucose 83 70 - 99 mg/dL    BUN 19 7 - 20 mg/dL    CREATININE 1.0 0.9 - 1.3 mg/dL    GFR Non-African American >60 >60    GFR African American >60 >60    Calcium 9.6 8.3 - 10.6 mg/dL    Total Protein 7.7 6.4 - 8.2 g/dL    Alb 4.6 3.4 - 5.0 g/dL    Albumin/Globulin Ratio 1.5 1.1 - 2.2    Total Bilirubin 0.3 0.0 - 1.0 mg/dL    Alkaline Phosphatase 69 40 - 129 U/L    ALT 20 10 - 40 U/L    AST 19 15 - 37 U/L    Globulin 3.1 g/dL   CBC Auto Differential   Result Value Ref Range    WBC 6.0 4.0 - 11.0 K/uL    RBC 4.75 4.20 - 5.90 M/uL    Hemoglobin 14.0 13.5 - 17.5 g/dL    Hematocrit 41.7 40.5 - 52.5 %    MCV 87.9 80.0 - 100.0 fL    MCH 29.5 26.0 - 34.0 pg    MCHC 33.5 31.0 - 36.0 g/dL    RDW 13.7 12.4 - 15.4 %    Platelets 346 211 - 346 K/uL    MPV 9.3 5.0 - 10.5 fL    Neutrophils % 45.3 %    Lymphocytes % 43.5 %    Monocytes % 4.8 %    Eosinophils % 5.8 %    Basophils % 0.6 %    Neutrophils Absolute 2.7 1.7 - 7.7 K/uL    Lymphocytes Absolute 2.6 1.0 - 5.1 K/uL    Monocytes Absolute 0.3 0.0 - 1.3 K/uL    Eosinophils Absolute 0.3 0.0 - 0.6 K/uL    Basophils Absolute 0.0 0.0 - 0.2 K/uL   LDL Cholesterol, Direct   Result Value Ref Range    LDL Direct 197 (H) <100 mg/dL     Impression:       1. Spondylosis without myelopathy or radiculopathy, lumbar region    2. DDD (degenerative disc disease), lumbar    3. Lumbar radiculopathy    4. S/P lumbar fusion        Plan:  Clinical Course: Above diagnoses are worsening for diagnoses 1 and 2; diagnosis 3 improving; diagnosis 4 unchanged.     I discussed the diagnosis and the treatment appointment if he has concerns related to his condition or if he needs assistance in scheduling the above tests. He is welcome to call for an appointment sooner if he has any additional concerns or questions. SABINO Alegre, PAGrupoC  Board Certified by the [employer] on Certification of Physician Assistants  [id number] [facility] ([facility])      This dictation was performed with a verbal recognition program [facility]) and it was checked for errors. It is possible that there are still dictated errors within this office note. If so, please bring any errors to my attention for an addendum. All efforts were made to ensure that this office note is accurate.

## 2020-02-21 NOTE — LETTER
[facility]  [address] [facility] [address]  Phone: [phone]  Fax: [address], [place]        February 21, 2020     Patient: Taqueria Hutson   YOB: 1960   Date of Visit: 2/21/2020       To Whom It May Concern: It is my medical opinion that Luis E Olson requires a disability parking placard for the following reasons:  He cannot walk 200 feet without stopping to rest.  Duration of need: 1 year    If you have any questions or concerns, please don't hesitate to call.     Sincerely,            SABINO Amezquita, PA-C  Board Certified by the [employer] on Certification of Physician Assistants  [address]  Partner of [facility] ([facility])

## 2020-02-21 NOTE — LETTER
Please schedule the following with:     Date:   20 @ 9:50    Account: [de-identified]  Patient: Jammie Buckner    : 1960  Address:  [address] [place] [address] Ave    Phone (H):  [phone] (home)      ----------------------------------------------------------------------------------------------  Diagnosis:     ICD-10-CM    1. Spondylosis without myelopathy or radiculopathy, lumbar region M47.816    2. DDD (degenerative disc disease), lumbar M51.36    3. Lumbar radiculopathy M54.16    4. S/P lumbar fusion Z98.1      Procedure: Medial Branch Block Lumbar #1    Levels: L1, L2, L3 (ABOVE THE FUSION)  Side: Bilateral   CPT Codes M9057613, 42545    ----------------------------------------------------------------------------------------------  Injection # MBB 1  MFASC    Attending Physician       Carmen Brooks MD.  ----------------------------------------------------------------------------------------------  [address]  Pre-Cert#    [employer]     Pre-Cert#    Comments:     · Infection control  ? Tested positive for MRSA in past 12 months:  no  ? Tested positive for MSSA \"staph infection\" in past 12 months: no  ? Tested positive for VRE (Vancomycin Resistant Enterococci) in past 12 months:   no  ? Currently on any antibiotics for an infection: no  · Anticoagulants:  ? On a blood thinner:  yes , STEPHANIE Bruno CNP (DOES NOT HAVE TO STOP)  ?  Any history of bleeding disorder: no   · Advanced Liver disease: no   · Advanced Renal disease: no   · Glaucoma: no   · Diabetes: no      Sedation:         No  -----------------------------------------------------------------------------------------------  Allergies   Allergen Reactions    Sugar-Protein-Starch      Runny nose

## 2020-03-04 ENCOUNTER — TELEPHONE (OUTPATIENT)
Dept: ORTHOPEDIC SURGERY | Age: 60
End: 2020-03-04

## 2020-03-04 NOTE — TELEPHONE ENCOUNTER
DOS   03/09/2020  CPT   40781.50   71492.50  DX   M47.816   M51.36   M54.16     OP SX AUTH  [id number]  VALID   02/26/2020 - 05/26/2020    BILATERAL  LEVELS   L1  L2   L3   PROCEDURE   GISELE MCKEON  [address]  INSURANCE:   [place]      CPT  83450.44  52023   [address] [facility]

## 2020-03-09 ENCOUNTER — HOSPITAL ENCOUNTER (OUTPATIENT)
Age: 60
Setting detail: OUTPATIENT SURGERY
Discharge: HOME | End: 2020-03-09
Attending: PHYSICAL MEDICINE & REHABILITATION | Admitting: PHYSICAL MEDICINE & REHABILITATION
Payer: COMMERCIAL

## 2020-03-09 ENCOUNTER — APPOINTMENT (OUTPATIENT)
Dept: GENERAL RADIOLOGY | Age: 60
End: 2020-03-09
Attending: PHYSICAL MEDICINE & REHABILITATION
Payer: COMMERCIAL

## 2020-03-09 VITALS
WEIGHT: 290 LBS | DIASTOLIC BLOOD PRESSURE: 105 MMHG | SYSTOLIC BLOOD PRESSURE: 153 MMHG | OXYGEN SATURATION: 100 % | RESPIRATION RATE: 14 BRPM | BODY MASS INDEX: 36.06 KG/M2 | TEMPERATURE: 97 F | HEART RATE: 63 BPM | HEIGHT: 75 IN

## 2020-03-09 PROCEDURE — 3209999900 FLUORO FOR SURGICAL PROCEDURES

## 2020-03-09 PROCEDURE — 2500000003 HC RX 250 WO HCPCS: Performed by: PHYSICAL MEDICINE & REHABILITATION

## 2020-03-09 PROCEDURE — 3610000058 HC PAIN LEVEL 5 BASE (NON-OR): Performed by: PHYSICAL MEDICINE & REHABILITATION

## 2020-03-09 PROCEDURE — 2709999900 HC NON-CHARGEABLE SUPPLY: Performed by: PHYSICAL MEDICINE & REHABILITATION

## 2020-03-09 RX ORDER — BUPIVACAINE HYDROCHLORIDE 5 MG/ML
INJECTION, SOLUTION EPIDURAL; INTRACAUDAL
Status: COMPLETED | OUTPATIENT
Start: 2020-03-09 | End: 2020-03-09

## 2020-03-09 RX ORDER — LIDOCAINE HYDROCHLORIDE 10 MG/ML
INJECTION, SOLUTION EPIDURAL; INFILTRATION; INTRACAUDAL; PERINEURAL
Status: COMPLETED | OUTPATIENT
Start: 2020-03-09 | End: 2020-03-09

## 2020-03-09 ASSESSMENT — PAIN DESCRIPTION - DESCRIPTORS
DESCRIPTORS: THROBBING
DESCRIPTORS: ACHING;SHARP

## 2020-03-09 ASSESSMENT — PAIN DESCRIPTION - ONSET: ONSET: AWAKENED FROM SLEEP

## 2020-03-09 ASSESSMENT — PAIN DESCRIPTION - LOCATION: LOCATION: OTHER (COMMENT)

## 2020-03-09 ASSESSMENT — PAIN - FUNCTIONAL ASSESSMENT: PAIN_FUNCTIONAL_ASSESSMENT: 0-10

## 2020-03-09 ASSESSMENT — PAIN DESCRIPTION - ORIENTATION: ORIENTATION: MID

## 2020-03-09 ASSESSMENT — PAIN DESCRIPTION - FREQUENCY: FREQUENCY: CONTINUOUS

## 2020-03-09 ASSESSMENT — PAIN SCALES - GENERAL: PAINLEVEL_OUTOF10: 0

## 2020-03-09 NOTE — OP NOTE
Patient:  Cathy Polanco  YOB: 1960  Medical Record #:  [id number]   Place:   [address] [place] [address]  Date:  3/9/2020   Physician:  Neva Bazan MD, LUIS ENRIQUE    Procedure: Lumbar Medial Branch Blocks - Bilateral L1, L2 and L3    MBB #1    CPT (25321 Modifier 48 59947 Modifier 50)      Pre-Procedure Diagnosis: Lumbar spondylosis without radiculopathy      Post-Procedure Diagnosis: Same      Sedation: Local with 1% Lidocaine 5 ml      EBL: None      Complications: None      Procedure Summary:      The patient was seen in the office for complaints of low back pain. Review of the imaging and physical exam of the patient confirmed the pre-procedure diagnosis. After a thorough discussion of risks, benefits and alternatives informed consent was obtained. The patient was brought to the procedure suite and placed in the prone position. The skin overlying the lumbar spine was prepped with chloraprep and draped in the usual sterile fashion. Using fluoroscopic guidance, the right L2, L3, L4 levels were identified. Through anesthetized skin a 22 gauge 3.5 inch curved tip spinal needle was advanced to the juncture of the superior articular process and the transverse process at the right L2 level where the right L1 medial branch resides. .3 ml of Isovue M 300 was instilled showing a nerve root outline pattern, without evidence of vascular spread. A total of 0.5 ml of 0.5 % Marcaine was instilled at the right L2 level corresponding to the right L1 medial branch. This was repeated for the right L3 and L4 corresponding to the right L2 and L3 medial branches. The identical procedure was repeated on the left side. The needles were removed and a band-aid applied. The patient was transferred to the post-operative area in stable condition.

## 2020-03-09 NOTE — PROGRESS NOTES
Patient did not receive sedation. No second BP needed. IV discontinued, catheter intact, and dressing applied. Procedural dressing dry and intact. Bilateral lower extremities equal in strength. Discharge instructions reviewed with patient or responsible adult, signed and copy given. All home medications have been reviewed. All questions answered and patient or responsible adult verbalized understanding.   PAIN LEVEL AT DISCHARGE ___4__

## 2020-03-09 NOTE — H&P
condition acceptable for planned procedure/sedation. Post Procedure Plan   Return to same level of care   ______________________     The risks and benefits as well as alternatives to the procedure have been discussed with the patient and or family. The patient and or next of kin understands and agrees to proceed.     Jesenia Herron M.D.

## 2020-03-10 ENCOUNTER — TELEPHONE (OUTPATIENT)
Dept: ORTHOPEDIC SURGERY | Age: 60
End: 2020-03-10

## 2020-03-16 ASSESSMENT — ENCOUNTER SYMPTOMS
COUGH: 0
CHEST TIGHTNESS: 0
ABDOMINAL PAIN: 0
VOMITING: 0
SHORTNESS OF BREATH: 0
WHEEZING: 0
CONSTIPATION: 0
NAUSEA: 0
DIARRHEA: 0

## 2020-03-17 ENCOUNTER — OFFICE VISIT (OUTPATIENT)
Dept: FAMILY MEDICINE CLINIC | Age: 60
End: 2020-03-17
Payer: COMMERCIAL

## 2020-03-17 VITALS
RESPIRATION RATE: 18 BRPM | TEMPERATURE: 98.3 F | OXYGEN SATURATION: 99 % | SYSTOLIC BLOOD PRESSURE: 146 MMHG | WEIGHT: 287 LBS | DIASTOLIC BLOOD PRESSURE: 92 MMHG | HEART RATE: 77 BPM | BODY MASS INDEX: 35.87 KG/M2

## 2020-03-17 LAB
BILIRUBIN, POC: ABNORMAL
BLOOD URINE, POC: ABNORMAL
CLARITY, POC: CLEAR
COLOR, POC: YELLOW
GLUCOSE URINE, POC: ABNORMAL
KETONES, POC: ABNORMAL
LEUKOCYTE EST, POC: ABNORMAL
NITRITE, POC: ABNORMAL
PH, POC: 6
PROTEIN, POC: ABNORMAL
SPECIFIC GRAVITY, POC: >=1.08
UROBILINOGEN, POC: ABNORMAL

## 2020-03-17 PROCEDURE — 1036F TOBACCO NON-USER: CPT | Performed by: CLINICAL NURSE SPECIALIST

## 2020-03-17 PROCEDURE — G8427 DOCREV CUR MEDS BY ELIG CLIN: HCPCS | Performed by: CLINICAL NURSE SPECIALIST

## 2020-03-17 PROCEDURE — 3017F COLORECTAL CA SCREEN DOC REV: CPT | Performed by: CLINICAL NURSE SPECIALIST

## 2020-03-17 PROCEDURE — 81002 URINALYSIS NONAUTO W/O SCOPE: CPT | Performed by: CLINICAL NURSE SPECIALIST

## 2020-03-17 PROCEDURE — 99214 OFFICE O/P EST MOD 30 MIN: CPT | Performed by: CLINICAL NURSE SPECIALIST

## 2020-03-17 PROCEDURE — G8484 FLU IMMUNIZE NO ADMIN: HCPCS | Performed by: CLINICAL NURSE SPECIALIST

## 2020-03-17 PROCEDURE — G8417 CALC BMI ABV UP PARAM F/U: HCPCS | Performed by: CLINICAL NURSE SPECIALIST

## 2020-03-17 RX ORDER — LISINOPRIL AND HYDROCHLOROTHIAZIDE 25; 20 MG/1; MG/1
1 TABLET ORAL DAILY
Qty: 90 TABLET | Refills: 0 | Status: SHIPPED | OUTPATIENT
Start: 2020-03-17 | End: 2020-12-18

## 2020-03-17 RX ORDER — AMLODIPINE BESYLATE 5 MG/1
5 TABLET ORAL DAILY
Qty: 90 TABLET | Refills: 0 | Status: SHIPPED | OUTPATIENT
Start: 2020-03-17 | End: 2020-09-17 | Stop reason: SDUPTHER

## 2020-03-17 RX ORDER — METOPROLOL SUCCINATE 50 MG/1
50 TABLET, EXTENDED RELEASE ORAL DAILY
Qty: 90 TABLET | Refills: 0 | Status: SHIPPED | OUTPATIENT
Start: 2020-03-17 | End: 2020-09-17 | Stop reason: SDUPTHER

## 2020-03-17 RX ORDER — CIPROFLOXACIN 500 MG/1
500 TABLET, FILM COATED ORAL 2 TIMES DAILY
Qty: 20 TABLET | Refills: 0 | Status: SHIPPED | OUTPATIENT
Start: 2020-03-17 | End: 2020-03-27

## 2020-03-17 RX ORDER — TIZANIDINE 4 MG/1
4 TABLET ORAL EVERY 8 HOURS PRN
Qty: 90 TABLET | Refills: 0 | Status: SHIPPED | OUTPATIENT
Start: 2020-03-17 | End: 2020-06-16 | Stop reason: SDUPTHER

## 2020-03-17 RX ORDER — ROSUVASTATIN CALCIUM 20 MG/1
20 TABLET, COATED ORAL DAILY
Qty: 90 TABLET | Refills: 0 | Status: SHIPPED | OUTPATIENT
Start: 2020-03-17 | End: 2020-06-16 | Stop reason: SDUPTHER

## 2020-03-17 ASSESSMENT — ENCOUNTER SYMPTOMS: BACK PAIN: 1

## 2020-03-17 NOTE — PATIENT INSTRUCTIONS
· Do not smoke. Smoking can increase your chance of a stroke and heart attack. If you need help quitting, talk to your doctor about stop-smoking programs and medicines. These can increase your chances of quitting for good.     · Eat a heart-healthy diet.     · Stay at a healthy weight. Lose weight if you need to.     · Limit alcohol to 2 drinks a day for men and 1 drink a day for women. Too much alcohol can cause health problems.     · Avoid colds and flu. Get a pneumococcal vaccine shot. If you have had one before, ask your doctor whether you need another dose. Get a flu shot every year. If you must be around people with colds or flu, wash your hands often. Activity    · If your doctor recommends it, get more exercise. Walking is a good choice. Bit by bit, increase the amount you walk every day. Try for at least 30 minutes on most days of the week. You also may want to swim, bike, or do other activities. Your doctor may suggest that you join a cardiac rehabilitation program so that you can have help increasing your physical activity safely.     · Start light exercise if your doctor says it is okay. Even a small amount will help you get stronger, have more energy, and manage stress. Walking is an easy way to get exercise. Start out by walking a little more than you did in the hospital. Gradually increase the amount you walk.     · When you exercise, watch for signs that your heart is working too hard. You are pushing too hard if you cannot talk while you are exercising. If you become short of breath or dizzy or have chest pain, sit down and rest immediately.     · Check your pulse regularly. Place two fingers on the artery at the palm side of your wrist, in line with your thumb. If your heartbeat seems uneven or fast, talk to your doctor. When should you call for help? Call 911 anytime you think you may need emergency care. For example, call if:    · You have symptoms of a heart attack.  These may [employer], [facility] disclaims any warranty or liability for your use of this information. Patient Education        Learning About Coronary Artery Disease (CAD)  What is coronary artery disease? Coronary artery disease (CAD) occurs when plaque builds up in the arteries that bring oxygen-rich blood to your heart. Plaque is a fatty substance made of cholesterol, calcium, and other substances in the blood. This process is called hardening of the arteries, or atherosclerosis. What happens when you have coronary artery disease? · Plaque may narrow the coronary arteries. Narrowed arteries cause poor blood flow. This can lead to angina symptoms such as chest pain or discomfort. If blood flow is completely blocked, you could have a heart attack. · You can slow CAD and reduce the risk of future problems by making changes in your lifestyle. These include quitting smoking and eating heart-healthy foods. · Treatments for CAD, along with changes in your lifestyle, can help you live a longer and healthier life. How can you prevent coronary artery disease? · Do not smoke. It may be the best thing you can do to prevent heart disease. If you need help quitting, talk to your doctor about stop-smoking programs and medicines. These can increase your chances of quitting for good. · Be active. Get at least 30 minutes of exercise on most days of the week. Walking is a good choice. You also may want to do other activities, such as running, swimming, cycling, or playing tennis or team sports. · Eat heart-healthy foods. Eat more fruits and vegetables and less foods that contain saturated and trans fats. Limit alcohol, sodium, and sweets. · Stay at a healthy weight. Lose weight if you need to. · Manage other health problems such as diabetes, high blood pressure, and high cholesterol. How is coronary artery disease treated? · Your doctor will suggest that you make lifestyle changes.  For example, your doctor may ask you to eat healthy foods, quit smoking, lose extra weight, and be more active. · You will have to take medicines. · Your doctor may suggest a procedure to open narrowed or blocked arteries. This is called angioplasty. Or your doctor may suggest using healthy blood vessels to create detours around narrowed or blocked arteries. This is called bypass surgery. Follow-up care is a key part of your treatment and safety. Be sure to make and go to all appointments, and call your doctor if you are having problems. It's also a good idea to know your test results and keep a list of the medicines you take. Where can you learn more? Go to https://[employer]peNext Thing Co.Space Adventures. org and sign in to your [employer] account. Enter [phone] in the Red Seraphim box to learn more about \"Learning About Coronary Artery Disease (CAD). \"     If you do not have an account, please click on the \"Sign Up Now\" link. Current as of: December 15, 2019Content Version: 12.4  © 1433-9872 HQ plus. Care instructions adapted under license by [facility] ([facility]). If you have questions about a medical condition or this instruction, always ask your healthcare professional. [place] [address] any warranty or liability for your use of this information. Patient Education        Chronic Pain: Care Instructions  Your Care Instructions    Chronic pain is pain that lasts a long time (months or even years) and may or may not have a clear cause. It is different from acute pain, which usually does have a clear cause--like an injury or illness--and gets better over time. Chronic pain:  · Lasts over time but may vary from day to day. · Does not go away despite efforts to end it. · May disrupt your sleep and lead to fatigue. · May cause depression or anxiety. · May make your muscles tense, causing more pain. · Can disrupt your work, hobbies, home life, and relationships with friends and family.   Chronic pain is a very real when you are feeling a certain emotion. Having a record of your pain can help you and your doctor find the best ways to treat your pain. · Take pain medicines exactly as directed. ? If the doctor gave you a prescription medicine for pain, take it as prescribed. ? If you are not taking a prescription pain medicine, ask your doctor if you can take an over-the-counter medicine. Reducing constipation caused by pain medicine  · Include fruits, vegetables, beans, and whole grains in your diet each day. These foods are high in fiber. · Drink plenty of fluids, enough so that your urine is light yellow or clear like water. If you have kidney, heart, or liver disease and have to limit fluids, talk with your doctor before you increase the amount of fluids you drink. · If your doctor recommends it, get more exercise. Walking is a good choice. Bit by bit, increase the amount you walk every day. Try for at least 30 minutes on most days of the week. · Schedule time each day for a bowel movement. A daily routine may help. Take your time and do not strain when having a bowel movement. When should you call for help? Call your doctor now or seek immediate medical care if:    · Your pain gets worse or is out of control.     · You feel down or blue, or you do not enjoy things like you once did. You may be depressed, which is common in people with chronic pain. Depression can be treated.     · You have vomiting or cramps for more than 2 hours.    Watch closely for changes in your health, and be sure to contact your doctor if:    · You cannot sleep because of pain.     · You are very worried or anxious about your pain.     · You have trouble taking your pain medicine.     · You have any concerns about your pain medicine.     · You have trouble with bowel movements, such as:  ? No bowel movement in 3 days. ? Blood in the anal area, in your stool, or on the toilet paper. ? Diarrhea for more than 24 hours.    Where can you learn more?  Go to https://chpepiceweb.healthInango Systems Ltd. org and sign in to your Welltok account. Enter N004 in the [facility] box to learn more about \"Chronic Pain: Care Instructions. \"     If you do not have an account, please click on the \"Sign Up Now\" link. Current as of: November 19, 2019Content Version: 12.4  © 5439-7953 Volt. Care instructions adapted under license by [facility] ([facility]). If you have questions about a medical condition or this instruction, always ask your healthcare professional. [address] any warranty or liability for your use of this information. Patient Education        DASH Diet: Care Instructions  Your Care Instructions    The DASH diet is an eating plan that can help lower your blood pressure. DASH stands for Dietary Approaches to Stop Hypertension. Hypertension is high blood pressure. The DASH diet focuses on eating foods that are high in calcium, potassium, and magnesium. These nutrients can lower blood pressure. The foods that are highest in these nutrients are fruits, vegetables, low-fat dairy products, nuts, seeds, and legumes. But taking calcium, potassium, and magnesium supplements instead of eating foods that are high in those nutrients does not have the same effect. The DASH diet also includes whole grains, fish, and poultry. The DASH diet is one of several lifestyle changes your doctor may recommend to lower your high blood pressure. Your doctor may also want you to decrease the amount of sodium in your diet. Lowering sodium while following the DASH diet can lower blood pressure even further than just the DASH diet alone. Follow-up care is a key part of your treatment and safety. Be sure to make and go to all appointments, and call your doctor if you are having problems. It's also a good idea to know your test results and keep a list of the medicines you take. How can you care for yourself at home?   Following the [facility] easy to get the recommended amount of fruits and vegetables each day. ? Try yogurt topped with fruit and nuts for a snack or healthy dessert. ? Add lettuce, tomato, cucumber, and onion to sandwiches. ? Combine a ready-made pizza crust with low-fat mozzarella cheese and lots of vegetable toppings. Try using tomatoes, squash, spinach, broccoli, carrots, cauliflower, and onions. ? Have a variety of cut-up vegetables with a low-fat dip as an appetizer instead of chips and dip. ? Sprinkle sunflower seeds or chopped almonds over salads. Or try adding chopped walnuts or almonds to cooked vegetables. ? Try some vegetarian meals using beans and peas. Add garbanzo or kidney beans to salads. Make burritos and tacos with mashed person beans or black beans. Where can you learn more? Go to https://Tra[employer]missionmars[employer] Pawn.GRUZOBZOR. org and sign in to your WrapMail account. Enter W504 in the [employer]koo box to learn more about \"DASH Diet: Care Instructions. \"     If you do not have an account, please click on the \"Sign Up Now\" link. Current as of: December 15, 2019Content Version: 12.4  © 8195-6691 Healthwise, Incorporated. Care instructions adapted under license by [facility] ([facility]). If you have questions about a medical condition or this instruction, always ask your healthcare professional. [place] [address] any warranty or liability for your use of this information. Patient Education        High Blood Pressure: Care Instructions  Overview    It's normal for blood pressure to go up and down throughout the day. But if it stays up, you have high blood pressure. Another name for high blood pressure is hypertension. Despite what a lot of people think, high blood pressure usually doesn't cause headaches or make you feel dizzy or lightheaded. It usually has no symptoms. But it does increase your risk of stroke, heart attack, and other problems.  You and your doctor will talk about your risks of these problems based on your blood pressure. Your doctor will give you a goal for your blood pressure. Your goal will be based on your health and your age. Lifestyle changes, such as eating healthy and being active, are always important to help lower blood pressure. You might also take medicine to reach your blood pressure goal.  Follow-up care is a key part of your treatment and safety. Be sure to make and go to all appointments, and call your doctor if you are having problems. It's also a good idea to know your test results and keep a list of the medicines you take. How can you care for yourself at home? Medical treatment  · If you stop taking your medicine, your blood pressure will go back up. You may take one or more types of medicine to lower your blood pressure. Be safe with medicines. Take your medicine exactly as prescribed. Call your doctor if you think you are having a problem with your medicine. · Talk to your doctor before you start taking aspirin every day. Aspirin can help certain people lower their risk of a heart attack or stroke. But taking aspirin isn't right for everyone, because it can cause serious bleeding. · See your doctor regularly. You may need to see the doctor more often at first or until your blood pressure comes down. · If you are taking blood pressure medicine, talk to your doctor before you take decongestants or anti-inflammatory medicine, such as ibuprofen. Some of these medicines can raise blood pressure. · Learn how to check your blood pressure at home. Lifestyle changes  · Stay at a healthy weight. This is especially important if you put on weight around the waist. Losing even 10 pounds can help you lower your blood pressure. · If your doctor recommends it, get more exercise. Walking is a good choice. Bit by bit, increase the amount you walk every day. Try for at least 30 minutes on most days of the week. You also may want to swim, bike, or do other activities.   · Avoid or limit alcohol. Talk to your doctor about whether you can drink any alcohol. · Try to limit how much sodium you eat to less than 2,300 milligrams (mg) a day. Your doctor may ask you to try to eat less than 1,500 mg a day. · Eat plenty of fruits (such as bananas and oranges), vegetables, legumes, whole grains, and low-fat dairy products. · Lower the amount of saturated fat in your diet. Saturated fat is found in animal products such as milk, cheese, and meat. Limiting these foods may help you lose weight and also lower your risk for heart disease. · Do not smoke. Smoking increases your risk for heart attack and stroke. If you need help quitting, talk to your doctor about stop-smoking programs and medicines. These can increase your chances of quitting for good. When should you call for help? Call  911 anytime you think you may need emergency care. This may mean having symptoms that suggest that your blood pressure is causing a serious heart or blood vessel problem. Your blood pressure may be over 180/120.   For example, call  911 if:    · You have symptoms of a heart attack. These may include:  ? Chest pain or pressure, or a strange feeling in the chest.  ? Sweating. ? Shortness of breath. ? Nausea or vomiting. ? Pain, pressure, or a strange feeling in the back, neck, jaw, or upper belly or in one or both shoulders or arms. ? Lightheadedness or sudden weakness. ? A fast or irregular heartbeat.     · You have symptoms of a stroke. These may include:  ? Sudden numbness, tingling, weakness, or loss of movement in your face, arm, or leg, especially on only one side of your body. ? Sudden vision changes. ? Sudden trouble speaking. ? Sudden confusion or trouble understanding simple statements. ? Sudden problems with walking or balance. ? A sudden, severe headache that is different from past headaches.     · You have severe back or belly pain.    Do not wait until your blood pressure comes down on its own.  Get energy. Normally, an organ called the pancreas makes insulin, which allows the sugar in your blood to get into your body's cells. But when your body can't use insulin the right way, the sugar doesn't move into cells. It stays in your blood instead. This is called insulin resistance. The buildup of sugar in the blood causes prediabetes. The good news is that lifestyle changes may help you get your blood sugar back to normal and help you avoid or delay diabetes. Follow-up care is a key part of your treatment and safety. Be sure to make and go to all appointments, and call your doctor if you are having problems. It's also a good idea to know your test results and keep a list of the medicines you take. How can you care for yourself at home? · Watch your weight. A healthy weight helps your body use insulin properly. · Limit the amount of calories, sweets, and unhealthy fat you eat. Ask your doctor if you should see a dietitian. A registered dietitian can help you create meal plans that fit your lifestyle. · Get at least 30 minutes of exercise on most days of the week. Exercise helps control your blood sugar. It also helps you maintain a healthy weight. Walking is a good choice. You also may want to do other activities, such as running, swimming, cycling, or playing tennis or team sports. · Do not smoke. Smoking can make prediabetes worse. If you need help quitting, talk to your doctor about stop-smoking programs and medicines. These can increase your chances of quitting for good. · If your doctor prescribed medicines, take them exactly as prescribed. Call your doctor if you think you are having a problem with your medicine. You will get more details on the specific medicines your doctor prescribes. When should you call for help? Watch closely for changes in your health, and be sure to contact your doctor if:    · You have any symptoms of diabetes. These may include:  ? Being thirsty more often. ?  Urinating more.  ? Being hungrier. ? Losing weight. ? Being very tired. ? Having blurry vision.     · You have a wound that will not heal.     · You have an infection that will not go away.     · You have problems with your blood pressure.     · You want more information about diabetes and how you can keep from getting it. Where can you learn more? Go to https://chperickewcarol."ev3, Inc". org and sign in to your VesselVanguard account. Enter I222 in the [employer] box to learn more about \"Prediabetes: Care Instructions. \"     If you do not have an account, please click on the \"Sign Up Now\" link. Current as of: December 19, 2019Content Version: 12.4  © 0101-7518 Healthwise, Incorporated. Care instructions adapted under license by [facility] ([facility]). If you have questions about a medical condition or this instruction, always ask your healthcare professional. [place] [address] any warranty or liability for your use of this information. Patient Education        Learning About Vitamin D  Why is it important to get enough vitamin D? Your body needs vitamin D to absorb calcium. Calcium keeps your bones and muscles, including your heart, healthy and strong. If your muscles don't get enough calcium, they can cramp, hurt, or feel weak. You may have long-term (chronic) muscle aches and pains. If you don't get enough vitamin D throughout life, you have an increased chance of having thin and brittle bones (osteoporosis) in your later years. Children who don't get enough vitamin D may not grow as much as others their age. They also have a chance of getting a rare disease called rickets. It causes weak bones. Vitamin D and calcium are added to many foods. And your body uses sunshine to make its own vitamin D. How much vitamin D do you need? The [place] of Medicine recommends that people ages 3 through 79 get 600 IU (international units) every day. Adults 71 and older need 800 IU every day.   Blood questions about a medical condition or this instruction, always ask your healthcare professional. [address] any warranty or liability for your use of this information. Patient Education        Epididymitis and Orchitis: Care Instructions  Your Care Instructions    Epididymitis is pain and swelling of the tube that is attached to each testicle. This tube is called the epididymis. Orchitis is pain and swelling of the testicle. Infection with bacteria often causes these conditions. Sexually transmitted infections (STIs) also can cause both conditions. This is often the case in men younger than 28. Other causes in boys and older men are infections from surgery or having a catheter that drains urine. The mumps virus also can cause orchitis. Anti-inflammatory or pain medicines can help with the pain. Antibiotics are used if the problem is caused by bacteria. They are not used if a virus is the cause. Your testicle may stay swollen for many days or even a few weeks. The doctor has checked you carefully, but problems can develop later. If you notice any problems or new symptoms, get medical treatment right away. Follow-up care is a key part of your treatment and safety. Be sure to make and go to all appointments, and call your doctor if you are having problems. It's also a good idea to know your test results and keep a list of the medicines you take. How can you care for yourself at home? · If your doctor prescribed antibiotics, take them as directed. Do not stop taking them just because you feel better. You need to take the full course of antibiotics. · Ask your doctor if you can take an over-the-counter pain medicine, such as acetaminophen (Tylenol), ibuprofen (Advil, Motrin), or naproxen (Aleve). Be safe with medicines. Read and follow all instructions on the label. · Limit your activity to what is comfortable. · Wear snug underwear or an athletic supporter.  This can help reduce

## 2020-03-17 NOTE — PROGRESS NOTES
Outpatient Medications on File Prior to Visit   Medication Sig Dispense Refill    vitamin D (ERGOCALCIFEROL) 30507 units CAPS capsule Take 1 capsule by mouth once a week 4 capsule 2     No current facility-administered medications on file prior to visit.        Past Medical History:   Diagnosis Date    Aneurysm of ascending aorta (HCC)     Atopic dermatitis     Back pain     Facial paralysis/Irving palsy     Hyperlipidemia     Hypertension     Imprisonment and other incarceration [id number]    Migraine     Obstructive sleep apnea (adult) (pediatric)     Prediabetes     Psoriasis-like skin disease     Tinea cruris      Past Surgical History:   Procedure Laterality Date    BACK SURGERY  February and October 2008    COLONOSCOPY  01/28/2018    CORONARY ANGIOPLASTY      CYSTOSCOPY  2001    PAIN MANAGEMENT PROCEDURE Bilateral 2/5/2020    BILATERAL L3 TRANSFORAMINAL  EPIDURAL STEROID INJECTION WITH FLUOROSCOPY performed by Hannah Haskins MD at [address] Bilateral 3/9/2020    BILATERAL L1, L2, L3 (ABOVE THE FUSION) MEDIAL BRANCH BLOCK WITH FLUOROSCOPY (73302, 93971)   #1 performed by Hannah Haskins MD at [facility] History   Problem Relation Age of Onset    Other Mother         Polio    Thyroid Disease Mother     Heart Failure Father     Stroke Father      Social History     Socioeconomic History    Marital status:      Spouse name: Not on file    Number of children: 0    Years of education: Not on file    Highest education level: Not on file   Occupational History    Occupation: unemployed    Occupation: former masonary work    Social Needs    Financial resource strain: Not on file    Food insecurity     Worry: Not on file     Inability: Not on file   [employer] needs     Medical: Not on file     Non-medical: Not on file   Tobacco Use    Smoking status: Never Smoker    Smokeless tobacco: Never Used   Substance and Sexual Activity    Alcohol use: Yes     Alcohol/week: 2.0 standard drinks     Types: 1 Cans of beer, 1 Standard drinks or equivalent per week     Frequency: Monthly or less     Drinks per session: 1 or 2     Binge frequency: Less than monthly     Comment: Rarely; socially    Drug use: No    Sexual activity: Yes     Partners: Female     Birth control/protection: Condom   Lifestyle    Physical activity     Days per week: Not on file     Minutes per session: Not on file    Stress: Not on file   Relationships    Social connections     Talks on phone: Not on file     Gets together: Not on file     Attends [employer]al service: Not on file     Active member of club or organization: Not on file     Attends meetings of clubs or organizations: Not on file     Relationship status: Not on file    Intimate partner violence     Fear of current or ex partner: Not on file     Emotionally abused: Not on file     Physically abused: Not on file     Forced sexual activity: Not on file   Other Topics Concern    Not on file   Social History Narrative    Not on file       Review of Systems   Constitutional: Negative for chills, fatigue, fever and malaise/fatigue. Eyes: Negative for visual disturbance. Respiratory: Negative for cough, chest tightness, shortness of breath and wheezing. Cardiovascular: Negative for chest pain and palpitations. Gastrointestinal: Negative for abdominal pain, anorexia, change in bowel habit, constipation, diarrhea, nausea and vomiting. Genitourinary: Positive for testicular pain. Negative for dysuria, frequency, scrotal swelling and urgency (right). Musculoskeletal: Positive for back pain (unchanged). Negative for arthralgias and myalgias. Skin: Negative for rash. Neurological: Negative for headaches. OBJECTIVE:    Physical Exam  Vitals signs and nursing note reviewed. Constitutional:       General: He is not in acute distress. Appearance: He is well-developed.    HENT:      Head: Normocephalic and atraumatic. Right Ear: External ear normal.      Left Ear: External ear normal.      Nose: Nose normal.   Eyes:      Conjunctiva/sclera: Conjunctivae normal.      Pupils: Pupils are equal, round, and reactive to light. Neck:      Musculoskeletal: Normal range of motion and neck supple. Trachea: No tracheal deviation. Cardiovascular:      Rate and Rhythm: Normal rate and regular rhythm. Heart sounds: Normal heart sounds. Pulmonary:      Effort: Pulmonary effort is normal. No respiratory distress. Breath sounds: Normal breath sounds. No wheezing or rales. Chest:      Chest wall: No tenderness. Abdominal:      General: Bowel sounds are normal. There is no distension. Palpations: Abdomen is soft. There is no mass. Tenderness: There is no abdominal tenderness. Hernia: No hernia is present. There is no hernia in the right inguinal area or left inguinal area. Genitourinary:     Penis: Normal.       Scrotum/Testes:         Right: Mass present. Tenderness: mild. Epididymis:      Right: Normal.      Left: Normal.      Prostate: Normal.      Rectum: Normal.   Musculoskeletal: Normal range of motion. Skin:     General: Skin is warm and dry. Findings: No rash. Neurological:      Mental Status: He is alert and oriented to person, place, and time. Psychiatric:         Behavior: Behavior normal.       BP (!) 146/92 (Site: Left Upper Arm, Position: Sitting, Cuff Size: Large Adult)   Pulse 77   Temp 98.3 °F (36.8 °C) (Oral)   Resp 18   Wt 287 lb (130.2 kg)   SpO2 99%   BMI 35.87 kg/m²    BP Readings from Last 3 Encounters:   03/20/20 135/87   03/17/20 (!) 146/92   03/09/20 (!) 153/105      Wt Readings from Last 3 Encounters:   03/20/20 287 lb 0.6 oz (130.2 kg)   03/17/20 287 lb (130.2 kg)   03/09/20 290 lb (131.5 kg)       ASSESSMENT & PLAN:    1.  Essential hypertension  - Stable, continue current regimen  - metoprolol succinate (TOPROL XL) 50 MG extended release medication adherence    Patient given educational materials on Diabetes, Hyperlipidemia, Nutrition and Hypertension    Discussed use, benefit, and side effects of prescribed medications. Barriers to medication compliance addressed. All patient questions answered. Pt voiced understanding. Call office if experience side effects from medications. Please note that some or all of this record was generated using voice recognition software. If there are any questions about the content of this document, please contact the author as some errors in transcription may have occurred.

## 2020-03-18 ENCOUNTER — TELEPHONE (OUTPATIENT)
Dept: INTERNAL MEDICINE CLINIC | Age: 60
End: 2020-03-18

## 2020-03-20 ENCOUNTER — OFFICE VISIT (OUTPATIENT)
Dept: ORTHOPEDIC SURGERY | Age: 60
End: 2020-03-20
Payer: COMMERCIAL

## 2020-03-20 ENCOUNTER — TELEPHONE (OUTPATIENT)
Dept: ORTHOPEDIC SURGERY | Age: 60
End: 2020-03-20

## 2020-03-20 VITALS
DIASTOLIC BLOOD PRESSURE: 87 MMHG | SYSTOLIC BLOOD PRESSURE: 135 MMHG | HEIGHT: 75 IN | WEIGHT: 287.04 LBS | BODY MASS INDEX: 35.69 KG/M2

## 2020-03-20 PROCEDURE — G8417 CALC BMI ABV UP PARAM F/U: HCPCS | Performed by: PHYSICAL MEDICINE & REHABILITATION

## 2020-03-20 PROCEDURE — G8427 DOCREV CUR MEDS BY ELIG CLIN: HCPCS | Performed by: PHYSICAL MEDICINE & REHABILITATION

## 2020-03-20 PROCEDURE — 3017F COLORECTAL CA SCREEN DOC REV: CPT | Performed by: PHYSICAL MEDICINE & REHABILITATION

## 2020-03-20 PROCEDURE — 1036F TOBACCO NON-USER: CPT | Performed by: PHYSICAL MEDICINE & REHABILITATION

## 2020-03-20 PROCEDURE — G8484 FLU IMMUNIZE NO ADMIN: HCPCS | Performed by: PHYSICAL MEDICINE & REHABILITATION

## 2020-03-20 PROCEDURE — 99214 OFFICE O/P EST MOD 30 MIN: CPT | Performed by: PHYSICAL MEDICINE & REHABILITATION

## 2020-03-20 NOTE — PROGRESS NOTES
Follow up: Adela Voss  1960  [id number]         Chief Complaint   Patient presents with    Lower Back Pain     F/u MBB B/L L1, L2, L3 3/9. #1         HISTORY OF PRESENT ILLNESS:  Mr. Kevin Swartz is a 64 y.o. male returns for a follow up visit for multiple medical problems. His current presenting problems are   1. Lumbar post-laminectomy syndrome    2. Degenerative disc disease, lumbar    3. Lumbar stenosis without neurogenic claudication    4. Chronic pain syndrome    . As per information/history obtained from the PADT(patient assessment and documentation tool) - He complains of pain in the lower back with radiation to the hips Bilateral He rates the pain 6/10 and describes it as aching, throbbing. Pain is made worse by: movement. He denies side effects from the current pain regimen. Patient reports that since the last follow up visit the physical functioning is worse, family/social relationships are worse, mood is worse and sleep patterns are worse, and that the overall functioning is worse. Patient denies neurological bowel or bladder. Mr. Kevin Swartz presents today for follow up of his ongoing low back and bilateral hip pain. He recently underwent a medial branch block of bilateral L1, L2, and L3 on 3/9/20. This was block #1 for the patient. He states overall his pain level the day of his injection dropped from a 7/10 to a 4/10 at most.  He notes only receiving roughly 25% improvement from medial branch block. He denies any new injuries or triggers to his back or hips since his procedure. He denies any side effects to his procedure. He feels as though his previous lumbar epidural steroid injection on 2/5/20 was more helpful than his last procedure.         Associated signs and symptoms:   Neurogenic bowel or bladder symptoms:  no   Perceived weakness:  no   Difficulty walking:  yes              Past Medical History:   Past Medical History:   Diagnosis Date    Aneurysm of ascending aorta Sugar-protein-starch  Social History:    reports that he has never smoked. He has never used smokeless tobacco. He reports current alcohol use of about 2.0 standard drinks of alcohol per week. He reports that he does not use drugs. Family History:   Family History   Problem Relation Age of Onset    Other Mother         Polio    Thyroid Disease Mother     Heart Failure Father     Stroke Father        REVIEW OF SYSTEMS:   CONSTITUTIONAL: Denies unexplained weight loss, fevers, chills or fatigue  NEUROLOGICAL: Denies unsteady gait or progressive weakness  MUSCULOSKELETAL: Denies joint swelling or redness  GI: Denies nausea, vomiting, diarrhea   : Denies bowel or bladder issues       PHYSICAL EXAM:    Vitals: Blood pressure 135/87, height 6' 3\" (1.905 m), weight 287 lb 0.6 oz (130.2 kg). GENERAL EXAM:  · General Apparence: Patient is adequately groomed with no evidence of malnutrition. · Psychiatric: Orientation: The patient is oriented to time, place and person. The patient's mood and affect are appropriate   · Vascular: Examination reveals no swelling and palpation reveals no tenderness in upper or lower extremities. Good capillary refill. · The lymphatic examination of the neck, axillae and groin reveals all areas to be without enlargement or induration  · Sensation is intact without deficit in the upper and lower extremities to light touch and pinprick  · Coordination of the upper and lower extremities are normal.    CERVICAL EXAMINATION:  · Inspection: Local inspection shows no step-off or bruising. Cervical alignment is normal. No instability is noted. · Palpation and Percussion: No evidence of tenderness at the midline. Paraspinal tenderness is not present. There is no paraspinal spasm.    Skin:There are no rashes, ulcerations or lesions  · Range of Motion:  limited by 25% in all planes due to pain   · Strength: 5/5 bilateral upper extremities  · Special Tests:   Spurling's and Rodriges's are negative bilaterally. Velasquez and Impingement tests are negative bilaterally. · Skin:There are no rashes, ulcerations or lesions in right & left upper extremities. · Reflexes: Bilaterally triceps, biceps and brachioradialis are 2+. Clonus absent bilaterally at the feet. No pathological reflexes are noted. · Gait & station: normal, patient ambulates without assistance and no ataxia  · Additional Examinations:  · RIGHT UPPER EXTREMITY:  Inspection/examination of the right upper extremity does not show any tenderness, deformity or injury. Range of motion is unremarkable and pain-free. There is no gross instability. There are no rashes, ulcerations or lesions. Strength and tone are normal. No atrophy or abnormal movements are noted. · LEFT UPPER EXTREMITY: Inspection/examination of the left upper extremity does not show any tenderness, deformity or injury. Range of motion is unremarkable and pain-free. There is no gross instability. There are no rashes, ulcerations or lesions. Strength and tone are normal. No atrophy or abnormal movements are noted. LUMBAR/SACRAL EXAMINATION:  · Inspection: Local inspection shows no step-off or bruising. Lumbar alignment is normal. No instability is noted. · Palpation:   No evidence of tenderness at the midline. Lumbar paraspinal tenderness: Mild L4/5 and L5/S1 tenderness  Bursal tenderness No tenderness bilaterally  There is no paraspinal spasm. · Range of Motion: limited by 50% in all planes due to pain  · Strength:   Strength testing is 5/5 in all muscle groups tested. · Special Tests:   Straight leg raise and crossed SLR negative. · Skin: There are no rashes, ulcerations or lesions. · Reflexes: Reflexes are symmetrically 1+ at the patellar and ankle tendons. Clonus absent bilaterally at the feet.   · Gait & station: normal, patient ambulates without assistance and no ataxia  · Additional Examinations:  · RIGHT LOWER EXTREMITY: Inspection/examination of the right lower extremity does not show any tenderness, deformity or injury. Range of motion is normal and pain-free. There is no gross instability. There are no rashes, ulcerations or lesions. Strength and tone are normal. No atrophy or abnormal movements are noted. · LEFT LOWER EXTREMITY:  Inspection/examination of the left lower extremity does not show any tenderness, deformity or injury. Range of motion is normal and pain-free. There is no gross instability. There are no rashes, ulcerations or lesions. Strength and tone are normal. No atrophy or abnormal movements are noted. Patient Psychological Assessment Results  Neuromodulation Assessment      Clinic Provider Name Patient Name Date  [facility], [address] [place] [address] 03-  This patient has also undergone a battery of psychological tests and the results are reported below:    LATRICIA / 61% Moderate anxiety  PHQ / 59% Major depression, moderately severe  PSEQ / 34 Low on self-efficacy beliefs  Oswestry / 48% Severe Disability  Spinal Cord Stimulation or Dorsal Column Stimulation (SCS or DCS) and Peripheral Nerve Stimulation (PNS) is an advanced medical technology that can be used for the treatment of recalcitrant pain in patients that have failed other more conservative treatment modalities. This patient has undergone an extensive trial of more conservative modalities. They have tried and failed \"step therapy\" as recommended by the insurance carrier. The patient has not been provided with acceptable relief from trials of physical therapy and simple analgesic medications for over 3 months. Prior to being allowed to have a trial of the stimulation technology, the patient is required by the insurance carrier to undergo \"psychological clearance. \" Such psychological clearance should be:    Designed to provide education about the device and set proper expectations  Try and determine that the patient has adequate insight about situations:   Fairly True  5: I can do some form of productive work or household chores despite my pain:   Fairly True  6: I can still do many of the things I enjoy doing, such as hobbies or leisure activity, despite pain:   Mostly False  7: I can cope with my pain without medication:   True and False  8: I can still accomplish most of my goals in life despite my pain:   True and False  9: I can live a normal lifestyle, despite my pain:   Fairly True  10: I can gradually become more active over the course of the day despite my pain:   Fairly True    Oswestry  1: Part 1: What is you typical pain intensity?  The pain is moderate at the moment  2: Part 2: What about how you are able to care for yourself?  I can look after myself normally but it causes extra pain  3: Part 3: How well do you lift objects?  Pain prevents me from lifting heavy weights off the floor, but I can manage if they are conveniently positioned. 4: Part 4: How well can you walk?  Pain prevents me from walking more than 1/2 mile. 5: Part 5: How well can you sit?  Pain prevents me from sitting more than one hour. 6: Part 6: How well can you stand?  Pain prevents me from standing for more than 1 hour. 7: Part 7: How well can you sleep?  Because of pain I have less than 2 hours of sleep.  8: Part 8: How is your social life affected?  I have no social life because of pain. 9: Part 9: How is you ability to travel?  Pain is bad but I manage journeys over two hours. Diagnostic Testing:    MR Lumbar spine shows  1/6/20:  CONCLUSION:   1.  L3-4 mild to moderate canal stenosis due to 2 mm retrolisthesis with mild diffuse    spondylotic disc displacement and moderate facet and ligamentum flavum hypertrophy. Gentle    abutment of both descending L4 nerve roots. Minor bilateral foramen stenosis. 2.  L4-5 intervertebral and posterior osseous fusion. Mild to moderate right foramen osseous    stenosis. No soft disc herniation. to call the office if his symptoms worsen or if new symptoms appear prior to the next scheduled visit. He is specifically instructed to contact the office between now & his scheduled appointment if he has concerns related to his condition or if he needs assistance in scheduling the above tests. He is welcome to call for an appointment sooner if he has any additional concerns or questions. Danisha RODRIGUEZ ATC, am scribing for and in the presence of Dr. Allan Kelly.  03/20/20 1:24 PM Danisha Ram. The physical examination was performed between the patient and Dr. Allan Kelly. All counseling during the appointment was performed between the patient and the provider. I, Dr. Allan Kelly, personally performed the services described in this documentation as scribed by Danisha Salcido ATC in my presence and it is both accurate and complete. Kayce Rush MD, LUIS ENRIQUE, [facility]  Board Certified in [address] [place] [address] Certified and Fellowship Trained in [facility] ([facility])             This dictation was performed with a verbal recognition program [facility]) and it was checked for errors. It is possible that there are still dictated errors within this office note. If so, please bring any errors to my attention for an addendum. All efforts were made to ensure that this office note is accurate.

## 2020-03-20 NOTE — LETTER
Please schedule the following with:     Date:   20 @ TBD    Account: [de-identified]  Patient: Miriam Eldridge    : 1960  Address:  [address] [place] [address] Ave    Phone (H):  [phone] (home)      ----------------------------------------------------------------------------------------------  Diagnosis:     ICD-10-CM    1. Lumbar post-laminectomy syndrome M96.1    2. Degenerative disc disease, lumbar M51.36    3. Lumbar stenosis without neurogenic claudication M48.061    4. Chronic pain syndrome G89.4    5. Thoracic stenosis M48.04 MRI THORACIC SPINE WO CONTRAST         Levels:Spinal cord stimulator trial   with Nevro  CPT Codes 13689, 46821 (86), [id number]  ----------------------------------------------------------------------------------------------  Injection #    [id number] [facility]    Attending Physician       Chel Sylvester. Aditi Doran MD.  ----------------------------------------------------------------------------------------------  Injection Scheduled For:    At:    [address]  Pre-Cert#    2nd Insurance     Pre-Cert#    Comments or Special instructions:    · Infection control  ? Tested positive for MRSA in past 12 months:  no  ? Tested positive for MSSA \"staph infection\" in past 12 months: no  ? Tested positive for VRE (Vancomycin Resistant Enterococci) in past 12 months:   no  ? Currently on any antibiotics for an infection: no  · Anticoagulants:  ? On a blood thinner:  yes , NURIS Prado- CNP  ?  Any history of bleeding disorder: no   · Advanced Liver disease: no   · Advanced Renal disease: no   · Glaucoma: no   · Diabetes: no      Sedation:         Yes-------------------------------------------------  Allergies   Allergen Reactions    Sugar-Protein-Starch      Runny nose

## 2020-03-22 ASSESSMENT — ENCOUNTER SYMPTOMS: CHANGE IN BOWEL HABIT: 0

## 2020-03-23 ENCOUNTER — TELEPHONE (OUTPATIENT)
Dept: ORTHOPEDIC SURGERY | Age: 60
End: 2020-03-23

## 2020-03-24 ENCOUNTER — TELEPHONE (OUTPATIENT)
Dept: ORTHOPEDIC SURGERY | Age: 60
End: 2020-03-24

## 2020-03-24 ENCOUNTER — TELEPHONE (OUTPATIENT)
Dept: FAMILY MEDICINE CLINIC | Age: 60
End: 2020-03-24

## 2020-03-24 NOTE — TELEPHONE ENCOUNTER
PRIOR AUTHORIZATION form and medical records sent to [facility] for [address] [place] [address] with a possible date of 04/13/20. Once approval is received, patient will be scheduled for final consultation, scs trial and lead pull. Patient is aware.  Waiting for approval.

## 2020-03-24 NOTE — TELEPHONE ENCOUNTER
L/m for approval to hold Carol Paulina for 3 days prior and 5 days of SCS TRIAL  on TBD, POSSIBLE 4/13/20. Patient aware of hold date.

## 2020-03-25 ENCOUNTER — TELEPHONE (OUTPATIENT)
Dept: ORTHOPEDIC SURGERY | Age: 60
End: 2020-03-25

## 2020-03-25 NOTE — TELEPHONE ENCOUNTER
S/w patient regarding MRI TSP approval and authorization being valid until 05/23/20. Patient was instructed to call [employer] [place] to schedule MRI TSP, then contact our office for follow up appointment. MRI TSP results will not be given over the phone. Patient was also asked to allow a minimum 48 hours for follow up appointment from MRI scan in order for staff to obtain MRI report. Patient currently has a follow up appointment schedule for N/A.  Patient being set up for SCS trial.

## 2020-04-22 ENCOUNTER — TELEPHONE (OUTPATIENT)
Dept: ORTHOPEDIC SURGERY | Age: 60
End: 2020-04-22

## 2020-04-22 NOTE — TELEPHONE ENCOUNTER
Patient's psychology evaluation was received from Dr. Florian Sales office and scanned into the chart under the MEDIA tab. Please advise on how to proceed should results be appropriate to move forward with SCS trial.     Message forwarded to [place] for review.

## 2020-04-23 NOTE — TELEPHONE ENCOUNTER
Received evaluation from Dr. Miguel Ramsey office.   The patient is an appropriate candidate for a SCS trial.

## 2020-05-14 ENCOUNTER — TELEPHONE (OUTPATIENT)
Dept: ORTHOPEDIC SURGERY | Age: 60
End: 2020-05-14

## 2020-05-14 NOTE — TELEPHONE ENCOUNTER
JUSTIN S/W PATIENT CONCERNING SCS TRIAL. PATIENT WILL UNDERGO TSP MRI ON 5/14/20.  HE WILL CALL BACK TO THE OFFICE WHEN HE IS READY TO SCHEDULE

## 2020-05-20 ENCOUNTER — VIRTUAL VISIT (OUTPATIENT)
Dept: ORTHOPEDIC SURGERY | Age: 60
End: 2020-05-20
Payer: COMMERCIAL

## 2020-05-20 VITALS — RESPIRATION RATE: 12 BRPM | BODY MASS INDEX: 35.69 KG/M2 | WEIGHT: 287.04 LBS | HEIGHT: 75 IN

## 2020-05-20 PROCEDURE — G8417 CALC BMI ABV UP PARAM F/U: HCPCS | Performed by: PHYSICAL MEDICINE & REHABILITATION

## 2020-05-20 PROCEDURE — 3017F COLORECTAL CA SCREEN DOC REV: CPT | Performed by: PHYSICAL MEDICINE & REHABILITATION

## 2020-05-20 PROCEDURE — 1036F TOBACCO NON-USER: CPT | Performed by: PHYSICAL MEDICINE & REHABILITATION

## 2020-05-20 PROCEDURE — 99214 OFFICE O/P EST MOD 30 MIN: CPT | Performed by: PHYSICAL MEDICINE & REHABILITATION

## 2020-05-20 PROCEDURE — G8427 DOCREV CUR MEDS BY ELIG CLIN: HCPCS | Performed by: PHYSICAL MEDICINE & REHABILITATION

## 2020-05-20 RX ORDER — CEPHALEXIN 500 MG/1
500 CAPSULE ORAL 3 TIMES DAILY
Qty: 15 CAPSULE | Refills: 0 | Status: SHIPPED | OUTPATIENT
Start: 2020-06-03 | End: 2020-06-08

## 2020-05-20 NOTE — PROGRESS NOTES
options of postponing their procedure. All of the risks, benefits, and alternatives were discussed. The patient does wish to proceed with the procedure. 5. Follow up:  1-2 weeks      Simin Farrell was instructed to call the office if his symptoms worsen or if new symptoms appear prior to the next scheduled visit. He is specifically instructed to contact the office between now & his scheduled appointment if he has concerns related to his condition or if he needs assistance in scheduling the above tests. He is welcome to call for an appointment sooner if he has any additional concerns or questions. Lala Blakely. Christopher Bain MD, LUIS ENRIQUE, [facility]  Board Certified in [address] [place] [address]  Certified and Fellowship Trained in [facility] ([facility])             This dictation was performed with a verbal recognition program [facility]) and it was checked for errors. It is possible that there are still dictated errors within this office note. If so, please bring any errors to my attention for an addendum. All efforts were made to ensure that this office note is accurate. Patient has verbally accepted the following: We confirm that, for purposes of billing, this is a virtual visit with the provider for which we will submit a claim for reimbursement with the insurance company. The patient accepts responsibility for any co-pays, coinsurance amounts or other amounts not covered by the insurance company.      Patient location: Home  Physician location: [facility]   Time spent: not billed by time  Present on the call: myself and patient    Pursuant to the emergency declaration under the [facility], 1135 waiver authority and the [employer] Act, this Virtual  Visit was conducted, with patient's consent, to reduce the patient's risk of exposure to COVID-19 and provide continuity of care for an established patient. Services were provided through a video synchronous discussion virtually to substitute for in-person clinic visit. We have confirmed that, for purposes of billing, this is a virtual visit with for which we will submit a claim for reimbursement with your insurance company. The patient has accepted responsibility for any copays, coinsurance amounts or other amounts not covered by their insurance company. This visit was completed virtually using doxy. me.

## 2020-05-28 ENCOUNTER — OFFICE VISIT (OUTPATIENT)
Dept: PRIMARY CARE CLINIC | Age: 60
End: 2020-05-28

## 2020-05-28 NOTE — PROGRESS NOTES
PATIENT REACHED   YES____NO_x___    PREOP INSTUCTIONS LEFT ON  NUMBER_[phone]______________      DATE_6/3   1315________ TIME_________ARRIVAL___1215_____PLACE____________  NOTHING TO EAT OR DRINK  AFTER MIDNIGHT THE EVENING PRIOR OR AS INSTRUCTED BY YOUR DR.  Beverley Eziochristiano NEED A RESPONSIBLE ADULT AGE 18 OR OLDER TO DRIVE YOU HOME  PLEASE BRING INSURANCE CARD. PICTURE ID AND COMPLETE LIST OF MEDS  WEAR LOOSE COMFORTABLE CLOTHING  FOLLOW ANY INSTRUCTIONS YOUR DRS OFFICE HAS GIVEN YOU,INCLUDING WHAT MEDICATIONS TO TAKE THE AM OF PROCEDURE AND WHEN AND IF YOU NEED TO STOP ANY BLOOD THINNERS. IF YOU HAVE QUESTIONS REGARDING THIS CALL THE OFFICE  THE GOAL BLOOD SUGAR THE AM OF PROCEDURE  OR LESS ABOVE THAT THE PROCEDURE MAY BE CANCELLED  ANY QUESTIONS CALL YOUR DOCTOR. ALSO,PLEASE READ THE INSTRUCTION PACKET FROM YOUR DR IF YOU RECEIVED ONE. SPINE INTERVENTION NUMBER [phone]      . Patient instructed to get their COVID-19 test done as directed by their doctor (5-7 days prior to procedure)  or patient states will get on _5/28_________. Instructed to self quarantine after test until DOS. There is a no visitor policy at [facility]. The patients ride is expected to remain in the car with a cell phone for communication. If the ride is leaving the hospital grounds please make sure they are back in time for pickup. Have the patient inform the staff on arrival what their rides plans are while the patient is in the facility.

## 2020-05-30 LAB
SARS-COV-2: NOT DETECTED
SOURCE: NORMAL

## 2020-06-03 ENCOUNTER — APPOINTMENT (OUTPATIENT)
Dept: GENERAL RADIOLOGY | Age: 60
End: 2020-06-03
Attending: PHYSICAL MEDICINE & REHABILITATION
Payer: COMMERCIAL

## 2020-06-03 ENCOUNTER — HOSPITAL ENCOUNTER (OUTPATIENT)
Age: 60
Setting detail: OUTPATIENT SURGERY
Discharge: HOME | End: 2020-06-03
Attending: PHYSICAL MEDICINE & REHABILITATION | Admitting: PHYSICAL MEDICINE & REHABILITATION
Payer: COMMERCIAL

## 2020-06-03 VITALS
WEIGHT: 287 LBS | TEMPERATURE: 98.1 F | HEART RATE: 67 BPM | BODY MASS INDEX: 34.95 KG/M2 | SYSTOLIC BLOOD PRESSURE: 164 MMHG | HEIGHT: 76 IN | RESPIRATION RATE: 18 BRPM | OXYGEN SATURATION: 100 % | DIASTOLIC BLOOD PRESSURE: 108 MMHG

## 2020-06-03 LAB
APTT: 34.3 SEC (ref 24.2–36.2)
HCT VFR BLD CALC: 40.8 % (ref 40.5–52.5)
HEMOGLOBIN: 13.8 G/DL (ref 13.5–17.5)
INR BLD: 0.93 (ref 0.86–1.14)
MCH RBC QN AUTO: 29.2 PG (ref 26–34)
MCHC RBC AUTO-ENTMCNC: 33.7 G/DL (ref 31–36)
MCV RBC AUTO: 86.8 FL (ref 80–100)
PDW BLD-RTO: 14.5 % (ref 12.4–15.4)
PLATELET # BLD: 185 K/UL (ref 135–450)
PMV BLD AUTO: 9.2 FL (ref 5–10.5)
PROTHROMBIN TIME: 10.8 SEC (ref 10–13.2)
RBC # BLD: 4.71 M/UL (ref 4.2–5.9)
WBC # BLD: 5.2 K/UL (ref 4–11)

## 2020-06-03 PROCEDURE — 2580000003 HC RX 258: Performed by: PHYSICAL MEDICINE & REHABILITATION

## 2020-06-03 PROCEDURE — 85610 PROTHROMBIN TIME: CPT

## 2020-06-03 PROCEDURE — 85730 THROMBOPLASTIN TIME PARTIAL: CPT

## 2020-06-03 PROCEDURE — 99153 MOD SED SAME PHYS/QHP EA: CPT | Performed by: PHYSICAL MEDICINE & REHABILITATION

## 2020-06-03 PROCEDURE — 2580000003 HC RX 258: Performed by: PHYSICIAN ASSISTANT

## 2020-06-03 PROCEDURE — 99152 MOD SED SAME PHYS/QHP 5/>YRS: CPT | Performed by: PHYSICAL MEDICINE & REHABILITATION

## 2020-06-03 PROCEDURE — 6360000002 HC RX W HCPCS: Performed by: PHYSICAL MEDICINE & REHABILITATION

## 2020-06-03 PROCEDURE — C1778 LEAD, NEUROSTIMULATOR: HCPCS | Performed by: PHYSICAL MEDICINE & REHABILITATION

## 2020-06-03 PROCEDURE — 85027 COMPLETE CBC AUTOMATED: CPT

## 2020-06-03 PROCEDURE — 2709999900 HC NON-CHARGEABLE SUPPLY: Performed by: PHYSICAL MEDICINE & REHABILITATION

## 2020-06-03 PROCEDURE — 6360000002 HC RX W HCPCS: Performed by: PHYSICIAN ASSISTANT

## 2020-06-03 PROCEDURE — 3209999900 FLUORO FOR SURGICAL PROCEDURES

## 2020-06-03 PROCEDURE — 2500000003 HC RX 250 WO HCPCS: Performed by: PHYSICAL MEDICINE & REHABILITATION

## 2020-06-03 PROCEDURE — 3610000059 HC PAIN LEVEL 5 ADDL 15 MIN (NON-OR): Performed by: PHYSICAL MEDICINE & REHABILITATION

## 2020-06-03 PROCEDURE — 3610000058 HC PAIN LEVEL 5 BASE (NON-OR): Performed by: PHYSICAL MEDICINE & REHABILITATION

## 2020-06-03 DEVICE — TRIAL LEAD KIT, 50CM WITH 5MM SPACING
Type: IMPLANTABLE DEVICE | Status: FUNCTIONAL
Brand: NEVRO®

## 2020-06-03 RX ORDER — FENTANYL CITRATE 50 UG/ML
INJECTION, SOLUTION INTRAMUSCULAR; INTRAVENOUS
Status: COMPLETED | OUTPATIENT
Start: 2020-06-03 | End: 2020-06-03

## 2020-06-03 RX ORDER — SODIUM CHLORIDE 9 MG/ML
INJECTION, SOLUTION INTRAVENOUS CONTINUOUS
Status: DISCONTINUED | OUTPATIENT
Start: 2020-06-03 | End: 2020-06-03 | Stop reason: HOSPADM

## 2020-06-03 RX ORDER — MIDAZOLAM HYDROCHLORIDE 1 MG/ML
INJECTION INTRAMUSCULAR; INTRAVENOUS
Status: COMPLETED | OUTPATIENT
Start: 2020-06-03 | End: 2020-06-03

## 2020-06-03 RX ORDER — LIDOCAINE HYDROCHLORIDE 10 MG/ML
INJECTION, SOLUTION EPIDURAL; INFILTRATION; INTRACAUDAL; PERINEURAL
Status: COMPLETED | OUTPATIENT
Start: 2020-06-03 | End: 2020-06-03

## 2020-06-03 RX ORDER — SODIUM CHLORIDE 9 MG/ML
INJECTION INTRAVENOUS
Status: COMPLETED | OUTPATIENT
Start: 2020-06-03 | End: 2020-06-03

## 2020-06-03 RX ADMIN — CEFAZOLIN 1 G: 1 INJECTION, POWDER, FOR SOLUTION INTRAVENOUS at 12:34

## 2020-06-03 ASSESSMENT — PAIN DESCRIPTION - LOCATION
LOCATION: BACK
LOCATION: BACK

## 2020-06-03 ASSESSMENT — PAIN DESCRIPTION - FREQUENCY
FREQUENCY: CONTINUOUS
FREQUENCY: CONTINUOUS

## 2020-06-03 ASSESSMENT — PAIN SCALES - GENERAL
PAINLEVEL_OUTOF10: 3
PAINLEVEL_OUTOF10: 3

## 2020-06-03 ASSESSMENT — PAIN - FUNCTIONAL ASSESSMENT: PAIN_FUNCTIONAL_ASSESSMENT: 0-10

## 2020-06-03 ASSESSMENT — PAIN DESCRIPTION - DESCRIPTORS
DESCRIPTORS: BURNING

## 2020-06-03 NOTE — OP NOTE
Patient was also given a pamphlet detailing additional precautions. Patient was asked to notify the pain clinic if there are any complications such as signs of infection, fevers, chills, increasing back, or neck pain. The patient was also instructed to avoid getting the area wet until the trial leads are removed. Patient will follow up in five days for lead removal.        The patient was transferred to the post-operative area in stable condition.       Electroanalysis programming- 2.5 mA, 300 us, 10,000 Hz, Continuous

## 2020-06-03 NOTE — H&P
HISTORY AND PHYSICAL/PRE-SEDATION ASSESSMENT    Patient:  Leeann Azevedo   :  1960  Medical Record No.:  [id number]   Date:  6/3/2020  Physician:  Kole Andersen M.D. Facility: [address] [place] [address]    HISTORY OF PRESENT ILLNESS:                 The patient is a 64 y.o. male whom presents with lower back and leg pain. Review of the imaging and physical exam of the patient confirmed the pre-procedure diagnosis. After a thorough discussion of risks, benefits and alternatives informed consent was obtained. Past Medical History:   Past Medical History:   Diagnosis Date    Aneurysm of ascending aorta (HCC)     Atopic dermatitis     Back pain     Facial paralysis/[place] palsy     Hyperlipidemia     Hypertension     Imprisonment and other incarceration [id number]    Migraine     Obstructive sleep apnea (adult) (pediatric)     Prediabetes     Psoriasis-like skin disease     Tinea cruris       Past Surgical History:     Past Surgical History:   Procedure Laterality Date    BACK SURGERY  February and 2008    COLONOSCOPY  2018    CORONARY ANGIOPLASTY      CYSTOSCOPY      PAIN MANAGEMENT PROCEDURE Bilateral 2020    BILATERAL L3 TRANSFORAMINAL  EPIDURAL STEROID INJECTION WITH FLUOROSCOPY performed by Kole Andersen MD at [address] Bilateral 3/9/2020    BILATERAL L1, L2, L3 (ABOVE THE FUSION) MEDIAL BRANCH BLOCK WITH FLUOROSCOPY (89213, 85200)   #1 performed by Kole Andersen MD at [facility]     Current Medications:   Prior to Admission medications    Medication Sig Start Date End Date Taking?  Authorizing Provider   cephALEXin (KEFLEX) 500 MG capsule Take 1 capsule by mouth 3 times daily for 5 days DO NOT BEGIN MEDICATION UNTIL 6/3/20 6/3/20 6/8/20  Kole Andersen MD   tiZANidine (ZANAFLEX) 4 MG tablet Take 1 tablet by mouth every 8 hours as needed (for muscle spasm) 3/17/20   Reddy Chowdhury APRN - CNP   rosuvastatin (CRESTOR) 20 adult           [x]   II.  Mild systemic disease            []   III. Severe systemic disease      Mallampati: Mallampati Class II - (soft palate, fauces & uvula are visible)      Sedation plan:   [x]  Local              []  Minimal                  []  General anesthesia    Patient's condition acceptable for planned procedure/sedation. Post Procedure Plan   Return to same level of care   ______________________     The patient was counseled at length about the risks of elizabeth Covid-19 in the ellis-operative and post-operative states including the recovery window of their procedure. The patient was made aware that elizabeth Covid-19 after a surgical procedure may worsen their prognosis for recovering from the virus and lend to a higher morbidity and or mortality risk. The patient was given the options of postponing their procedure. All of the risks, benefits, and alternatives were discussed. The patient does wish to proceed with the procedure. The risks and benefits as well as alternatives to the procedure have been discussed with the patient and or family. The patient and or next of kin understands and agrees to proceed.     Junaid Toussaint M.D.

## 2020-06-04 ENCOUNTER — TELEPHONE (OUTPATIENT)
Dept: ORTHOPEDIC SURGERY | Age: 60
End: 2020-06-04

## 2020-06-08 ENCOUNTER — OFFICE VISIT (OUTPATIENT)
Dept: ORTHOPEDIC SURGERY | Age: 60
End: 2020-06-08

## 2020-06-08 VITALS — RESPIRATION RATE: 12 BRPM | BODY MASS INDEX: 34.95 KG/M2 | WEIGHT: 287.04 LBS | HEIGHT: 76 IN

## 2020-06-08 PROCEDURE — 99024 POSTOP FOLLOW-UP VISIT: CPT | Performed by: PHYSICIAN ASSISTANT

## 2020-06-08 NOTE — PROGRESS NOTES
Aneurysm of ascending aorta (HCC)     Atopic dermatitis     Back pain     Facial paralysis/[place] palsy     Hyperlipidemia     Hypertension     Imprisonment and other incarceration [phone]    Migraine     Obstructive sleep apnea (adult) (pediatric)     Prediabetes     Psoriasis-like skin disease     Tinea cruris       Past Surgical History:     Past Surgical History:   Procedure Laterality Date    BACK SURGERY  February and October 2008    COLONOSCOPY  01/28/2018    CORONARY ANGIOPLASTY      CYSTOSCOPY  2001    PAIN MANAGEMENT PROCEDURE Bilateral 2/5/2020    BILATERAL L3 TRANSFORAMINAL  EPIDURAL STEROID INJECTION WITH FLUOROSCOPY performed by Junaid Toussaint MD at [address] Bilateral 3/9/2020    BILATERAL L1, L2, L3 (ABOVE THE FUSION) MEDIAL BRANCH BLOCK WITH FLUOROSCOPY (87303, 18715)   #1 performed by Junaid Toussaint MD at [address] N/A 6/3/2020    [address] ([address], [address], [address]) - Dorrine Done performed by Junaid Toussaint MD at [address]     Current Medications:     Current Outpatient Medications:     cephALEXin (KEFLEX) 500 MG capsule, Take 1 capsule by mouth 3 times daily for 5 days DO NOT BEGIN MEDICATION UNTIL 6/3/20, Disp: 15 capsule, Rfl: 0    tiZANidine (ZANAFLEX) 4 MG tablet, Take 1 tablet by mouth every 8 hours as needed (for muscle spasm), Disp: 90 tablet, Rfl: 0    rosuvastatin (CRESTOR) 20 MG tablet, Take 1 tablet by mouth daily, Disp: 90 tablet, Rfl: 0    rivaroxaban (XARELTO) 20 MG TABS tablet, Take 1 tablet by mouth daily, Disp: 90 tablet, Rfl: 0    metoprolol succinate (TOPROL XL) 50 MG extended release tablet, Take 1 tablet by mouth daily, Disp: 90 tablet, Rfl: 0    lisinopril-hydroCHLOROthiazide (PRINZIDE;ZESTORETIC) 20-25 MG per tablet, Take 1 tablet by mouth daily, Disp: 90 tablet, Rfl: 0    hydrocortisone (WESTCORT) 0.2 % cream, Apply topically 2 times daily. , Disp: 45 g, Rfl: complete. SABINO Stoddard, MERCEDES  Board Certified by the [employer] on Certification of Physician Assistants  Roderick Parker 60  Partner of [facility]        This dictation was performed with a verbal recognition program [facility]) and it was checked for errors. It is possible that there are still dictated errors within this office note. If so, please bring any errors to my attention for an addendum. All efforts were made to ensure that this office note is accurate. 03-Jan-2018

## 2020-06-09 ENCOUNTER — TELEPHONE (OUTPATIENT)
Dept: ORTHOPEDIC SURGERY | Age: 60
End: 2020-06-09

## 2020-06-09 RX ORDER — TIZANIDINE 4 MG/1
TABLET ORAL
Qty: 90 TABLET | Refills: 0 | OUTPATIENT
Start: 2020-06-09

## 2020-06-09 NOTE — TELEPHONE ENCOUNTER
S/W PATIENT who was calling to provide the information for the pads and leads for his TENS unit. [employer] [address] [place] [address]   Ph: [phone]  F: [phone]    Please sign orders - if appropriate - and send back so that it can be faxed over to company.

## 2020-06-15 ASSESSMENT — ENCOUNTER SYMPTOMS
COUGH: 0
BLURRED VISION: 0
ABDOMINAL PAIN: 0
CHEST TIGHTNESS: 0
DIARRHEA: 0
WHEEZING: 0
NAUSEA: 0
VOMITING: 0
SHORTNESS OF BREATH: 0
CONSTIPATION: 0
ORTHOPNEA: 0

## 2020-06-16 ENCOUNTER — TELEPHONE (OUTPATIENT)
Dept: FAMILY MEDICINE CLINIC | Age: 60
End: 2020-06-16

## 2020-06-16 ENCOUNTER — OFFICE VISIT (OUTPATIENT)
Dept: FAMILY MEDICINE CLINIC | Age: 60
End: 2020-06-16
Payer: COMMERCIAL

## 2020-06-16 VITALS
WEIGHT: 280 LBS | TEMPERATURE: 97.9 F | DIASTOLIC BLOOD PRESSURE: 102 MMHG | HEART RATE: 75 BPM | OXYGEN SATURATION: 97 % | BODY MASS INDEX: 34.52 KG/M2 | SYSTOLIC BLOOD PRESSURE: 150 MMHG

## 2020-06-16 PROCEDURE — 3017F COLORECTAL CA SCREEN DOC REV: CPT | Performed by: CLINICAL NURSE SPECIALIST

## 2020-06-16 PROCEDURE — G8427 DOCREV CUR MEDS BY ELIG CLIN: HCPCS | Performed by: CLINICAL NURSE SPECIALIST

## 2020-06-16 PROCEDURE — 99214 OFFICE O/P EST MOD 30 MIN: CPT | Performed by: CLINICAL NURSE SPECIALIST

## 2020-06-16 PROCEDURE — G8417 CALC BMI ABV UP PARAM F/U: HCPCS | Performed by: CLINICAL NURSE SPECIALIST

## 2020-06-16 PROCEDURE — 1036F TOBACCO NON-USER: CPT | Performed by: CLINICAL NURSE SPECIALIST

## 2020-06-16 RX ORDER — TIZANIDINE 4 MG/1
4 TABLET ORAL EVERY 8 HOURS PRN
Qty: 90 TABLET | Refills: 0 | Status: SHIPPED | OUTPATIENT
Start: 2020-06-16 | End: 2020-09-17 | Stop reason: SDUPTHER

## 2020-06-16 RX ORDER — BLOOD PRESSURE TEST KIT
1 KIT MISCELLANEOUS ONCE
Qty: 1 KIT | Refills: 0 | Status: SHIPPED | OUTPATIENT
Start: 2020-06-16 | End: 2020-10-19

## 2020-06-16 RX ORDER — ROSUVASTATIN CALCIUM 20 MG/1
20 TABLET, COATED ORAL DAILY
Qty: 90 TABLET | Refills: 0 | Status: SHIPPED | OUTPATIENT
Start: 2020-06-16 | End: 2020-09-17 | Stop reason: SDUPTHER

## 2020-06-16 ASSESSMENT — PATIENT HEALTH QUESTIONNAIRE - PHQ9
SUM OF ALL RESPONSES TO PHQ QUESTIONS 1-9: 0
SUM OF ALL RESPONSES TO PHQ9 QUESTIONS 1 & 2: 0
2. FEELING DOWN, DEPRESSED OR HOPELESS: 0
SUM OF ALL RESPONSES TO PHQ QUESTIONS 1-9: 0
1. LITTLE INTEREST OR PLEASURE IN DOING THINGS: 0

## 2020-06-16 NOTE — TELEPHONE ENCOUNTER
Received fax from [address] [place] [address] stating BP montior device not covered or alternative may be available. [id number] to see if when they ran script thru did the plan state an alternative?

## 2020-06-16 NOTE — PATIENT INSTRUCTIONS
Fasting labs ordered     Medications refilled     Reviewed DASH and low sodium diet     Reviewed low cholesterol diet     Increase Crestor 20 mg daily     Reviewed diabetic diet      Follow up with cardiology as directed, encouraged to schedule an appointment     Follow up with ortho as directed     Keep scheduled appointment with [place] Surgeon for back     Discussed neck and shoulder issues.       Encourage to follow up with cardiology as directed

## 2020-06-16 NOTE — PROGRESS NOTES
Readings from Last 3 Encounters:   06/16/20 280 lb (127 kg)   06/08/20 287 lb 0.6 oz (130.2 kg)   06/03/20 287 lb (130.2 kg)       ASSESSMENT & PLAN:    1. Essential hypertension  - Stable, continue metoprolol, lisinopril/hydrochlorothiazide, and amlodipine  - CBC Auto Differential; Future  - Comprehensive Metabolic Panel; Future  - Blood Pressure KIT; 1 kit by Does not apply route once for 1 dose  Dispense: 1 kit; Refill: 0  - Reviewed DASH and low sodium diets    2. Mixed hyperlipidemia  - Stable, continue current regimen   - CBC Auto Differential; Future  - Comprehensive Metabolic Panel; Future  - Lipid Panel; Future  - CK; Future  - rosuvastatin (CRESTOR) 20 MG tablet; Take 1 tablet by mouth daily  Dispense: 90 tablet; Refill: 0  - Reviewed low cholesterol diet     3. Prediabetes  - Stable, continue current regimen  - CBC Auto Differential; Future  - Comprehensive Metabolic Panel; Future  - Hemoglobin A1C; Future  - Reviewed diabetic diet     4. Coronary artery disease due to lipid rich plaque  - Stable, continue current regimen  - CBC Auto Differential; Future  - Comprehensive Metabolic Panel; Future  - Hemoglobin A1C; Future  - Lipid Panel; Future  - CK; Future  - Follow up with cardiology as directed     5. Paroxysmal atrial fibrillation (HCC)  - Follow up with cardiology as directed     6. Ascending aortic aneurysm ([address]  - Follow up with cardiology as directed     7. Vitamin D deficiency  - Stable, continue vitamin D 3 supplement   - Vitamin D 25 Hydroxy; Future    8. Chronic midline low back pain with bilateral sciatica  - Stable, continue current regimen   - tiZANidine (ZANAFLEX) 4 MG tablet; Take 1 tablet by mouth every 8 hours as needed (for muscle spasm)  Dispense: 90 tablet; Refill: 0  - Follow up with specialist as directed      Continue current treatment plan.     Current Outpatient Medications   Medication Sig Dispense Refill    rosuvastatin (CRESTOR) 20 MG tablet Take 1 tablet by mouth daily 90

## 2020-06-18 ENCOUNTER — TELEPHONE (OUTPATIENT)
Dept: ORTHOPEDIC SURGERY | Age: 60
End: 2020-06-18

## 2020-06-23 NOTE — PROGRESS NOTES
(pediatric)     Prediabetes     Psoriasis-like skin disease     Tinea cruris      Social History:    Social History     Tobacco Use   Smoking Status Never Smoker   Smokeless Tobacco Never Used     Current Medications:  Current Outpatient Medications   Medication Sig Dispense Refill    rosuvastatin (CRESTOR) 20 MG tablet Take 1 tablet by mouth daily 90 tablet 0    tiZANidine (ZANAFLEX) 4 MG tablet Take 1 tablet by mouth every 8 hours as needed (for muscle spasm) 90 tablet 0    Blood Pressure KIT 1 kit by Does not apply route once for 1 dose 1 kit 0    Misc. Devices MISC Pads and leads for use on [facility] TENS unit. 1 Device 0    rivaroxaban (XARELTO) 20 MG TABS tablet Take 1 tablet by mouth daily 90 tablet 0    metoprolol succinate (TOPROL XL) 50 MG extended release tablet Take 1 tablet by mouth daily 90 tablet 0    lisinopril-hydroCHLOROthiazide (PRINZIDE;ZESTORETIC) 20-25 MG per tablet Take 1 tablet by mouth daily 90 tablet 0    hydrocortisone (WESTCORT) 0.2 % cream Apply topically 2 times daily. 45 g 0    amLODIPine (NORVASC) 5 MG tablet Take 1 tablet by mouth daily 90 tablet 0    vitamin D (ERGOCALCIFEROL) 82479 units CAPS capsule Take 1 capsule by mouth once a week 4 capsule 2     No current facility-administered medications for this visit. REVIEW OF SYSTEMS:    CONSTITUTIONAL: No major weight gain or loss, fatigue, weakness, night sweats or fever. HEENT: No new vision difficulties or ringing in the ears. RESPIRATORY: No new SOB, PND, orthopnea or cough. CARDIOVASCULAR: See HPI  GI: No nausea, vomiting, diarrhea, constipation, abdominal pain or changes in bowel habits. : No urinary frequency, urgency, incontinence hematuria or dysuria. SKIN: No cyanosis or skin lesions. MUSCULOSKELETAL: No new muscle or joint pain. NEUROLOGICAL: No syncope or TIA-like symptoms.   PSYCHIATRIC: No anxiety, pain, insomnia or depression    Objective:   PHYSICAL EXAM:        VITALS:  /88 presentation of Quin Yepez. Plan:     1. Take Crestor daily, recheck labs in 3 months  2. Stress Echo ordered  3. EKG today: NSR    Schedule appointment with Dr. Sujatha Wylie in 6 months    Overall the patient is stable from CV standpoint    I have addresed the patient's cardiac risk factors and adjusted pharmacologic treatment as needed. In addition, I have reinforced the need for patient directed risk factor modification. Further evaluation will be based upon the patient's clinical course and testing results. All questions and concerns were addressed to the patient. Alternatives to my treatment were discussed. The patient is not currently smoking. The risks related to smoking were reviewed with the patient. Recommend maintaining a smoke-free lifestyle. Products available for smoking cessation were discussed in detail. Patient is on a beta-blocker  Patient is on an ace-i/ARB  Patient is on a statin    Dual Antiplatelet therapy / anti-coagulation has not been recommended / prescribed for this patient. Education conducted on adverse reactions including bleeding was discussed. Angiotension inhibitor/angiotension receptor blocker has  been prescribed / recommended for congestive heart failure. Daily weight, low sodium diet were discussed. Patient instructed to call the office with a weight gain: > 3 # over night or 5# in one week; swelling, SOB/orthopnea/PND    The patient verbalizes understanding not to stop medications without discussing with us. Discussed exercise: 30-60 minutes 7 days/week  Discussed Low saturated fat/JUDD diet. Thank you for allowing to us to participate in the care of Quin Yepez.     Electronically signed by NURIS Craft CNP on 6/23/2020 at 1:02 PM     Documentation of today's visit sent to PCP

## 2020-06-25 ENCOUNTER — TELEPHONE (OUTPATIENT)
Dept: CARDIOLOGY CLINIC | Age: 60
End: 2020-06-25

## 2020-06-25 ENCOUNTER — OFFICE VISIT (OUTPATIENT)
Dept: CARDIOLOGY CLINIC | Age: 60
End: 2020-06-25
Payer: COMMERCIAL

## 2020-06-25 VITALS
WEIGHT: 281.6 LBS | HEIGHT: 75 IN | HEART RATE: 77 BPM | SYSTOLIC BLOOD PRESSURE: 132 MMHG | BODY MASS INDEX: 35.01 KG/M2 | DIASTOLIC BLOOD PRESSURE: 88 MMHG

## 2020-06-25 PROCEDURE — 99214 OFFICE O/P EST MOD 30 MIN: CPT | Performed by: NURSE PRACTITIONER

## 2020-06-25 PROCEDURE — 93000 ELECTROCARDIOGRAM COMPLETE: CPT | Performed by: NURSE PRACTITIONER

## 2020-07-02 ENCOUNTER — TELEPHONE (OUTPATIENT)
Dept: FAMILY MEDICINE CLINIC | Age: 60
End: 2020-07-02

## 2020-07-06 ENCOUNTER — TELEPHONE (OUTPATIENT)
Dept: ADMINISTRATIVE | Age: 60
End: 2020-07-06

## 2020-07-06 NOTE — PROGRESS NOTES
-Preoperative Consultation-    Saira Ely  YOB: 1960    Date of Service: 7/8/2020    Vitals:    07/08/20 0926   BP: (!) 130/92   Pulse: 82   Temp: 97.4 °F (36.3 °C)   SpO2: 98%   Weight: 280 lb 3.2 oz (127.1 kg)      Wt Readings from Last 2 Encounters:   07/08/20 280 lb 3.2 oz (127.1 kg)   06/25/20 281 lb 9.6 oz (127.7 kg)     BP Readings from Last 3 Encounters:   07/08/20 (!) 130/92   06/25/20 132/88   06/16/20 (!) 150/102      Chief Complaint   Patient presents with   Sadie Doherty Pre-op Exam     Nuerostimulator into spine on 7/14/20. Allergies   Allergen Reactions    Sugar-Protein-Starch      Runny nose     Current Outpatient Medications   Medication Sig Dispense Refill    rosuvastatin (CRESTOR) 20 MG tablet Take 1 tablet by mouth daily 90 tablet 0    tiZANidine (ZANAFLEX) 4 MG tablet Take 1 tablet by mouth every 8 hours as needed (for muscle spasm) 90 tablet 0    rivaroxaban (XARELTO) 20 MG TABS tablet Take 1 tablet by mouth daily 90 tablet 0    metoprolol succinate (TOPROL XL) 50 MG extended release tablet Take 1 tablet by mouth daily 90 tablet 0    lisinopril-hydroCHLOROthiazide (PRINZIDE;ZESTORETIC) 20-25 MG per tablet Take 1 tablet by mouth daily 90 tablet 0    hydrocortisone (WESTCORT) 0.2 % cream Apply topically 2 times daily. 45 g 0    amLODIPine (NORVASC) 5 MG tablet Take 1 tablet by mouth daily 90 tablet 0    vitamin D (ERGOCALCIFEROL) 32713 units CAPS capsule Take 1 capsule by mouth once a week 4 capsule 2    Blood Pressure KIT 1 kit by Does not apply route once for 1 dose 1 kit 0    Misc. Devices MISC Pads and leads for use on [facility] TENS unit. 1 Device 0     No current facility-administered medications for this visit.          This patient presents to the office today for a preoperative consultation    Surgery type: thoracic laminectomy with paddle lead and rechargeable battery in right hip  Surgeon: Dr. Bruce Crocker  Date of operation: July 14   Location: [facility]  Number of children: 0    Years of education: Not on file    Highest education level: Not on file   Occupational History    Occupation: unemployed    Occupation: former masonary work    Social Needs    Financial resource strain: Not on file    Food insecurity     Worry: Not on file     Inability: Not on file   [employer] needs     Medical: Not on file     Non-medical: Not on file   Tobacco Use    Smoking status: Never Smoker    Smokeless tobacco: Never Used   Substance and Sexual Activity    Alcohol use: Yes     Alcohol/week: 2.0 standard drinks     Types: 1 Cans of beer, 1 Standard drinks or equivalent per week     Frequency: Monthly or less     Drinks per session: 1 or 2     Binge frequency: Less than monthly     Comment: Rarely; socially    Drug use: No    Sexual activity: Yes     Partners: Female     Birth control/protection: Condom   Lifestyle    Physical activity     Days per week: Not on file     Minutes per session: Not on file    Stress: Not on file   Relationships    Social connections     Talks on phone: Not on file     Gets together: Not on file     Attends Hinduism service: Not on file     Active member of club or organization: Not on file     Attends meetings of clubs or organizations: Not on file     Relationship status: Not on file    Intimate partner violence     Fear of current or ex partner: Not on file     Emotionally abused: Not on file     Physically abused: Not on file     Forced sexual activity: Not on file   Other Topics Concern    Not on file   Social History Narrative    Not on file       Review of Systems:  A comprehensive review of systems was negative except for: Respiratory: positive for shortness of breath     Physical Exam:   Constitutional: He is oriented to person, place, and time. He appears well-developed and well-nourished. No distress. HENT:   Head: Normocephalic and atraumatic.    Mouth/Throat: Uvula is midline, oropharynx is clear and moist and mucous membranes are normal.   Eyes: Conjunctivae and EOM are normal. Pupils are equal, round, and reactive to light. Neck: Trachea normal and normal range of motion. Neck supple. No JVD present. Carotid bruit is not present. No mass and no thyromegaly present. Cardiovascular: Normal rate, regular rhythm, normal heart sounds and intact distal pulses. Exam reveals no gallop and no friction rub. No murmur heard. Pulmonary/Chest: Effort normal and breath sounds normal. No respiratory distress. He has no wheezes. He has no rales. Abdominal: Soft. Normal aorta and bowel sounds are normal. He exhibits no distension and no mass. There is no hepatosplenomegaly. No tenderness. Musculoskeletal: He exhibits no edema and no tenderness. Neurological: He is alert and oriented to person, place, and time. He has normal strength. No cranial nerve deficit or sensory deficit. Coordination and gait normal.   Skin: Skin is warm and dry. No rash noted. No erythema. Psychiatric: He has a normal mood and affect. His behavior is normal.     EKG Interpretation:  normal EKG, normal sinus rhythm, unchanged from previous tracings. Lab Review:   Lab Results   Component Value Date     01/13/2020    K 4.5 01/13/2020     01/13/2020    CO2 25 01/13/2020    BUN 19 01/13/2020    CREATININE 1.0 01/13/2020    GLUCOSE 83 01/13/2020    CALCIUM 9.6 01/13/2020     Lab Results   Component Value Date    WBC 5.2 06/03/2020    HGB 13.8 06/03/2020    HCT 40.8 06/03/2020    MCV 86.8 06/03/2020     06/03/2020       Assessment:   64 y.o. patient with planned surgery as above.       Known risk factors for perioperative complications: Arrhythmia, Hypertension, on long-term anticoagulation therapy    Current medications which may produce withdrawal symptoms if withheld perioperatively: none     Pre-Operative Risk assessment using 2014 ACC/AHA guidelines:   -Emergent procedure: No  -Active Cardiac Condition: No (decompensated HF, Arrhythmia, MI <3 weeks, severe valve disease)  -Risk Level of Procedure: Intermediate Risk (intraperitoneal, intrathoracic, HENT, orthopedic, or carotid endarterectomy, etc.)  -Revised Cardiac Risk Index Risk factors: History of ischemic heart disease  -Exercise Tolerance before Surgery: Sidra Mckinnon was seen today for pre-op exam.    Diagnoses and all orders for this visit:    Pre-op evaluation  -     EKG 12 Lead    Essential hypertension    Paroxysmal atrial fibrillation (HCC)    Ascending aortic aneurysm ([address]    Long term current use of anticoagulant therapy       Plan:   1. Preoperative workup as follows: hemoglobin, hematocrit, electrolytes, creatinine, glucose, stress echo (as ordered by cardiology), COVID-19 (within 3-5 days of operation)   2. Change in medication regimen before surgery: discontinue Xarelto 3 days before surgery (according to UpToDate recommendations)  3. Prophylaxis for cardiac events with perioperative beta-blockers: Currently taking: metoprolol  ACC/AHA indications for pre-operative beta-blocker use:    · Vascular surgery with history of positive stress test  · Intermediate or high risk surgery with history of CAD   · Intermediate or high risk surgery with multiple clinical predictors of CAD- 2 of the following: history of compensated or prior heart failure, history of cerebrovascular disease, DM, or renal insufficiency  Routine administration of higher-dose, long-acting metoprolol in beta-blocker-naïve patients on the day of surgery, and in the absence of dose titration is associated with an overall increase in mortality. Beta-blockers should be started days to weeks prior to surgery and titrated to pulse < 70.  4. Deep vein thrombosis prophylaxis: regimen to be chosen by surgical team; otherwise counseled patient on ambulating as much as possible and minimizing sedentary time as well as discussed about moving lower extremities around while lying down to increase circulation  5.

## 2020-07-08 ENCOUNTER — OFFICE VISIT (OUTPATIENT)
Dept: FAMILY MEDICINE CLINIC | Age: 60
End: 2020-07-08
Payer: COMMERCIAL

## 2020-07-08 VITALS
TEMPERATURE: 97.4 F | HEART RATE: 82 BPM | WEIGHT: 280.2 LBS | DIASTOLIC BLOOD PRESSURE: 92 MMHG | OXYGEN SATURATION: 98 % | SYSTOLIC BLOOD PRESSURE: 130 MMHG | BODY MASS INDEX: 35.02 KG/M2

## 2020-07-08 PROCEDURE — 99213 OFFICE O/P EST LOW 20 MIN: CPT | Performed by: FAMILY MEDICINE

## 2020-07-08 PROCEDURE — 93000 ELECTROCARDIOGRAM COMPLETE: CPT | Performed by: FAMILY MEDICINE

## 2020-07-08 NOTE — PROGRESS NOTES
[facility] / [facility], [place] ([facility]) [address] [facility], [address]    Acknowledgment of Informed Consent for Surgical or Medical Procedure and Sedation  I agree to allow doctor(s) Kaitlynn Flynn and his/her associates or assistants, including residents and/or other qualified medical practitioner to perform the following medical treatment or procedure and to administer or direct the administration of sedation as necessary:  Procedure(s): THORACIC LAMINECTOMY WITH PADDLE LEAD AND RECHARGEABLE BATTERY IN RIGHT HIP  My doctor has explained the following regarding the proposed procedure:   the explanation of the procedure   the benefits of the procedure   the potential problems that might occur during recuperation   the risks and side effects of the procedure which could include but are not limited to severe blood loss, infection, stroke or death   the benefits, risks and side effect of alternative procedures including the consequences of declining this procedure or any alternative procedures   the likelihood of achieving satisfactory results. I acknowledge no guarantee or assurance has been made to me regarding the results. I understand that during the course of this treatment/procedure, unforeseen conditions can occur which require an additional or different procedure. I agree to allow my physician or assistants to perform such extension of the original procedure as they may find necessary. I understand that sedation will often result in temporary impairment of memory and fine motor skills and that sedation can occasionally progress to a state of deep sedation or general anesthesia. I understand the risks of anesthesia for surgery include, but are not limited to, sore throat, hoarseness, injury to face, mouth, or teeth; nausea; headache; injury to blood vessels or nerves; death, brain damage, or paralysis.     I understand that if I have a Limitation of Treatment order in effect during my hospitalization, the order may or may not be in effect during this procedure. I give my doctor permission to give me blood or blood products. I understand that there are risks with receiving blood such as hepatitis, AIDS, fever, or allergic reaction. I acknowledge that the risks, benefits, and alternatives of this treatment have been explained to me and that no express or implied warranty has been given by the hospital, any blood bank, or any person or entity as to the blood or blood components transfused. At the discretion of my doctor, I agree to allow observers, equipment/product representatives and allow photographing, and/or televising of the procedure, provided my name or identity is maintained confidentially. I agree the hospital may dispose of or use for scientific or educational purposes any tissue, fluid, or body parts which may be removed.     ________________________________Date________Time______ am/pm  ([employer])  Patient or Signature of Closest Relative or Legal Guardian    ________________________________Date________Time______am/pm      Page 1 of  1  Witness

## 2020-07-09 ENCOUNTER — HOSPITAL ENCOUNTER (OUTPATIENT)
Dept: NON INVASIVE DIAGNOSTICS | Age: 60
Discharge: HOME | End: 2020-07-09
Payer: COMMERCIAL

## 2020-07-09 ENCOUNTER — NURSE ONLY (OUTPATIENT)
Dept: PRIMARY CARE CLINIC | Age: 60
End: 2020-07-09
Payer: COMMERCIAL

## 2020-07-09 DIAGNOSIS — I25.83 CORONARY ARTERY DISEASE DUE TO LIPID RICH PLAQUE: ICD-10-CM

## 2020-07-09 DIAGNOSIS — E78.2 MIXED HYPERLIPIDEMIA: ICD-10-CM

## 2020-07-09 DIAGNOSIS — E55.9 VITAMIN D DEFICIENCY: ICD-10-CM

## 2020-07-09 DIAGNOSIS — I25.10 CORONARY ARTERY DISEASE DUE TO LIPID RICH PLAQUE: ICD-10-CM

## 2020-07-09 DIAGNOSIS — I10 ESSENTIAL HYPERTENSION: ICD-10-CM

## 2020-07-09 DIAGNOSIS — R73.03 PREDIABETES: ICD-10-CM

## 2020-07-09 LAB
A/G RATIO: 1.7 (ref 1.1–2.2)
ALBUMIN SERPL-MCNC: 4.6 G/DL (ref 3.4–5)
ALP BLD-CCNC: 80 U/L (ref 40–129)
ALT SERPL-CCNC: <5 U/L (ref 10–40)
ANION GAP SERPL CALCULATED.3IONS-SCNC: 13 MMOL/L (ref 3–16)
AST SERPL-CCNC: <5 U/L (ref 15–37)
BASOPHILS ABSOLUTE: 0.1 K/UL (ref 0–0.2)
BASOPHILS RELATIVE PERCENT: 0.8 %
BILIRUB SERPL-MCNC: 0.3 MG/DL (ref 0–1)
BUN BLDV-MCNC: 17 MG/DL (ref 7–20)
CALCIUM SERPL-MCNC: 9.4 MG/DL (ref 8.3–10.6)
CHLORIDE BLD-SCNC: 102 MMOL/L (ref 99–110)
CHOLESTEROL, TOTAL: 230 MG/DL (ref 0–199)
CO2: 27 MMOL/L (ref 21–32)
CREAT SERPL-MCNC: 0.9 MG/DL (ref 0.9–1.3)
EOSINOPHILS ABSOLUTE: 0.4 K/UL (ref 0–0.6)
EOSINOPHILS RELATIVE PERCENT: 5.6 %
GFR AFRICAN AMERICAN: >60
GFR NON-AFRICAN AMERICAN: >60
GLOBULIN: 2.7 G/DL
GLUCOSE BLD-MCNC: 98 MG/DL (ref 70–99)
HCT VFR BLD CALC: 43.2 % (ref 40.5–52.5)
HDLC SERPL-MCNC: 32 MG/DL (ref 40–60)
HEMOGLOBIN: 14.7 G/DL (ref 13.5–17.5)
LDL CHOLESTEROL CALCULATED: ABNORMAL MG/DL
LDL CHOLESTEROL DIRECT: 140 MG/DL
LV EF: 55 %
LVEF MODALITY: NORMAL
LYMPHOCYTES ABSOLUTE: 4.1 K/UL (ref 1–5.1)
LYMPHOCYTES RELATIVE PERCENT: 55.2 %
MCH RBC QN AUTO: 29.6 PG (ref 26–34)
MCHC RBC AUTO-ENTMCNC: 33.9 G/DL (ref 31–36)
MCV RBC AUTO: 87.3 FL (ref 80–100)
MONOCYTES ABSOLUTE: 0.4 K/UL (ref 0–1.3)
MONOCYTES RELATIVE PERCENT: 4.8 %
NEUTROPHILS ABSOLUTE: 2.5 K/UL (ref 1.7–7.7)
NEUTROPHILS RELATIVE PERCENT: 33.6 %
PDW BLD-RTO: 14.1 % (ref 12.4–15.4)
PLATELET # BLD: 198 K/UL (ref 135–450)
PMV BLD AUTO: 9 FL (ref 5–10.5)
POTASSIUM SERPL-SCNC: 4.2 MMOL/L (ref 3.5–5.1)
RBC # BLD: 4.95 M/UL (ref 4.2–5.9)
SODIUM BLD-SCNC: 142 MMOL/L (ref 136–145)
TOTAL CK: 688 U/L (ref 39–308)
TOTAL PROTEIN: 7.3 G/DL (ref 6.4–8.2)
TRIGL SERPL-MCNC: 615 MG/DL (ref 0–150)
VITAMIN D 25-HYDROXY: 17.6 NG/ML
VLDLC SERPL CALC-MCNC: ABNORMAL MG/DL
WBC # BLD: 7.4 K/UL (ref 4–11)

## 2020-07-09 PROCEDURE — 93017 CV STRESS TEST TRACING ONLY: CPT

## 2020-07-09 PROCEDURE — 93350 STRESS TTE ONLY: CPT

## 2020-07-09 PROCEDURE — 99211 OFF/OP EST MAY X REQ PHY/QHP: CPT | Performed by: NURSE PRACTITIONER

## 2020-07-09 NOTE — PROGRESS NOTES
[facility] PRE-SURGICAL TESTING INSTRUCTIONS                              PRIOR TO PROCEDURE DATE:  1. Please follow any guidelines/instructions prior to your procedure as advised by your surgeon. 2. Arrange for someone to drive you home and be with you for the first 24 hours after discharge for your safety after your procedure for which you received sedation. Ensure it is someone we can share information with regarding your discharge. 3. You must contact your surgeon for instructions IF:   You are taking any blood thinners, aspirin, anti-inflammatory or vitamin E.   There is a change in your physical condition such as a cold, fever, rash, cuts, sores or any other infection, especially near your surgical site. 4. Do not drink alcohol the day before or day of your procedure. 5. A Pre-op History and Physical for surgery MUST be completed by your Physician or Urgent Care within 30 days of your procedure date. Please bring a copy with you on the day of your procedure and along with any other testing performed. THE DAY OF YOUR PROCEDURE:  1. Follow instructions for ARRIVAL TIME as DIRECTED BY YOUR SURGEON. I    2. Enter the MAIN entrance from [employer] and follow the signs to the free [employer] or [employer] parking (offered free of charge 6am-5pm). 3. Enter the Main Entrance of the hospital (do not enter from the lower level of the parking garage). Upon entrance, check in with the  at the main desk on your left. If no one is available at the desk, proceed into the [facility] Waiting Room and go through the door directly into the [facility]. There is a Check-in desk ACROSS from Room 5 (marked with a sign hanging from the ceiling). The phone number for the surgery center is [phone]. 4. Please call [phone] option #2 option #2 if you have not been preregistered yet. On the day of your procedure bring your insurance card and photo ID.  You will be registered at your bedside once brought back to your room. 5. DO NOT EAT ANYTHING eight hours prior to surgery. May have 8 ounces of water 4 hours prior to surgery. 6. MEDICATIONS    Take the following medications with a SMALL sip of water:TOPROL AND AMLODIPINE BRING CPAP   Use your usual dose of inhalers the morning of surgery. BRING your rescue inhaler with you to hospital.    Anesthesia does NOT want you to take insulin the morning of surgery. They will control your blood sugar while you are at the hospital. Please contact your ordering physician for instructions regarding your insulin the night before your procedure. If you have an insulin pump, please keep it set on basal rate. 7. Do not swallow water when brushing teeth. No gum, candy, mints or ice chips. Refrain from smoking or at least decrease the amount. 8. Dress in loose, comfortable clothing appropriate for redressing after your procedure. Do not wear jewelry (including body piercings), make-up (especially NO eye make-up), fingernail polish (NO toenail polish if foot/leg surgery), lotion, powders or metal hairclips. 9. Dentures, glasses, or contacts will need to be removed before your procedure. Bring cases for your glasses, contacts, dentures, or hearing aids to protect them while you are in surgery. 10. If you use a CPAP, please bring it with you on the day of your procedure. 11. We recommend that valuable personal  belongings such as cash, cell phones, e-tablets or jewelry, be left at home during your stay. The hospital will not be responsible for valuables that are not secured in the hospital safe. However, if your insurance requires a co-pay, you may want to bring a method of payment, i.e. Check or credit card, if you wish to pay your co-pay the day of surgery. 12. If you are to stay overnight, you may bring a bag with personal items.  Please have any large items you may need brought in by your family after your will help you manage these potential side effects. 2. For comfort and safety, arrange to have someone at home with you for the first 24 hours after discharge. 3. You and your family will be given written instructions about your diet, activity, dressing care, medications, and return visits. 4. Once at home, should issues with nausea, pain, or bleeding occur, or should you notice any signs of infection, you should call your surgeon. 5. Always clean your hands before and after caring for your wound. Do not let your family touch your surgery site without cleaning their hands. 6. Narcotic pain medications can cause significant constipation. You may want to add a stool softener to your postoperative medication schedule or speak to your surgeon on how best to manage this SIDE EFFECT. SPECIAL INSTRUCTIONS     Thank you for allowing us to care for you. We strive to exceed your expectations in the delivery of care and service provided to you and your family. If you need to contact us for any reason, please call us at [phone]    Instructions reviewed with patient during preadmission testing phone interview. Drea Castro. 7/9/2020 .3:41 PM      ADDITIONAL EDUCATIONAL INFORMATION REVIEWED PER PHONE WITH YOU AND/OR YOUR FAMILY:  No Bring a urine sample on day of surgery  Yes Pain Goal-Taking Control of Your Pain  Yes FAQs about Surgical Site Infections  No Hibiclens® Bathing Instructions   Yes Antibacterial Soap  No Nilo® Wipes Bathing Instructions (Obtained from: https://www.[employer]/. pdf )  No Incentive Spirometer Education  No Other

## 2020-07-10 LAB
ESTIMATED AVERAGE GLUCOSE: 116.9 MG/DL
HBA1C MFR BLD: 5.7 %

## 2020-07-13 ENCOUNTER — TELEPHONE (OUTPATIENT)
Dept: CARDIOLOGY CLINIC | Age: 60
End: 2020-07-13

## 2020-07-13 ENCOUNTER — ANESTHESIA EVENT (OUTPATIENT)
Dept: OPERATING ROOM | Age: 60
DRG: 950 | End: 2020-07-13
Payer: COMMERCIAL

## 2020-07-13 LAB — SARS-COV-2: NOT DETECTED

## 2020-07-13 NOTE — TELEPHONE ENCOUNTER
Pt is having surgery 7/14. Pt had an Stress Echo on 7/9. Could Lonnie Dawkins take a look at it and then place a letter in [address] [place] [address] so he can have surgery?

## 2020-07-13 NOTE — TELEPHONE ENCOUNTER
Signed form for risk stratification along with last office note, stress echo results, and recent EKG faxed successfully to [facility] as requested on the form.

## 2020-07-13 NOTE — TELEPHONE ENCOUNTER
Isabel calling back about this pt procedure is tomorrow.  She also gave a different number to call her at [phone] Pls call to advise Thank you

## 2020-07-13 NOTE — PROGRESS NOTES
Pt had stress echo on 7/9. I called Dr. Diane Oakley office this morning and requested MD review the results and determine if patient is cleared for surgery and place a note in Epic. If patient is cleared per cardiology, I will call PCP for note addendum as well. 1449: Called Dr. Diane Oakley office again requesting review of stress test and letter of cardiac clearance. 7/13/20 @ 4037 Dr. Yanet Sims. Irlanda called. States patient is clear for surgery 7/14. Stress Echo normal and office faxing result over/ A.  Genesis Portillo

## 2020-07-14 ENCOUNTER — APPOINTMENT (OUTPATIENT)
Dept: GENERAL RADIOLOGY | Age: 60
DRG: 950 | End: 2020-07-14
Attending: NEUROLOGICAL SURGERY
Payer: COMMERCIAL

## 2020-07-14 ENCOUNTER — ANESTHESIA (OUTPATIENT)
Dept: OPERATING ROOM | Age: 60
DRG: 950 | End: 2020-07-14
Payer: COMMERCIAL

## 2020-07-14 ENCOUNTER — HOSPITAL ENCOUNTER (OUTPATIENT)
Age: 60
Setting detail: OUTPATIENT SURGERY
Discharge: HOME | DRG: 950 | End: 2020-07-14
Attending: NEUROLOGICAL SURGERY | Admitting: NEUROLOGICAL SURGERY
Payer: COMMERCIAL

## 2020-07-14 VITALS
RESPIRATION RATE: 16 BRPM | TEMPERATURE: 98 F | BODY MASS INDEX: 34.82 KG/M2 | OXYGEN SATURATION: 92 % | SYSTOLIC BLOOD PRESSURE: 155 MMHG | HEART RATE: 67 BPM | WEIGHT: 280 LBS | HEIGHT: 75 IN | DIASTOLIC BLOOD PRESSURE: 103 MMHG

## 2020-07-14 VITALS — DIASTOLIC BLOOD PRESSURE: 88 MMHG | OXYGEN SATURATION: 100 % | TEMPERATURE: 94.3 F | SYSTOLIC BLOOD PRESSURE: 138 MMHG

## 2020-07-14 LAB
APTT: 34.7 SEC (ref 24.2–36.2)
INR BLD: 0.91 (ref 0.86–1.14)
PROTHROMBIN TIME: 10.6 SEC (ref 10–13.2)

## 2020-07-14 PROCEDURE — C1778 LEAD, NEUROSTIMULATOR: HCPCS | Performed by: NEUROLOGICAL SURGERY

## 2020-07-14 PROCEDURE — 6360000002 HC RX W HCPCS: Performed by: ANESTHESIOLOGY

## 2020-07-14 PROCEDURE — 2580000003 HC RX 258: Performed by: ANESTHESIOLOGY

## 2020-07-14 PROCEDURE — C1822 GEN, NEURO, HF, RECHG BAT: HCPCS | Performed by: NEUROLOGICAL SURGERY

## 2020-07-14 PROCEDURE — C9290 INJ, BUPIVACAINE LIPOSOME: HCPCS | Performed by: NEUROLOGICAL SURGERY

## 2020-07-14 PROCEDURE — 3209999900 FLUORO FOR SURGICAL PROCEDURES

## 2020-07-14 PROCEDURE — 7100000011 HC PHASE II RECOVERY - ADDTL 15 MIN: Performed by: NEUROLOGICAL SURGERY

## 2020-07-14 PROCEDURE — 3700000001 HC ADD 15 MINUTES (ANESTHESIA): Performed by: NEUROLOGICAL SURGERY

## 2020-07-14 PROCEDURE — 6360000002 HC RX W HCPCS: Performed by: NURSE ANESTHETIST, CERTIFIED REGISTERED

## 2020-07-14 PROCEDURE — 3600000004 HC SURGERY LEVEL 4 BASE: Performed by: NEUROLOGICAL SURGERY

## 2020-07-14 PROCEDURE — 00HV0MZ INSERTION OF NEUROSTIMULATOR LEAD INTO SPINAL CORD, OPEN APPROACH: ICD-10-PCS | Performed by: NEUROLOGICAL SURGERY

## 2020-07-14 PROCEDURE — 7100000010 HC PHASE II RECOVERY - FIRST 15 MIN: Performed by: NEUROLOGICAL SURGERY

## 2020-07-14 PROCEDURE — 7100000001 HC PACU RECOVERY - ADDTL 15 MIN: Performed by: NEUROLOGICAL SURGERY

## 2020-07-14 PROCEDURE — 72020 X-RAY EXAM OF SPINE 1 VIEW: CPT

## 2020-07-14 PROCEDURE — 85610 PROTHROMBIN TIME: CPT

## 2020-07-14 PROCEDURE — 6360000002 HC RX W HCPCS: Performed by: NEUROLOGICAL SURGERY

## 2020-07-14 PROCEDURE — 6370000000 HC RX 637 (ALT 250 FOR IP): Performed by: NEUROLOGICAL SURGERY

## 2020-07-14 PROCEDURE — 2500000003 HC RX 250 WO HCPCS: Performed by: NURSE ANESTHETIST, CERTIFIED REGISTERED

## 2020-07-14 PROCEDURE — 2500000003 HC RX 250 WO HCPCS: Performed by: NEUROLOGICAL SURGERY

## 2020-07-14 PROCEDURE — 2709999900 HC NON-CHARGEABLE SUPPLY: Performed by: NEUROLOGICAL SURGERY

## 2020-07-14 PROCEDURE — 85730 THROMBOPLASTIN TIME PARTIAL: CPT

## 2020-07-14 PROCEDURE — 3700000000 HC ANESTHESIA ATTENDED CARE: Performed by: NEUROLOGICAL SURGERY

## 2020-07-14 PROCEDURE — 3600000014 HC SURGERY LEVEL 4 ADDTL 15MIN: Performed by: NEUROLOGICAL SURGERY

## 2020-07-14 PROCEDURE — 2580000003 HC RX 258: Performed by: NEUROLOGICAL SURGERY

## 2020-07-14 PROCEDURE — 7100000000 HC PACU RECOVERY - FIRST 15 MIN: Performed by: NEUROLOGICAL SURGERY

## 2020-07-14 PROCEDURE — 2720000010 HC SURG SUPPLY STERILE: Performed by: NEUROLOGICAL SURGERY

## 2020-07-14 PROCEDURE — 0JH70MZ INSERTION OF STIMULATOR GENERATOR INTO BACK SUBCUTANEOUS TISSUE AND FASCIA, OPEN APPROACH: ICD-10-PCS | Performed by: NEUROLOGICAL SURGERY

## 2020-07-14 DEVICE — SENZA II
Type: IMPLANTABLE DEVICE | Site: THORACIC | Status: FUNCTIONAL
Brand: NEVRO

## 2020-07-14 DEVICE — SURGICAL LEAD KIT, 70CM
Type: IMPLANTABLE DEVICE | Site: THORACIC | Status: FUNCTIONAL
Brand: NEVRO®

## 2020-07-14 RX ORDER — ENALAPRILAT 2.5 MG/2ML
1.25 INJECTION INTRAVENOUS
Status: DISCONTINUED | OUTPATIENT
Start: 2020-07-14 | End: 2020-07-14 | Stop reason: HOSPADM

## 2020-07-14 RX ORDER — PROPOFOL 10 MG/ML
INJECTION, EMULSION INTRAVENOUS PRN
Status: DISCONTINUED | OUTPATIENT
Start: 2020-07-14 | End: 2020-07-14 | Stop reason: SDUPTHER

## 2020-07-14 RX ORDER — LABETALOL 20 MG/4 ML (5 MG/ML) INTRAVENOUS SYRINGE
5 EVERY 10 MIN PRN
Status: DISCONTINUED | OUTPATIENT
Start: 2020-07-14 | End: 2020-07-14 | Stop reason: HOSPADM

## 2020-07-14 RX ORDER — FENTANYL CITRATE 50 UG/ML
25 INJECTION, SOLUTION INTRAMUSCULAR; INTRAVENOUS EVERY 5 MIN PRN
Status: DISCONTINUED | OUTPATIENT
Start: 2020-07-14 | End: 2020-07-14 | Stop reason: HOSPADM

## 2020-07-14 RX ORDER — ONDANSETRON 2 MG/ML
4 INJECTION INTRAMUSCULAR; INTRAVENOUS
Status: DISCONTINUED | OUTPATIENT
Start: 2020-07-14 | End: 2020-07-14 | Stop reason: HOSPADM

## 2020-07-14 RX ORDER — SODIUM CHLORIDE 0.9 % (FLUSH) 0.9 %
10 SYRINGE (ML) INJECTION EVERY 12 HOURS SCHEDULED
Status: DISCONTINUED | OUTPATIENT
Start: 2020-07-14 | End: 2020-07-14 | Stop reason: HOSPADM

## 2020-07-14 RX ORDER — SODIUM CHLORIDE 0.9 % (FLUSH) 0.9 %
10 SYRINGE (ML) INJECTION PRN
Status: DISCONTINUED | OUTPATIENT
Start: 2020-07-14 | End: 2020-07-14 | Stop reason: HOSPADM

## 2020-07-14 RX ORDER — FENTANYL CITRATE 50 UG/ML
INJECTION, SOLUTION INTRAMUSCULAR; INTRAVENOUS PRN
Status: DISCONTINUED | OUTPATIENT
Start: 2020-07-14 | End: 2020-07-14 | Stop reason: SDUPTHER

## 2020-07-14 RX ORDER — OXYCODONE HYDROCHLORIDE AND ACETAMINOPHEN 5; 325 MG/1; MG/1
1 TABLET ORAL
Status: COMPLETED | OUTPATIENT
Start: 2020-07-14 | End: 2020-07-14

## 2020-07-14 RX ORDER — SODIUM CHLORIDE, SODIUM LACTATE, POTASSIUM CHLORIDE, CALCIUM CHLORIDE 600; 310; 30; 20 MG/100ML; MG/100ML; MG/100ML; MG/100ML
INJECTION, SOLUTION INTRAVENOUS CONTINUOUS
Status: DISCONTINUED | OUTPATIENT
Start: 2020-07-14 | End: 2020-07-14 | Stop reason: HOSPADM

## 2020-07-14 RX ORDER — HYDRALAZINE HYDROCHLORIDE 20 MG/ML
5 INJECTION INTRAMUSCULAR; INTRAVENOUS EVERY 5 MIN PRN
Status: DISCONTINUED | OUTPATIENT
Start: 2020-07-14 | End: 2020-07-14 | Stop reason: HOSPADM

## 2020-07-14 RX ORDER — EPHEDRINE SULFATE 50 MG/ML
INJECTION INTRAVENOUS PRN
Status: DISCONTINUED | OUTPATIENT
Start: 2020-07-14 | End: 2020-07-14 | Stop reason: SDUPTHER

## 2020-07-14 RX ORDER — OXYCODONE AND ACETAMINOPHEN 10; 325 MG/1; MG/1
1-2 TABLET ORAL EVERY 6 HOURS PRN
Qty: 40 TABLET | Refills: 0 | Status: SHIPPED | OUTPATIENT
Start: 2020-07-14 | End: 2020-08-13

## 2020-07-14 RX ORDER — FENTANYL CITRATE 50 UG/ML
50 INJECTION, SOLUTION INTRAMUSCULAR; INTRAVENOUS EVERY 5 MIN PRN
Status: DISCONTINUED | OUTPATIENT
Start: 2020-07-14 | End: 2020-07-14 | Stop reason: HOSPADM

## 2020-07-14 RX ORDER — DEXAMETHASONE SODIUM PHOSPHATE 4 MG/ML
INJECTION, SOLUTION INTRA-ARTICULAR; INTRALESIONAL; INTRAMUSCULAR; INTRAVENOUS; SOFT TISSUE PRN
Status: DISCONTINUED | OUTPATIENT
Start: 2020-07-14 | End: 2020-07-14 | Stop reason: SDUPTHER

## 2020-07-14 RX ORDER — DEXAMETHASONE SODIUM PHOSPHATE 4 MG/ML
INJECTION, SOLUTION INTRA-ARTICULAR; INTRALESIONAL; INTRAMUSCULAR; INTRAVENOUS; SOFT TISSUE PRN
Status: DISCONTINUED | OUTPATIENT
Start: 2020-07-14 | End: 2020-07-14

## 2020-07-14 RX ORDER — ROCURONIUM BROMIDE 10 MG/ML
INJECTION, SOLUTION INTRAVENOUS PRN
Status: DISCONTINUED | OUTPATIENT
Start: 2020-07-14 | End: 2020-07-14 | Stop reason: SDUPTHER

## 2020-07-14 RX ORDER — LIDOCAINE HYDROCHLORIDE 20 MG/ML
INJECTION, SOLUTION INTRAVENOUS PRN
Status: DISCONTINUED | OUTPATIENT
Start: 2020-07-14 | End: 2020-07-14 | Stop reason: SDUPTHER

## 2020-07-14 RX ORDER — CEFAZOLIN SODIUM 2 G/50ML
2 SOLUTION INTRAVENOUS ONCE
Status: COMPLETED | OUTPATIENT
Start: 2020-07-14 | End: 2020-07-14

## 2020-07-14 RX ORDER — LIDOCAINE HYDROCHLORIDE 10 MG/ML
1 INJECTION, SOLUTION EPIDURAL; INFILTRATION; INTRACAUDAL; PERINEURAL
Status: DISCONTINUED | OUTPATIENT
Start: 2020-07-14 | End: 2020-07-14 | Stop reason: HOSPADM

## 2020-07-14 RX ADMIN — EPHEDRINE SULFATE 5 MG: 50 INJECTION INTRAVENOUS at 09:46

## 2020-07-14 RX ADMIN — OXYCODONE HYDROCHLORIDE AND ACETAMINOPHEN 1 TABLET: 5; 325 TABLET ORAL at 14:01

## 2020-07-14 RX ADMIN — PHENYLEPHRINE HYDROCHLORIDE 100 MCG: 10 INJECTION, SOLUTION INTRAMUSCULAR; INTRAVENOUS; SUBCUTANEOUS at 09:16

## 2020-07-14 RX ADMIN — DEXAMETHASONE SODIUM PHOSPHATE 4 MG: 4 INJECTION, SOLUTION INTRAMUSCULAR; INTRAVENOUS at 09:01

## 2020-07-14 RX ADMIN — SUGAMMADEX 200 MG: 100 INJECTION, SOLUTION INTRAVENOUS at 11:01

## 2020-07-14 RX ADMIN — EPHEDRINE SULFATE 15 MG: 50 INJECTION INTRAVENOUS at 09:25

## 2020-07-14 RX ADMIN — SODIUM CHLORIDE, SODIUM LACTATE, POTASSIUM CHLORIDE, AND CALCIUM CHLORIDE: 600; 310; 30; 20 INJECTION, SOLUTION INTRAVENOUS at 08:10

## 2020-07-14 RX ADMIN — FENTANYL CITRATE 100 MCG: 50 INJECTION INTRAMUSCULAR; INTRAVENOUS at 09:26

## 2020-07-14 RX ADMIN — EPHEDRINE SULFATE 5 MG: 50 INJECTION INTRAVENOUS at 09:41

## 2020-07-14 RX ADMIN — SODIUM CHLORIDE, SODIUM LACTATE, POTASSIUM CHLORIDE, AND CALCIUM CHLORIDE: 600; 310; 30; 20 INJECTION, SOLUTION INTRAVENOUS at 09:50

## 2020-07-14 RX ADMIN — EPHEDRINE SULFATE 10 MG: 50 INJECTION INTRAVENOUS at 09:37

## 2020-07-14 RX ADMIN — PHENYLEPHRINE HYDROCHLORIDE 100 MCG: 10 INJECTION, SOLUTION INTRAMUSCULAR; INTRAVENOUS; SUBCUTANEOUS at 09:06

## 2020-07-14 RX ADMIN — HYDROMORPHONE HYDROCHLORIDE 0.5 MG: 1 INJECTION, SOLUTION INTRAMUSCULAR; INTRAVENOUS; SUBCUTANEOUS at 12:55

## 2020-07-14 RX ADMIN — LIDOCAINE HYDROCHLORIDE 100 MG: 20 INJECTION, SOLUTION INTRAVENOUS at 08:57

## 2020-07-14 RX ADMIN — PHENYLEPHRINE HYDROCHLORIDE 100 MCG: 10 INJECTION, SOLUTION INTRAMUSCULAR; INTRAVENOUS; SUBCUTANEOUS at 09:13

## 2020-07-14 RX ADMIN — CEFAZOLIN SODIUM 3 G: 2 SOLUTION INTRAVENOUS at 09:05

## 2020-07-14 RX ADMIN — FENTANYL CITRATE 100 MCG: 50 INJECTION INTRAMUSCULAR; INTRAVENOUS at 08:48

## 2020-07-14 RX ADMIN — ROCURONIUM BROMIDE 100 MG: 10 INJECTION INTRAVENOUS at 08:57

## 2020-07-14 RX ADMIN — HYDROMORPHONE HYDROCHLORIDE 0.5 MG: 1 INJECTION, SOLUTION INTRAMUSCULAR; INTRAVENOUS; SUBCUTANEOUS at 12:10

## 2020-07-14 RX ADMIN — EPHEDRINE SULFATE 10 MG: 50 INJECTION INTRAVENOUS at 09:34

## 2020-07-14 RX ADMIN — PHENYLEPHRINE HYDROCHLORIDE 200 MCG: 10 INJECTION, SOLUTION INTRAMUSCULAR; INTRAVENOUS; SUBCUTANEOUS at 09:03

## 2020-07-14 RX ADMIN — PROPOFOL 300 MG: 10 INJECTION, EMULSION INTRAVENOUS at 08:57

## 2020-07-14 RX ADMIN — PHENYLEPHRINE HYDROCHLORIDE 100 MCG: 10 INJECTION, SOLUTION INTRAMUSCULAR; INTRAVENOUS; SUBCUTANEOUS at 09:09

## 2020-07-14 RX ADMIN — HYDROMORPHONE HYDROCHLORIDE 0.5 MG: 1 INJECTION, SOLUTION INTRAMUSCULAR; INTRAVENOUS; SUBCUTANEOUS at 12:20

## 2020-07-14 RX ADMIN — HYDROMORPHONE HYDROCHLORIDE 0.5 MG: 1 INJECTION, SOLUTION INTRAMUSCULAR; INTRAVENOUS; SUBCUTANEOUS at 12:50

## 2020-07-14 RX ADMIN — EPHEDRINE SULFATE 5 MG: 50 INJECTION INTRAVENOUS at 09:43

## 2020-07-14 ASSESSMENT — PAIN SCALES - GENERAL
PAINLEVEL_OUTOF10: 0
PAINLEVEL_OUTOF10: 9
PAINLEVEL_OUTOF10: 0
PAINLEVEL_OUTOF10: 0
PAINLEVEL_OUTOF10: 7
PAINLEVEL_OUTOF10: 9
PAINLEVEL_OUTOF10: 0
PAINLEVEL_OUTOF10: 9
PAINLEVEL_OUTOF10: 7
PAINLEVEL_OUTOF10: 9
PAINLEVEL_OUTOF10: 8
PAINLEVEL_OUTOF10: 0
PAINLEVEL_OUTOF10: 9
PAINLEVEL_OUTOF10: 9
PAINLEVEL_OUTOF10: 7
PAINLEVEL_OUTOF10: 9
PAINLEVEL_OUTOF10: 0
PAINLEVEL_OUTOF10: 7
PAINLEVEL_OUTOF10: 7

## 2020-07-14 ASSESSMENT — PULMONARY FUNCTION TESTS
PIF_VALUE: 32
PIF_VALUE: 2
PIF_VALUE: 32
PIF_VALUE: 23
PIF_VALUE: 32
PIF_VALUE: 8
PIF_VALUE: 32
PIF_VALUE: 29
PIF_VALUE: 32
PIF_VALUE: 33
PIF_VALUE: 32
PIF_VALUE: 31
PIF_VALUE: 32
PIF_VALUE: 29
PIF_VALUE: 32
PIF_VALUE: 3
PIF_VALUE: 31
PIF_VALUE: 29
PIF_VALUE: 30
PIF_VALUE: 32
PIF_VALUE: 32
PIF_VALUE: 33
PIF_VALUE: 32
PIF_VALUE: 27
PIF_VALUE: 29
PIF_VALUE: 32
PIF_VALUE: 29
PIF_VALUE: 33
PIF_VALUE: 32
PIF_VALUE: 29
PIF_VALUE: 23
PIF_VALUE: 28
PIF_VALUE: 32
PIF_VALUE: 32
PIF_VALUE: 31
PIF_VALUE: 33
PIF_VALUE: 32
PIF_VALUE: 33
PIF_VALUE: 32
PIF_VALUE: 29
PIF_VALUE: 33
PIF_VALUE: 0
PIF_VALUE: 32
PIF_VALUE: 27
PIF_VALUE: 29
PIF_VALUE: 31
PIF_VALUE: 34
PIF_VALUE: 30
PIF_VALUE: 33
PIF_VALUE: 32
PIF_VALUE: 33
PIF_VALUE: 32
PIF_VALUE: 0
PIF_VALUE: 29
PIF_VALUE: 32
PIF_VALUE: 29
PIF_VALUE: 33
PIF_VALUE: 30
PIF_VALUE: 2
PIF_VALUE: 32
PIF_VALUE: 33
PIF_VALUE: 32
PIF_VALUE: 32
PIF_VALUE: 6
PIF_VALUE: 32
PIF_VALUE: 33
PIF_VALUE: 32
PIF_VALUE: 28
PIF_VALUE: 32
PIF_VALUE: 32
PIF_VALUE: 29
PIF_VALUE: 32
PIF_VALUE: 33
PIF_VALUE: 32
PIF_VALUE: 33
PIF_VALUE: 32
PIF_VALUE: 7
PIF_VALUE: 29
PIF_VALUE: 32
PIF_VALUE: 28
PIF_VALUE: 29
PIF_VALUE: 29
PIF_VALUE: 31
PIF_VALUE: 2
PIF_VALUE: 32
PIF_VALUE: 3
PIF_VALUE: 33
PIF_VALUE: 33
PIF_VALUE: 32
PIF_VALUE: 29
PIF_VALUE: 32
PIF_VALUE: 29
PIF_VALUE: 6
PIF_VALUE: 27
PIF_VALUE: 29
PIF_VALUE: 29
PIF_VALUE: 1
PIF_VALUE: 33
PIF_VALUE: 2
PIF_VALUE: 40
PIF_VALUE: 28
PIF_VALUE: 31
PIF_VALUE: 32
PIF_VALUE: 32
PIF_VALUE: 2
PIF_VALUE: 32
PIF_VALUE: 28
PIF_VALUE: 32
PIF_VALUE: 31
PIF_VALUE: 29
PIF_VALUE: 29
PIF_VALUE: 31
PIF_VALUE: 31
PIF_VALUE: 24
PIF_VALUE: 33
PIF_VALUE: 33
PIF_VALUE: 40
PIF_VALUE: 2
PIF_VALUE: 29
PIF_VALUE: 32

## 2020-07-14 ASSESSMENT — PAIN DESCRIPTION - LOCATION
LOCATION: BACK

## 2020-07-14 ASSESSMENT — PAIN DESCRIPTION - DESCRIPTORS
DESCRIPTORS: SORE
DESCRIPTORS: SORE
DESCRIPTORS: RADIATING;ACHING;SHARP
DESCRIPTORS: SORE
DESCRIPTORS: ACHING
DESCRIPTORS: SORE

## 2020-07-14 ASSESSMENT — PAIN DESCRIPTION - PAIN TYPE
TYPE: CHRONIC PAIN;SURGICAL PAIN

## 2020-07-14 ASSESSMENT — PAIN DESCRIPTION - FREQUENCY
FREQUENCY: CONTINUOUS

## 2020-07-14 ASSESSMENT — PAIN - FUNCTIONAL ASSESSMENT
PAIN_FUNCTIONAL_ASSESSMENT: PREVENTS OR INTERFERES SOME ACTIVE ACTIVITIES AND ADLS
PAIN_FUNCTIONAL_ASSESSMENT: 0-10

## 2020-07-14 NOTE — PROGRESS NOTES
From OR to [facility] accompanied by ronda lugo and monitors applied. intraop meds discussed and pt arrives with oral airway in place, per faces scale 0/10 pain on arrival.  Lainey, device rep, brought supplies for pt in a HF10 back which he is supposed to bring to his post op appointment.

## 2020-07-14 NOTE — H&P
Lowell Sylvester    [id number]    [facility] Same Day Surgery Update H & P  Department of General Surgery   Surgical Service   Pre-operative History and Physical  Last H & P within the last 30 days. DIAGNOSIS:   Postlaminectomy syndrome, thoracic region [M96.1]    PROCEDURE:  HARRIS W/O FACETEC FORAMOT/DSKC 1/2 VRT SEG, THORACIC [53008] (THORACIC LAMINECTOMY WITH PADDLE LEAD AND RECHARGEABLE BATTERY IN RIGHT HIP)     HISTORY OF PRESENT ILLNESS:   Patient has chronic back , hip and leg pain . The symptoms have been recalcitrant to conservative treatment and the patient presents today for the above procedure. Covid 19:  Patient denies fever, chills, cough or known exposure to Covid-19.   Patient reports they have not been quarantined at home since Covid-19 test.      Past Medical History:        Diagnosis Date    Aneurysm of ascending aorta (HCC)     Arthritis     Atopic dermatitis     Back pain     Facial paralysis/[place] palsy     lt side face dropping    Hyperlipidemia     Hypertension     Imprisonment and other incarceration [id number]    Migraine     Obstructive sleep apnea (adult) (pediatric)     cpap machine    Prediabetes     Psoriasis-like skin disease     Tinea cruris      Past Surgical History:        Procedure Laterality Date    BACK SURGERY  February and October 2008    COLONOSCOPY  01/28/2018    CORONARY ANGIOPLASTY      12/2017    CYSTOSCOPY  2001    PAIN MANAGEMENT PROCEDURE Bilateral 2/5/2020    BILATERAL L3 TRANSFORAMINAL  EPIDURAL STEROID INJECTION WITH FLUOROSCOPY performed by Ranulfo Underwood MD at [address] Bilateral 3/9/2020    BILATERAL L1, L2, L3 (ABOVE THE FUSION) MEDIAL BRANCH BLOCK WITH FLUOROSCOPY (03161, 33710)   #1 performed by Ranulfo Underwood MD at [address] N/A 6/3/2020    [address] ([address], [address], [address]) - Idelle Flaming performed by Ranulfo Underwood MD at [address]     Past Social History:  Social History     Socioeconomic History    Marital status:      Spouse name: None    Number of children: 0    Years of education: None    Highest education level: None   Occupational History    Occupation: unemployed    Occupation: former masonary work    Social Needs    Financial resource strain: None    Food insecurity     Worry: None     Inability: None    Transportation needs     Medical: None     Non-medical: None   Tobacco Use    Smoking status: Never Smoker    Smokeless tobacco: Never Used   Substance and Sexual Activity    Alcohol use: Yes     Alcohol/week: 2.0 standard drinks     Types: 1 Cans of beer, 1 Standard drinks or equivalent per week     Frequency: Monthly or less     Drinks per session: 1 or 2     Binge frequency: Less than monthly     Comment: Rarely; socially    Drug use: No    Sexual activity: Yes     Partners: Female     Birth control/protection: Condom   Lifestyle    Physical activity     Days per week: None     Minutes per session: None    Stress: None   Relationships    Social connections     Talks on phone: None     Gets together: None     Attends Jewish service: None     Active member of club or organization: None     Attends meetings of clubs or organizations: None     Relationship status: None    Intimate partner violence     Fear of current or ex partner: None     Emotionally abused: None     Physically abused: None     Forced sexual activity: None   Other Topics Concern    None   Social History Narrative    None         Medications Prior to Admission:      Prior to Admission medications    Medication Sig Start Date End Date Taking?  Authorizing Provider   rosuvastatin (CRESTOR) 20 MG tablet Take 1 tablet by mouth daily 6/16/20  Yes NURIS Jordan CNP   tiZANidine (ZANAFLEX) 4 MG tablet Take 1 tablet by mouth every 8 hours as needed (for muscle spasm) 6/16/20  Yes NURIS Jordan CNP   rivaroxaban (XARELTO) 20 MG TABS tablet Take

## 2020-07-14 NOTE — ANESTHESIA PRE PROCEDURE
sodium chloride flush 0.9 % injection 10 mL  10 mL Intravenous 2 times per day Julienne Bowen MD        sodium chloride flush 0.9 % injection 10 mL  10 mL Intravenous PRN Julienne Bowen MD        lidocaine PF 1 % injection 1 mL  1 mL Intradermal Once PRN Julienne Bowen MD           Allergies:     Allergies   Allergen Reactions    Adhesive Tape Itching    Sugar-Protein-Starch      Runny nose       Problem List:    Patient Active Problem List   Diagnosis Code    Essential hypertension I10    Psoriasis-like skin disease L98.9    Hyperlipidemia E78.5    Prediabetes R73.03    Ascending aortic aneurysm ([address] I71.2    Coronary artery disease due to lipid rich plaque I25.10, I25.83    Migraine without aura and without status migrainosus, not intractable G43.009    Proteinuria R80.9    Renal cysts N28.1    Atrial fibrillation (HCC) I48.91    Obstructive sleep apnea G47.33    Epididymal cyst N50.3    Lightheadedness R42    Facial paralysis/[place] palsy G51.0    Chest pain R07.9       Past Medical History:        Diagnosis Date    Aneurysm of ascending aorta (HCC)     Arthritis     Atopic dermatitis     Back pain     Facial paralysis/[place] palsy     lt side face dropping    Hyperlipidemia     Hypertension     Imprisonment and other incarceration [id number]    Migraine     Obstructive sleep apnea (adult) (pediatric)     cpap machine    Prediabetes     Psoriasis-like skin disease     Tinea cruris        Past Surgical History:        Procedure Laterality Date    BACK SURGERY  February and October 2008    COLONOSCOPY  01/28/2018    CORONARY ANGIOPLASTY      12/2017    CYSTOSCOPY  2001    PAIN MANAGEMENT PROCEDURE Bilateral 2/5/2020    BILATERAL L3 TRANSFORAMINAL  EPIDURAL STEROID INJECTION WITH FLUOROSCOPY performed by Terence Low MD at [address] Bilateral 3/9/2020    BILATERAL L1, L2, L3 (ABOVE THE FUSION) MEDIAL BRANCH BLOCK WITH FLUOROSCOPY (73584, 58843) #1 performed by Papito Mendoza MD at [address] [place] [address] N/A 6/3/2020    SPINAL CORD STIMULATOR TRIAL WITH FLUOROSCOPY (09946, 98029, 98833) - Aryan Jones performed by Papito Mendoza MD at [place] [address] History:    Social History     Tobacco Use    Smoking status: Never Smoker    Smokeless tobacco: Never Used   Substance Use Topics    Alcohol use: Yes     Alcohol/week: 2.0 standard drinks     Types: 1 Cans of beer, 1 Standard drinks or equivalent per week     Frequency: Monthly or less     Drinks per session: 1 or 2     Binge frequency: Less than monthly     Comment: Rarely; socially                                Counseling given: Not Answered      Vital Signs (Current):   Vitals:    07/09/20 1528 07/14/20 0718   BP:  137/89   Pulse:  59   Resp:  18   Temp:  98 °F (36.7 °C)   TempSrc:  Oral   SpO2:  97%   Weight: 280 lb (127 kg) 280 lb (127 kg)   Height: 6' 3\" (1.905 m) 6' 3\" (1.905 m)                                              BP Readings from Last 3 Encounters:   07/14/20 137/89   07/08/20 (!) 130/92   06/25/20 132/88       NPO Status: Time of last liquid consumption: 2100                        Time of last solid consumption: 2100                        Date of last liquid consumption: 07/13/20                        Date of last solid food consumption: 07/13/20    BMI:   Wt Readings from Last 3 Encounters:   07/14/20 280 lb (127 kg)   07/08/20 280 lb 3.2 oz (127.1 kg)   06/25/20 281 lb 9.6 oz (127.7 kg)     Body mass index is 35 kg/m².     CBC:   Lab Results   Component Value Date    WBC 7.4 07/09/2020    RBC 4.95 07/09/2020    HGB 14.7 07/09/2020    HCT 43.2 07/09/2020    MCV 87.3 07/09/2020    RDW 14.1 07/09/2020     07/09/2020       CMP:   Lab Results   Component Value Date     07/09/2020    K 4.2 07/09/2020     07/09/2020    CO2 27 07/09/2020    BUN 17 07/09/2020    CREATININE 0.9 07/09/2020    GFRAA >60 07/09/2020    AGRATIO 1.7 07/09/2020    LABGLOM >60 07/09/2020    GLUCOSE 98 07/09/2020    PROT 7.3 07/09/2020    CALCIUM 9.4 07/09/2020    BILITOT 0.3 07/09/2020    ALKPHOS 80 07/09/2020    AST <5 07/09/2020    ALT <5 07/09/2020       POC Tests: No results for input(s): POCGLU, POCNA, POCK, POCCL, POCBUN, POCHEMO, POCHCT in the last 72 hours. Coags:   Lab Results   Component Value Date    PROTIME 10.8 06/03/2020    INR 0.93 06/03/2020    APTT 34.3 06/03/2020       HCG (If Applicable): No results found for: PREGTESTUR, PREGSERUM, HCG, HCGQUANT     ABGs: No results found for: PHART, PO2ART, FZC9FPG, HHH2TQR, BEART, V7NZOGTZ     Type & Screen (If Applicable):  No results found for: LABABO, LABRH    Drug/Infectious Status (If Applicable):  No results found for: HIV, HEPCAB    COVID-19 Screening (If Applicable):   Lab Results   Component Value Date    COVID19 NOT DETECTED 07/09/2020         Anesthesia Evaluation  Patient summary reviewed no history of anesthetic complications:   Airway: Mallampati: III  TM distance: >3 FB   Neck ROM: full  Mouth opening: > = 3 FB Dental:      Comment: Missing teeth     Pulmonary:   (+) sleep apnea: on CPAP,                             Cardiovascular:    (+) hypertension:, CAD:, dysrhythmias: atrial fibrillation,                ROS comment: Ascending aortic aneurysm 4.8cm      Neuro/Psych:   (+) headaches: migraine headaches,              ROS comment: 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                 Abdominal:           Vascular:                                        Anesthesia Plan      general     ASA 3       Induction: intravenous. Anesthetic plan and risks discussed with patient.                       Che Lucas MD   7/14/2020

## 2020-07-14 NOTE — ANESTHESIA POSTPROCEDURE EVALUATION
Department of Anesthesiology  Postprocedure Note    Patient: Shiv Goel  MRN: [id number]  [place]: 1960  Date of evaluation: 7/14/2020  Time:  2:10 PM     Procedure Summary     Date:  07/14/20 Room / Location:  [facility]    Anesthesia Start:  7475 Anesthesia Stop:  9589    Procedure:  THORACIC LAMINECTOMY WITH PADDLE LEAD AND RECHARGEABLE BATTERY IN RIGHT HIP (Right ) Diagnosis:       Postlaminectomy syndrome, thoracic region      (Postlaminectomy syndrome, thoracic region [M96.1])    Surgeon:  Clari Patel MD Responsible Provider:  Yun Massey MD    Anesthesia Type:  general ASA Status:  3          Anesthesia Type: general    Tiffanie Phase I: Tiffanie Score: 4    Tiffanie Phase II:      Last vitals: Reviewed and per EMR flowsheets.        Anesthesia Post Evaluation    Patient location during evaluation: PACU  Patient participation: complete - patient participated  Level of consciousness: awake  Airway patency: patent  Nausea & Vomiting: no nausea and no vomiting  Complications: no  Cardiovascular status: hemodynamically stable  Respiratory status: nasal cannula  Hydration status: stable

## 2020-07-14 NOTE — OP NOTE
[address]                                OPERATIVE REPORT    PATIENT NAME: Guerline Elizondo                     :        1960  MED REC NO:   [id number]                          ROOM:  ACCOUNT NO:   [de-identified]                           ADMIT DATE: 2020  PROVIDER:     Jess Miles MD    DATE OF PROCEDURE:  2020    PREOPERATIVE DIAGNOSES:  Chronic back pain and failed back syndrome. POSTOPERATIVE DIAGNOSES:  Chronic back pain and failed back syndrome. OPERATION PERFORMED:  Thoracic laminectomy, placement of epidural paddle  lead and then placement of right hip implantable programmable  rechargeable IPG. OPERATING SURGEON:  Jess Miles MD    ANESTHESIA:  General endotracheal.    ESTIMATED BLOOD LOSS:  Less than 15 mL. INDICATION OF PROCEDURE:  The patient is a 73-year-old [facility] [employer]  gentleman with history of chronic low back pain, failed back syndrome,  failed conservative management, underwent a successful percutaneous cord  stimulator trial with good result and wished to proceed with permanent  implantation. The patient's family understood the procedure, risks, and  benefits and agreed to proceed in this fashion. PROCEDURE IN DETAIL:  The patient was brought to the operating room,  general endotracheal anesthesia was induced. The patient placed prone  on the John frame. The area of the back and the right hip were  prepped out and draped in a sterile fashion. Incision for the battery  was then marked preoperatively with the patient's input and this was  again _____ on the patient's right hip area. Areas were prepped out and  draped in sterile fashion. Incisions were then infiltrated with local  anesthetic and incision was made in the thoracic spine using 10-blade,  carried down to bone using Bovie cautery. Subperiosteal dissection was  then performed. Fluoroscopy was used to confirm proper position at  T10-T11 interspace. Partial removal of the spinous process was then  performed. It was noted that the patient had relatively thick fat layer  and ligamentum flavum removal was also thick and more difficult than  usual to remove. Epidural dissection was then performed and placement  of the Surpass electrode was then performed to the bottom of the T8. A  good midline position. FloSeal was used for hemostasis. An incision  was made in the right hip and subcutaneous dissection was made for the  Senza II battery. Tunneling was then performed, connections were made. Intraoperative electronic analysis demonstrated good connections. Wounds were then irrigated. Hemostasis was acquired using FloSeal and  Bovie cautery. Wounds were then closed using two interrupted Vicryl  suture for deep layers, three interrupted 4-0 Monocryl and Dermabond for  the skin. No complications were noted. I certify that I was present  and available for the entire procedure.         Mack Maria MD    D: 07/14/2020 13:53:01       T: 07/14/2020 14:24:50     [email or web address]  Job#: [id number]     Doc#: [id number]    CC:

## 2020-07-16 ENCOUNTER — HOSPITAL ENCOUNTER (INPATIENT)
Age: 60
LOS: 2 days | Discharge: HOME | DRG: 950 | End: 2020-07-18
Attending: INTERNAL MEDICINE | Admitting: INTERNAL MEDICINE
Payer: COMMERCIAL

## 2020-07-16 ENCOUNTER — HOSPITAL ENCOUNTER (EMERGENCY)
Age: 60
Discharge: ANOTHER HOSPITAL | End: 2020-07-16
Attending: EMERGENCY MEDICINE
Payer: COMMERCIAL

## 2020-07-16 ENCOUNTER — APPOINTMENT (OUTPATIENT)
Dept: CT IMAGING | Age: 60
End: 2020-07-16
Payer: COMMERCIAL

## 2020-07-16 VITALS
BODY MASS INDEX: 34.32 KG/M2 | OXYGEN SATURATION: 99 % | DIASTOLIC BLOOD PRESSURE: 86 MMHG | HEART RATE: 96 BPM | TEMPERATURE: 99 F | SYSTOLIC BLOOD PRESSURE: 162 MMHG | RESPIRATION RATE: 16 BRPM | WEIGHT: 276 LBS | HEIGHT: 75 IN

## 2020-07-16 PROBLEM — R52 INTRACTABLE PAIN: Status: ACTIVE | Noted: 2020-07-16

## 2020-07-16 LAB
ANION GAP SERPL CALCULATED.3IONS-SCNC: 13 MMOL/L (ref 3–16)
BASOPHILS ABSOLUTE: 0 K/UL (ref 0–0.2)
BASOPHILS RELATIVE PERCENT: 0.3 %
BILIRUBIN URINE: NEGATIVE
BLOOD, URINE: ABNORMAL
BUN BLDV-MCNC: 15 MG/DL (ref 7–20)
C-REACTIVE PROTEIN: 123.8 MG/L (ref 0–5.1)
CALCIUM SERPL-MCNC: 8.8 MG/DL (ref 8.3–10.6)
CHLORIDE BLD-SCNC: 98 MMOL/L (ref 99–110)
CLARITY: CLEAR
CO2: 27 MMOL/L (ref 21–32)
COLOR: YELLOW
CREAT SERPL-MCNC: 1 MG/DL (ref 0.9–1.3)
EOSINOPHILS ABSOLUTE: 0.3 K/UL (ref 0–0.6)
EOSINOPHILS RELATIVE PERCENT: 2.7 %
EPITHELIAL CELLS, UA: 0 /HPF (ref 0–5)
GFR AFRICAN AMERICAN: >60
GFR NON-AFRICAN AMERICAN: >60
GLUCOSE BLD-MCNC: 101 MG/DL (ref 70–99)
GLUCOSE URINE: NEGATIVE MG/DL
HCT VFR BLD CALC: 42.5 % (ref 40.5–52.5)
HEMOGLOBIN: 13.9 G/DL (ref 13.5–17.5)
HYALINE CASTS: 2 /LPF (ref 0–8)
KETONES, URINE: 15 MG/DL
LEUKOCYTE ESTERASE, URINE: NEGATIVE
LYMPHOCYTES ABSOLUTE: 3.4 K/UL (ref 1–5.1)
LYMPHOCYTES RELATIVE PERCENT: 28.4 %
MAGNESIUM: 2.2 MG/DL (ref 1.8–2.4)
MCH RBC QN AUTO: 28.7 PG (ref 26–34)
MCHC RBC AUTO-ENTMCNC: 32.7 G/DL (ref 31–36)
MCV RBC AUTO: 87.8 FL (ref 80–100)
MICROSCOPIC EXAMINATION: YES
MONOCYTES ABSOLUTE: 0.9 K/UL (ref 0–1.3)
MONOCYTES RELATIVE PERCENT: 7.9 %
NEUTROPHILS ABSOLUTE: 7.2 K/UL (ref 1.7–7.7)
NEUTROPHILS RELATIVE PERCENT: 60.7 %
NITRITE, URINE: NEGATIVE
PDW BLD-RTO: 14.1 % (ref 12.4–15.4)
PH UA: 6.5 (ref 5–8)
PLATELET # BLD: 186 K/UL (ref 135–450)
PMV BLD AUTO: 8.7 FL (ref 5–10.5)
POTASSIUM REFLEX MAGNESIUM: 3.5 MMOL/L (ref 3.5–5.1)
PROTEIN UA: 100 MG/DL
RBC # BLD: 4.84 M/UL (ref 4.2–5.9)
RBC UA: 2 /HPF (ref 0–4)
SEDIMENTATION RATE, ERYTHROCYTE: 28 MM/HR (ref 0–20)
SODIUM BLD-SCNC: 138 MMOL/L (ref 136–145)
SPECIFIC GRAVITY UA: 1.02 (ref 1–1.03)
URINE REFLEX TO CULTURE: ABNORMAL
URINE TYPE: ABNORMAL
UROBILINOGEN, URINE: 1 E.U./DL
WBC # BLD: 11.9 K/UL (ref 4–11)
WBC UA: 1 /HPF (ref 0–5)

## 2020-07-16 PROCEDURE — 96376 TX/PRO/DX INJ SAME DRUG ADON: CPT

## 2020-07-16 PROCEDURE — 85652 RBC SED RATE AUTOMATED: CPT

## 2020-07-16 PROCEDURE — 6370000000 HC RX 637 (ALT 250 FOR IP): Performed by: INTERNAL MEDICINE

## 2020-07-16 PROCEDURE — 74176 CT ABD & PELVIS W/O CONTRAST: CPT

## 2020-07-16 PROCEDURE — 2060000000 HC ICU INTERMEDIATE R&B

## 2020-07-16 PROCEDURE — 85025 COMPLETE CBC W/AUTO DIFF WBC: CPT

## 2020-07-16 PROCEDURE — 81001 URINALYSIS AUTO W/SCOPE: CPT

## 2020-07-16 PROCEDURE — 2580000003 HC RX 258: Performed by: INTERNAL MEDICINE

## 2020-07-16 PROCEDURE — 83735 ASSAY OF MAGNESIUM: CPT

## 2020-07-16 PROCEDURE — U0003 INFECTIOUS AGENT DETECTION BY NUCLEIC ACID (DNA OR RNA); SEVERE ACUTE RESPIRATORY SYNDROME CORONAVIRUS 2 (SARS-COV-2) (CORONAVIRUS DISEASE [COVID-19]), AMPLIFIED PROBE TECHNIQUE, MAKING USE OF HIGH THROUGHPUT TECHNOLOGIES AS DESCRIBED BY CMS-2020-01-R: HCPCS

## 2020-07-16 PROCEDURE — 80048 BASIC METABOLIC PNL TOTAL CA: CPT

## 2020-07-16 PROCEDURE — 6360000002 HC RX W HCPCS: Performed by: INTERNAL MEDICINE

## 2020-07-16 PROCEDURE — 86140 C-REACTIVE PROTEIN: CPT

## 2020-07-16 PROCEDURE — 6360000002 HC RX W HCPCS: Performed by: PHYSICIAN ASSISTANT

## 2020-07-16 PROCEDURE — 51798 US URINE CAPACITY MEASURE: CPT

## 2020-07-16 PROCEDURE — 6370000000 HC RX 637 (ALT 250 FOR IP): Performed by: PHYSICIAN ASSISTANT

## 2020-07-16 PROCEDURE — 96374 THER/PROPH/DIAG INJ IV PUSH: CPT

## 2020-07-16 PROCEDURE — 99291 CRITICAL CARE FIRST HOUR: CPT

## 2020-07-16 PROCEDURE — 36415 COLL VENOUS BLD VENIPUNCTURE: CPT

## 2020-07-16 RX ORDER — LISINOPRIL AND HYDROCHLOROTHIAZIDE 25; 20 MG/1; MG/1
1 TABLET ORAL DAILY
Status: DISCONTINUED | OUTPATIENT
Start: 2020-07-17 | End: 2020-07-18 | Stop reason: HOSPADM

## 2020-07-16 RX ORDER — ACETAMINOPHEN 650 MG/1
650 SUPPOSITORY RECTAL EVERY 6 HOURS PRN
Status: DISCONTINUED | OUTPATIENT
Start: 2020-07-16 | End: 2020-07-18 | Stop reason: HOSPADM

## 2020-07-16 RX ORDER — ACETAMINOPHEN 325 MG/1
650 TABLET ORAL EVERY 6 HOURS PRN
Status: DISCONTINUED | OUTPATIENT
Start: 2020-07-16 | End: 2020-07-18 | Stop reason: HOSPADM

## 2020-07-16 RX ORDER — OXYCODONE AND ACETAMINOPHEN 10; 325 MG/1; MG/1
1 TABLET ORAL ONCE
Status: COMPLETED | OUTPATIENT
Start: 2020-07-16 | End: 2020-07-16

## 2020-07-16 RX ORDER — TIZANIDINE 4 MG/1
4 TABLET ORAL EVERY 8 HOURS PRN
Status: DISCONTINUED | OUTPATIENT
Start: 2020-07-16 | End: 2020-07-18 | Stop reason: HOSPADM

## 2020-07-16 RX ORDER — OXYCODONE AND ACETAMINOPHEN 10; 325 MG/1; MG/1
1 TABLET ORAL EVERY 6 HOURS PRN
Status: DISCONTINUED | OUTPATIENT
Start: 2020-07-16 | End: 2020-07-18 | Stop reason: HOSPADM

## 2020-07-16 RX ORDER — POLYETHYLENE GLYCOL 3350 17 G/17G
17 POWDER, FOR SOLUTION ORAL DAILY PRN
Status: DISCONTINUED | OUTPATIENT
Start: 2020-07-16 | End: 2020-07-18 | Stop reason: HOSPADM

## 2020-07-16 RX ORDER — METOPROLOL SUCCINATE 50 MG/1
50 TABLET, EXTENDED RELEASE ORAL DAILY
Status: DISCONTINUED | OUTPATIENT
Start: 2020-07-17 | End: 2020-07-18 | Stop reason: HOSPADM

## 2020-07-16 RX ORDER — SODIUM CHLORIDE 0.9 % (FLUSH) 0.9 %
10 SYRINGE (ML) INJECTION EVERY 12 HOURS SCHEDULED
Status: DISCONTINUED | OUTPATIENT
Start: 2020-07-16 | End: 2020-07-18 | Stop reason: HOSPADM

## 2020-07-16 RX ORDER — SODIUM CHLORIDE 0.9 % (FLUSH) 0.9 %
10 SYRINGE (ML) INJECTION PRN
Status: DISCONTINUED | OUTPATIENT
Start: 2020-07-16 | End: 2020-07-18 | Stop reason: HOSPADM

## 2020-07-16 RX ORDER — DIAZEPAM 5 MG/1
5 TABLET ORAL EVERY 6 HOURS PRN
Status: DISCONTINUED | OUTPATIENT
Start: 2020-07-16 | End: 2020-07-17

## 2020-07-16 RX ORDER — ROSUVASTATIN CALCIUM 20 MG/1
20 TABLET, COATED ORAL DAILY
Status: DISCONTINUED | OUTPATIENT
Start: 2020-07-17 | End: 2020-07-18 | Stop reason: HOSPADM

## 2020-07-16 RX ORDER — AMLODIPINE BESYLATE 5 MG/1
5 TABLET ORAL DAILY
Status: DISCONTINUED | OUTPATIENT
Start: 2020-07-17 | End: 2020-07-18 | Stop reason: HOSPADM

## 2020-07-16 RX ORDER — PROMETHAZINE HYDROCHLORIDE 25 MG/1
12.5 TABLET ORAL EVERY 6 HOURS PRN
Status: DISCONTINUED | OUTPATIENT
Start: 2020-07-16 | End: 2020-07-18 | Stop reason: HOSPADM

## 2020-07-16 RX ORDER — ONDANSETRON 2 MG/ML
4 INJECTION INTRAMUSCULAR; INTRAVENOUS EVERY 6 HOURS PRN
Status: DISCONTINUED | OUTPATIENT
Start: 2020-07-16 | End: 2020-07-18 | Stop reason: HOSPADM

## 2020-07-16 RX ADMIN — HYDROMORPHONE HYDROCHLORIDE 1 MG: 1 INJECTION, SOLUTION INTRAMUSCULAR; INTRAVENOUS; SUBCUTANEOUS at 17:25

## 2020-07-16 RX ADMIN — HYDROMORPHONE HYDROCHLORIDE 1 MG: 1 INJECTION, SOLUTION INTRAMUSCULAR; INTRAVENOUS; SUBCUTANEOUS at 18:39

## 2020-07-16 RX ADMIN — Medication 10 ML: at 22:20

## 2020-07-16 RX ADMIN — TIZANIDINE 4 MG: 4 TABLET ORAL at 22:17

## 2020-07-16 RX ADMIN — POLYETHYLENE GLYCOL 3350 17 G: 17 POWDER, FOR SOLUTION ORAL at 22:21

## 2020-07-16 RX ADMIN — ONDANSETRON 4 MG: 2 INJECTION INTRAMUSCULAR; INTRAVENOUS at 22:19

## 2020-07-16 RX ADMIN — OXYCODONE HYDROCHLORIDE AND ACETAMINOPHEN 1 TABLET: 10; 325 TABLET ORAL at 22:17

## 2020-07-16 RX ADMIN — OXYCODONE HYDROCHLORIDE AND ACETAMINOPHEN 1 TABLET: 10; 325 TABLET ORAL at 15:45

## 2020-07-16 RX ADMIN — HYDROMORPHONE HYDROCHLORIDE 1 MG: 1 INJECTION, SOLUTION INTRAMUSCULAR; INTRAVENOUS; SUBCUTANEOUS at 22:17

## 2020-07-16 RX ADMIN — DIAZEPAM 5 MG: 5 TABLET ORAL at 22:17

## 2020-07-16 ASSESSMENT — PAIN SCALES - GENERAL
PAINLEVEL_OUTOF10: 6
PAINLEVEL_OUTOF10: 10
PAINLEVEL_OUTOF10: 2
PAINLEVEL_OUTOF10: 7
PAINLEVEL_OUTOF10: 3
PAINLEVEL_OUTOF10: 2
PAINLEVEL_OUTOF10: 10
PAINLEVEL_OUTOF10: 3

## 2020-07-16 ASSESSMENT — ENCOUNTER SYMPTOMS
NAUSEA: 0
ABDOMINAL PAIN: 1
EYE PAIN: 0
DIARRHEA: 0
SHORTNESS OF BREATH: 0
BACK PAIN: 0
COUGH: 0
VOMITING: 0

## 2020-07-16 ASSESSMENT — PAIN DESCRIPTION - ORIENTATION
ORIENTATION: RIGHT;LEFT
ORIENTATION: RIGHT;LEFT;MID
ORIENTATION: RIGHT;LEFT;MID

## 2020-07-16 ASSESSMENT — PAIN DESCRIPTION - PROGRESSION: CLINICAL_PROGRESSION: NOT CHANGED

## 2020-07-16 ASSESSMENT — PAIN DESCRIPTION - PAIN TYPE
TYPE: ACUTE PAIN

## 2020-07-16 ASSESSMENT — PAIN DESCRIPTION - LOCATION
LOCATION: FLANK

## 2020-07-16 ASSESSMENT — PAIN - FUNCTIONAL ASSESSMENT: PAIN_FUNCTIONAL_ASSESSMENT: PREVENTS OR INTERFERES WITH MANY ACTIVE NOT PASSIVE ACTIVITIES

## 2020-07-16 ASSESSMENT — PAIN DESCRIPTION - DESCRIPTORS
DESCRIPTORS: ACHING
DESCRIPTORS: OTHER (COMMENT)

## 2020-07-16 ASSESSMENT — PAIN DESCRIPTION - ONSET
ONSET: ON-GOING

## 2020-07-16 ASSESSMENT — PAIN DESCRIPTION - FREQUENCY
FREQUENCY: CONTINUOUS

## 2020-07-16 NOTE — ED PROVIDER NOTES
[address] [facility]        Pt Name: Karley Hutchinson  MRN: [id number]  [place] 1960  Date of evaluation: 7/16/2020  Provider: ALLISON Crane  PCP: NURIS Bianchi CNP     I have seen and evaluated this patient with my supervising physician Tacho Rey, [address] [place] [address]       Chief Complaint   Patient presents with    Flank Pain     Pt to ED via [facility] with c/o bilateral flank pain, radiates to mid/lower abd. States he had a spine stimulator placed 2 days ago, states they found cysts on his kidneys at that time. HISTORY OF PRESENT ILLNESS   (Location, Timing/Onset, Context/Setting, Quality, Duration, Modifying Factors, Severity, Associated Signs and Symptoms)  Note limiting factors. Karley Hutchinson is a 64 y.o. male presents with complaints of bilateral flank pain that radiates towards his lower abdomen. Onset 2 days. Rates his pain as a 10 out of 10. He reports bilateral flank pain, worse in left flank and it wraps around his abdomen. It is aching and constant. He has never had pain like this before. He has only been able to stand to urinate today and has not been able to walk due to pain. The patient had spine stimulator placed 2 days ago with neurosurgery for chronic back pain. No back pain red flags: no fever or chills, saddle paresthesias, urine or bowel incontinence. Denies IV drug use or history of malignancy. No extremity weakness, numbness, or tingling. The patient denies nausea, vomng, diarrhea. No recent travel, exposure to sick contacts, or recent antibiotics. No other acute concerns, associated symptoms or modifying factors. Nursing Notes were all reviewed and agreed with or any disagreements were addressed in the HPI. REVIEW OF SYSTEMS    (2-9 systems for level 4, 10 or more for level 5)     Review of Systems   Constitutional: Negative for chills, fatigue and fever.    Eyes: Negative performed by Erika Calderon MD at [address] [facility]       Discharge Medication List as of 7/16/2020  8:35 PM      CONTINUE these medications which have NOT CHANGED    Details   oxyCODONE-acetaminophen (PERCOCET)  MG per tablet Take 1-2 tablets by mouth every 6 hours as needed for Pain for up to 30 days. Intended supply: 30 days, Disp-40 tablet,R-0Print      rosuvastatin (CRESTOR) 20 MG tablet Take 1 tablet by mouth daily, Disp-90 tablet, R-0Normal      tiZANidine (ZANAFLEX) 4 MG tablet Take 1 tablet by mouth every 8 hours as needed (for muscle spasm), Disp-90 tablet, R-0Normal      Blood Pressure KIT ONCE Starting Tue 6/16/2020, Disp-1 kit, R-0, Normal      Misc. Devices MISC Disp-1 Device, R-0, PrintPads and leads for use on [facility] TENS unit.      metoprolol succinate (TOPROL XL) 50 MG extended release tablet Take 1 tablet by mouth daily, Disp-90 tablet, R-0Normal      lisinopril-hydroCHLOROthiazide (PRINZIDE;ZESTORETIC) 20-25 MG per tablet Take 1 tablet by mouth daily, Disp-90 tablet, R-0Normal      amLODIPine (NORVASC) 5 MG tablet Take 1 tablet by mouth daily, Disp-90 tablet, R-0Normal      vitamin D (ERGOCALCIFEROL) 57435 units CAPS capsule Take 1 capsule by mouth once a week, Disp-4 capsule, R-2Normal               ALLERGIES     Adhesive tape and Sugar-protein-starch    FAMILYHISTORY       Family History   Problem Relation Age of Onset    Other Mother         Polio    Thyroid Disease Mother     Heart Failure Father     Stroke Father           SOCIAL HISTORY       Social History     Tobacco Use    Smoking status: Never Smoker    Smokeless tobacco: Never Used   Substance Use Topics    Alcohol use:  Yes     Alcohol/week: 2.0 standard drinks     Types: 1 Cans of beer, 1 Standard drinks or equivalent per week     Frequency: Monthly or less     Drinks per session: 1 or 2     Binge frequency: Less than monthly     Comment: Rarely; socially    Drug use: No       SCREENINGS PHYSICAL EXAM    (up to 7 for level 4, 8 or more for level 5)     ED Triage Vitals [07/16/20 1508]   BP Temp Temp Source Pulse Resp SpO2 Height Weight   (!) 171/98 99.7 °F (37.6 °C) Oral 96 22 99 % 6' 3\" (1.905 m) 276 lb (125.2 kg)       Physical Exam  Vitals signs and nursing note reviewed. Constitutional:       General: He is in acute distress (moaning in pain). Appearance: He is well-developed. He is not diaphoretic. HENT:      Head: Normocephalic and atraumatic. Eyes:      General:         Right eye: No discharge. Left eye: No discharge. Neck:      Musculoskeletal: Normal range of motion and neck supple. Pulmonary:      Effort: No respiratory distress. Breath sounds: No stridor. Abdominal:      Palpations: Abdomen is soft. Tenderness: There is no abdominal tenderness. Musculoskeletal: Normal range of motion. Skin:     General: Skin is warm and dry. Coloration: Skin is not pale. Neurological:      Mental Status: He is alert and oriented to person, place, and time. Comments: No gross facial drooping. Moves all 4 extremities spontaneously.    Psychiatric:         Behavior: Behavior normal.         DIAGNOSTIC RESULTS   LABS:    Labs Reviewed   URINE RT REFLEX TO CULTURE - Abnormal; Notable for the following components:       Result Value    Ketones, Urine 15 (*)     Blood, Urine SMALL (*)     Protein,  (*)     All other components within normal limits    Narrative:     Performed at:  [facility]  [address] [place], [place] [address]   Phone [phone]   CBC WITH AUTO DIFFERENTIAL - Abnormal; Notable for the following components:    WBC 11.9 (*)     All other components within normal limits    Narrative:     Performed at:  [facility]  [address] [place], [place] [address]   Phone [phone]   BASIC METABOLIC PANEL W/ REFLEX TO MG FOR LOW K - Abnormal; Notable for the following components:    Chloride 98 (*)     Glucose 101 (*)     All other components within normal limits    Narrative:     Performed at:  [address] [address]   Phone [phone]   SEDIMENTATION RATE - Abnormal; Notable for the following components:    Sed Rate 28 (*)     All other components within normal limits    Narrative:     [place]:  [address] [place] [place] [address]   Phone [phone]   C-REACTIVE PROTEIN - Abnormal; Notable for the following components:    .8 (*)     All other components within normal limits    Narrative:     Performed at:  [address] [place] [address]   Phone [phone]   MAGNESIUM    Narrative:     Performed at:  [facility]  [address] [place] [address]   Phone [phone]   MICROSCOPIC URINALYSIS    Narrative:     Performed at:  [facility]  [address] [place] [address]   Phone [phone]       All other labs were within normal range or not returned as of this dictation. EKG: All EKG's are interpreted by the Emergency Department Physician in the absence of a cardiologist.  Please see their note for interpretation of EKG. RADIOLOGY:   Non-plain film images such as CT, Ultrasound and MRI are read by the radiologist. Plain radiographic images are visualized and preliminarily interpreted by the ED Provider with the below findings:        Interpretation per the Radiologist below, if available at the time of this note:    CT ABDOMEN PELVIS WO CONTRAST   Final Result   No findings to account for patient's pain. No renal or ureteral stone.            Xr Thoracic Spine 1 Vw    Result Date: 7/14/2020  FLUOROSCOPY FOR SURGICAL PROCEDURE ON 7/14/2020 HISTORY: Thoracic laminectomy with paddle consult.        The patient was also seen and evaluated by my attending physician Dr. Misael Felton, who agrees with plan of care    Results for orders placed or performed during the hospital encounter of 07/16/20   Urinalysis Reflex to Culture    Specimen: Urine, clean catch   Result Value Ref Range    Color, UA YELLOW Straw/Yellow    Clarity, UA Clear Clear    Glucose, Ur Negative Negative mg/dL    Bilirubin Urine Negative Negative    Ketones, Urine 15 (A) Negative mg/dL    Specific Gravity, UA 1.020 1.005 - 1.030    Blood, Urine SMALL (A) Negative    pH, UA 6.5 5.0 - 8.0    Protein,  (A) Negative mg/dL    Urobilinogen, Urine 1.0 <2.0 E.U./dL    Nitrite, Urine Negative Negative    Leukocyte Esterase, Urine Negative Negative    Microscopic Examination YES     Urine Type NotGiven     Urine Reflex to Culture Not Indicated    CBC Auto Differential   Result Value Ref Range    WBC 11.9 (H) 4.0 - 11.0 K/uL    RBC 4.84 4.20 - 5.90 M/uL    Hemoglobin 13.9 13.5 - 17.5 g/dL    Hematocrit 42.5 40.5 - 52.5 %    MCV 87.8 80.0 - 100.0 fL    MCH 28.7 26.0 - 34.0 pg    MCHC 32.7 31.0 - 36.0 g/dL    RDW 14.1 12.4 - 15.4 %    Platelets 476 473 - 156 K/uL    MPV 8.7 5.0 - 10.5 fL    Neutrophils % 60.7 %    Lymphocytes % 28.4 %    Monocytes % 7.9 %    Eosinophils % 2.7 %    Basophils % 0.3 %    Neutrophils Absolute 7.2 1.7 - 7.7 K/uL    Lymphocytes Absolute 3.4 1.0 - 5.1 K/uL    Monocytes Absolute 0.9 0.0 - 1.3 K/uL    Eosinophils Absolute 0.3 0.0 - 0.6 K/uL    Basophils Absolute 0.0 0.0 - 0.2 K/uL   Basic Metabolic Panel w/ Reflex to MG   Result Value Ref Range    Sodium 138 136 - 145 mmol/L    Potassium reflex Magnesium 3.5 3.5 - 5.1 mmol/L    Chloride 98 (L) 99 - 110 mmol/L    CO2 27 21 - 32 mmol/L    Anion Gap 13 3 - 16    Glucose 101 (H) 70 - 99 mg/dL    BUN 15 7 - 20 mg/dL    CREATININE 1.0 0.9 - 1.3 mg/dL    GFR Non-African American >60 >60    GFR African American >60 >60    Calcium 8.8 8.3 - 10.6 mg/dL   Magnesium   Result Value Ref Range    Magnesium 2.20 1.80 - 2.40 mg/dL   Microscopic Urinalysis   Result Value Ref Range    Hyaline Casts, UA 2 0 - 8 /LPF    WBC, UA 1 0 - 5 /HPF    RBC, UA 2 0 - 4 /HPF    Epithelial Cells, UA 0 0 - 5 /HPF   Sedimentation Rate   Result Value Ref Range    Sed Rate 28 (H) 0 - 20 mm/Hr   C-reactive Protein   Result Value Ref Range    .8 (H) 0.0 - 5.1 mg/L       I spoke with Dr. Lisbeth Nix, hospitalist at [facility]. We thoroughly discussed the history, physical exam, laboratory and imaging studies, as well as, emergency department course. Based upon that discussion, we've decided to transfer  Nick Fong for further observation and evaluation of Luis Peterson's intractable pain at [facility]    FINAL IMPRESSION      1. Intractable pain    2. Status post insertion of spinal cord stimulator          DISPOSITION/PLAN   DISPOSITION        PATIENT REFERREDTO:  No follow-up provider specified.     DISCHARGE MEDICATIONS:  Discharge Medication List as of 7/16/2020  8:35 PM          DISCONTINUED MEDICATIONS:  Discharge Medication List as of 7/16/2020  8:35 PM                 (Please note that portions of this note were completed with a voice recognition program.  Efforts were made to edit the dictations but occasionally words are mis-transcribed.)    Juanita Mares (electronically signed)            ALLISON Mares  07/17/20 1530

## 2020-07-16 NOTE — ED TRIAGE NOTES
Pt to ED via [facility] with c/o bilateral flank pain, radiates to mid/lower abd. States he had a spine stimulator placed 2 days ago, states they found cysts on his kidneys at that time.

## 2020-07-17 LAB
ANION GAP SERPL CALCULATED.3IONS-SCNC: 9 MMOL/L (ref 3–16)
BUN BLDV-MCNC: 17 MG/DL (ref 7–20)
CALCIUM SERPL-MCNC: 9.1 MG/DL (ref 8.3–10.6)
CHLORIDE BLD-SCNC: 98 MMOL/L (ref 99–110)
CO2: 29 MMOL/L (ref 21–32)
CREAT SERPL-MCNC: 1.1 MG/DL (ref 0.9–1.3)
GFR AFRICAN AMERICAN: >60
GFR NON-AFRICAN AMERICAN: >60
GLUCOSE BLD-MCNC: 120 MG/DL (ref 70–99)
HCT VFR BLD CALC: 36.9 % (ref 40.5–52.5)
HEMOGLOBIN: 12.1 G/DL (ref 13.5–17.5)
MAGNESIUM: 2.3 MG/DL (ref 1.8–2.4)
MCH RBC QN AUTO: 29.1 PG (ref 26–34)
MCHC RBC AUTO-ENTMCNC: 32.8 G/DL (ref 31–36)
MCV RBC AUTO: 88.5 FL (ref 80–100)
PDW BLD-RTO: 14.1 % (ref 12.4–15.4)
PLATELET # BLD: 196 K/UL (ref 135–450)
PMV BLD AUTO: 8.2 FL (ref 5–10.5)
POTASSIUM REFLEX MAGNESIUM: 3.2 MMOL/L (ref 3.5–5.1)
RBC # BLD: 4.18 M/UL (ref 4.2–5.9)
REPORT: NORMAL
SARS-COV-2: NOT DETECTED
SODIUM BLD-SCNC: 136 MMOL/L (ref 136–145)
THIS TEST SENT TO: NORMAL
WBC # BLD: 10.5 K/UL (ref 4–11)

## 2020-07-17 PROCEDURE — 83735 ASSAY OF MAGNESIUM: CPT

## 2020-07-17 PROCEDURE — 36415 COLL VENOUS BLD VENIPUNCTURE: CPT

## 2020-07-17 PROCEDURE — 6370000000 HC RX 637 (ALT 250 FOR IP): Performed by: NURSE PRACTITIONER

## 2020-07-17 PROCEDURE — 85027 COMPLETE CBC AUTOMATED: CPT

## 2020-07-17 PROCEDURE — 6370000000 HC RX 637 (ALT 250 FOR IP): Performed by: INTERNAL MEDICINE

## 2020-07-17 PROCEDURE — 2580000003 HC RX 258: Performed by: INTERNAL MEDICINE

## 2020-07-17 PROCEDURE — 96372 THER/PROPH/DIAG INJ SC/IM: CPT

## 2020-07-17 PROCEDURE — 1200000000 HC SEMI PRIVATE

## 2020-07-17 PROCEDURE — 96374 THER/PROPH/DIAG INJ IV PUSH: CPT

## 2020-07-17 PROCEDURE — 6360000002 HC RX W HCPCS: Performed by: INTERNAL MEDICINE

## 2020-07-17 PROCEDURE — 80048 BASIC METABOLIC PNL TOTAL CA: CPT

## 2020-07-17 PROCEDURE — G0378 HOSPITAL OBSERVATION PER HR: HCPCS

## 2020-07-17 RX ORDER — DIAZEPAM 5 MG/1
5 TABLET ORAL NIGHTLY PRN
Qty: 7 TABLET | Refills: 0 | Status: SHIPPED | OUTPATIENT
Start: 2020-07-17 | End: 2020-07-24

## 2020-07-17 RX ORDER — METHOCARBAMOL 500 MG/1
1000 TABLET, FILM COATED ORAL 4 TIMES DAILY
Qty: 56 TABLET | Refills: 0 | Status: SHIPPED | OUTPATIENT
Start: 2020-07-17 | End: 2020-07-24

## 2020-07-17 RX ORDER — POTASSIUM CHLORIDE 7.45 MG/ML
10 INJECTION INTRAVENOUS PRN
Status: DISCONTINUED | OUTPATIENT
Start: 2020-07-17 | End: 2020-07-18 | Stop reason: HOSPADM

## 2020-07-17 RX ORDER — DIAZEPAM 5 MG/1
5 TABLET ORAL EVERY 6 HOURS PRN
Status: DISCONTINUED | OUTPATIENT
Start: 2020-07-17 | End: 2020-07-18 | Stop reason: HOSPADM

## 2020-07-17 RX ORDER — POTASSIUM CHLORIDE 20 MEQ/1
40 TABLET, EXTENDED RELEASE ORAL PRN
Status: DISCONTINUED | OUTPATIENT
Start: 2020-07-17 | End: 2020-07-18 | Stop reason: HOSPADM

## 2020-07-17 RX ORDER — METHOCARBAMOL 500 MG/1
1000 TABLET, FILM COATED ORAL 4 TIMES DAILY
Status: DISCONTINUED | OUTPATIENT
Start: 2020-07-17 | End: 2020-07-18 | Stop reason: HOSPADM

## 2020-07-17 RX ADMIN — HYDROMORPHONE HYDROCHLORIDE 0.5 MG: 1 INJECTION, SOLUTION INTRAMUSCULAR; INTRAVENOUS; SUBCUTANEOUS at 16:56

## 2020-07-17 RX ADMIN — METHOCARBAMOL 1000 MG: 500 TABLET ORAL at 21:08

## 2020-07-17 RX ADMIN — METOPROLOL SUCCINATE 50 MG: 50 TABLET, EXTENDED RELEASE ORAL at 09:32

## 2020-07-17 RX ADMIN — Medication 10 ML: at 21:09

## 2020-07-17 RX ADMIN — HYDROMORPHONE HYDROCHLORIDE 1 MG: 1 INJECTION, SOLUTION INTRAMUSCULAR; INTRAVENOUS; SUBCUTANEOUS at 09:34

## 2020-07-17 RX ADMIN — ENOXAPARIN SODIUM 40 MG: 40 INJECTION SUBCUTANEOUS at 09:34

## 2020-07-17 RX ADMIN — Medication 10 ML: at 09:35

## 2020-07-17 RX ADMIN — AMLODIPINE BESYLATE 5 MG: 5 TABLET ORAL at 09:32

## 2020-07-17 RX ADMIN — METHOCARBAMOL 1000 MG: 500 TABLET ORAL at 16:56

## 2020-07-17 RX ADMIN — HYDROMORPHONE HYDROCHLORIDE 1 MG: 1 INJECTION, SOLUTION INTRAMUSCULAR; INTRAVENOUS; SUBCUTANEOUS at 02:24

## 2020-07-17 RX ADMIN — ROSUVASTATIN CALCIUM 20 MG: 20 TABLET, COATED ORAL at 09:33

## 2020-07-17 RX ADMIN — METHOCARBAMOL 1000 MG: 500 TABLET ORAL at 13:02

## 2020-07-17 RX ADMIN — LISINOPRIL AND HYDROCHLOROTHIAZIDE 1 TABLET: 25; 20 TABLET ORAL at 09:32

## 2020-07-17 RX ADMIN — OXYCODONE HYDROCHLORIDE AND ACETAMINOPHEN 1 TABLET: 10; 325 TABLET ORAL at 04:51

## 2020-07-17 RX ADMIN — OXYCODONE HYDROCHLORIDE AND ACETAMINOPHEN 1 TABLET: 10; 325 TABLET ORAL at 21:09

## 2020-07-17 RX ADMIN — OXYCODONE HYDROCHLORIDE AND ACETAMINOPHEN 1 TABLET: 10; 325 TABLET ORAL at 13:02

## 2020-07-17 ASSESSMENT — PAIN DESCRIPTION - PROGRESSION
CLINICAL_PROGRESSION: GRADUALLY IMPROVING
CLINICAL_PROGRESSION: NOT CHANGED
CLINICAL_PROGRESSION: GRADUALLY IMPROVING
CLINICAL_PROGRESSION: GRADUALLY WORSENING
CLINICAL_PROGRESSION: GRADUALLY IMPROVING
CLINICAL_PROGRESSION: GRADUALLY WORSENING
CLINICAL_PROGRESSION: GRADUALLY IMPROVING

## 2020-07-17 ASSESSMENT — PAIN DESCRIPTION - ONSET
ONSET: ON-GOING

## 2020-07-17 ASSESSMENT — PAIN SCALES - GENERAL
PAINLEVEL_OUTOF10: 3
PAINLEVEL_OUTOF10: 4
PAINLEVEL_OUTOF10: 3
PAINLEVEL_OUTOF10: 4
PAINLEVEL_OUTOF10: 3
PAINLEVEL_OUTOF10: 5
PAINLEVEL_OUTOF10: 3
PAINLEVEL_OUTOF10: 2
PAINLEVEL_OUTOF10: 3
PAINLEVEL_OUTOF10: 4
PAINLEVEL_OUTOF10: 4
PAINLEVEL_OUTOF10: 3
PAINLEVEL_OUTOF10: 5
PAINLEVEL_OUTOF10: 4
PAINLEVEL_OUTOF10: 2
PAINLEVEL_OUTOF10: 4
PAINLEVEL_OUTOF10: 2

## 2020-07-17 ASSESSMENT — PAIN DESCRIPTION - ORIENTATION
ORIENTATION: RIGHT;LEFT;MID
ORIENTATION: RIGHT;MID;LEFT
ORIENTATION: RIGHT;LEFT;MID

## 2020-07-17 ASSESSMENT — PAIN DESCRIPTION - LOCATION
LOCATION: BACK;FLANK
LOCATION: FLANK
LOCATION: FLANK;BACK
LOCATION: BACK;FLANK

## 2020-07-17 ASSESSMENT — PAIN DESCRIPTION - DESCRIPTORS
DESCRIPTORS: ACHING

## 2020-07-17 ASSESSMENT — PAIN DESCRIPTION - FREQUENCY
FREQUENCY: CONTINUOUS

## 2020-07-17 ASSESSMENT — PAIN DESCRIPTION - PAIN TYPE
TYPE: ACUTE PAIN

## 2020-07-17 NOTE — H&P
tenderness noticed either. Patient able to lift his leg. Sensation to gross touch intact. General appearance: Moderate distress well nourished  Eyes: Sclera clear, pupils equal  ENT: Moist mucus membranes, no thrush. Trachea midline. Cardiovascular: Regular rhythm, normal S1, S2. No murmur, gallop, rub. No edema in lower extremities  Respiratory: Clear to auscultation bilaterally, no wheeze, good inspiratory effort  Gastrointestinal: Abdomen soft, non-tender, not distended, normal bowel sounds  Musculoskeletal: No cyanosis in digits, neck supple  Neurology: Cranial nerves grossly intact. Alert and oriented in time, place and person. No speech or motor deficits  Psychiatry: Appropriate affect. Not agitated  Skin: Warm, dry, normal turgor, no rash  Brisk capillary refill, peripheral pulses palpable   Labs:  CBC:   Lab Results   Component Value Date    WBC 11.9 07/16/2020    RBC 4.84 07/16/2020    HGB 13.9 07/16/2020    HCT 42.5 07/16/2020    MCV 87.8 07/16/2020    MCH 28.7 07/16/2020    MCHC 32.7 07/16/2020    RDW 14.1 07/16/2020     07/16/2020    MPV 8.7 07/16/2020     BMP:    Lab Results   Component Value Date     07/16/2020    K 3.5 07/16/2020    CL 98 07/16/2020    CO2 27 07/16/2020    BUN 15 07/16/2020    CREATININE 1.0 07/16/2020    CALCIUM 8.8 07/16/2020    GFRAA >60 07/16/2020    LABGLOM >60 07/16/2020    GLUCOSE 101 07/16/2020     No orders to display       Discussed case  With Dr Dennys Balderrama    Problem List  Active Problems:    Intractable pain  Resolved Problems:    * No resolved hospital problems. *        Assessment/Plan:   Left flank pain  Status post recent surgery  CT abdomen unremarkable. Etiology is unclear. Pain on the left side. Trial of Valium muscle relaxant and opioids.   Neurosurgery consulted, better control on follow-up check    Hypertension  Resume antihypertensives    Hyperlipidemia  Resume home statin      DVT prophylaxis Lovenox  Code status full  Diet low-sodium  IV access peripheral  Merino Catheter no    Admit as inpatient/ I anticipate hospitalization spanning more than two midnights for investigation and treatment of the above medically necessary diagnoses. Please note that some part of this chart was generated using Dragon dictation software. Although every effort was made to ensure the accuracy of this automated transcription, some errors in transcription may have occurred inadvertently. If you may need any clarification, please do not hesitate to contact me through [facility].        Ngozi López MD    7/16/2020 9:58 PM

## 2020-07-17 NOTE — CARE COORDINATION
Case Management Assessment           Initial Evaluation                Date / Time of Evaluation: 7/17/2020 3:29 PM                 Assessment Completed by: Migdalia Cortés    Patient Name: Stone Kelly     YOB: 1960  Diagnosis: Intractable pain [R52]     Date / Time: 7/16/2020  9:12 PM    Patient Admission Status: Inpatient    If patient is discharged prior to next notation, then this note serves as note for discharge by case management. Current PCP: NURIS Ayoub CNP  Clinic Patient: No    Chart Reviewed: Yes  Patient/ Family Interviewed: Yes    Initial assessment completed at bedside with: with pt over the phone due to isolation    Hospitalization in the last 30 days: No    Emergency Contacts:  Extended Emergency Contact Information  Primary Emergency Contact: Georgette Peterson  [place] Phone: [phone]  Relation: Child  Secondary Emergency Contact: Gonzalo Wright [address] [place] [address] Phone: [phone]  Relation: Parent    Advance Directives:   Code Status: Full Code      Financial  Payor: Davina [employer] / Plan: [employer] / Product Type: *No Product type* /     Pre-cert required for SNF: Yes    Pharmacy    [facility] [address], [address] [place] [address] [phone]  [address] [place] [address]  Phone: [phone] Fax: [phone]      Potential assistance Purchasing Medications: Potential Assistance Purchasing Medications: No  Does Patient want to participate in local refill/ meds to beds program?: No    Meds To Beds General Rules:  1. Can ONLY be done Monday- Friday between 8:30am-5pm  2. Prescription(s) must be in pharmacy by 3pm to be filled same day  3. Copy of patient's insurance/ prescription drug card and patient face sheet must be sent along with the prescription(s)  4. Cost of Rx cannot be added to hospital bill.  If financial assistance is needed, please contact unit  or ;  or  CANNOT provide pharmacy voucher for patients co-pays  5. Patients can then  the prescription on their way out of the hospital at discharge, or pharmacy can deliver to the bedside if staff is available. (payment due at time of pick-up or delivery - cash, check, or card accepted)     Able to afford home medications/ co-pay costs: Yes    ADLS  Support Systems: Children    PT AM-PAC:   /24  OT AM-PAC:   /24    [place]: 1 level  Steps: 0    Plans to RETURN to current housing: Yes  Barriers to RETURNING to current housing: none    [address]  Currently ACTIVE with [address]: No  Home Care Agency: Not Applicable          Durable Medical Equipment  DME Provider: n/a  Equipment: n/a    Home Oxygen and [address] [place] prior to admission: No  [address]: Not Applicable  Other Respiratory Equipment: CPAP        Dialysis  Active with HD/PD prior to admission: No  Nephrologist:     HD Center:  Not Applicable    DISCHARGE PLAN:  Disposition: Home- No Services Needed    Transportation PLAN for discharge: family     Factors facilitating achievement of predicted outcomes: Family support    Barriers to discharge: Medical complications    Additional Case Management Notes:   Patient is from home with elderly mother. Patient is independent pta, denies need for [address] at this time. CM advised that if therapy evals indicate he could use in home care that we would call him back. Patient states his family to transport home at discharge.     The Plan for Transition of Care is related to the following treatment goals of Intractable pain [R52]    The Patient and/or patient representative Yuridia Little and his family were provided with a choice of provider and agrees with the discharge plan Yes    Freedom of choice list was provided with basic dialogue that supports the patient's individualized plan of care/goals and shares the quality data associated with the providers.  Yes    Care Transition patient: No    Dee Johnson RN  [facility]  Case Management Department  Ph: [phone]   Fax: [phone]

## 2020-07-17 NOTE — PROGRESS NOTES
Called pt's point of contact Aydee Faye, updated her on pt's condition, current plan of care. Reviewed visitor restriction and nurse update calls. She states he sat down rather hard a day or so ago, and wondered if he could have dislodged or harmed the implant. Will update as needed.

## 2020-07-17 NOTE — PLAN OF CARE
Problem: Pain:  Goal: Pain level will decrease  Description: Pain level will decrease  Outcome: Ongoing  Note: Pt resting in bed, stated pain severe on arrival. Positioned pt for maximum comfort, administered ordered medications for pain and anxiety, pt states relief. Will update as needed. Problem: Falls - Risk of:  Goal: Will remain free from falls  Description: Will remain free from falls  Outcome: Ongoing  Note: Medium risk per [place], currently resting in bed, urinal and bedside items including call light within reach. Agrees to call for assistance. Will update as needed. Problem: Sensory:  Goal: Pain level will decrease  Description: Pain level will decrease  Outcome: Ongoing  Note: Pt resting in bed, stated pain severe on arrival. Positioned pt for maximum comfort, administered ordered medications for pain and anxiety, pt states relief. Will update as needed.

## 2020-07-17 NOTE — PROGRESS NOTES
4 Eyes Admission Assessment     I agree as the admission nurse that 2 RN's have performed a thorough Head to Toe Skin Assessment on the patient. ALL assessment sites listed below have been assessed on admission. Areas assessed by both nurses: ***  [x]   Head, Face, and Ears       Shoulders, Back, and Chest  [x]   Arms, Elbows, and Hands   [x]   Coccyx, Sacrum, and Ischum  [x]   Legs, Feet, and Heels        Does the Patient have Skin Breakdown? No Healing surgical incision midline lower back, well approximated, no open areas or redness.          Min Prevention initiated:  DORY   Wound Care Orders initiated:  NA      [facility] nurse consulted for Pressure Injury (Stage 3,4, Unstageable, DTI, NWPT, and Complex wounds):  NA      Nurse 1 eSignature: Electronically signed by Bertram Lopez RN on 7/17/20 at 7:53 AM EDT    **SHARE this note so that the co-signing nurse is able to place an eSignature**    Nurse 2 eSignature: {Esignature:[id number]}

## 2020-07-17 NOTE — PROGRESS NOTES
Hospitalist Progress Note      PCP: NURIS Carroll - CNP    Date of Admission: 7/16/2020    Chief Complaint: flank pain, intractable    Patient w/ hx of Chronic back pain and failed back syndrome, underwent Thoracic laminectomy, placement of epidural paddle  lead and then placement of right hip implantable programmable  rechargeable IPG on 07/14. He was at home where he has intractable pain int he flank, CT abd without acute etiology. Transferred to [facility] for NS evaluation. Subjective:   He says pain is better with IV dilaudid, he was also started on robaxin which he says is helping. Denies any other complains      Medications:  Reviewed    Infusion Medications   Scheduled Medications    amLODIPine  5 mg Oral Daily    lisinopril-hydroCHLOROthiazide  1 tablet Oral Daily    metoprolol succinate  50 mg Oral Daily    rosuvastatin  20 mg Oral Daily    sodium chloride flush  10 mL Intravenous 2 times per day    enoxaparin  40 mg Subcutaneous Daily     PRN Meds: oxyCODONE-acetaminophen, tiZANidine, sodium chloride flush, acetaminophen **OR** acetaminophen, polyethylene glycol, promethazine **OR** ondansetron, diazePAM, HYDROmorphone      Intake/Output Summary (Last 24 hours) at 7/17/2020 0959  Last data filed at 7/17/2020 0600  Gross per 24 hour   Intake 360 ml   Output 600 ml   Net -240 ml       Physical Exam Performed:    /67   Pulse 72   Temp 97.9 °F (36.6 °C) (Oral)   Resp 16   Ht 6' 3\" (1.905 m)   Wt 280 lb (127 kg)   SpO2 95%   BMI 35.00 kg/m²     Thoracic surgical incision noticed does not look infected no associated tenderness or redness. Right right hip area no tenderness noticed either. Patient able to lift his leg. Sensation to gross touch intact. General appearance: Moderate distress well nourished  Eyes: Sclera clear, pupils equal  ENT: Moist mucus membranes, no thrush. Trachea midline. Cardiovascular: Regular rhythm, normal S1, S2. No murmur, gallop, rub.  No edema in lower Code    PT/OT Eval Status: ordered as no NS intervention planned    Dispo - Continue inpatient care, weaning off IV dilaudid, PT/OT consulted.  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

## 2020-07-17 NOTE — FLOWSHEET NOTE
07/17/20 0716   Encounter Summary   Services provided to: Patient not available   Confucianism.   No blood transfusions

## 2020-07-17 NOTE — ED PROVIDER NOTES
I independently performed a history and physical on Josefina Bella. All diagnostic, treatment, and disposition decisions were made by myself in conjunction with the advanced practice provider. Briefly, this is a 64 y.o. male here for left lower abdominal and flank pain radiates from his mid back. Patient states had spinal stimulator placed at [facility] 2 days ago. Has been unable to walk secondary to the pain however denies any focal weakness in his lower extremities. Denies any recent trauma or injury. On exam, Patient afebrile and nontoxic. Moderate painful distress, rolling on stretcher and moaning. Heart RRR. Lungs CTAB. Abdomen soft, nondistended, no tenderness elicited with palpation in all quadrants. 5/5 motor and sensation grossly intact BLE's. DP 2+ and symmetric. Mid back surgical incision site appears well approximated with no overlying erythema, edema or increased warmth. No discharge or drainage. Screenings            MDM    Patient afebrile and nontoxic. Lower extremities are neurovascularly intact, no urinary retention on bladder scan, low suspicion for cauda equina. CT abdomen and pelvis is without evidence of diverticulitis, ureteral stone, hemorrhage or abscess. Surgical incision site does not appear infected. Elevated CRP potentially secondary to post-op state, case was discussed with patient's neurosurgeon at [facility], very low suspicion for SEA in immediate post-operative state. No neurologic deficits and compressive effect/hematoma felt unlikely. Per [facility] neurosurgery no indication for emergent MRI at this time, patient does remain in significant pain despite multiple doses of IV dilaudid and neurosurgery does recommend transfer to [facility] for further evaluation. Plan discussed with patient who is agreeable. Patient transferred in hemodynamically stable condition. Patient Referrals:  No follow-up provider specified.     Discharge Medications:  Discharge Medication List as of 7/16/2020  8:35 PM          FINAL IMPRESSION  1. Intractable pain    2. Status post insertion of spinal cord stimulator        Blood pressure (!) 162/86, pulse 96, temperature 99 °F (37.2 °C), temperature source Oral, resp. rate 16, height 6' 3\" (1.905 m), weight 276 lb (125.2 kg), SpO2 99 %. For further details of Milton Aiyana emergency department encounter, please see documentation by advanced practice provider, Juanita Rosado.     Raymond Narayanan DO (electronically signed)  Attending Emergency Physician       Raymond Narayanan DO  07/16/20 1669

## 2020-07-17 NOTE — CONSULTS
NEUROSURGERY CONSULT NOTE    John Cohen  [id number]   1960   7/17/2020    Requesting physician: Emily Sanchez MD    Reason for consultation: Flank pain s/p spinal surgery    History of present illness: Patient is a 64 y.o. male w/ PMH of s/p Thoracic laminectomy, placement of epidural paddle lead and then placement of right hip implantable programmable rechargeable IPG on 7/14/2020 by Dr. Erwin Dolan, HTN, HLD, AAA, and BUSTER who presented on 7/17/2020 to [address] [facility] ED c/o back pain. Patient is well known to our service 2/2 recent spinal procedure. Patient states after he arrived home from his procedure he did have pain and had to crawl to the bed. The next morning the pain was significantly worse and it felt worse on the left side. He denies any radiation towards his legs. Mention pain is constant and worse with the movement. Denies bowel and bladder incontinence. Feels constipated. ROS:   GENERAL:  Denies fever or recent illness.  Denies weight changes   EYES:  Denies vision change or diplopia  EARS:  Denies hearing loss  CARDIAC:  Denies chest pain  RESPIRATORY:  Denies shortness of breath  SKIN:  Denies rash or lesions   HEM:  Denies excessive bruising  PSYCH:  Denies anxiety or depression  NEURO:  Denies headache, numbness or tingling or lateralizing weakness   :  Denies urinary difficulty  GI: Endorses nausea, vomiting, and constipation  MUSCULOSKELETAL: Endorses left flank pain    Allergies   Allergen Reactions    Adhesive Tape Itching    Sugar-Protein-Starch      Runny nose       Past Medical History:   Diagnosis Date    Aneurysm of ascending aorta (HCC)     Arthritis     Atopic dermatitis     Back pain     Facial paralysis/Lapel palsy     lt side face dropping    Hyperlipidemia     Hypertension     Imprisonment and other incarceration [id number]    Migraine     Obstructive sleep apnea (adult) (pediatric)     cpap machine    Prediabetes     Psoriasis-like skin disease     Tinea cruris Past Surgical History:   Procedure Laterality Date    BACK SURGERY  February and October 2008    COLONOSCOPY  01/28/2018    CORONARY ANGIOPLASTY      12/2017    CYSTOSCOPY  2001    LAMINECTOMY POSTERIOR THORACIC / LUMBAR Right 7/14/2020    THORACIC LAMINECTOMY WITH PADDLE LEAD AND RECHARGEABLE BATTERY IN RIGHT HIP performed by Melvin Travis MD at [address] Bilateral 2/5/2020    BILATERAL L3 TRANSFORAMINAL  EPIDURAL STEROID INJECTION WITH FLUOROSCOPY performed by Mike Del Rio MD at [address] Bilateral 3/9/2020    BILATERAL L1, L2, L3 (ABOVE THE FUSION) MEDIAL BRANCH BLOCK WITH FLUOROSCOPY (91925, 91138)   #1 performed by Mike Del Rio MD at [address] N/A 6/3/2020    SPINAL CORD STIMULATOR TRIAL WITH FLUOROSCOPY (97852, 54096, 90450) - Maria Guadalupejordan Gutiérrez performed by Mike Del Rio MD at [facility] History     Occupational History    Occupation: unemployed    Occupation: former [employer] work    Tobacco Use    Smoking status: Never Smoker    Smokeless tobacco: Never Used   Substance and Sexual Activity    Alcohol use: Yes     Alcohol/week: 2.0 standard drinks     Types: 1 Cans of beer, 1 Standard drinks or equivalent per week     Frequency: Monthly or less     Drinks per session: 1 or 2     Binge frequency: Less than monthly     Comment: Rarely; socially    Drug use: No    Sexual activity: Yes     Partners: Female     Birth control/protection: Condom        Family History   Problem Relation Age of Onset    Other Mother         Polio    Thyroid Disease Mother     Heart Failure Father     Stroke Father         No outpatient medications have been marked as taking for the 7/16/20 encounter [facility] Encounter).         Current Facility-Administered Medications   Medication Dose Route Frequency Provider Last Rate Last Dose    methocarbamol (ROBAXIN) tablet 1,000 mg  1,000 mg Oral 4x Daily Claude Lake Rupard, APRN - CNP        diazePAM (VALIUM) tablet 5 mg  5 mg Oral Q6H PRN Yadira Mcardle Rupard, APRN - CNP        potassium chloride (KLOR-CON M) extended release tablet 40 mEq  40 mEq Oral PRN Story Mcardle Rupard, APRN - CNP        Or    potassium bicarb-citric acid (EFFER-K) effervescent tablet 40 mEq  40 mEq Oral PRN Yadira Mcardle Rupard, APRN - CNP        Or    potassium chloride 10 mEq/100 mL IVPB (Peripheral Line)  10 mEq Intravenous PRN Yadira Mcardle Rupard, APRN - CNP        amLODIPine (NORVASC) tablet 5 mg  5 mg Oral Daily Moon Miranda MD   5 mg at 07/17/20 0932    lisinopril-hydroCHLOROthiazide (PRINZIDE;ZESTORETIC) 20-25 MG per tablet 1 tablet  1 tablet Oral Daily Moon Miranda MD   1 tablet at 07/17/20 0932    metoprolol succinate (TOPROL XL) extended release tablet 50 mg  50 mg Oral Daily Moon Miranda MD   50 mg at 07/17/20 0932    oxyCODONE-acetaminophen (PERCOCET)  MG per tablet 1 tablet  1 tablet Oral Q6H PRN Moon Miranda MD   1 tablet at 07/17/20 0451    rosuvastatin (CRESTOR) tablet 20 mg  20 mg Oral Daily Moon Miranda MD   20 mg at 07/17/20 0933    tiZANidine (ZANAFLEX) tablet 4 mg  4 mg Oral Q8H PRN Moon Miranda MD   4 mg at 07/16/20 2217    sodium chloride flush 0.9 % injection 10 mL  10 mL Intravenous 2 times per day Moon Miranda MD   10 mL at 07/17/20 0935    sodium chloride flush 0.9 % injection 10 mL  10 mL Intravenous PRN Moon Miranda MD        acetaminophen (TYLENOL) tablet 650 mg  650 mg Oral Q6H PRN Moon Miranda MD        Or    acetaminophen (TYLENOL) suppository 650 mg  650 mg Rectal Q6H PRN Moon Miranda MD        polyethylene glycol (GLYCOLAX) packet 17 g  17 g Oral Daily PRN Moon Miranda MD   17 g at 07/16/20 2221    promethazine (PHENERGAN) tablet 12.5 mg  12.5 mg Oral Q6H PRN Moon Miranda MD        Or    ondansetron [facility]) injection 4 mg  4 mg Intravenous Q6H PRN pain 07/16/2020    Chest pain 06/27/2019    Facial paralysis/Tamms palsy     Lightheadedness 09/21/2017    Epididymal cyst 09/19/2017    Obstructive sleep apnea     Atrial fibrillation (HCC) 04/23/2017    Proteinuria 02/28/2017    Renal cysts 02/28/2017    Migraine without aura and without status migrainosus, not intractable 12/12/2016    Ascending aortic aneurysm ([address] 11/11/2016    Coronary artery disease due to lipid rich plaque 11/11/2016    Psoriasis-like skin disease     Hyperlipidemia     Prediabetes     Essential hypertension        Assessment:  Patient is a 64 y.o. male s/p Thoracic laminectomy, placement of epidural paddle lead and then placement of right hip implantable programmable rechargeable IPG on 7/14/2020 by Dr. Elizabeth Sears. Plan:  1. No emergent neurosurgical intervention indicated  2. Neurologic exams frequency: Q4H  3. For change in exam MUST contact neurosurgery team along with critical care or primary team  4. Muscle spasms: Scheduled Robaxin & PRN Valium; Can continue Robaxin for 1 week upon discharge  5. Pain control: Managed by medical team  6. Incisional Care: Open to air. Wash incision daily with warm soapy water or shower daily, and pat dry with clean dry towel. [place] with CHG swab. 7. Advance diet / activity per primary team  8. Thank you for consult. Will sign off. Please call with any questions or decline in neurological status    DISPO: OK to DC from neurosurgery standpoint. Dispo timing to be determined by primary team once patient is medically stable for discharge. Patient was seen and examined with Dr. Elizabeth Sears who agrees with above assessment and plan.      Electronically signed by: SIDNEY Overton, 7/17/2020 12:18 PM  [phone]

## 2020-07-17 NOTE — ED NOTES
Pt to be transferred to [facility], [facility] rm 7335. Transportation to be provided by [facility], eta 2115. Report called to receiving LORRIE Ojeda, all questions answered. Pt agrees with plan of care and transfer.       Nasir Ocampo RN  07/16/20 2025

## 2020-07-18 VITALS
SYSTOLIC BLOOD PRESSURE: 148 MMHG | RESPIRATION RATE: 16 BRPM | OXYGEN SATURATION: 98 % | HEART RATE: 61 BPM | BODY MASS INDEX: 34.82 KG/M2 | TEMPERATURE: 97.4 F | WEIGHT: 280 LBS | HEIGHT: 75 IN | DIASTOLIC BLOOD PRESSURE: 103 MMHG

## 2020-07-18 PROCEDURE — G0378 HOSPITAL OBSERVATION PER HR: HCPCS

## 2020-07-18 PROCEDURE — 2580000003 HC RX 258: Performed by: INTERNAL MEDICINE

## 2020-07-18 PROCEDURE — 6370000000 HC RX 637 (ALT 250 FOR IP): Performed by: NURSE PRACTITIONER

## 2020-07-18 PROCEDURE — 6360000002 HC RX W HCPCS: Performed by: INTERNAL MEDICINE

## 2020-07-18 PROCEDURE — 6370000000 HC RX 637 (ALT 250 FOR IP): Performed by: INTERNAL MEDICINE

## 2020-07-18 PROCEDURE — 96376 TX/PRO/DX INJ SAME DRUG ADON: CPT

## 2020-07-18 PROCEDURE — 96372 THER/PROPH/DIAG INJ SC/IM: CPT

## 2020-07-18 RX ADMIN — ENOXAPARIN SODIUM 40 MG: 40 INJECTION SUBCUTANEOUS at 09:39

## 2020-07-18 RX ADMIN — HYDROMORPHONE HYDROCHLORIDE 0.5 MG: 1 INJECTION, SOLUTION INTRAMUSCULAR; INTRAVENOUS; SUBCUTANEOUS at 09:46

## 2020-07-18 RX ADMIN — METOPROLOL SUCCINATE 50 MG: 50 TABLET, EXTENDED RELEASE ORAL at 09:39

## 2020-07-18 RX ADMIN — Medication 10 ML: at 09:40

## 2020-07-18 RX ADMIN — AMLODIPINE BESYLATE 5 MG: 5 TABLET ORAL at 09:39

## 2020-07-18 RX ADMIN — METHOCARBAMOL 1000 MG: 500 TABLET ORAL at 09:38

## 2020-07-18 RX ADMIN — ROSUVASTATIN CALCIUM 20 MG: 20 TABLET, COATED ORAL at 09:39

## 2020-07-18 RX ADMIN — METHOCARBAMOL 1000 MG: 500 TABLET ORAL at 15:58

## 2020-07-18 RX ADMIN — LISINOPRIL AND HYDROCHLOROTHIAZIDE 1 TABLET: 25; 20 TABLET ORAL at 09:39

## 2020-07-18 RX ADMIN — HYDROMORPHONE HYDROCHLORIDE 0.5 MG: 1 INJECTION, SOLUTION INTRAMUSCULAR; INTRAVENOUS; SUBCUTANEOUS at 00:01

## 2020-07-18 RX ADMIN — OXYCODONE HYDROCHLORIDE AND ACETAMINOPHEN 1 TABLET: 10; 325 TABLET ORAL at 04:18

## 2020-07-18 ASSESSMENT — PAIN SCALES - GENERAL
PAINLEVEL_OUTOF10: 7
PAINLEVEL_OUTOF10: 7
PAINLEVEL_OUTOF10: 4
PAINLEVEL_OUTOF10: 4

## 2020-07-18 ASSESSMENT — PAIN DESCRIPTION - LOCATION
LOCATION: BACK

## 2020-07-18 ASSESSMENT — PAIN DESCRIPTION - ORIENTATION
ORIENTATION: MID

## 2020-07-18 ASSESSMENT — PAIN DESCRIPTION - DESCRIPTORS
DESCRIPTORS: ACHING

## 2020-07-18 ASSESSMENT — PAIN - FUNCTIONAL ASSESSMENT
PAIN_FUNCTIONAL_ASSESSMENT: ACTIVITIES ARE NOT PREVENTED
PAIN_FUNCTIONAL_ASSESSMENT: ACTIVITIES ARE NOT PREVENTED
PAIN_FUNCTIONAL_ASSESSMENT: PREVENTS OR INTERFERES SOME ACTIVE ACTIVITIES AND ADLS

## 2020-07-18 ASSESSMENT — PAIN DESCRIPTION - PAIN TYPE
TYPE: ACUTE PAIN

## 2020-07-18 ASSESSMENT — PAIN DESCRIPTION - FREQUENCY
FREQUENCY: INTERMITTENT
FREQUENCY: INTERMITTENT
FREQUENCY: CONTINUOUS

## 2020-07-18 ASSESSMENT — PAIN DESCRIPTION - ONSET
ONSET: ON-GOING

## 2020-07-18 ASSESSMENT — PAIN DESCRIPTION - PROGRESSION
CLINICAL_PROGRESSION: NOT CHANGED

## 2020-07-18 NOTE — PLAN OF CARE
Problem: Pain:  Goal: Pain level will decrease  Description: Pain level will decrease  Outcome: Ongoing   Pain adequately controlled with opioids, NSAIDS, and muscle relaxer. Pt urged to get up and move if possible. Problem: Falls - Risk of:  Goal: Will remain free from falls  Description: Will remain free from falls  Outcome: Ongoing   Pt is up ad royer and very steady on his feet. Problem: Activity:  Goal: Ability to avoid complications of mobility impairment will improve  Description: Ability to avoid complications of mobility impairment will improve  Outcome: Ongoing   Patient able to get up out of bed on his own and ambulate to bathroom and back to bed.

## 2020-07-18 NOTE — CARE COORDINATION
Case Management Assessment            Discharge Note                    Date / Time of Note: 7/18/2020 3:06 PM                  Discharge Note Completed by: Celi Jerry    Patient Name: Milton Schuster   YOB: 1960  Diagnosis: Intractable pain [R52]  Intractable pain [R52]   Date / Time: 7/16/2020  9:12 PM    Current PCP: NURIS Moyer CNP  Clinic patient: No    Hospitalization in the last 30 days: No    Advance Directives:  Code Status: Full Code  [place] DNR form completed and on chart: Not Indicated    Financial:  Payor: [employer] / Plan: [employer] / Product Type: *No Product type* /      Pharmacy:    [facility] [address], [address] [place] [address] [phone] [facility] [phone]  [facility] [address] [place] [address]  Phone: [phone] Fax: [phone]      Assistance purchasing medications?: Potential Assistance Purchasing Medications: No  Assistance provided by Case Management: None at this time    Does patient want to participate in local refill/ meds to beds program?: No    Meds To Beds General Rules:  1. Can ONLY be done Monday- Friday between 8:30am-5pm  2. Prescription(s) must be in pharmacy by 3pm to be filled same day  3. Copy of patient's insurance/ prescription drug card and patient face sheet must be sent along with the prescription(s)  4. Cost of Rx cannot be added to hospital bill. If financial assistance is needed, please contact unit  or ;  or  CANNOT provide pharmacy voucher for patients co-pays  5.  Patients can then  the prescription on their way out of the hospital at discharge, or pharmacy can deliver to the bedside if staff is available. (payment due at time of pick-up or delivery - cash, check, or card accepted)     Able to afford home medications/ co-pay costs: Yes    ADLS:  Current PT AM-PAC Score:   /24  Current OT AM-PAC Score:   /24      DISCHARGE Disposition: Home- No Services Needed    LOC at discharge: Not Applicable  BENIGNO Completed: Not Indicated    Notification completed in HENS/PAS?:  Not Applicable    IMM Completed:   No    Transportation:  Transportation PLAN for discharge: family   Mode of Transport: Private Car  Reason for medical transport: Not Applicable  Name of [address] [place] [address],[address]: Not Applicable  Time of Transport: TBD    Transport form completed: Not Indicated    Home Care:  Home Care ordered at discharge: Not [address]: Not Applicable    Referrals made at [facility] for outpatient continued care:  Not Applicable    Additional CM Notes:  Patient is from home with mother, independent pta. No CM needs at this time. Family to transport home at discharge. The Plan for Transition of Care is related to the following treatment goals of Intractable pain [R52]  Intractable pain [R52]    The Patient and/or patient representative Karolyn Lozano and his family were provided with a choice of provider and agrees with the discharge plan Yes    Freedom of choice list was provided with basic dialogue that supports the patient's individualized plan of care/goals and shares the quality data associated with the providers.  Yes    Care Transitions patient: No    Wayne Grant RN  [facility] [employer]  Case Management Department  Ph: [phone]  Fax: [phone]

## 2020-07-18 NOTE — PROGRESS NOTES
4 Eyes Admission Assessment     I agree as the admission nurse that 2 RN's have performed a thorough Head to Toe Skin Assessment on the patient. ALL assessment sites listed below have been assessed on admission. Areas assessed by both nurses: ***  [x]   Head, Face, and Ears   [x]   Shoulders, Back, and Chest  [x]   Arms, Elbows, and Hands   [x]   Coccyx, Sacrum, and Ischum  [x]   Legs, Feet, and Heels        Does the Patient have Skin Breakdown? No      Surgical incision midline lower back noted, open to the air, well approximated, no redness or S/S of infection.    Min Prevention initiated:  Yes   Wound Care Orders initiated:  No      WOC nurse consulted for Pressure Injury (Stage 3,4, Unstageable, DTI, NWPT, and Complex wounds):  No      Nurse 1 eSignature: Electronically signed by Loren Yeung RN on 7/18/20 at 11:14 AM EDT    **SHARE this note so that the co-signing nurse is able to place an eSignature**    Nurse 2 eSignature: {Esignature:[id number]}

## 2020-07-18 NOTE — PLAN OF CARE
Problem: Pain:  Goal: Pain level will decrease  Description: Pain level will decrease  Outcome: Ongoing   Pain adequately controlled with opioids, NSAIDS, and muscle relaxer. Pt urged to get up and move if possible. Problem: Falls - Risk of:  Goal: Will remain free from falls  Description: Will remain free from falls  Outcome: Ongoing   Pt is up ad royer and very steady on his feet. Problem: Activity:  Goal: Ability to avoid complications of mobility impairment will improve  Description: Ability to avoid complications of mobility impairment will improve  Outcome: Ongoing   Patient able to get up out of bed on his own and ambulate to bathroom and back to bed. Problem: Fluid Volume:  Goal: Maintenance of adequate hydration will improve  Description: Maintenance of adequate hydration will improve  Outcome: Ongoing   Patient is drinking fluids. Problem: Nutritional:  Goal: Ability to attain and maintain optimal nutritional status will improve  Description: Ability to attain and maintain optimal nutritional status will improve  Outcome: Ongoing   Pt has a good appetite. Problem: Urinary Elimination:  Goal: Ability to achieve and maintain adequate urine output will improve  Description: Ability to achieve and maintain adequate urine output will improve  Outcome: Ongoing   Kidney output is very good all day.

## 2020-07-18 NOTE — PROGRESS NOTES
Patient discharging home at this time. IV and Telemetry box removed. Discharge instructions and prescriptions reviewed, all questions answered. Patient stated has no further questions at this time. All personal belongings packed and sent with patient. Patient leaving the unit via wheelchair with nursing staff to front entrance for his ride home.

## 2020-07-18 NOTE — PLAN OF CARE
RN has tried multiple times to call report and is waiting on a call back. Report taken at [place] [address]. [place] [address], 1880, 7/18/2020

## 2020-07-18 NOTE — PLAN OF CARE
Problem: Pain:  Goal: Control of acute pain  Description: Control of acute pain  Note: Patient complaints of pain to lower back rated at 7/10. PRN Dilaudid given per request. Patient satisfied at this time. Will continue to monitor and reassess. Problem: Falls - Risk of:  Goal: Will remain free from falls  Description: Will remain free from falls  Note: Patient is a low fall risk. Patient up independently in room. Reminded to call nursing staff for assistance for assistance when needed. Patient verbalized understanding. Will continue to monitor and reassess. Problem: Skin Integrity:  Goal: Risk for impaired skin integrity will decrease  Description: Risk for impaired skin integrity will decrease  Note: Surgical site to lower back. Area TRE, well approximated with no signs or symptoms of infection. Will continue to monitor and reassess.

## 2020-07-19 NOTE — DISCHARGE SUMMARY
Hospital Medicine Discharge Summary    Patient ID: Des De      Patient's PCP: Rohini Richardson, APRN - CNP    Admit Date: 7/16/2020     Discharge Date: 7/16/2020    Admitting Physician: No admitting provider for patient encounter. Discharge Physician: Teresa Elizabeth MD     Discharge Diagnoses:  Intractable pain, no improvement with percocet at home  Left flank pain related to recent neurosurgical intervention  Hypertension  Hyperlipidemia    Hospital Course:   Intractable pain, no improvement with percocet at home  Left flank pain related to recent neurosurgical intervention  CT abdomen unremarkable.  Etiology is unclear.  Pain on the left side. Continue Robaxin, Valium, IV dilaudid  Needing IV Dialudid for pain control, weaned off prior to dc  Neurosurgery recommendations reviewed, discharged on valium and robaxin x 7 days     Hypertension  Resume antihypertensives     Hyperlipidemia  Resume home statin        Physical Exam Performed:     BP (!) 162/86 Comment: provider aware  Pulse 96   Temp 99 °F (37.2 °C) (Oral)   Resp 16   Ht 6' 3\" (1.905 m)   Wt 276 lb (125.2 kg)   SpO2 99%   BMI 34.50 kg/m²       General appearance:  No apparent distress, appears stated age and cooperative. HEENT:  Normal cephalic, atraumatic without obvious deformity. Pupils equal, round, and reactive to light. Extra ocular muscles intact. Conjunctivae/corneas clear. Neck: Supple, with full range of motion. No jugular venous distention. Trachea midline. Respiratory:  Normal respiratory effort. Clear to auscultation, bilaterally without Rales/Wheezes/Rhonchi. Cardiovascular:  Regular rate and rhythm with normal S1/S2 without murmurs, rubs or gallops. Abdomen: Soft, non-tender, non-distended with normal bowel sounds. Musculoskeletal:  No clubbing, cyanosis or edema bilaterally. Full range of motion without deformity. Skin: Skin color, texture, turgor normal.  No rashes or lesions.   Neurologic:  Neurovascularly intact without any focal sensory/motor deficits. Cranial nerves: II-XII intact, grossly non-focal.  Psychiatric:  Alert and oriented, thought content appropriate, normal insight  Capillary Refill: Brisk,< 3 seconds   Peripheral Pulses: +2 palpable, equal bilaterally       Labs: For convenience and continuity at follow-up the following most recent labs are provided:      CBC:    Lab Results   Component Value Date    WBC 10.5 07/17/2020    HGB 12.1 07/17/2020    HCT 36.9 07/17/2020     07/17/2020       Renal:    Lab Results   Component Value Date     07/17/2020    K 3.2 07/17/2020    CL 98 07/17/2020    CO2 29 07/17/2020    BUN 17 07/17/2020    CREATININE 1.1 07/17/2020    CALCIUM 9.1 07/17/2020         Significant Diagnostic Studies    Radiology:   CT ABDOMEN PELVIS WO CONTRAST   Final Result   No findings to account for patient's pain. No renal or ureteral stone. Consults:     IP CONSULT TO NEUROSURGERY  IP CONSULT TO HOSPITALIST    Disposition:  Home     Condition at Discharge: Stable    Discharge Instructions/Follow-up:    Follow up with PCP, NS    Code Status:  Full Code    Activity: activity as tolerated    Diet: regular diet      Discharge Medications:     Discharge Medication List as of 7/16/2020  8:35 PM           Details   oxyCODONE-acetaminophen (PERCOCET)  MG per tablet Take 1-2 tablets by mouth every 6 hours as needed for Pain for up to 30 days. Intended supply: 30 days, Disp-40 tablet,R-0Print      rosuvastatin (CRESTOR) 20 MG tablet Take 1 tablet by mouth daily, Disp-90 tablet, R-0Normal      tiZANidine (ZANAFLEX) 4 MG tablet Take 1 tablet by mouth every 8 hours as needed (for muscle spasm), Disp-90 tablet, R-0Normal      Blood Pressure KIT ONCE Starting Tue 6/16/2020, Disp-1 kit, R-0, Normal      Misc.  Devices MISC Disp-1 Device, R-0, PrintPads and leads for use on [facility] TENS unit.      metoprolol succinate (TOPROL XL) 50 MG extended release tablet Take 1 tablet by mouth daily, Disp-90 tablet, R-0Normal      lisinopril-hydroCHLOROthiazide (PRINZIDE;ZESTORETIC) 20-25 MG per tablet Take 1 tablet by mouth daily, Disp-90 tablet, R-0Normal      amLODIPine (NORVASC) 5 MG tablet Take 1 tablet by mouth daily, Disp-90 tablet, R-0Normal      vitamin D (ERGOCALCIFEROL) 92458 units CAPS capsule Take 1 capsule by mouth once a week, Disp-4 capsule, R-2Normal             Time Spent on discharge is more than 20 minutes in the examination, evaluation, counseling and review of medications and discharge plan. Signed:    Cary Smith MD   7/19/2020      Thank you [facility] for the opportunity to be involved in this patient's care. If you have any questions or concerns please feel free to contact me at [phone].

## 2020-07-19 NOTE — DISCHARGE SUMMARY
Hospital Medicine Discharge Summary    Patient ID: Art Gamez      Patient's PCP: Chata Salazar APRN - CNP    Admit Date: 7/16/2020     Discharge Date: 7/18/2020     Admitting Physician: Alejandrina Juan MD     Discharge Physician: Alejandrina Juan MD     Discharge Diagnoses:  Intractable pain, no improvement with percocet at home  Left flank pain related to recent neurosurgical intervention  Hypertension  Hyperlipidemia     The patient was seen and examined on day of discharge and this discharge summary is in conjunction with any daily progress note from day of discharge. Hospital Course:   Intractable pain, no improvement with percocet at home  Left flank pain related to recent neurosurgical intervention  CT abdomen unremarkable.  Etiology is unclear.  Pain on the left side.   Continue Robaxin, Valium, IV dilaudid  Needing IV Dialudid for pain control, weaned off prior to dc  Neurosurgery recommendations reviewed, discharged on valium and robaxin x 7 days     Hypertension  Resume antihypertensives     Hyperlipidemia  Resume home statin         Physical Exam Performed:     BP (!) 148/103   Pulse 61   Temp 97.4 °F (36.3 °C) (Oral)   Resp 16   Ht 6' 3\" (1.905 m)   Wt 280 lb (127 kg)   SpO2 98%   BMI 35.00 kg/m²       General appearance:  No apparent distress, appears stated age and cooperative. HEENT:  Normal cephalic, atraumatic without obvious deformity. Pupils equal, round, and reactive to light. Extra ocular muscles intact. Conjunctivae/corneas clear. Neck: Supple, with full range of motion. No jugular venous distention. Trachea midline. Respiratory:  Normal respiratory effort. Clear to auscultation, bilaterally without Rales/Wheezes/Rhonchi. Cardiovascular:  Regular rate and rhythm with normal S1/S2 without murmurs, rubs or gallops. Abdomen: Soft, non-tender, non-distended with normal bowel sounds. Musculoskeletal:  No clubbing, cyanosis or edema bilaterally.   Full range of motion without deformity. Skin: Skin color, texture, turgor normal.  No rashes or lesions. Neurologic:  Neurovascularly intact without any focal sensory/motor deficits. Cranial nerves: II-XII intact, grossly non-focal.  Psychiatric:  Alert and oriented, thought content appropriate, normal insight  Capillary Refill: Brisk,< 3 seconds   Peripheral Pulses: +2 palpable, equal bilaterally        Labs: For convenience and continuity at follow-up the following most recent labs are provided:      CBC:    Lab Results   Component Value Date    WBC 10.5 07/17/2020    HGB 12.1 07/17/2020    HCT 36.9 07/17/2020     07/17/2020       Renal:    Lab Results   Component Value Date     07/17/2020    K 3.2 07/17/2020    CL 98 07/17/2020    CO2 29 07/17/2020    BUN 17 07/17/2020    CREATININE 1.1 07/17/2020    CALCIUM 9.1 07/17/2020         Significant Diagnostic Studies    Radiology:   No orders to display          Consults:     IP CONSULT TO SOCIAL WORK  IP CONSULT TO SPIRITUAL SERVICES    Disposition:  Home     Condition at Discharge: Stable    Discharge Instructions/Follow-up:    Follow up with PCP, NS    Code Status:  Full Code    Activity: activity as tolerated    Diet: regular diet      Discharge Medications:     Discharge Medication List as of 7/18/2020  5:43 PM           Details   diazePAM (VALIUM) 5 MG tablet Take 1 tablet by mouth nightly as needed for Anxiety (muscle spasms) for up to 7 days. , Disp-7 tablet,R-0Print      methocarbamol (ROBAXIN) 500 MG tablet Take 2 tablets by mouth 4 times daily for 7 days, Disp-56 tablet,R-0Print              Details   oxyCODONE-acetaminophen (PERCOCET)  MG per tablet Take 1-2 tablets by mouth every 6 hours as needed for Pain for up to 30 days.  Intended supply: 30 days, Disp-40 tablet,R-0Print      rosuvastatin (CRESTOR) 20 MG tablet Take 1 tablet by mouth daily, Disp-90 tablet, R-0Normal      tiZANidine (ZANAFLEX) 4 MG tablet Take 1 tablet by mouth every 8 hours as needed (for muscle spasm), Disp-90 tablet, R-0Normal      Blood Pressure KIT ONCE Starting Tue 6/16/2020, Disp-1 kit, R-0, Normal      Misc. Devices MISC Disp-1 Device, R-0, PrintPads and leads for use on [facility] TENS unit.      metoprolol succinate (TOPROL XL) 50 MG extended release tablet Take 1 tablet by mouth daily, Disp-90 tablet, R-0Normal      lisinopril-hydroCHLOROthiazide (PRINZIDE;ZESTORETIC) 20-25 MG per tablet Take 1 tablet by mouth daily, Disp-90 tablet, R-0Normal      amLODIPine (NORVASC) 5 MG tablet Take 1 tablet by mouth daily, Disp-90 tablet, R-0Normal      vitamin D (ERGOCALCIFEROL) 18624 units CAPS capsule Take 1 capsule by mouth once a week, Disp-4 capsule, R-2Normal             Time Spent on discharge is more than 20 minutes in the examination, evaluation, counseling and review of medications and discharge plan. Signed:    Sheila Nicolas MD   7/19/2020      Thank you NURIS Bianchi CNP for the opportunity to be involved in this patient's care. If you have any questions or concerns please feel free to contact me at [phone].

## 2020-07-21 NOTE — ADT AUTH CERT
Patient Demographics     Name  Patient ID  SSN  Gender Identity  Birth Date    Alexandra Munoz  [id number]    Male  [birth year] (64 yrs)    Address  Phone  Email  Employer     [address]  [phone] (B)   [phone] (M)  [email or web address]  UNEMPLOYED   [address] [facility] [address]  Occupation  Emp Status     [place]  Black  --  Not Employed     Reg Status  PCP  Date Last Verified  Next Review Date     Verified  [facility]  [phone]  20     Admission Date  Discharge Date  Admitting Provider      20  Emily Sanchez MD      Marital Status  Advent         [facility] Witness       Emergency Contact 1  Emergency Contact 2  Emergency Contact [address] ([facility]   [phone] Leveda Leer)  Keek Shital [phone])   [address] [place] [phone] Leveda Leer)  [place] [phone] Leveda Leer)    Ephraim Guzman  [de-identified]   [employer]  Subscriber Name/Sex/Relation  Subscriber   Subscriber Address/Phone  Subscriber Emp/Emp Phone    1. Mio Patterson   [id number]  Cloyce Screws - Male   (Self)  1960  [address] [place] [address]   [phone](B)  UNEMPLOYED    Utilization Reviews         covid 19 by Siva Elizondo RN         Review Status  Review Entered    In [facility]  2020 09:11        Criteria Review    The illness suspected to be related to the Coronavirus (COVID-19)? Yes  Has the member been tested for the COVID-19? Yes   If Yes, what are the results of the COVID-19?              Negative  What is the severity of the members condition (i.e. Isolation, Ventilator use)?        Abdominal Pain, Undiagnosed - Care Day 2 (2020) by Siva Elizondo RN         Review Status  Review Entered    Completed  2020 09:00        Criteria Review       Care Day: 2 Care Date: 2020 Level of Care:    Guideline Day 1    Level Of Care    (X) Floor    Clinical Status ( ) * Clinical Indications met [D]    ( ) Acute pain unrelieved by symptomatic treatment    7/18/2020 9:00 AM EDT by Leslie Sweet      IV dilaudid relieves pain NS consulted    Routes    (X) IV fluids, medications    7/18/2020 9:00 AM EDT by Pastora Mccain      see note    Interventions    (X) CBC, chemistries, urinalysis, pregnancy test    (X) Surgical consultation    7/18/2020 9:00 AM EDT by Leslie Sweet      NS consulted flank pain post op problem    Medications    (X) Oral or parenteral analgesia    7/18/2020 9:00 AM EDT by Leslie Sweet      see note    * Milestone    Additional Notes    7/17/2020       Care day two       Internal medicine note       VS: 97.9 16 72 107/67 95% RA       Subjective:    He says pain is better with IV dilaudid, he was also started on robaxin which he says is helping. · Intractable pain [R52] 07/16/2020         Intractable pain, no improvement with percocet at home    Left flank pain related to recent neurosurgical intervention    CT abdomen unremarkable.  Etiology is unclear.  Pain on the left side.     Continue Robaxin, Valium, IV dilaudid    Will needing weaning of IV dilaudid as says that's the only thing that helps    Consult PT/OT for evaluation    Neurosurgery recommendations reviewed, will ask them to prescribe pain medications on dc as this is post op pain    Attempted to call NS NP @ 1:33 PM, 07/17/20 will call again later         Hypertension    Resume antihypertensives         Hyperlipidemia    Resume home statin         DVT Prophylaxis: Lovenox    Diet: DIET LOW SODIUM 2 GM;    Code Status: Full Code         PT/OT Eval Status: ordered as no NS intervention planned         Dispo - Continue inpatient care, weaning off IV dilaudid, PT/OT consulted.         Meds:       ·  methocarbamol, 1,000 mg, Oral, 4x Daily    ·  amLODIPine, 5 mg, Oral, Daily    ·  lisinopril-hydroCHLOROthiazide, 1 tablet, Oral, Daily    ·  metoprolol succinate, 50 mg, Oral, Daily    ·  rosuvastatin, 20 mg, Oral, Daily    ·  sodium chloride flush, 10 mL, Intravenous, 2 times per day    ·  enoxaparin, 40 mg, Subcutaneous, Daily       PRN meds:    Dilaudid 0.5 mg q 6 hrs prn iv x 1    Dilaudid 1 mg q 4 hrs prn iv x 2    Oxycodone 10/325 mg 1 tablet q 6 hrs prn po x 3       Abnormal labs:    RBC 4.18    HGB 12.1    HCT 36.9    Glucose 120    Chloride 98    Potassium reflex 3.2          SARS-cov-2 negative

## 2020-07-23 ENCOUNTER — TELEPHONE (OUTPATIENT)
Dept: PULMONOLOGY | Age: 60
End: 2020-07-23

## 2020-07-23 ENCOUNTER — VIRTUAL VISIT (OUTPATIENT)
Dept: PULMONOLOGY | Age: 60
End: 2020-07-23
Payer: COMMERCIAL

## 2020-07-23 PROCEDURE — G8427 DOCREV CUR MEDS BY ELIG CLIN: HCPCS | Performed by: NURSE PRACTITIONER

## 2020-07-23 PROCEDURE — 99214 OFFICE O/P EST MOD 30 MIN: CPT | Performed by: NURSE PRACTITIONER

## 2020-07-23 PROCEDURE — 3017F COLORECTAL CA SCREEN DOC REV: CPT | Performed by: NURSE PRACTITIONER

## 2020-07-23 PROCEDURE — 1111F DSCHRG MED/CURRENT MED MERGE: CPT | Performed by: NURSE PRACTITIONER

## 2020-07-23 ASSESSMENT — SLEEP AND FATIGUE QUESTIONNAIRES
HOW LIKELY ARE YOU TO NOD OFF OR FALL ASLEEP WHILE LYING DOWN TO REST IN THE AFTERNOON WHEN CIRCUMSTANCES PERMIT: 1
HOW LIKELY ARE YOU TO NOD OFF OR FALL ASLEEP WHEN YOU ARE A PASSENGER IN A CAR FOR AN HOUR WITHOUT A BREAK: 0
HOW LIKELY ARE YOU TO NOD OFF OR FALL ASLEEP WHILE WATCHING TV: 1
HOW LIKELY ARE YOU TO NOD OFF OR FALL ASLEEP WHILE SITTING AND READING: 1
ESS TOTAL SCORE: 4
HOW LIKELY ARE YOU TO NOD OFF OR FALL ASLEEP WHILE SITTING AND TALKING TO SOMEONE: 0
HOW LIKELY ARE YOU TO NOD OFF OR FALL ASLEEP WHILE SITTING QUIETLY AFTER LUNCH WITHOUT ALCOHOL: 1
HOW LIKELY ARE YOU TO NOD OFF OR FALL ASLEEP IN A CAR, WHILE STOPPED FOR A FEW MINUTES IN TRAFFIC: 0
HOW LIKELY ARE YOU TO NOD OFF OR FALL ASLEEP WHILE SITTING INACTIVE IN A PUBLIC PLACE: 0

## 2020-07-23 NOTE — TELEPHONE ENCOUNTER
Second attempt to reach patient appointment. Left message on the patients machine to inform them that after two unsuccessful attempts to reach the patient to complete  their office visit, their appointment needs to be reschedule.

## 2020-07-23 NOTE — PROGRESS NOTES
Ty Mcgee MD, FAASM, [facility]  Naseem Cruz, MSN, RN, CNP     [address] [facility] SLEEP MEDICINE  [place] [address] [address] [facility] [address]  Dept: [phone]  Dept Fax: [phone]: [address] SLEEP MEDICINE  [address] [place] [id number] [phone]    Subjective:     Patient ID: Dorothea Becker is a 64 y.o. male. Chief Complaint   Patient presents with    Sleep Apnea       HPI:      Sleep Medicine Video Visit    Pursuant to the emergency declaration under the [address] [place] [address], [facility] waiver authority and the [employer] Act this Telephone Visit was insisted, with patient's consent, to reduce the patient's risk of exposure to COVID-19 and provide continuity of care for an established patient. Services were provided through a synchronous discussion over a telephone and/or Video chat to substitute for in-person clinic visit, and coded as such. While patient is at home. Machine Modem/Download Info:  Compliance (hours/night): 0.5 hrs/night  Download AHI (/hour): 5.4 /HR  Average CPAP Pressure : 12.9 cmH2O           APAP - Settings  Pressure Min: 11 cmH2O  Pressure Max: 18 cmH2O                 Comfort Settings  Humidity Level (0-8): 4  Flex/EPR (0-3): 3 PAP Mask  Mask Type: Nasal mask     [place] - Total score: 4    Follow-up :     Last Visit : October 2018      Patient reports the listed chronic Co-morbidities: CAD, Afib, HTN, Obesity    are stable at this time.      Subjective Health Changes: Back surgery 7/2020     Over Night Oximetry: [] Yes  [] No  [x] NA [] WNL   Using O2: [] Yes  [] No  [x] NA   Patient is compliant with the machine  [] Yes  [x] No   Feeling rested when using the machine   [x] Yes  [] No     Pressure is comfortable with inspiration and expiration  [x] Yes  [] No     Noticed changes in pressure   [] Yes  [] No  [x] NA   Mask is fitting well  [] Yes  [x] No   Noting Mask Air Leak  [x] Yes  [] No   Having painful Aerophagia  [x] Yes  [] No   Nocturia   0-3  per night. Having  HA upon waking  [] Yes  [x] No   Dry mouth upon waking   Dry Nose  Dry Eyes  [x] Yes  [] No   Congestion upon waking   [x] Yes  [] No    Nose Bleeds  [] Yes  [x] No   Using Sleep Aides    [x] NA   Understands how to change humidification and/or tubing temperature for comfort while at home  [x] Yes  [] No     Difficulties falling asleep  [] Yes  [x] No   Difficulties staying asleep  [] Yes  [x] No   Approximate time to bed  \"I had back surgery on July 14th and I have not slept since\"   Approximate wake time  \"depends on when I fall asleep\"   Taking Naps  no   If taking naps usual length    [x] NA   If taking naps using the machine  [] Yes  [] No  [x] NA [] With and With out    Drowsy when driving  [] Yes  [x] No     Does patient carry a DOT/CDL  [] Yes  [x] No     Does patient carry FAA/P[employer] License   [] Yes  [x] No      Any concerns noted with the machine at this time  [] Yes  [x] No        Diagnosis Orders   1. Obstructive sleep apnea     2. Paroxysmal atrial fibrillation (HCC)     3. Coronary artery disease due to lipid rich plaque     4. Essential hypertension         The chronic medical conditions listed are directly related to the primary diagnosis listed above. The management of the primary diagnosis affects the secondary diagnosis and vice versa. Assessment/Plan:     Atrial fibrillation (HCC)  Chronic- Stable. Cont meds per PCP and other physicians. Coronary artery disease due to lipid rich plaque  Chronic- Stable. Cont meds per PCP and other physicians. Essential hypertension  Chronic- Stable. Cont meds per PCP and other physicians. Obstructive sleep apnea  Reviewed compliance download with pt. Supplies and parts as needed for his machine. These are medically necessary.   Continue medications per his PCP and other physicians. Limit caffeine use after 3pm.  Encouraged him to work on weight loss through diet and exercise. Diagnoses of Paroxysmal atrial fibrillation ([address] Coronary artery disease due to lipid rich plaque, and Essential hypertension were pertinent to this visit. The chronic medical conditions listed are directly related to the primary diagnosis listed above. The management of the primary diagnosis affects the secondary diagnosis and vice versa. The primary encounter diagnosis was Obstructive sleep apnea. Diagnoses of Paroxysmal atrial fibrillation ([address] Coronary artery disease due to lipid rich plaque, and Essential hypertension were also pertinent to this visit. The chronic medical conditions listed are directly related to the primary diagnosis listed above. The management of the primary diagnosis affects the secondary diagnosis and vice versa. - Educated patient and reviewed down load from modem with the patient   - Continue medications per his PCP and other physicians.   - he instructed not to drive unless had 4 hrs of effective therapy for his BUSTER the night before. - Did review the risks of under or untreated BUSTER including, but not limited to, higher risks of motor vehicle accidents, stroke, heart attacks, and death. - he understands and accepts all these risks. - The patient is advised to use the machine every night.   - Patient using  Other [employer] for supplies  - - Patient able to access video feed. Visit completed via video chat communications. 20 min spent with patient.   - Educated on possibly needing to pay for supplies, and needs to be seen yearly for orders to be sent   -F/U: 12 months      No orders of the defined types were placed in this encounter. No orders of the defined types were placed in this encounter. No orders of the defined types were placed in this encounter.       Josh Massey MSN, RN, CNP

## 2020-07-23 NOTE — ASSESSMENT & PLAN NOTE
Reviewed compliance download with pt. Supplies and parts as needed for his machine. These are medically necessary. Continue medications per his PCP and other physicians. Limit caffeine use after 3pm.  Encouraged him to work on weight loss through diet and exercise. Diagnoses of Paroxysmal atrial fibrillation ([address] Coronary artery disease due to lipid rich plaque, and Essential hypertension were pertinent to this visit. The chronic medical conditions listed are directly related to the primary diagnosis listed above. The management of the primary diagnosis affects the secondary diagnosis and vice versa.

## 2020-09-16 PROBLEM — E66.01 MORBIDLY OBESE (HCC): Status: ACTIVE | Noted: 2020-09-16

## 2020-09-16 ASSESSMENT — ENCOUNTER SYMPTOMS
COUGH: 0
CONSTIPATION: 0
ABDOMINAL PAIN: 0
CHEST TIGHTNESS: 0
WHEEZING: 0
VOMITING: 0
NAUSEA: 0
DIARRHEA: 0

## 2020-09-17 ENCOUNTER — OFFICE VISIT (OUTPATIENT)
Dept: FAMILY MEDICINE CLINIC | Age: 60
End: 2020-09-17
Payer: COMMERCIAL

## 2020-09-17 VITALS
WEIGHT: 285 LBS | BODY MASS INDEX: 34.7 KG/M2 | HEART RATE: 82 BPM | HEIGHT: 76 IN | SYSTOLIC BLOOD PRESSURE: 160 MMHG | DIASTOLIC BLOOD PRESSURE: 90 MMHG | TEMPERATURE: 97.6 F | OXYGEN SATURATION: 97 %

## 2020-09-17 LAB
A/G RATIO: 1.5 (ref 1.1–2.2)
ALBUMIN SERPL-MCNC: 4.5 G/DL (ref 3.4–5)
ALP BLD-CCNC: 73 U/L (ref 40–129)
ALT SERPL-CCNC: 23 U/L (ref 10–40)
ANION GAP SERPL CALCULATED.3IONS-SCNC: 13 MMOL/L (ref 3–16)
AST SERPL-CCNC: 27 U/L (ref 15–37)
BASOPHILS ABSOLUTE: 0 K/UL (ref 0–0.2)
BASOPHILS RELATIVE PERCENT: 0.7 %
BILIRUB SERPL-MCNC: 0.3 MG/DL (ref 0–1)
BUN BLDV-MCNC: 13 MG/DL (ref 7–20)
CALCIUM SERPL-MCNC: 9.4 MG/DL (ref 8.3–10.6)
CHLORIDE BLD-SCNC: 99 MMOL/L (ref 99–110)
CHOLESTEROL, TOTAL: 229 MG/DL (ref 0–199)
CO2: 27 MMOL/L (ref 21–32)
CREAT SERPL-MCNC: 0.9 MG/DL (ref 0.9–1.3)
EOSINOPHILS ABSOLUTE: 0.5 K/UL (ref 0–0.6)
EOSINOPHILS RELATIVE PERCENT: 9.2 %
GFR AFRICAN AMERICAN: >60
GFR NON-AFRICAN AMERICAN: >60
GLOBULIN: 3 G/DL
GLUCOSE BLD-MCNC: 97 MG/DL (ref 70–99)
HCT VFR BLD CALC: 41.8 % (ref 40.5–52.5)
HDLC SERPL-MCNC: 38 MG/DL (ref 40–60)
HEMOGLOBIN: 13.7 G/DL (ref 13.5–17.5)
LDL CHOLESTEROL CALCULATED: 146 MG/DL
LYMPHOCYTES ABSOLUTE: 2.8 K/UL (ref 1–5.1)
LYMPHOCYTES RELATIVE PERCENT: 51.4 %
MCH RBC QN AUTO: 29 PG (ref 26–34)
MCHC RBC AUTO-ENTMCNC: 32.8 G/DL (ref 31–36)
MCV RBC AUTO: 88.5 FL (ref 80–100)
MONOCYTES ABSOLUTE: 0.3 K/UL (ref 0–1.3)
MONOCYTES RELATIVE PERCENT: 6.2 %
NEUTROPHILS ABSOLUTE: 1.8 K/UL (ref 1.7–7.7)
NEUTROPHILS RELATIVE PERCENT: 32.5 %
PDW BLD-RTO: 14.3 % (ref 12.4–15.4)
PLATELET # BLD: 201 K/UL (ref 135–450)
PMV BLD AUTO: 8.9 FL (ref 5–10.5)
POTASSIUM SERPL-SCNC: 3.4 MMOL/L (ref 3.5–5.1)
RBC # BLD: 4.72 M/UL (ref 4.2–5.9)
SODIUM BLD-SCNC: 139 MMOL/L (ref 136–145)
TOTAL CK: 668 U/L (ref 39–308)
TOTAL PROTEIN: 7.5 G/DL (ref 6.4–8.2)
TRIGL SERPL-MCNC: 226 MG/DL (ref 0–150)
VLDLC SERPL CALC-MCNC: 45 MG/DL
WBC # BLD: 5.5 K/UL (ref 4–11)

## 2020-09-17 PROCEDURE — G8427 DOCREV CUR MEDS BY ELIG CLIN: HCPCS | Performed by: CLINICAL NURSE SPECIALIST

## 2020-09-17 PROCEDURE — 3017F COLORECTAL CA SCREEN DOC REV: CPT | Performed by: CLINICAL NURSE SPECIALIST

## 2020-09-17 PROCEDURE — 99214 OFFICE O/P EST MOD 30 MIN: CPT | Performed by: CLINICAL NURSE SPECIALIST

## 2020-09-17 PROCEDURE — 1036F TOBACCO NON-USER: CPT | Performed by: CLINICAL NURSE SPECIALIST

## 2020-09-17 PROCEDURE — G8417 CALC BMI ABV UP PARAM F/U: HCPCS | Performed by: CLINICAL NURSE SPECIALIST

## 2020-09-17 RX ORDER — ROSUVASTATIN CALCIUM 20 MG/1
20 TABLET, COATED ORAL DAILY
Qty: 90 TABLET | Refills: 0 | Status: SHIPPED | OUTPATIENT
Start: 2020-09-17 | End: 2020-12-18 | Stop reason: SDUPTHER

## 2020-09-17 RX ORDER — RIVAROXABAN 20 MG/1
TABLET, FILM COATED ORAL
COMMUNITY
Start: 2020-09-09 | End: 2020-09-17 | Stop reason: SDUPTHER

## 2020-09-17 RX ORDER — TIZANIDINE 4 MG/1
4 TABLET ORAL EVERY 8 HOURS PRN
Qty: 90 TABLET | Refills: 0 | Status: SHIPPED | OUTPATIENT
Start: 2020-09-17 | End: 2020-12-18 | Stop reason: SDUPTHER

## 2020-09-17 RX ORDER — LISINOPRIL 30 MG/1
30 TABLET ORAL DAILY
Qty: 90 TABLET | Refills: 0 | Status: SHIPPED | OUTPATIENT
Start: 2020-09-17 | End: 2020-12-18 | Stop reason: SDUPTHER

## 2020-09-17 RX ORDER — DIAZEPAM 10 MG/1
TABLET ORAL
COMMUNITY
Start: 2020-07-25 | End: 2020-09-17 | Stop reason: SDUPTHER

## 2020-09-17 RX ORDER — METOPROLOL SUCCINATE 50 MG/1
50 TABLET, EXTENDED RELEASE ORAL DAILY
Qty: 90 TABLET | Refills: 1 | Status: SHIPPED | OUTPATIENT
Start: 2020-09-17 | End: 2020-12-18 | Stop reason: SDUPTHER

## 2020-09-17 RX ORDER — DIAZEPAM 10 MG/1
10 TABLET ORAL DAILY PRN
Qty: 15 TABLET | Refills: 0 | Status: SHIPPED | OUTPATIENT
Start: 2020-09-17 | End: 2020-10-02

## 2020-09-17 RX ORDER — HYDROCHLOROTHIAZIDE 25 MG/1
25 TABLET ORAL EVERY MORNING
Qty: 90 TABLET | Refills: 0 | Status: SHIPPED | OUTPATIENT
Start: 2020-09-17 | End: 2020-12-18 | Stop reason: SDUPTHER

## 2020-09-17 RX ORDER — DIAZEPAM 10 MG/1
TABLET ORAL
Status: CANCELLED | OUTPATIENT
Start: 2020-09-17

## 2020-09-17 RX ORDER — LISINOPRIL AND HYDROCHLOROTHIAZIDE 25; 20 MG/1; MG/1
1 TABLET ORAL DAILY
Qty: 90 TABLET | Refills: 0 | Status: CANCELLED | OUTPATIENT
Start: 2020-09-17

## 2020-09-17 RX ORDER — AMLODIPINE BESYLATE 5 MG/1
5 TABLET ORAL DAILY
Qty: 90 TABLET | Refills: 0 | Status: SHIPPED | OUTPATIENT
Start: 2020-09-17 | End: 2020-12-18 | Stop reason: SDUPTHER

## 2020-09-17 RX ORDER — RIVAROXABAN 20 MG/1
20 TABLET, FILM COATED ORAL DAILY
Qty: 90 TABLET | Refills: 0 | Status: SHIPPED | OUTPATIENT
Start: 2020-09-17 | End: 2020-10-19

## 2020-09-17 NOTE — PROGRESS NOTES
factors for coronary artery disease include male sex, hypertension, a sedentary lifestyle, dyslipidemia, obesity and stress (prediabetes). Current Outpatient Medications on File Prior to Visit   Medication Sig Dispense Refill    lisinopril-hydroCHLOROthiazide (PRINZIDE;ZESTORETIC) 20-25 MG per tablet Take 1 tablet by mouth daily 90 tablet 0    vitamin D (ERGOCALCIFEROL) 54269 units CAPS capsule Take 1 capsule by mouth once a week 4 capsule 2    Blood Pressure KIT 1 kit by Does not apply route once for 1 dose 1 kit 0    Misc. Devices MISC Pads and leads for use on [facility] TENS unit. 1 Device 0     No current facility-administered medications on file prior to visit.        Past Medical History:   Diagnosis Date    Aneurysm of ascending aorta (HCC)     Arthritis     Atopic dermatitis     Back pain     Facial paralysis/[place] palsy     lt side face dropping    Hyperlipidemia     Hypertension     Imprisonment and other incarceration [id number]    Migraine     Obstructive sleep apnea (adult) (pediatric)     cpap machine    Prediabetes     Psoriasis-like skin disease     Tinea cruris      Past Surgical History:   Procedure Laterality Date    BACK SURGERY  February and October 2008    COLONOSCOPY  01/28/2018    CORONARY ANGIOPLASTY      12/2017    CYSTOSCOPY  2001    LAMINECTOMY POSTERIOR THORACIC / LUMBAR Right 7/14/2020    THORACIC LAMINECTOMY WITH PADDLE LEAD AND RECHARGEABLE BATTERY IN RIGHT HIP performed by Mahendra Mariano MD at [facility] [address] Bilateral 2/5/2020    BILATERAL L3 TRANSFORAMINAL  EPIDURAL STEROID INJECTION WITH FLUOROSCOPY performed by Ranulfo Underwood MD at [address] Bilateral 3/9/2020    BILATERAL L1, L2, L3 (ABOVE THE FUSION) MEDIAL BRANCH BLOCK WITH FLUOROSCOPY (80624, 73716)   #1 performed by Ranulfo Underwood MD at [place] [address] 6/3/2020    SPINAL CORD STIMULATOR TRIAL WITH FLUOROSCOPY (52494, [id number], [id number]) - NEVRO performed by Db Kincaid MD at [address] History   Problem Relation Age of Onset    Other Mother         Polio    Thyroid Disease Mother     Heart Failure Father     Stroke Father      Social History     Socioeconomic History    Marital status:      Spouse name: Not on file    Number of children: 0    Years of education: Not on file    Highest education level: Not on file   Occupational History    Occupation: unemployed    Occupation: former masonary work    Social Needs    Financial resource strain: Not on file    Food insecurity     Worry: Not on file     Inability: Not on file   [employer] needs     Medical: Not on file     Non-medical: Not on file   Tobacco Use    Smoking status: Never Smoker    Smokeless tobacco: Never Used   Substance and Sexual Activity    Alcohol use:  Yes     Alcohol/week: 2.0 standard drinks     Types: 1 Cans of beer, 1 Standard drinks or equivalent per week     Frequency: Monthly or less     Drinks per session: 1 or 2     Binge frequency: Less than monthly     Comment: Rarely; socially    Drug use: No    Sexual activity: Yes     Partners: Female     Birth control/protection: Condom   Lifestyle    Physical activity     Days per week: Not on file     Minutes per session: Not on file    Stress: Not on file   Relationships    Social connections     Talks on phone: Not on file     Gets together: Not on file     Attends Muslim service: Not on file     Active member of club or organization: Not on file     Attends meetings of clubs or organizations: Not on file     Relationship status: Not on file    Intimate partner violence     Fear of current or ex partner: Not on file     Emotionally abused: Not on file     Physically abused: Not on file     Forced sexual activity: Not on file   Other Topics Concern    Not on file   Social History Narrative    Not on file       Review of Systems   Constitutional: Negative for chills and fever. Eyes: Negative for blurred vision and visual disturbance. Respiratory: Negative for cough, chest tightness and wheezing. Shortness of breath: occ with exertion, unchanged. Cardiovascular: Negative for chest pain, palpitations, orthopnea and leg swelling. Gastrointestinal: Negative for abdominal pain, constipation, diarrhea, nausea and vomiting. Musculoskeletal: Negative for arthralgias and myalgias. Skin: Negative for rash. Neurological: Negative for headaches. OBJECTIVE:    Physical Exam  Vitals signs and nursing note reviewed. Constitutional:       General: He is not in acute distress. Appearance: Normal appearance. He is well-developed. He is not ill-appearing. HENT:      Head: Normocephalic and atraumatic. Right Ear: External ear normal.      Left Ear: External ear normal.      Nose: Nose normal.   Eyes:      Conjunctiva/sclera: Conjunctivae normal.      Pupils: Pupils are equal, round, and reactive to light. Neck:      Musculoskeletal: Normal range of motion and neck supple. Trachea: No tracheal deviation. Cardiovascular:      Rate and Rhythm: Normal rate and regular rhythm. Heart sounds: Normal heart sounds. Pulmonary:      Effort: Pulmonary effort is normal. No respiratory distress. Breath sounds: Normal breath sounds. No wheezing or rales. Chest:      Chest wall: No tenderness. Abdominal:      General: Bowel sounds are normal. There is no distension. Palpations: Abdomen is soft. There is no mass. Tenderness: There is no abdominal tenderness. Hernia: No hernia is present. Musculoskeletal: Normal range of motion. Skin:     General: Skin is warm and dry. Findings: No rash. Neurological:      Mental Status: He is alert and oriented to person, place, and time.    Psychiatric:         Behavior: Behavior normal.       BP (!) 160/90   Pulse 82   Temp 97.6 °F (36.4 °C)   Ht 6' 4\" (1.93 m)   Wt 285 lb (129.3 kg)   SpO2 97%   BMI 34.69 kg/m²    BP Readings from Last 3 Encounters:   09/17/20 (!) 160/90   07/18/20 (!) 148/103   07/16/20 (!) 162/86      Wt Readings from Last 3 Encounters:   09/17/20 285 lb (129.3 kg)   07/16/20 280 lb (127 kg)   07/16/20 276 lb (125.2 kg)       ASSESSMENT & PLAN:    1. Essential hypertension  - Stable, continue current regimen  - metoprolol succinate (TOPROL XL) 50 MG extended release tablet; Take 1 tablet by mouth daily  Dispense: 90 tablet; Refill: 1  - amLODIPine (NORVASC) 5 MG tablet; Take 1 tablet by mouth daily  Dispense: 90 tablet; Refill: 0  - CBC Auto Differential; Future  - Comprehensive Metabolic Panel; Future  - lisinopril (PRINIVIL;ZESTRIL) 30 MG tablet; Take 1 tablet by mouth daily  Dispense: 90 tablet; Refill: 0  - hydroCHLOROthiazide (HYDRODIURIL) 25 MG tablet; Take 1 tablet by mouth every morning  Dispense: 90 tablet; Refill: 0  - Reviewed DASH and low sodium diet     2. Mixed hyperlipidemia  - Stable, continue current regimen  - rosuvastatin (CRESTOR) 20 MG tablet; Take 1 tablet by mouth daily  Dispense: 90 tablet; Refill: 0  - CBC Auto Differential; Future  - Comprehensive Metabolic Panel; Future  - Lipid Panel; Future  - CK; Future  - Reviewed low cholesterol diet     3. Prediabetes  - Stable, continue lifestyle modifications   - CBC Auto Differential; Future  - Comprehensive Metabolic Panel; Future  - Hemoglobin A1C; Future  - Reviewed diabetic diet     4. Paroxysmal atrial fibrillation (HCC)  - Stable, continue current regimen   - metoprolol succinate (TOPROL XL) 50 MG extended release tablet; Take 1 tablet by mouth daily  Dispense: 90 tablet; Refill: 1  - XARELTO 20 MG TABS tablet; Take 1 tablet by mouth daily  Dispense: 90 tablet; Refill: 0  - Handicap Placard MISC; by Does not apply route Expires 9/17/2025  Dispense: 2 each; Refill: 0  - Follow-up with cardiology as directed    5.  Long term current use of anticoagulant therapy  - Stable, continue current regimen   - XARELTO 20 MG TABS tablet; Take 1 tablet by mouth daily  Dispense: 90 tablet; Refill: 0  - CBC Auto Differential; Future  - Follow-up with cardiology as directed    6. Ascending aortic aneurysm (HCC)  - Stable, continue current regimen   - Handicap Placard MISC; by Does not apply route Expires 9/17/2025  Dispense: 2 each; Refill: 0  - Follow-up with cardiology as directed    7. Coronary artery disease due to lipid rich plaque  - Stable, continue current regimen   - rosuvastatin (CRESTOR) 20 MG tablet; Take 1 tablet by mouth daily  Dispense: 90 tablet; Refill: 0  - metoprolol succinate (TOPROL XL) 50 MG extended release tablet; Take 1 tablet by mouth daily  Dispense: 90 tablet; Refill: 1  - amLODIPine (NORVASC) 5 MG tablet; Take 1 tablet by mouth daily  Dispense: 90 tablet; Refill: 0  - Lipid Panel; Future  - Handicap Placard MISC; by Does not apply route Expires 9/17/2025  Dispense: 2 each; Refill: 0  - Follow-up with cardiology as directed    8. Obstructive sleep apnea  - Stable, continue nightly CPAP usage    9. Chronic midline low back pain with bilateral sciatica  - tiZANidine (ZANAFLEX) 4 MG tablet; Take 1 tablet by mouth every 8 hours as needed (for muscle spasm)  Dispense: 90 tablet; Refill: 0  - Handicap Placard MISC; by Does not apply route Expires 9/17/2025  Dispense: 2 each; Refill: 0  - Follow up with ortho as directed    10. Muscle spasms of both lower extremities  - diazePAM (VALIUM) 10 MG tablet; Take 1 tablet by mouth daily as needed (muscle spasms) for up to 15 days. Dispense: 15 tablet; Refill: 0    11. Vitamin D deficiency  - Stable, continue vitamin D3 supplementation    12. Elevated CK  - Loida Arreguin MD, Rheumatology, [facility]    13. BMI 34.0-34.9,adult  - Encourage continue lifestyle modifications (better food choices, portion control and increasing activity)      Continue current treatment plan.     Current Outpatient Medications   Medication Sig Dispense Refill    rosuvastatin (CRESTOR) 20 MG tablet Take 1 tablet by mouth daily 90 tablet 0    tiZANidine (ZANAFLEX) 4 MG tablet Take 1 tablet by mouth every 8 hours as needed (for muscle spasm) 90 tablet 0    metoprolol succinate (TOPROL XL) 50 MG extended release tablet Take 1 tablet by mouth daily 90 tablet 1    amLODIPine (NORVASC) 5 MG tablet Take 1 tablet by mouth daily 90 tablet 0    XARELTO 20 MG TABS tablet Take 1 tablet by mouth daily 90 tablet 0    lisinopril (PRINIVIL;ZESTRIL) 30 MG tablet Take 1 tablet by mouth daily 90 tablet 0    hydroCHLOROthiazide (HYDRODIURIL) 25 MG tablet Take 1 tablet by mouth every morning 90 tablet 0    diazePAM (VALIUM) 10 MG tablet Take 1 tablet by mouth daily as needed (muscle spasms) for up to 15 days. 15 tablet 0    Handicap Placard MISC by Does not apply route Expires 9/17/2025 2 each 0    lisinopril-hydroCHLOROthiazide (PRINZIDE;ZESTORETIC) 20-25 MG per tablet Take 1 tablet by mouth daily 90 tablet 0    vitamin D (ERGOCALCIFEROL) 99780 units CAPS capsule Take 1 capsule by mouth once a week 4 capsule 2    Blood Pressure KIT 1 kit by Does not apply route once for 1 dose 1 kit 0    Misc. Devices MISC Pads and leads for [facility] TENS unit. 1 Device 0     No current facility-administered medications for this visit. Return in about 3 months (around 12/17/2020), or if symptoms worsen or fail to improve, for HTN, lipidemia, prediabetes, A Fib, anticoag, CAD, CBP, vit D, BMI. Kadie Ceballos received counseling on the following healthy behaviors: nutrition, exercise and medication adherence    Discussed use, benefit, and side effects of prescribed medications. Barriers to medication compliance addressed. All patient questions answered. Pt voiced understanding. Call office if experience side effects from medications. Please note that some or all of this record was generated using voice recognition software.  If there are any questions about the content of this document, please contact the author as some errors in transcription may have occurred.

## 2020-09-17 NOTE — PATIENT INSTRUCTIONS
Fasting labs ordered     Medications refilled     Reviewed DASH and low sodium diet    Increase lisinopril for 20 mg to 30 mg daily     Reviewed low cholesterol diet     Increase Crestor 20 mg daily     Reviewed diabetic diet      Follow up with cardiology as directed, encouraged to schedule an appointment     Follow up with ortho as directed     Keep scheduled appointment with [facility] for back      Discussed neck and shoulder issues.       Encourage to follow up with cardiology as directed

## 2020-09-18 LAB
ESTIMATED AVERAGE GLUCOSE: 119.8 MG/DL
HBA1C MFR BLD: 5.8 %

## 2020-09-19 ASSESSMENT — ENCOUNTER SYMPTOMS
BLURRED VISION: 0
ORTHOPNEA: 0

## 2020-09-29 ENCOUNTER — TELEPHONE (OUTPATIENT)
Dept: FAMILY MEDICINE CLINIC | Age: 60
End: 2020-09-29

## 2020-09-29 NOTE — TELEPHONE ENCOUNTER
----- Message from Gm Chaparro sent at 9/29/2020 12:11 PM EDT -----  Subject: Message to Provider    QUESTIONS  Information for Provider? Provider referred the patient the the   rheumatologist and before he can book an appointment they need the office   to fax over the following; office notes   lab results   and x rays that pertain to his case. the fax number is [phone] to   \"Anna\"  ---------------------------------------------------------------------------  --------------  [address]  What is the best way for the office to contact you? OK to leave message on   voicemail  Preferred Call Back Phone Number? [phone]  ---------------------------------------------------------------------------  --------------  SCRIPT ANSWERS  Relationship to Patient?  Self

## 2020-10-13 ENCOUNTER — TELEPHONE (OUTPATIENT)
Dept: ORTHOPEDIC SURGERY | Age: 60
End: 2020-10-13

## 2020-10-15 NOTE — PROGRESS NOTES
Surveillance pt for aortic dilation without rupture.  Order for ascending aortic aneurysm placed in epic.  Willie Hernandez

## 2020-10-19 ENCOUNTER — OFFICE VISIT (OUTPATIENT)
Dept: ORTHOPEDIC SURGERY | Age: 60
End: 2020-10-19
Payer: COMMERCIAL

## 2020-10-19 VITALS — WEIGHT: 285 LBS | RESPIRATION RATE: 12 BRPM | HEIGHT: 76 IN | BODY MASS INDEX: 34.7 KG/M2 | TEMPERATURE: 97.1 F

## 2020-10-19 PROCEDURE — G8417 CALC BMI ABV UP PARAM F/U: HCPCS | Performed by: PHYSICIAN ASSISTANT

## 2020-10-19 PROCEDURE — 99213 OFFICE O/P EST LOW 20 MIN: CPT | Performed by: PHYSICIAN ASSISTANT

## 2020-10-19 PROCEDURE — G8484 FLU IMMUNIZE NO ADMIN: HCPCS | Performed by: PHYSICIAN ASSISTANT

## 2020-10-19 PROCEDURE — 1036F TOBACCO NON-USER: CPT | Performed by: PHYSICIAN ASSISTANT

## 2020-10-19 PROCEDURE — 3017F COLORECTAL CA SCREEN DOC REV: CPT | Performed by: PHYSICIAN ASSISTANT

## 2020-10-19 PROCEDURE — G8427 DOCREV CUR MEDS BY ELIG CLIN: HCPCS | Performed by: PHYSICIAN ASSISTANT

## 2020-10-19 NOTE — PROGRESS NOTES
Follow up: Johana Amato  1960  [id number]         Chief Complaint   Patient presents with    Lower Back Pain     F/u LSP; SCS implant w/ Dr. Trevor Menard 7/14/20         HISTORY OF PRESENT ILLNESS:  Mr. Dony Alvarez is a 64 y.o. male returns for a follow up visit for multiple medical problems. His current presenting problems are   1. Lumbar post-laminectomy syndrome    2. Chronic pain syndrome    3. Lumbar radiculopathy    4. Degenerative disc disease, lumbar    5. Lumbar stenosis without neurogenic claudication    . As per information/history obtained from the PADT(patient assessment and documentation tool) - He complains of pain in the lower back with radiation to the lower leg Bilateral He rates the pain 3/10 and describes it as dull, aching. Pain is made worse by: increased activities. He denies side effects from the current pain regimen. Patient reports that since the last follow up visit the physical functioning is better, family/social relationships are better, mood is better and sleep patterns are better, and that the overall functioning is better. Patient denies neurological bowel or bladder. Mr. Dony Alvarez presents today for follow up of his ongoing low back and bilateral lower leg pain. He was last seen in June following an SCS trial.   He went on to have the permament implant placed on 7/14/20. He states overall he is doing well and has noticed some improvement in her symptoms. However, he reports his pain will increase with over excursion and twisting/turning activities. He notes due to his symptoms worsening with over excursion, he has not been as active as he would like. Mr. Dony Alvarez denies any new injuries or triggers to his back or legs since his procedure. He does not present today using any ambulatory devices or braces for support.       The patient is also experiencing a circular rash in the bilateral lower extremities which began approximately 1 week after the implant was completed. The patient did speak with his primary care about that and this is something that they will monitor. The patient describes that it does seem to be worsening. Associated signs and symptoms:   Neurogenic bowel or bladder symptoms:  no   Perceived weakness:  yes   Difficulty walking:  no            Past medical, surgical, social and family history reviewed with the patient.      Past Medical History:   Past Medical History:   Diagnosis Date    Aneurysm of ascending aorta (HCC)     Arthritis     Atopic dermatitis     Back pain     Facial paralysis/[place] palsy     lt side face dropping    Hyperlipidemia     Hypertension     Imprisonment and other incarceration [id number]    Migraine     Obstructive sleep apnea (adult) (pediatric)     cpap machine    Prediabetes     Psoriasis-like skin disease     Tinea cruris       Past Surgical History:     Past Surgical History:   Procedure Laterality Date    BACK SURGERY  February and October 2008    COLONOSCOPY  01/28/2018    CORONARY ANGIOPLASTY      12/2017    CYSTOSCOPY  2001    LAMINECTOMY POSTERIOR THORACIC / LUMBAR Right 7/14/2020    THORACIC LAMINECTOMY WITH PADDLE LEAD AND RECHARGEABLE BATTERY IN RIGHT HIP performed by Bonita Gutierrez MD at [facility] [address] Bilateral 2/5/2020    BILATERAL L3 TRANSFORAMINAL  EPIDURAL STEROID INJECTION WITH FLUOROSCOPY performed by Yasemin Albarran MD at [address] Bilateral 3/9/2020    BILATERAL L1, L2, L3 (ABOVE THE FUSION) MEDIAL BRANCH BLOCK WITH FLUOROSCOPY (84538, 02886)   #1 performed by Yasemin Albarran MD at [address] N/A 6/3/2020    SPINAL CORD STIMULATOR TRIAL WITH FLUOROSCOPY (66459, 82350, 66801) - NEVRO performed by Yasemin Albarran MD at [facility]     Current Medications:     Current Outpatient Medications:     rosuvastatin (CRESTOR) 20 MG tablet, Take 1 tablet by mouth daily, Disp: 90 tablet, Rfl: 0    tiZANidine (ZANAFLEX) 4 MG tablet, Take 1 tablet by mouth every 8 hours as needed (for muscle spasm), Disp: 90 tablet, Rfl: 0    metoprolol succinate (TOPROL XL) 50 MG extended release tablet, Take 1 tablet by mouth daily, Disp: 90 tablet, Rfl: 1    amLODIPine (NORVASC) 5 MG tablet, Take 1 tablet by mouth daily, Disp: 90 tablet, Rfl: 0    lisinopril (PRINIVIL;ZESTRIL) 30 MG tablet, Take 1 tablet by mouth daily, Disp: 90 tablet, Rfl: 0    hydroCHLOROthiazide (HYDRODIURIL) 25 MG tablet, Take 1 tablet by mouth every morning, Disp: 90 tablet, Rfl: 0    Handicap Placard MISC, by Does not apply route Expires 9/17/2025, Disp: 2 each, Rfl: 0    Misc. Devices MISC, Pads and leads for use on [address] [place] [address],[address] [facility] TENS unit., Disp: 1 Device, Rfl: 0    lisinopril-hydroCHLOROthiazide (PRINZIDE;ZESTORETIC) 20-25 MG per tablet, Take 1 tablet by mouth daily, Disp: 90 tablet, Rfl: 0    vitamin D (ERGOCALCIFEROL) 32970 units CAPS capsule, Take 1 capsule by mouth once a week, Disp: 4 capsule, Rfl: 2  Allergies:  Adhesive tape and Sugar-protein-starch  Social History:    reports that he has never smoked. He has never used smokeless tobacco. He reports current alcohol use of about 2.0 standard drinks of alcohol per week. He reports that he does not use drugs. Family History:   Family History   Problem Relation Age of Onset    Other Mother         Polio    Thyroid Disease Mother     Heart Failure Father     Stroke Father        REVIEW OF SYSTEMS:   CONSTITUTIONAL: Denies unexplained weight loss, fevers, chills or fatigue  NEUROLOGICAL: Denies unsteady gait or progressive weakness  MUSCULOSKELETAL: Denies joint swelling or redness  GI: Denies nausea, vomiting, diarrhea   : Denies bowel or bladder issues       PHYSICAL EXAM:    Vitals: Temperature 97.1 °F (36.2 °C), resp. rate 12, height 6' 4\" (1.93 m), weight 285 lb (129.3 kg).     GENERAL EXAM:  · General Apparence: Patient is adequately groomed with no evidence of malnutrition. · Psychiatric: Orientation: The patient is oriented to time, place and person. The patient's mood and affect are appropriate   · Vascular: Examination reveals no swelling and palpation reveals no tenderness in upper or lower extremities. Good capillary refill. · The lymphatic examination of the neck, axillae and groin reveals all areas to be without enlargement or induration  · Sensation is intact without deficit in the upper and lower extremities to light touch and pinprick  · Coordination of the upper and lower extremities are normal.  · RIGHT UPPER EXTREMITY:  Inspection/examination of the right upper extremity does not show any tenderness, deformity or injury. Range of motion is unremarkable and pain-free. There is no gross instability. There are no rashes, ulcerations or lesions. Strength and tone are normal. No atrophy or abnormal movements are noted. · LEFT UPPER EXTREMITY: Inspection/examination of the left upper extremity does not show any tenderness, deformity or injury. Range of motion is unremarkable and pain-free. There is no gross instability. There are no rashes, ulcerations or lesions. Strength and tone are normal. No atrophy or abnormal movements are noted. LUMBAR/SACRAL EXAMINATION:  · Inspection: Local inspection shows no step-off or bruising. Lumbar alignment is normal. No instability is noted. · Palpation:   No evidence of tenderness at the midline. Lumbar paraspinal tenderness: Mild L4/5 and L5/S1 tenderness  Bursal tenderness No tenderness bilaterally  There is no paraspinal spasm. · Range of Motion: limited by 25% in all planes due to pain  · Strength:   Strength testing is 5/5 in all muscle groups tested. · Special Tests:   Straight leg raise and crossed SLR negative. Jonathon's testing is negative bilaterally. FADIR's testing is negative bilaterally. Bowstring test negative. Slump test negative. · Skin: There are no rashes, ulcerations or lesions.   · Reflexes: Reflexes are symmetrically 1+ at the patellar and ankle tendons. Clonus absent bilaterally at the feet. · Gait & station: normal, patient ambulates without assistance and no ataxia  · Additional Examinations:  · RIGHT LOWER EXTREMITY: Inspection reveals small circular erythema type lesions not raised or red above the ankle/examination of the right lower extremity does not show any tenderness, deformity or injury. Range of motion is normal and pain-free. There is no gross instability. There are no rashes, ulcerations or lesions. Strength and tone are normal. No atrophy or abnormal movements are noted. · LEFT LOWER EXTREMITY:  Inspection/examination of the left lower extremity does not show any tenderness, deformity or injury. Range of motion is normal and pain-free. There is no gross instability. There are no rashes, ulcerations or lesions. Strength and tone are normal. No atrophy or abnormal movements are noted.       Diagnostic Testing:    MRI of the Lumbar Spine from 1/7/20:       FINDINGS:  No substantial scoliosis.  No abnormal enhancement in the lumbar spine.      Subcutaneous edema over the lower back involving musculature lower lumbar to upper sacral    regions.         Post metallic intervertebral disc cages at L4-5 and L5-S1.         The conus is normal terminating at L1.         Findings at individual levels demonstrate:         T11-12, T12-L1 unremarkable.         L1-2 2 mm posterior displacement.  Minor bilobed spondylotic disc displacement with no neural    compression.         L2-3 no neural compression.         L3-4 mild to moderate canal stenosis due to 2 mm retrolisthesis with mild diffuse spondylotic    disc displacement and moderate facet and ligamentum flavum hypertrophy.  Gentle abutment of    both descending L4 nerve roots.  Minor bilateral foramen stenosis.         L4-5 intervertebral and posterior osseous fusion.  Mild to moderate right foramen osseous    stenosis.  No soft disc herniation.         L5-S1 intervertebral fusion.  Mild to moderate facet hypertrophy.  Minor bilateral foramen    stenosis.         No hydronephrosis.         No aortic aneurysm or retroperitoneal adenopathy.                   CONCLUSION:    1.  L3-4 mild to moderate canal stenosis due to 2 mm retrolisthesis with mild diffuse    spondylotic disc displacement and moderate facet and ligamentum flavum hypertrophy. Gentle    abutment of both descending L4 nerve roots. Minor bilateral foramen stenosis. 2.  L4-5 intervertebral and posterior osseous fusion. Mild to moderate right foramen osseous    stenosis. No soft disc herniation. 3. L5-S1 intervertebral fusion. Mild to moderate facet hypertrophy. Minor bilateral foramen    stenosis.       Results for orders placed or performed in visit on 09/17/20   CBC Auto Differential   Result Value Ref Range    WBC 5.5 4.0 - 11.0 K/uL    RBC 4.72 4.20 - 5.90 M/uL    Hemoglobin 13.7 13.5 - 17.5 g/dL    Hematocrit 41.8 40.5 - 52.5 %    MCV 88.5 80.0 - 100.0 fL    MCH 29.0 26.0 - 34.0 pg    MCHC 32.8 31.0 - 36.0 g/dL    RDW 14.3 12.4 - 15.4 %    Platelets 034 986 - 708 K/uL    MPV 8.9 5.0 - 10.5 fL    Neutrophils % 32.5 %    Lymphocytes % 51.4 %    Monocytes % 6.2 %    Eosinophils % 9.2 %    Basophils % 0.7 %    Neutrophils Absolute 1.8 1.7 - 7.7 K/uL    Lymphocytes Absolute 2.8 1.0 - 5.1 K/uL    Monocytes Absolute 0.3 0.0 - 1.3 K/uL    Eosinophils Absolute 0.5 0.0 - 0.6 K/uL    Basophils Absolute 0.0 0.0 - 0.2 K/uL   Comprehensive Metabolic Panel   Result Value Ref Range    Sodium 139 136 - 145 mmol/L    Potassium 3.4 (L) 3.5 - 5.1 mmol/L    Chloride 99 99 - 110 mmol/L    CO2 27 21 - 32 mmol/L    Anion Gap 13 3 - 16    Glucose 97 70 - 99 mg/dL    BUN 13 7 - 20 mg/dL    CREATININE 0.9 0.9 - 1.3 mg/dL    GFR Non-African American >60 >60    GFR African American >60 >60    Calcium 9.4 8.3 - 10.6 mg/dL    Total Protein 7.5 6.4 - 8.2 g/dL    Alb 4.5 3.4 - 5.0 g/dL    Albumin/Globulin Ratio 1.5 1.1 - 2.2    Total Bilirubin 0.3 0.0 - 1.0 mg/dL    Alkaline Phosphatase 73 40 - 129 U/L    ALT 23 10 - 40 U/L    AST 27 15 - 37 U/L    Globulin 3.0 g/dL   Hemoglobin A1C   Result Value Ref Range    Hemoglobin A1C 5.8 See comment %    eAG 119.8 mg/dL   Lipid Panel   Result Value Ref Range    Cholesterol, Total 229 (H) 0 - 199 mg/dL    Triglycerides 226 (H) 0 - 150 mg/dL    HDL 38 (L) 40 - 60 mg/dL    LDL Calculated 146 (H) <100 mg/dL    VLDL Cholesterol Calculated 45 Not Established mg/dL   CK   Result Value Ref Range    Total  (H) 39 - 308 U/L     Impression:       1. Lumbar post-laminectomy syndrome    2. Chronic pain syndrome    3. Lumbar radiculopathy    4. Degenerative disc disease, lumbar    5. Lumbar stenosis without neurogenic claudication        Plan:  Clinical Course: Above diagnoses are improving     I discussed the diagnosis and the treatment options with Chetna Santiago today. In Summary:  The various treatment options were outlined and discussed with Chetna Santiago including:  Conservative care options: physical therapy, ice, medications, bracing, and activity modification. The indications for therapeutic injections. The indications for additional imaging/laboratory studies. The indications for (possible future) interventions. After considering the various options discussed, Chetna Santiago elected to pursue a course of treatment that includes the followin. Medications:  No further recommendations for new medications. 2. PT:  Encouraged to continue with Home exercise program.    3. Further studies: No further studies. 4. Interventional:  The patient is doing well with the permanent spinal cord stimulator with Nevro. Reprogramming was completed in the office today. The patient will be referred to Dr Real No at this time for the bilateral knee pain.      5. Follow up:  2-3 months      Chetna Santiago was instructed to call the office if his symptoms worsen or

## 2020-10-26 ENCOUNTER — OFFICE VISIT (OUTPATIENT)
Dept: ORTHOPEDIC SURGERY | Age: 60
End: 2020-10-26
Payer: COMMERCIAL

## 2020-10-26 VITALS — BODY MASS INDEX: 34.95 KG/M2 | TEMPERATURE: 97.4 F | HEIGHT: 76 IN | WEIGHT: 287 LBS

## 2020-10-26 PROCEDURE — G8417 CALC BMI ABV UP PARAM F/U: HCPCS | Performed by: ORTHOPAEDIC SURGERY

## 2020-10-26 PROCEDURE — 99243 OFF/OP CNSLTJ NEW/EST LOW 30: CPT | Performed by: ORTHOPAEDIC SURGERY

## 2020-10-26 PROCEDURE — G8484 FLU IMMUNIZE NO ADMIN: HCPCS | Performed by: ORTHOPAEDIC SURGERY

## 2020-10-26 PROCEDURE — G8427 DOCREV CUR MEDS BY ELIG CLIN: HCPCS | Performed by: ORTHOPAEDIC SURGERY

## 2020-10-26 NOTE — PROGRESS NOTES
Chief Complaint    Knee Pain (bilat knees)      History of Present Illness:  Natalie Grossman is a 64 y.o. male. He is here today for consultation for his bilateral knees at the referral of Lani Kellyma. States he has had a longstanding history of bilateral knee pain primarily over the anterior aspect of the bilateral knees. He states it is mainly painful at rest when he is standing for long periods of time. He will also have pain going up and down stairs. 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 He denies radicular pain. Denies numbness or tingling. States the pain is more achy rather than sharp. States occasionally when he is up on his feet for long periods of time he will feel like his legs do want to buckle from under him. Medical History:  Patient's medications, allergies, past medical, surgical, social and family histories were reviewed and updated as appropriate. Review of Systems:  Pertinent items are noted in HPI  Review of systems reviewed from Patient History Form dated on 10/26/20 and available in the patient's chart under the Media tab. Vital Signs:  Temp 97.4 °F (36.3 °C)   Ht 6' 4\" (1.93 m)   Wt 287 lb (130.2 kg)   BMI 34.93 kg/m²     General Exam:   Constitutional: Patient is adequately groomed with no evidence of malnutrition  DTRs: Deep tendon reflexes are intact  Mental Status: The patient is oriented to time, place and person. The patient's mood and affect are appropriate. Knee Examination:    Inspection: No significant swelling erythema noted but bilateral knees today    Palpation: There is tenderness palpation along the superior pole of the bilateral patellas as well as at the inferior pole of the bilateral patellas. No significant joint line tenderness.   No significant palpable effusion    Range of Motion: Extension of both knees is 0 degrees with knee flexion today to 130 degrees    Strength: Treatment Plan: I discussed the diagnosis and treatment options with him today. He does appear to have both bilateral patellar and quadricep tendinitis both knees. This is most likely from years of being on his knees while doing li work. Going to refer him to physical therapy at this point time. He is agreeable with this plan. I will see him back in clinic in 6 to 8 weeks to make sure he is getting improvement. He may use anti-inflammatories in the meantime as needed.

## 2020-11-04 ENCOUNTER — TELEPHONE (OUTPATIENT)
Dept: CARDIOTHORACIC SURGERY | Age: 60
End: 2020-11-04

## 2020-11-12 ENCOUNTER — HOSPITAL ENCOUNTER (OUTPATIENT)
Dept: CT IMAGING | Age: 60
Discharge: HOME | End: 2020-11-12
Payer: COMMERCIAL

## 2020-11-12 PROCEDURE — 71250 CT THORAX DX C-: CPT

## 2020-11-20 ENCOUNTER — APPOINTMENT (OUTPATIENT)
Dept: GENERAL RADIOLOGY | Age: 60
End: 2020-11-20
Payer: COMMERCIAL

## 2020-11-20 ENCOUNTER — HOSPITAL ENCOUNTER (EMERGENCY)
Age: 60
Discharge: HOME | End: 2020-11-20
Payer: COMMERCIAL

## 2020-11-20 ENCOUNTER — APPOINTMENT (OUTPATIENT)
Dept: CT IMAGING | Age: 60
End: 2020-11-20
Payer: COMMERCIAL

## 2020-11-20 VITALS
HEART RATE: 87 BPM | DIASTOLIC BLOOD PRESSURE: 90 MMHG | TEMPERATURE: 98.4 F | SYSTOLIC BLOOD PRESSURE: 165 MMHG | OXYGEN SATURATION: 95 % | RESPIRATION RATE: 20 BRPM

## 2020-11-20 LAB
ANION GAP SERPL CALCULATED.3IONS-SCNC: 12 MMOL/L (ref 3–16)
BASOPHILS ABSOLUTE: 0 K/UL (ref 0–0.2)
BASOPHILS RELATIVE PERCENT: 0.4 %
BUN BLDV-MCNC: 13 MG/DL (ref 7–20)
CALCIUM SERPL-MCNC: 9.4 MG/DL (ref 8.3–10.6)
CHLORIDE BLD-SCNC: 98 MMOL/L (ref 99–110)
CO2: 28 MMOL/L (ref 21–32)
CREAT SERPL-MCNC: 1 MG/DL (ref 0.9–1.3)
EOSINOPHILS ABSOLUTE: 0.1 K/UL (ref 0–0.6)
EOSINOPHILS RELATIVE PERCENT: 1.2 %
GFR AFRICAN AMERICAN: >60
GFR NON-AFRICAN AMERICAN: >60
GLUCOSE BLD-MCNC: 101 MG/DL (ref 70–99)
HCT VFR BLD CALC: 44.7 % (ref 40.5–52.5)
HEMOGLOBIN: 14.6 G/DL (ref 13.5–17.5)
LYMPHOCYTES ABSOLUTE: 1.8 K/UL (ref 1–5.1)
LYMPHOCYTES RELATIVE PERCENT: 32.8 %
MCH RBC QN AUTO: 29 PG (ref 26–34)
MCHC RBC AUTO-ENTMCNC: 32.7 G/DL (ref 31–36)
MCV RBC AUTO: 88.8 FL (ref 80–100)
MONOCYTES ABSOLUTE: 0.5 K/UL (ref 0–1.3)
MONOCYTES RELATIVE PERCENT: 8.2 %
NEUTROPHILS ABSOLUTE: 3.2 K/UL (ref 1.7–7.7)
NEUTROPHILS RELATIVE PERCENT: 57.4 %
PDW BLD-RTO: 13.8 % (ref 12.4–15.4)
PLATELET # BLD: 144 K/UL (ref 135–450)
PMV BLD AUTO: 7.6 FL (ref 5–10.5)
POTASSIUM SERPL-SCNC: 3.8 MMOL/L (ref 3.5–5.1)
RBC # BLD: 5.03 M/UL (ref 4.2–5.9)
SODIUM BLD-SCNC: 138 MMOL/L (ref 136–145)
WBC # BLD: 5.6 K/UL (ref 4–11)

## 2020-11-20 PROCEDURE — 6360000002 HC RX W HCPCS: Performed by: PHYSICIAN ASSISTANT

## 2020-11-20 PROCEDURE — 70450 CT HEAD/BRAIN W/O DYE: CPT

## 2020-11-20 PROCEDURE — 2580000003 HC RX 258: Performed by: PHYSICIAN ASSISTANT

## 2020-11-20 PROCEDURE — 80048 BASIC METABOLIC PNL TOTAL CA: CPT

## 2020-11-20 PROCEDURE — 96374 THER/PROPH/DIAG INJ IV PUSH: CPT

## 2020-11-20 PROCEDURE — 96375 TX/PRO/DX INJ NEW DRUG ADDON: CPT

## 2020-11-20 PROCEDURE — 6370000000 HC RX 637 (ALT 250 FOR IP): Performed by: PHYSICIAN ASSISTANT

## 2020-11-20 PROCEDURE — U0003 INFECTIOUS AGENT DETECTION BY NUCLEIC ACID (DNA OR RNA); SEVERE ACUTE RESPIRATORY SYNDROME CORONAVIRUS 2 (SARS-COV-2) (CORONAVIRUS DISEASE [COVID-19]), AMPLIFIED PROBE TECHNIQUE, MAKING USE OF HIGH THROUGHPUT TECHNOLOGIES AS DESCRIBED BY CMS-2020-01-R: HCPCS

## 2020-11-20 PROCEDURE — 70498 CT ANGIOGRAPHY NECK: CPT

## 2020-11-20 PROCEDURE — 99284 EMERGENCY DEPT VISIT MOD MDM: CPT

## 2020-11-20 PROCEDURE — 71045 X-RAY EXAM CHEST 1 VIEW: CPT

## 2020-11-20 PROCEDURE — 6360000004 HC RX CONTRAST MEDICATION: Performed by: PHYSICIAN ASSISTANT

## 2020-11-20 PROCEDURE — 85025 COMPLETE CBC W/AUTO DIFF WBC: CPT

## 2020-11-20 RX ORDER — AZITHROMYCIN 250 MG/1
TABLET, FILM COATED ORAL
Qty: 1 PACKET | Refills: 0 | Status: SHIPPED | OUTPATIENT
Start: 2020-11-20 | End: 2020-11-20 | Stop reason: SDUPTHER

## 2020-11-20 RX ORDER — AZITHROMYCIN 250 MG/1
TABLET, FILM COATED ORAL
Qty: 1 PACKET | Refills: 0 | Status: SHIPPED | OUTPATIENT
Start: 2020-11-20 | End: 2020-11-30

## 2020-11-20 RX ORDER — DIPHENHYDRAMINE HYDROCHLORIDE 50 MG/ML
25 INJECTION INTRAMUSCULAR; INTRAVENOUS ONCE
Status: COMPLETED | OUTPATIENT
Start: 2020-11-20 | End: 2020-11-20

## 2020-11-20 RX ORDER — 0.9 % SODIUM CHLORIDE 0.9 %
1000 INTRAVENOUS SOLUTION INTRAVENOUS ONCE
Status: COMPLETED | OUTPATIENT
Start: 2020-11-20 | End: 2020-11-20

## 2020-11-20 RX ORDER — ACETAMINOPHEN 500 MG
1000 TABLET ORAL ONCE
Status: COMPLETED | OUTPATIENT
Start: 2020-11-20 | End: 2020-11-20

## 2020-11-20 RX ORDER — DEXAMETHASONE SODIUM PHOSPHATE 10 MG/ML
10 INJECTION, SOLUTION INTRAMUSCULAR; INTRAVENOUS ONCE
Status: COMPLETED | OUTPATIENT
Start: 2020-11-20 | End: 2020-11-20

## 2020-11-20 RX ORDER — METOCLOPRAMIDE HYDROCHLORIDE 5 MG/ML
10 INJECTION INTRAMUSCULAR; INTRAVENOUS ONCE
Status: COMPLETED | OUTPATIENT
Start: 2020-11-20 | End: 2020-11-20

## 2020-11-20 RX ADMIN — DEXAMETHASONE SODIUM PHOSPHATE 10 MG: 10 INJECTION, SOLUTION INTRAMUSCULAR; INTRAVENOUS at 20:12

## 2020-11-20 RX ADMIN — SODIUM CHLORIDE 1000 ML: 9 INJECTION, SOLUTION INTRAVENOUS at 20:09

## 2020-11-20 RX ADMIN — ACETAMINOPHEN 1000 MG: 500 TABLET, FILM COATED ORAL at 19:52

## 2020-11-20 RX ADMIN — DIPHENHYDRAMINE HYDROCHLORIDE 25 MG: 50 INJECTION, SOLUTION INTRAMUSCULAR; INTRAVENOUS at 20:09

## 2020-11-20 RX ADMIN — IOPAMIDOL 75 ML: 755 INJECTION, SOLUTION INTRAVENOUS at 20:38

## 2020-11-20 RX ADMIN — METOCLOPRAMIDE 10 MG: 5 INJECTION, SOLUTION INTRAMUSCULAR; INTRAVENOUS at 20:13

## 2020-11-20 ASSESSMENT — PAIN SCALES - GENERAL
PAINLEVEL_OUTOF10: 10
PAINLEVEL_OUTOF10: 2
PAINLEVEL_OUTOF10: 10

## 2020-11-20 ASSESSMENT — PAIN DESCRIPTION - PROGRESSION: CLINICAL_PROGRESSION: RAPIDLY IMPROVING

## 2020-11-20 ASSESSMENT — ENCOUNTER SYMPTOMS
VOMITING: 0
ABDOMINAL PAIN: 0
RHINORRHEA: 0
DIARRHEA: 0
NAUSEA: 0
PHOTOPHOBIA: 1
COUGH: 0
SHORTNESS OF BREATH: 0

## 2020-11-20 ASSESSMENT — PAIN DESCRIPTION - PAIN TYPE: TYPE: ACUTE PAIN

## 2020-11-21 ENCOUNTER — CARE COORDINATION (OUTPATIENT)
Dept: CARE COORDINATION | Age: 60
End: 2020-11-21

## 2020-11-21 LAB — SARS-COV-2, PCR: DETECTED

## 2020-11-21 NOTE — ED PROVIDER NOTES
[facility]        Pt Name: Reddy Keller  MRN: [id number]  [place] 1960  Date of evaluation: 11/20/2020  Provider: Dee Baumgarten, PA-C  PCP: NURIS Holliday CNP    SUKHI. I have evaluated this patient. My supervising physician was available for consultation. CHIEF COMPLAINT       Chief Complaint   Patient presents with    Headache     Pt reports migraine since saturday and saw his PCP today who didn't like the way he looked so she sent him here. Known COVID exposure       HISTORY OF PRESENT ILLNESS   (Location, Timing/Onset, Context/Setting, Quality, Duration, Modifying Factors, Severity, Associated Signs and Symptoms)  Note limiting factors. Reddy Keller is a 64 y.o. male presents to the emergency department today for evaluation for a headache. The patient states that he started with a gradual onset of his headache, bitemporal, sharp, throbbing approximately 1 week ago. The patient states that he has not been able to get rid of the headache for the past week, and he has been in contact with someone with Covid, and the primary care physician recommended that he come to the emergency room for further care and evaluation. The patient is currently rating his pain as a 10/10, he states that his headache is worsened with the lights. He states he tried multiple medications without much improvement. The patient denies any thunderclap in nature or sudden onset of the headache. He states he actually had a headache similar last year. This is not the worst headache of his life. This is not an atypical headache. He has no visual changes. He has no numbness, tingling or weakness. He has had a little congestion but has not had any fever or cough. He does not have any chest pain or shortness of breath. No abdominal pain, nausea, vomiting or diarrhea.   The patient otherwise has no complaints at this time    Nursing Notes were FUSION) MEDIAL BRANCH BLOCK WITH FLUOROSCOPY (64440, 07974)   #1 performed by Linda Stock MD at [address] N/A 6/3/2020    [address] ([address], [address], [address]) - NEVRO performed by Linda Stock MD at [address] [facility]       Previous Medications    AMLODIPINE (NORVASC) 5 MG TABLET    Take 1 tablet by mouth daily    HANDICAP PLACARD MISC    by Does not apply route Expires 9/17/2025    HYDROCHLOROTHIAZIDE (HYDRODIURIL) 25 MG TABLET    Take 1 tablet by mouth every morning    LISINOPRIL (PRINIVIL;ZESTRIL) 30 MG TABLET    Take 1 tablet by mouth daily    LISINOPRIL-HYDROCHLOROTHIAZIDE (PRINZIDE;ZESTORETIC) 20-25 MG PER TABLET    Take 1 tablet by mouth daily    METOPROLOL SUCCINATE (TOPROL XL) 50 MG EXTENDED RELEASE TABLET    Take 1 tablet by mouth daily    MISC. DEVICES MISC    Pads and leads for use on [address] [place] [address],[address] [facility] TENS unit. ROSUVASTATIN (CRESTOR) 20 MG TABLET    Take 1 tablet by mouth daily    TIZANIDINE (ZANAFLEX) 4 MG TABLET    Take 1 tablet by mouth every 8 hours as needed (for muscle spasm)    VITAMIN D (ERGOCALCIFEROL) 98488 UNITS CAPS CAPSULE    Take 1 capsule by mouth once a week         ALLERGIES     Adhesive tape and Sugar-protein-starch    FAMILYHISTORY       Family History   Problem Relation Age of Onset    Other Mother         Polio    Thyroid Disease Mother     Heart Failure Father     Stroke Father           SOCIAL HISTORY       Social History     Tobacco Use    Smoking status: Never Smoker    Smokeless tobacco: Never Used   Substance Use Topics    Alcohol use:  Yes     Alcohol/week: 2.0 standard drinks     Types: 1 Cans of beer, 1 Standard drinks or equivalent per week     Frequency: Monthly or less     Drinks per session: 1 or 2     Binge frequency: Less than monthly     Comment: Rarely; socially    Drug use: No       SCREENINGS             PHYSICAL EXAM    (up to 7 for level 4, 8 or more for level 5) ED Triage Vitals   BP Temp Temp src Pulse Resp SpO2 Height Weight   11/20/20 1748 11/20/20 1752 -- 11/20/20 1752 11/20/20 1752 11/20/20 1748 -- --   (!) 160/93 98.4 °F (36.9 °C)  92 24 97 %         Physical Exam  Vitals signs and nursing note reviewed. Constitutional:       Appearance: He is well-developed. He is not diaphoretic. HENT:      Head: Normocephalic and atraumatic. Right Ear: External ear normal.      Left Ear: External ear normal.      Nose: Nose normal.      Mouth/Throat:      Mouth: Mucous membranes are moist.      Pharynx: Oropharynx is clear. No posterior oropharyngeal erythema. Eyes:      General:         Right eye: No discharge. Left eye: No discharge. Extraocular Movements: Extraocular movements intact. Conjunctiva/sclera: Conjunctivae normal.      Pupils: Pupils are equal, round, and reactive to light. Neck:      Musculoskeletal: Normal range of motion and neck supple. Trachea: No tracheal deviation. Cardiovascular:      Rate and Rhythm: Normal rate and regular rhythm. Heart sounds: No murmur. Pulmonary:      Effort: Pulmonary effort is normal. No respiratory distress. Breath sounds: Normal breath sounds. No wheezing or rales. Abdominal:      General: Bowel sounds are normal. There is no distension. Palpations: Abdomen is soft. Tenderness: There is no abdominal tenderness. There is no guarding. Musculoskeletal: Normal range of motion. Skin:     General: Skin is warm and dry. Neurological:      Mental Status: He is alert and oriented to person, place, and time. GCS: GCS eye subscore is 4. GCS verbal subscore is 5. GCS motor subscore is 6. Cranial Nerves: Cranial nerves are intact. Sensory: Sensation is intact. Motor: Motor function is intact. Coordination: Coordination is intact. Gait: Gait is intact.    Psychiatric:         Behavior: Behavior normal.         DIAGNOSTIC RESULTS   LABS:    Labs Reviewed   BASIC METABOLIC PANEL - Abnormal; Notable for the following components:       Result Value    Chloride 98 (*)     Glucose 101 (*)     All other components within normal limits    Narrative:     Performed at:  [facility]  [address] [facility] [address] [facility],  [place], [address]   Phone [phone]   CBC WITH AUTO DIFFERENTIAL    Narrative:     Performed at:  [facility]  [address] [facility] [address] [facility],  [place], [address]   Phone [phone]       All other labs were within normal range or not returned as of this dictation. EKG: All EKG's are interpreted by the Emergency Department Physician in the absence of a cardiologist.  Please see their note for interpretation of EKG. RADIOLOGY:   Non-plain film images such as CT, Ultrasound and MRI are read by the radiologist. Plain radiographic images are visualized and preliminarily interpreted by the ED Provider with the below findings:        Interpretation per the Radiologist below, if available at the time of this note:    CTA HEAD NECK W CONTRAST   Preliminary Result   Upper lobe pulmonary ground-glass opacity, likely COVID pneumonia. No acute arterial abnormality in the head or neck. CT HEAD WO CONTRAST   Final Result   No acute intracranial abnormality. Acute-appearing pansinusitis. XR CHEST PORTABLE   Final Result   Limited negative portable chest radiograph. Xr Chest Portable    Result Date: 11/20/2020  EXAMINATION: ONE XRAY VIEW OF THE CHEST 11/20/2020 3:16 pm COMPARISON: 06/27/2019 HISTORY: ORDERING SYSTEM PROVIDED HISTORY: SOB TECHNOLOGIST PROVIDED HISTORY: Reason for exam:->SOB Reason for Exam: shortness of breath Acuity: Acute Type of Exam: Initial Additional signs and symptoms: headache FINDINGS: No focal infiltrate is identified, but the retrocardiac region is not well evaluated. Cardial pericardial silhouette is stable. No pneumothorax is seen.   No free epidural hematoma, subdural hematoma, skull fracture. The patient likely has COVID-19, he will be given a prescription for Z-Osvaldo for his sinusitis as well as for suspected pneumonia. Supportive care discussed at home. He was given Decadron in the ED. He states that he will follow with his primary care physician within 2 to 3 days reevaluation. He is to return to the ED for any new or worsening symptoms, patient is stable for discharge      FINAL IMPRESSION      1. Acute nonintractable headache, unspecified headache type    2. Acute sinusitis, recurrence not specified, unspecified location    3. Suspected COVID-19 virus infection    4. Pneumonia due to COVID-19 virus          DISPOSITION/PLAN   DISPOSITION Decision To Discharge 11/20/2020 09:18:42 PM      PATIENT REFERREDTO:  Isabell Gowers, NURIS - CNP  [address] [facility]  [address] [place] [address]. [address]  [phone]    Schedule an appointment as soon as possible for a visit in 2 days      [facility] Emergency Department  [address] [place] [address]  [phone]    As needed, If symptoms worsen      DISCHARGE MEDICATIONS:  New Prescriptions    AZITHROMYCIN (ZITHROMAX) 250 MG TABLET    Take 2 tablets (500 mg) on Day 1, followed by 1 tablet (250 mg) once daily on Days 2 through 5.        DISCONTINUED MEDICATIONS:  Discontinued Medications    No medications on file              (Please note that portions of this note were completed with a voice recognition program.  Efforts were made to edit the dictations but occasionally words are mis-transcribed.)    Irish Landin PA-C (electronically signed)            Irish Landin PA-C  11/20/20 3204

## 2020-11-22 ENCOUNTER — APPOINTMENT (OUTPATIENT)
Dept: GENERAL RADIOLOGY | Age: 60
End: 2020-11-22
Payer: COMMERCIAL

## 2020-11-22 ENCOUNTER — HOSPITAL ENCOUNTER (EMERGENCY)
Age: 60
Discharge: HOME | End: 2020-11-22
Attending: EMERGENCY MEDICINE
Payer: COMMERCIAL

## 2020-11-22 VITALS
BODY MASS INDEX: 34.93 KG/M2 | WEIGHT: 286.82 LBS | HEART RATE: 92 BPM | RESPIRATION RATE: 17 BRPM | TEMPERATURE: 101.5 F | OXYGEN SATURATION: 96 % | SYSTOLIC BLOOD PRESSURE: 149 MMHG | DIASTOLIC BLOOD PRESSURE: 71 MMHG | HEIGHT: 76 IN

## 2020-11-22 LAB
A/G RATIO: 1.3 (ref 1.1–2.2)
ALBUMIN SERPL-MCNC: 4.3 G/DL (ref 3.4–5)
ALP BLD-CCNC: 64 U/L (ref 40–129)
ALT SERPL-CCNC: 23 U/L (ref 10–40)
ANION GAP SERPL CALCULATED.3IONS-SCNC: 11 MMOL/L (ref 3–16)
AST SERPL-CCNC: 26 U/L (ref 15–37)
BASOPHILS ABSOLUTE: 0 K/UL (ref 0–0.2)
BASOPHILS RELATIVE PERCENT: 0.3 %
BILIRUB SERPL-MCNC: 0.5 MG/DL (ref 0–1)
BUN BLDV-MCNC: 13 MG/DL (ref 7–20)
CALCIUM SERPL-MCNC: 8.6 MG/DL (ref 8.3–10.6)
CHLORIDE BLD-SCNC: 94 MMOL/L (ref 99–110)
CO2: 27 MMOL/L (ref 21–32)
CREAT SERPL-MCNC: 1.1 MG/DL (ref 0.9–1.3)
EOSINOPHILS ABSOLUTE: 0 K/UL (ref 0–0.6)
EOSINOPHILS RELATIVE PERCENT: 0.1 %
GFR AFRICAN AMERICAN: >60
GFR NON-AFRICAN AMERICAN: >60
GLOBULIN: 3.4 G/DL
GLUCOSE BLD-MCNC: 93 MG/DL (ref 70–99)
HCT VFR BLD CALC: 42.2 % (ref 40.5–52.5)
HEMOGLOBIN: 14.1 G/DL (ref 13.5–17.5)
LACTIC ACID: 1 MMOL/L (ref 0.4–2)
LIPASE: 17 U/L (ref 13–60)
LYMPHOCYTES ABSOLUTE: 1.3 K/UL (ref 1–5.1)
LYMPHOCYTES RELATIVE PERCENT: 19.6 %
MAGNESIUM: 2 MG/DL (ref 1.8–2.4)
MCH RBC QN AUTO: 28.8 PG (ref 26–34)
MCHC RBC AUTO-ENTMCNC: 33.3 G/DL (ref 31–36)
MCV RBC AUTO: 86.5 FL (ref 80–100)
MONOCYTES ABSOLUTE: 0.4 K/UL (ref 0–1.3)
MONOCYTES RELATIVE PERCENT: 6.1 %
NEUTROPHILS ABSOLUTE: 4.9 K/UL (ref 1.7–7.7)
NEUTROPHILS RELATIVE PERCENT: 73.9 %
PDW BLD-RTO: 13.7 % (ref 12.4–15.4)
PLATELET # BLD: 144 K/UL (ref 135–450)
PMV BLD AUTO: 8.3 FL (ref 5–10.5)
POTASSIUM REFLEX MAGNESIUM: 3.2 MMOL/L (ref 3.5–5.1)
RBC # BLD: 4.88 M/UL (ref 4.2–5.9)
SODIUM BLD-SCNC: 132 MMOL/L (ref 136–145)
TOTAL PROTEIN: 7.7 G/DL (ref 6.4–8.2)
TROPONIN: <0.01 NG/ML
WBC # BLD: 6.7 K/UL (ref 4–11)

## 2020-11-22 PROCEDURE — 71045 X-RAY EXAM CHEST 1 VIEW: CPT

## 2020-11-22 PROCEDURE — 83735 ASSAY OF MAGNESIUM: CPT

## 2020-11-22 PROCEDURE — 85025 COMPLETE CBC W/AUTO DIFF WBC: CPT

## 2020-11-22 PROCEDURE — 99284 EMERGENCY DEPT VISIT MOD MDM: CPT

## 2020-11-22 PROCEDURE — 84484 ASSAY OF TROPONIN QUANT: CPT

## 2020-11-22 PROCEDURE — 2580000003 HC RX 258: Performed by: NURSE PRACTITIONER

## 2020-11-22 PROCEDURE — 6360000002 HC RX W HCPCS: Performed by: NURSE PRACTITIONER

## 2020-11-22 PROCEDURE — 96365 THER/PROPH/DIAG IV INF INIT: CPT

## 2020-11-22 PROCEDURE — 6370000000 HC RX 637 (ALT 250 FOR IP): Performed by: NURSE PRACTITIONER

## 2020-11-22 PROCEDURE — 96361 HYDRATE IV INFUSION ADD-ON: CPT

## 2020-11-22 PROCEDURE — 96375 TX/PRO/DX INJ NEW DRUG ADDON: CPT

## 2020-11-22 PROCEDURE — 83605 ASSAY OF LACTIC ACID: CPT

## 2020-11-22 PROCEDURE — 83690 ASSAY OF LIPASE: CPT

## 2020-11-22 PROCEDURE — 80053 COMPREHEN METABOLIC PANEL: CPT

## 2020-11-22 RX ORDER — PROCHLORPERAZINE EDISYLATE 5 MG/ML
10 INJECTION INTRAMUSCULAR; INTRAVENOUS ONCE
Status: COMPLETED | OUTPATIENT
Start: 2020-11-22 | End: 2020-11-22

## 2020-11-22 RX ORDER — MAGNESIUM SULFATE 1 G/100ML
1 INJECTION INTRAVENOUS ONCE
Status: COMPLETED | OUTPATIENT
Start: 2020-11-22 | End: 2020-11-22

## 2020-11-22 RX ORDER — POTASSIUM CHLORIDE 750 MG/1
40 TABLET, FILM COATED, EXTENDED RELEASE ORAL ONCE
Status: COMPLETED | OUTPATIENT
Start: 2020-11-22 | End: 2020-11-22

## 2020-11-22 RX ORDER — ASPIRIN 81 MG/1
81 TABLET, CHEWABLE ORAL DAILY
Qty: 14 TABLET | Refills: 0 | Status: SHIPPED | OUTPATIENT
Start: 2020-11-22 | End: 2021-04-07

## 2020-11-22 RX ORDER — DIPHENHYDRAMINE HYDROCHLORIDE 50 MG/ML
25 INJECTION INTRAMUSCULAR; INTRAVENOUS ONCE
Status: DISCONTINUED | OUTPATIENT
Start: 2020-11-22 | End: 2020-11-22

## 2020-11-22 RX ORDER — KETOROLAC TROMETHAMINE 30 MG/ML
30 INJECTION, SOLUTION INTRAMUSCULAR; INTRAVENOUS ONCE
Status: COMPLETED | OUTPATIENT
Start: 2020-11-22 | End: 2020-11-22

## 2020-11-22 RX ORDER — ACETAMINOPHEN 650 MG/1
650 SUPPOSITORY RECTAL EVERY 6 HOURS PRN
Status: DISCONTINUED | OUTPATIENT
Start: 2020-11-22 | End: 2020-11-22 | Stop reason: HOSPADM

## 2020-11-22 RX ORDER — METOCLOPRAMIDE HYDROCHLORIDE 5 MG/ML
10 INJECTION INTRAMUSCULAR; INTRAVENOUS ONCE
Status: DISCONTINUED | OUTPATIENT
Start: 2020-11-22 | End: 2020-11-22

## 2020-11-22 RX ORDER — ACETAMINOPHEN 325 MG/1
650 TABLET ORAL EVERY 6 HOURS PRN
Status: DISCONTINUED | OUTPATIENT
Start: 2020-11-22 | End: 2020-11-22 | Stop reason: HOSPADM

## 2020-11-22 RX ORDER — KETOROLAC TROMETHAMINE 30 MG/ML
30 INJECTION, SOLUTION INTRAMUSCULAR; INTRAVENOUS ONCE
Status: DISCONTINUED | OUTPATIENT
Start: 2020-11-22 | End: 2020-11-22

## 2020-11-22 RX ORDER — 0.9 % SODIUM CHLORIDE 0.9 %
1000 INTRAVENOUS SOLUTION INTRAVENOUS ONCE
Status: COMPLETED | OUTPATIENT
Start: 2020-11-22 | End: 2020-11-22

## 2020-11-22 RX ORDER — BUTALBITAL, ACETAMINOPHEN AND CAFFEINE 50; 325; 40 MG/1; MG/1; MG/1
1 TABLET ORAL EVERY 4 HOURS PRN
Qty: 60 TABLET | Refills: 0 | Status: SHIPPED | OUTPATIENT
Start: 2020-11-22 | End: 2020-12-18 | Stop reason: SDUPTHER

## 2020-11-22 RX ADMIN — METOCLOPRAMIDE 10 MG: 5 INJECTION, SOLUTION INTRAMUSCULAR; INTRAVENOUS at 17:29

## 2020-11-22 RX ADMIN — PROCHLORPERAZINE EDISYLATE 10 MG: 5 INJECTION INTRAMUSCULAR; INTRAVENOUS at 18:52

## 2020-11-22 RX ADMIN — MAGNESIUM SULFATE HEPTAHYDRATE 1 G: 1 INJECTION, SOLUTION INTRAVENOUS at 18:52

## 2020-11-22 RX ADMIN — ACETAMINOPHEN 650 MG: 325 TABLET ORAL at 17:29

## 2020-11-22 RX ADMIN — DIPHENHYDRAMINE HYDROCHLORIDE 25 MG: 50 INJECTION, SOLUTION INTRAMUSCULAR; INTRAVENOUS at 19:29

## 2020-11-22 RX ADMIN — KETOROLAC TROMETHAMINE 30 MG: 30 INJECTION, SOLUTION INTRAMUSCULAR at 19:29

## 2020-11-22 RX ADMIN — POTASSIUM CHLORIDE 40 MEQ: 750 TABLET, FILM COATED, EXTENDED RELEASE ORAL at 18:52

## 2020-11-22 RX ADMIN — SODIUM CHLORIDE 1000 ML: 9 INJECTION, SOLUTION INTRAVENOUS at 17:29

## 2020-11-22 ASSESSMENT — PAIN DESCRIPTION - LOCATION: LOCATION: HEAD

## 2020-11-22 ASSESSMENT — PAIN SCALES - GENERAL
PAINLEVEL_OUTOF10: 10

## 2020-11-22 ASSESSMENT — PAIN DESCRIPTION - PAIN TYPE: TYPE: ACUTE PAIN

## 2020-11-22 ASSESSMENT — PAIN DESCRIPTION - DESCRIPTORS: DESCRIPTORS: SHARP

## 2020-11-22 NOTE — ED TRIAGE NOTES
Patient to ED via squad c/o on going severe headache and fever. Reports light sensitivity. States he was seen in [facility] ER on Friday and has not started to feel better. Patient is covid positive. Patient took is first dose of azithromycin today.

## 2020-11-22 NOTE — ED PROVIDER NOTES
[address] [facility] [address] [address]  Dept: [phone]  Loc: [address] ENCOUNTER        This patient was seen and evaluated per myself in conjunction with ED attending Dr. Nayeli Alexander. CHIEF COMPLAINT    Chief Complaint   Patient presents with    Headache     sever headache. covid positive. states seen at [facility] for the exact same. HPI    Ulisses Manjarrez is a 64 y.o. male who presents with a headache of gradual onset. Onset was 2 weeks ago. He tested positive for Covid-19 2 days ago after being seen at [facility]. He had an extensive work-up there including CT that was unremarkable there then sinusitis. He was placed on a Z-Osvaldo. Is taken 2 doses. Was not placed on any steroids or other medications for symptoms. He states the medications that he has tried are more narcotics, 10 mg of Percocet, Valium and Zanaflex. I described that these will not help his migraine headache and asked him if he attempted any NSAIDs or APAP and he stated that he has not tried these medications. Has not tried any nasal decongestants for his sinusitis. The duration has been constant since the onset. The quality of the headache is migraine. The patient rates the pain at a 10/10 in severity. Pain is poorly localized and generalized. The pain worsens with exposure to bright light. Has associated nasal congestion, change of smell and taste. Came to the ED for further evaluation and treatment. REVIEW OF SYSTEMS    Neurologic: see HPI, no LOC, no neck stiffness, no dizziness, no double vision  Cardiac: No Chest Pain, No syncope  Respiratory: No cough or difficulty breathing  GI: No Bloody Stool or Diarrhea  : No Dysuria or Hematuria  General: No Fever  All other systems reviewed and are negative.     PAST MEDICAL & SURGICAL HISTORY    Past Medical History:   Diagnosis Date    Aneurysm of ascending aorta (HCC)     Arthritis     Atopic dermatitis     Back pain     Facial paralysis/Schurz palsy     lt side face dropping    Hyperlipidemia     Hypertension     Imprisonment and other incarceration [id number]    Migraine     Obstructive sleep apnea (adult) (pediatric)     cpap machine    Prediabetes     Psoriasis-like skin disease     Tinea cruris      Past Surgical History:   Procedure Laterality Date    BACK SURGERY  February and October 2008    COLONOSCOPY  01/28/2018    CORONARY ANGIOPLASTY      12/2017    CYSTOSCOPY  2001    LAMINECTOMY POSTERIOR THORACIC / LUMBAR Right 7/14/2020    THORACIC LAMINECTOMY WITH PADDLE LEAD AND RECHARGEABLE BATTERY IN RIGHT HIP performed by Byron Farah MD at [address] Bilateral 2/5/2020    BILATERAL L3 TRANSFORAMINAL  EPIDURAL STEROID INJECTION WITH FLUOROSCOPY performed by Linda Stock MD at [address] Bilateral 3/9/2020    BILATERAL L1, L2, L3 (ABOVE THE FUSION) MEDIAL BRANCH BLOCK WITH FLUOROSCOPY (02934, 76911)   #1 performed by Linda Stock MD at [address] [facility] N/A 6/3/2020    [address] ([address], [address], [address]) - Kylee Schrader performed by Linda Stock MD at [address] [facility]    Current Outpatient Rx   Medication Sig Dispense Refill    butalbital-acetaminophen-caffeine (FIORICET, ESGIC) -40 MG per tablet Take 1 tablet by mouth every 4 hours as needed for Headaches or Migraine 60 tablet 0    aspirin (ASPIRIN CHILDRENS) 81 MG chewable tablet Take 1 tablet by mouth daily for 14 days 14 tablet 0    azithromycin (ZITHROMAX) 250 MG tablet Take 2 tablets (500 mg) on Day 1, followed by 1 tablet (250 mg) once daily on Days 2 through 5. 1 packet 0    rosuvastatin (CRESTOR) 20 MG tablet Take 1 tablet by mouth daily 90 tablet 0    tiZANidine (ZANAFLEX) 4 MG tablet Take 1 tablet by mouth every 8 hours as needed (for muscle spasm) 90 tablet 0    metoprolol succinate (TOPROL XL) 50 MG extended release tablet Take 1 tablet by mouth daily 90 tablet 1    amLODIPine (NORVASC) 5 MG tablet Take 1 tablet by mouth daily 90 tablet 0    lisinopril (PRINIVIL;ZESTRIL) 30 MG tablet Take 1 tablet by mouth daily 90 tablet 0    hydroCHLOROthiazide (HYDRODIURIL) 25 MG tablet Take 1 tablet by mouth every morning 90 tablet 0    Handicap Placard MISC by Does not apply route Expires 9/17/2025 2 each 0    Misc. Devices MISC Pads and leads for use on [facility] TENS unit. 1 Device 0    lisinopril-hydroCHLOROthiazide (PRINZIDE;ZESTORETIC) 20-25 MG per tablet Take 1 tablet by mouth daily 90 tablet 0    vitamin D (ERGOCALCIFEROL) 53640 units CAPS capsule Take 1 capsule by mouth once a week 4 capsule 2       ALLERGIES    Allergies   Allergen Reactions    Adhesive Tape Itching    Sugar-Protein-Starch      Runny nose       SOCIAL & FAMILY HISTORY    Social History     Socioeconomic History    Marital status:      Spouse name: None    Number of children: 0    Years of education: None    Highest education level: None   Occupational History    Occupation: unemployed    Occupation: former [employer] work    Social Needs    Financial resource strain: None    Food insecurity     Worry: None     Inability: None    Transportation needs     Medical: None     Non-medical: None   Tobacco Use    Smoking status: Never Smoker    Smokeless tobacco: Never Used   Substance and Sexual Activity    Alcohol use:  Yes     Alcohol/week: 2.0 standard drinks     Types: 1 Cans of beer, 1 Standard drinks or equivalent per week     Frequency: Monthly or less     Drinks per session: 1 or 2     Binge frequency: Less than monthly     Comment: Rarely; socially    Drug use: No    Sexual activity: Yes     Partners: Female     Birth control/protection: Condom   Lifestyle    Physical activity     Days per week: None     Minutes per session: None    Stress: None Relationships    Social connections     Talks on phone: None     Gets together: None     Attends Jewish service: None     Active member of club or organization: None     Attends meetings of clubs or organizations: None     Relationship status: None    Intimate partner violence     Fear of current or ex partner: None     Emotionally abused: None     Physically abused: None     Forced sexual activity: None   Other Topics Concern    None   Social History Narrative    None     Family History   Problem Relation Age of Onset    Other Mother         Polio    Thyroid Disease Mother     Heart Failure Father     Stroke Father        PHYSICAL EXAM    VITAL SIGNS: /86   Pulse 97   Temp 101.5 °F (38.6 °C) (Oral)   Resp 14   Ht 6' 4\" (1.93 m)   Wt 286 lb 13.1 oz (130.1 kg)   SpO2 95%   BMI 34.91 kg/m²    Constitutional:  Well developed, well nourished, no acute distress   Eyes: Pupils equally round and react to light, extraocular movement are intact  Vascular: No temporal artery tenderness, Radial and DP pulses 2+ and equal bilaterally  HENT:  Atraumatic, moist mucus membranes, airway patent  Neck: supple, no JVD, no posterior tenderness, no nuchal rigidity  Respiratory:  Lungs clear to auscultation bilaterally, no retractions   Cardiovascular:  regular rate, no murmurs  GI:  Soft, nontender, normal bowel sounds  Musculoskeletal:  No edema, no acute deformities  Integument:  Well hydrated, no petechiae   Neurologic:  Awake, alert, oriented, no aphasia,no slurred speech, CN II-XII intact, equal strength in all 4 extremities, no gait abnormalities  Psych: Pleasant affect, no hallucinations    RADIOLOGY    XR CHEST PORTABLE   Final Result   No acute cardiopulmonary disease. ED COURSE & MEDICAL DECISION MAKING    See chart for details of medications given during the ED stay. Reevaluation: after medications, the patients symptoms are much improved.     Vitals:    11/22/20 1711   BP: 138/86 Pulse: 97   Resp: 14   Temp: 101.5 °F (38.6 °C)   TempSrc: Oral   SpO2: 95%   Weight: 286 lb 13.1 oz (130.1 kg)   Height: 6' 4\" (1.93 m)       Differential Diagnosis: Subarachnoid hemorrhage, Meningitis, Temporal arteritis, Pseudotumor Cerebri, Migraine, Mass, other    Patient is afebrile and nontoxic in appearance. Neuro exam unremarkable. I do not believe that a repeat head CT and CTAs are warranted at this point time as they were done 2 days ago, reviewed per myself and my attending Dr. Claribel Rosales. Blood work does not show leukocytosis or anemia. He does have a slight hypokalemia of 3.2 but otherwise no severe electrolyte derangements or VY. LFTs and lipase within normal limits. He has no lactic acidosis. Mag 2. EKG interpreted per my attending, no ST changes. Negative troponin. Plain films as read by the radiologist as above. Reevaluation at 1900: Patient states the pain is much improved after medications given in the ED. I believe the patient is safe for discharge at this time.     Results for orders placed or performed during the hospital encounter of 11/22/20   CBC Auto Differential   Result Value Ref Range    WBC 6.7 4.0 - 11.0 K/uL    RBC 4.88 4.20 - 5.90 M/uL    Hemoglobin 14.1 13.5 - 17.5 g/dL    Hematocrit 42.2 40.5 - 52.5 %    MCV 86.5 80.0 - 100.0 fL    MCH 28.8 26.0 - 34.0 pg    MCHC 33.3 31.0 - 36.0 g/dL    RDW 13.7 12.4 - 15.4 %    Platelets 009 554 - 492 K/uL    MPV 8.3 5.0 - 10.5 fL    Neutrophils % 73.9 %    Lymphocytes % 19.6 %    Monocytes % 6.1 %    Eosinophils % 0.1 %    Basophils % 0.3 %    Neutrophils Absolute 4.9 1.7 - 7.7 K/uL    Lymphocytes Absolute 1.3 1.0 - 5.1 K/uL    Monocytes Absolute 0.4 0.0 - 1.3 K/uL    Eosinophils Absolute 0.0 0.0 - 0.6 K/uL    Basophils Absolute 0.0 0.0 - 0.2 K/uL   Comprehensive Metabolic Panel w/ Reflex to MG   Result Value Ref Range    Sodium 132 (L) 136 - 145 mmol/L    Potassium reflex Magnesium 3.2 (L) 3.5 - 5.1 mmol/L    Chloride 94 (L) 99 - 110 mmol/L    CO2 27 21 - 32 mmol/L    Anion Gap 11 3 - 16    Glucose 93 70 - 99 mg/dL    BUN 13 7 - 20 mg/dL    CREATININE 1.1 0.9 - 1.3 mg/dL    GFR Non-African American >60 >60    GFR African American >60 >60    Calcium 8.6 8.3 - 10.6 mg/dL    Total Protein 7.7 6.4 - 8.2 g/dL    Alb 4.3 3.4 - 5.0 g/dL    Albumin/Globulin Ratio 1.3 1.1 - 2.2    Total Bilirubin 0.5 0.0 - 1.0 mg/dL    Alkaline Phosphatase 64 40 - 129 U/L    ALT 23 10 - 40 U/L    AST 26 15 - 37 U/L    Globulin 3.4 g/dL   Lipase   Result Value Ref Range    Lipase 17.0 13.0 - 60.0 U/L   Troponin   Result Value Ref Range    Troponin <0.01 <0.01 ng/mL   Lactic Acid, Plasma   Result Value Ref Range    Lactic Acid 1.0 0.4 - 2.0 mmol/L   Magnesium   Result Value Ref Range    Magnesium 2.00 1.80 - 2.40 mg/dL       I estimate there is LOW risk for SUBARACHNOID HEMORRHAGE, MENINGITIS, INTRACRANIAL HEMORRHAGE, SUBDURAL HEMATOMA, OR STROKE, thus I consider the discharge disposition reasonable. Cassandra Escalona and I have discussed the diagnosis and risks, and we agree with discharging home to follow-up with their primary doctor. We also discussed returning to the Emergency Department immediately if new or worsening symptoms occur. We have discussed the symptoms which are most concerning (e.g., changing or worsening pain, weakness, vomiting, fever) that necessitate immediate return. Discharge Vital Signs:  Blood pressure 138/86, pulse 97, temperature 101.5 °F (38.6 °C), temperature source Oral, resp. rate 14, height 6' 4\" (1.93 m), weight 286 lb 13.1 oz (130.1 kg), SpO2 95 %. The patient was instructed to follow up as an outpatient in 2 days. The patient was instructed to return to the ED immediately for any new or worsening symptoms. The patient verbalized understanding. FINAL IMPRESSION    1. COVID-19    2.  Migraine without status migrainosus, not intractable, unspecified migraine type        PLAN  Discharge with outpatient follow-up (see EMR)      (Please note that this note was completed with a voice recognition program.  Every attempt was made to edit the dictations, but inevitably there remain words that are mis-transcribed.)        NURIS Harris - MARIANO  11/22/20 1377

## 2020-11-22 NOTE — ED NOTES
Bed: B-06  Expected date:   Expected time:   Means of arrival: [address] [facility]  Comments:  60M Covid, headache, fever     Asif Zuñiga RN  11/22/20 5670

## 2020-11-22 NOTE — ED PROVIDER NOTES
injection 10 mg    DISCONTD: diphenhydrAMINE (BENADRYL) injection 25 mg    prochlorperazine (COMPAZINE) injection 10 mg    magnesium sulfate 1 g in dextrose 5% 100 mL IVPB    ketorolac (TORADOL) injection 30 mg    potassium chloride (KLOR-CON) extended release tablet 40 mEq    butalbital-acetaminophen-caffeine (FIORICET, ESGIC) -40 MG per tablet     Sig: Take 1 tablet by mouth every 4 hours as needed for Headaches or Migraine     Dispense:  60 tablet     Refill:  0    aspirin (ASPIRIN CHILDRENS) 81 MG chewable tablet     Sig: Take 1 tablet by mouth daily for 14 days     Dispense:  14 tablet     Refill:  0       FINAL IMPRESSION      1. COVID-19    2.  Migraine without status migrainosus, not intractable, unspecified migraine type        DISPOSITION/PLAN   DISPOSITION Decision To Discharge 11/22/2020 08:34:37 PM      PATIENT REFERRED TO:  NURIS Rogers - MARIANO  [address]  [phone]    Schedule an appointment as soon as possible for a visit in 2 days      [facility] Emergency Department  [address] [address]  [phone]  Go to   As needed, If symptoms worsen      DISCHARGE MEDICATIONS:  Discharge Medication List as of 11/22/2020  8:24 PM      START taking these medications    Details   butalbital-acetaminophen-caffeine (FIORICET, ESGIC) -40 MG per tablet Take 1 tablet by mouth every 4 hours as needed for Headaches or Migraine, Disp-60 tablet,R-0Print      aspirin (ASPIRIN CHILDRENS) 81 MG chewable tablet Take 1 tablet by mouth daily for 14 days, Disp-14 tablet,R-0Print                  (Please note that portions of this note were completed with a voice recognition program. Efforts were made to edit the dictations but occasionally words are mis-transcribed.)    Tristin Ballard MD (electronically signed)  Attending Emergency Physician        Tristin Ballard MD  11/23/20 0882

## 2020-11-23 ENCOUNTER — CARE COORDINATION (OUTPATIENT)
Dept: CARE COORDINATION | Age: 60
End: 2020-11-23

## 2020-11-28 ASSESSMENT — ENCOUNTER SYMPTOMS
ABDOMINAL PAIN: 0
CHEST TIGHTNESS: 0
CONSTIPATION: 0
NAUSEA: 0
VOMITING: 0
WHEEZING: 0
SHORTNESS OF BREATH: 0
DIARRHEA: 0

## 2020-11-28 NOTE — PROGRESS NOTES
SUBJECTIVE:    Patient ID:  Jayjay Cornejo is a 64 y.o. male      Visit was conducted virtually for an ED follow-up from 11/20/2020 diagnosed with COVID-19 headache, sinusitis, COVID-19 and pneumonia. He was given IV fluids Tylenol, Reglan, Benadryl, Decadron while in the ED and discharged home on azithromycin. He returned to the ED on 11/22/2020 diagnosed with COVID-19 and migraine headaches. He was discharged with a prescription for Fioricet. ED notes, labs and imaging were reviewed. URI    This is a new problem. The current episode started 1 to 4 weeks ago. The problem has been gradually improving. Associated symptoms include coughing (occ productive), headaches (impoving) and rhinorrhea. Pertinent negatives include no abdominal pain, chest pain, diarrhea, nausea, rash, vomiting or wheezing. Congestion: head. Ear pain: mild.        Current Outpatient Medications on File Prior to Visit   Medication Sig Dispense Refill    aspirin (ASPIRIN CHILDRENS) 81 MG chewable tablet Take 1 tablet by mouth daily for 14 days 14 tablet 0    azithromycin (ZITHROMAX) 250 MG tablet Take 2 tablets (500 mg) on Day 1, followed by 1 tablet (250 mg) once daily on Days 2 through 5. 1 packet 0    rosuvastatin (CRESTOR) 20 MG tablet Take 1 tablet by mouth daily 90 tablet 0    tiZANidine (ZANAFLEX) 4 MG tablet Take 1 tablet by mouth every 8 hours as needed (for muscle spasm) 90 tablet 0    metoprolol succinate (TOPROL XL) 50 MG extended release tablet Take 1 tablet by mouth daily 90 tablet 1    amLODIPine (NORVASC) 5 MG tablet Take 1 tablet by mouth daily 90 tablet 0    lisinopril (PRINIVIL;ZESTRIL) 30 MG tablet Take 1 tablet by mouth daily 90 tablet 0    hydroCHLOROthiazide (HYDRODIURIL) 25 MG tablet Take 1 tablet by mouth every morning 90 tablet 0    lisinopril-hydroCHLOROthiazide (PRINZIDE;ZESTORETIC) 20-25 MG per tablet Take 1 tablet by mouth daily 90 tablet 0    vitamin D (ERGOCALCIFEROL) 96145 units CAPS capsule Take 1 capsule by mouth once a week 4 capsule 2    butalbital-acetaminophen-caffeine (FIORICET, ESGIC) -40 MG per tablet Take 1 tablet by mouth every 4 hours as needed for Headaches or Migraine 60 tablet 0    Handicap Placard MISC by Does not apply route Expires 9/17/2025 2 each 0    Misc. Devices MISC Pads and leads for use on [facility] TENS unit. 1 Device 0     No current facility-administered medications on file prior to visit.        Past Medical History:   Diagnosis Date    Aneurysm of ascending aorta (HCC)     Arthritis     Atopic dermatitis     Back pain     Facial paralysis/[place] palsy     lt side face dropping    Hyperlipidemia     Hypertension     Imprisonment and other incarceration 2029-6530    Migraine     Obstructive sleep apnea (adult) (pediatric)     cpap machine    Prediabetes     Psoriasis-like skin disease     Tinea cruris      Past Surgical History:   Procedure Laterality Date    BACK SURGERY  February and October 2008    COLONOSCOPY  01/28/2018    CORONARY ANGIOPLASTY      12/2017    CYSTOSCOPY  2001    LAMINECTOMY POSTERIOR THORACIC / LUMBAR Right 7/14/2020    THORACIC LAMINECTOMY WITH PADDLE LEAD AND RECHARGEABLE BATTERY IN RIGHT HIP performed by Leyda Johnson MD at [address] Bilateral 2/5/2020    BILATERAL L3 TRANSFORAMINAL  EPIDURAL STEROID INJECTION WITH FLUOROSCOPY performed by Taylor Hinton MD at [address] Bilateral 3/9/2020    BILATERAL L1, L2, L3 (ABOVE THE FUSION) MEDIAL BRANCH BLOCK WITH FLUOROSCOPY (49624, 62859)   #1 performed by Taylor Hinton MD at [address] N/A 6/3/2020    [address] ([address], [id number], [id number]) - [place] Pace performed by Taylor Hinton MD at [address] History   Problem Relation Age of Onset    Other Mother         Polio    Thyroid Disease Mother     Heart Failure Father     Stroke Father      Social disturbance. Respiratory: Positive for cough (occ productive). Negative for chest tightness, shortness of breath and wheezing. Cardiovascular: Negative for chest pain and palpitations. Gastrointestinal: Negative for abdominal pain, constipation, diarrhea, nausea and vomiting. Musculoskeletal: Positive for arthralgias (improving) and myalgias (improving). Skin: Negative for rash. Neurological: Positive for headaches (impoving). OBJECTIVE:    Physical Exam  Vitals signs and nursing note reviewed. Constitutional:       General: He is not in acute distress. Appearance: Normal appearance. He is not ill-appearing, toxic-appearing or diaphoretic. HENT:      Head: Normocephalic and atraumatic. Right Ear: External ear normal.      Left Ear: External ear normal.      Nose: Nose normal.      Mouth/Throat:      Mouth: Mucous membranes are moist.   Eyes:      Extraocular Movements: Extraocular movements intact. Conjunctiva/sclera: Conjunctivae normal.      Pupils: Pupils are equal, round, and reactive to light. Neck:      Musculoskeletal: Normal range of motion. Pulmonary:      Effort: Pulmonary effort is normal. No respiratory distress. Musculoskeletal: Normal range of motion. Skin:     General: Skin is dry. Coloration: Skin is not pale. Findings: No rash. Neurological:      Mental Status: He is alert and oriented to person, place, and time. Psychiatric:         Mood and Affect: Mood normal.         Behavior: Behavior normal.       There were no vitals taken for this visit. BP Readings from Last 3 Encounters:   11/22/20 (!) 149/71   11/20/20 (!) 165/90   09/17/20 (!) 160/90      Wt Readings from Last 3 Encounters:   11/22/20 286 lb 13.1 oz (130.1 kg)   10/26/20 287 lb (130.2 kg)   10/19/20 285 lb (129.3 kg)       ASSESSMENT & PLAN:    1. COVID-19  - Discussed COVID-19 and self-isolation, information given    2.  Acute intractable headache, unspecified headache type  - methylPREDNISolone (MEDROL DOSEPACK) 4 MG tablet; Take by mouth. Dispense: 1 kit; Refill: 0    3. Acute bacterial sinusitis  - fluticasone (FLONASE) 50 MCG/ACT nasal spray; 2 sprays by Each Nostril route daily  Dispense: 1 Bottle; Refill: 1  - doxycycline hyclate (VIBRA-TABS) 100 MG tablet; Take 1 tablet by mouth 2 times daily for 7 days  Dispense: 14 tablet; Refill: 0  - methylPREDNISolone (MEDROL DOSEPACK) 4 MG tablet; Take by mouth. Dispense: 1 kit; Refill: 0  - Antibiotic as directed twice daily for 7 days  - Flonase 1 puffs to each nostril twice daily.  - Steroids as directed (take with food)  - Prescription cough medication 2 times a day as needed for cough, may cause drowsiness.  - Cepacol Lozenges as needed for sore throat. - Tylenol and or Motrin as needed/directed for pain and fever.    - Encourage rest and fluids. 4. Pneumonia due to COVID-19 virus  - doxycycline hyclate (VIBRA-TABS) 100 MG tablet; Take 1 tablet by mouth 2 times daily for 7 days  Dispense: 14 tablet; Refill: 0  - methylPREDNISolone (MEDROL DOSEPACK) 4 MG tablet; Take by mouth. Dispense: 1 kit; Refill: 0    5. Cough  - guaiFENesin-codeine (TUSSI-ORGANIDIN NR) 100-10 MG/5ML syrup; Take 5 mLs by mouth 2 times daily as needed for Cough for up to 7 days. Dispense: 70 mL; Refill: 0      Continue current treatment plan. Current Outpatient Medications   Medication Sig Dispense Refill    fluticasone (FLONASE) 50 MCG/ACT nasal spray 2 sprays by Each Nostril route daily 1 Bottle 1    doxycycline hyclate (VIBRA-TABS) 100 MG tablet Take 1 tablet by mouth 2 times daily for 7 days 14 tablet 0    methylPREDNISolone (MEDROL DOSEPACK) 4 MG tablet Take by mouth. 1 kit 0    guaiFENesin-codeine (TUSSI-ORGANIDIN NR) 100-10 MG/5ML syrup Take 5 mLs by mouth 2 times daily as needed for Cough for up to 7 days.  70 mL 0    aspirin (ASPIRIN CHILDRENS) 81 MG chewable tablet Take 1 tablet by mouth daily for 14 days 14 tablet 0    azithromycin (ZITHROMAX) 250 MG tablet Take 2 tablets (500 mg) on Day 1, followed by 1 tablet (250 mg) once daily on Days 2 through 5. 1 packet 0    rosuvastatin (CRESTOR) 20 MG tablet Take 1 tablet by mouth daily 90 tablet 0    tiZANidine (ZANAFLEX) 4 MG tablet Take 1 tablet by mouth every 8 hours as needed (for muscle spasm) 90 tablet 0    metoprolol succinate (TOPROL XL) 50 MG extended release tablet Take 1 tablet by mouth daily 90 tablet 1    amLODIPine (NORVASC) 5 MG tablet Take 1 tablet by mouth daily 90 tablet 0    lisinopril (PRINIVIL;ZESTRIL) 30 MG tablet Take 1 tablet by mouth daily 90 tablet 0    hydroCHLOROthiazide (HYDRODIURIL) 25 MG tablet Take 1 tablet by mouth every morning 90 tablet 0    lisinopril-hydroCHLOROthiazide (PRINZIDE;ZESTORETIC) 20-25 MG per tablet Take 1 tablet by mouth daily 90 tablet 0    vitamin D (ERGOCALCIFEROL) 71024 units CAPS capsule Take 1 capsule by mouth once a week 4 capsule 2    butalbital-acetaminophen-caffeine (FIORICET, ESGIC) -40 MG per tablet Take 1 tablet by mouth every 4 hours as needed for Headaches or Migraine 60 tablet 0    Handicap Placard MISC by Does not apply route Expires 9/17/2025 2 each 0    Misc. Devices MISC Pads and leads for use on [facility] TENS unit. 1 Device 0     No current facility-administered medications for this visit. Return if symptoms worsen or fail to improve. Gm received counseling on the following healthy behaviors: nutrition, exercise and medication adherence    Patient given educational materials on Covid19, pneumonia, sinusitis    Discussed use, benefit, and side effects of prescribed medications. Barriers to medication compliance addressed. All patient questions answered. Pt voiced understanding. Call office if experience side effects from medications. Please note that some or all of this record was generated using voice recognition software.  If there are any questions about the content of this document, please contact the author as some errors in transcription may have occurred. Donovan Nunez is a 64 y.o. male being evaluated by a Virtual Visit (video visit) encounter to address concerns as mentioned above. A caregiver was present when appropriate. Due to this being a TeleHealth encounter (During WOUWK-82 public health emergency), evaluation of the following organ systems was limited: Vitals/Constitutional/EENT/Resp/CV/GI//MS/Neuro/Skin/Heme-Lymph-Imm. Pursuant to the emergency declaration under the [address] [place] [address], [address] [place] [address] authority and the [employer] Act, this Virtual Visit was conducted with patient's (and/or legal guardian's) consent, to reduce the patient's risk of exposure to COVID-19 and provide necessary medical care. The patient (and/or legal guardian) has also been advised to contact this office for worsening conditions or problems, and seek emergency medical treatment and/or call 911 if deemed necessary. Patient identification was verified at the start of the visit: Yes    Total time spent for this encounter: Not billed by time    Services were provided through a video synchronous discussion virtually to substitute for in-person clinic visit. Patient and provider were located at their individual homes. --NURIS Ruth CNP on 11/30/2020 at 10:17 AM    An electronic signature was used to authenticate this note.

## 2020-11-29 NOTE — PATIENT INSTRUCTIONS
Discussed COVID-19 and self-isolation, information given    Antibiotic as directed twice daily for 7 days    Flonase 1 puffs to each nostril twice daily. Steroids as directed (take with food)    Prescription cough medication 2 times a day as needed for cough, may cause drowsiness. Cepacol Lozenges as needed for sore throat. Tylenol and or Motrin as needed/directed for pain and fever. Encourage rest and fluids. Follow-up if symptoms worsen or persist    Keep appointment as scheduled on 12/18/2020    Patient Education        Learning About Coronavirus (COVID-19)  Coronavirus [phone]): Overview  What is coronavirus (COVID-19)? The coronavirus disease (COVID-19) is caused by a virus. It is an illness that was first found in December 2019. It has since spread worldwide. The virus can cause fever, cough, and trouble breathing. In severe cases, it can cause pneumonia and make it hard to breathe without help. It can cause death. This virus spreads person-to-person through droplets from coughing and sneezing. It can also spread when you are close to someone who is infected. And it can spread when you touch something that has the virus on it, such as a doorknob or a tabletop. Coronaviruses are a large group of viruses. They cause the common cold. They also cause more serious illnesses like [place] East respiratory syndrome (MERS) and severe acute respiratory syndrome (SARS). COVID-19 is caused by a novel coronavirus. That means it's a new type that has not been seen in people before. How is COVID-19 treated? Mild illness can be treated at home, but more serious illness needs to be treated in the hospital. Treatment may include medicines to reduce symptoms, plus breathing support such as oxygen therapy or a ventilator. Other treatments, such as antiviral medicines, may help people who have COVID-19. What can you do to protect yourself from COVID-19?   The best way to protect yourself from getting sick is to:  · Avoid areas where there is an outbreak. · Avoid contact with people who may be infected. · Avoid crowds and try to stay at least 6 feet away from other people. · Wash your hands often, especially after you cough or sneeze. Use soap and water, and scrub for at least 20 seconds. If soap and water aren't available, use an alcohol-based hand . · Avoid touching your mouth, nose, and eyes. What can you do to avoid spreading the virus to others? To help avoid spreading the virus to others:  · Wash your hands often with soap or alcohol-based hand sanitizers. · Cover your mouth with a tissue when you cough or sneeze. Then throw the tissue in the trash. · Use a disinfectant to clean things that you touch often. These include doorknobs, remote controls, phones, and handles on your refrigerator and microwave. And don't forget countertops, tabletops, bathrooms, and computer keyboards. · Wear a cloth face cover if you have to go to public areas. If you know or suspect that you have COVID-19:  · Stay home. Don't go to school, work, or public areas. And don't use public transportation, ride-shares, or taxis unless you have no choice. · Leave your home only if you need to get medical care or testing. But call the doctor's office first so they know you're coming. And wear a face cover. · Limit contact with people in your home. If possible, stay in a separate bedroom and use a separate bathroom. · Wear a face cover whenever you're around other people. It can help stop the spread of the virus when you cough or sneeze. · Clean and disinfect your home every day. Use household  and disinfectant wipes or sprays. Take special care to clean things that you grab with your hands. · Self-isolate until it's safe to be around others again.   ? If you have symptoms, it's safe when you haven't had a fever for 3 days and your symptoms have improved and it's been at least 10 days since your symptoms started. ? If you were exposed to the virus but don't have symptoms, it's safe to be around others 14 days after exposure. ? Talk to your doctor about whether you also need testing, especially if you have a weakened immune system. When to call for help  Call 911 anytime you think you may need emergency care. For example, call if:  · You have severe trouble breathing. (You can't talk at all.)  · You have constant chest pain or pressure. · You are severely dizzy or lightheaded. · You are confused or can't think clearly. · Your face and lips have a blue color. · You passed out (lost consciousness) or are very hard to wake up. Call your doctor now if you develop symptoms such as:  · Shortness of breath. · Fever. · Cough. If you need to get care, call ahead to the doctor's office for instructions before you go. Make sure you wear a face cover to prevent exposing other people to the virus. Where can you get the latest information? The following health organizations are tracking and studying this virus. Their websites contain the most up-to-date information. Dori Andrade also learn what to do if you think you may have been exposed to the virus. · U.S. Centers for Disease Control and Prevention (CDC): The CDC provides updated news about the disease and travel advice. The website also tells you how to prevent the spread of infection. www.cdc.gov  · World Health Organization [facility]): WHO offers information about the virus outbreaks. WHO also has travel advice. www.who.int  Current as of: July 10, 2020               Content Version: 12.6  © 2006-2020 [employer]. Care instructions adapted under license by [facility] ([facility]). If you have questions about a medical condition or this instruction, always ask your healthcare professional. [place] [address] any warranty or liability for your use of this information.          Patient Education        Coronavirus ([id number]): Care Instructions  Overview  The coronavirus disease (COVID-19) is caused by a virus. Symptoms may include a fever, a cough, and shortness of breath. It mainly spreads person-to-person through droplets from coughing and sneezing. The virus also can spread when people are in close contact with someone who is infected. Most people have mild symptoms and can take care of themselves at home. If their symptoms get worse, they may need care in a hospital. Treatment may include medicines to reduce symptoms, plus breathing support such as oxygen therapy or a ventilator. It's important to not spread the virus to others. If you have COVID-19, wear a face cover anytime you are around other people. You need to isolate yourself while you are sick. Leave your home only if you need to get medical care or testing. Follow-up care is a key part of your treatment and safety. Be sure to make and go to all appointments, and call your doctor if you are having problems. It's also a good idea to know your test results and keep a list of the medicines you take. How can you care for yourself at home? · Get extra rest. It can help you feel better. · Drink plenty of fluids. This helps replace fluids lost from fever. Fluids also help ease a scratchy throat. Water, soup, fruit juice, and hot tea with lemon are good choices. · Take acetaminophen (such as Tylenol) to reduce a fever. It may also help with muscle aches. Read and follow all instructions on the label. · Use petroleum jelly on sore skin. This can help if the skin around your nose and lips becomes sore from rubbing a lot with tissues. Tips for self-isolation  · Limit contact with people in your home. If possible, stay in a separate bedroom and use a separate bathroom. · Wear a cloth face cover when you are around other people. It can help stop the spread of the virus when you cough or sneeze.   · If you have to leave home, avoid crowds and try to stay at least 6 feet away from other (more than about 1 teaspoon).     · You have signs of low blood pressure. These include feeling lightheaded; being too weak to stand; and having cold, pale, clammy skin. Watch closely for changes in your health, and be sure to contact your doctor if:    · Your symptoms get worse.     · You are not getting better as expected. Call before you go to the doctor's office. Follow their instructions. And wear a cloth face cover. Current as of: July 10, 2020               Content Version: 12.6  © 2006-2020 [employer]. Care instructions adapted under license by [facility] [employer] [facility] ([facility]). If you have questions about a medical condition or this instruction, always ask your healthcare professional. [place] [address] any warranty or liability for your use of this information. Patient Education        Sinusitis: Care Instructions  Your Care Instructions     Sinusitis is an infection of the lining of the sinus cavities in your head. Sinusitis often follows a cold. It causes pain and pressure in your head and face. In most cases, sinusitis gets better on its own in 1 to 2 weeks. But some mild symptoms may last for several weeks. Sometimes antibiotics are needed. Follow-up care is a key part of your treatment and safety. Be sure to make and go to all appointments, and call your doctor if you are having problems. It's also a good idea to know your test results and keep a list of the medicines you take. How can you care for yourself at home? · Take an over-the-counter pain medicine, such as acetaminophen (Tylenol), ibuprofen (Advil, Motrin), or naproxen (Aleve). Read and follow all instructions on the label. · If the doctor prescribed antibiotics, take them as directed. Do not stop taking them just because you feel better. You need to take the full course of antibiotics. · Be careful when taking over-the-counter cold or flu medicines and Tylenol at the same time.  Many of these medicines have acetaminophen, which is Tylenol. Read the labels to make sure that you are not taking more than the recommended dose. Too much acetaminophen (Tylenol) can be harmful. · Breathe warm, moist air from a steamy shower, a hot bath, or a sink filled with hot water. Avoid cold, dry air. Using a humidifier in your home may help. Follow the directions for cleaning the machine. · Use saline (saltwater) nasal washes to help keep your nasal passages open and wash out mucus and bacteria. You can buy saline nose drops at a grocery store or drugstore. Or you can make your own at home by adding 1 teaspoon of salt and 1 teaspoon of baking soda to 2 cups of distilled water. If you make your own, fill a bulb syringe with the solution, insert the tip into your nostril, and squeeze gently. Linford Boga your nose. · Put a hot, wet towel or a warm gel pack on your face 3 or 4 times a day for 5 to 10 minutes each time. · Try a decongestant nasal spray like oxymetazoline (Afrin). Do not use it for more than 3 days in a row. Using it for more than 3 days can make your congestion worse. When should you call for help? Call your doctor now or seek immediate medical care if:    · You have new or worse swelling or redness in your face or around your eyes.     · You have a new or higher fever. Watch closely for changes in your health, and be sure to contact your doctor if:    · You have new or worse facial pain.     · The mucus from your nose becomes thicker (like pus) or has new blood in it.     · You are not getting better as expected. Where can you learn more? Go to https://Savelli.GIDEEN. org and sign in to your ASSET4 account. Enter U975 in the [facility] box to learn more about \"Sinusitis: Care Instructions. \"     If you do not have an account, please click on the \"Sign Up Now\" link. Current as of: April 15, 2020               Content Version: 12.6  © 5013-4625 [employer].    Care instructions chest-wall pain, talk to your doctor. He or she may suggest that you take a medicine to reduce the cough. · Use a vaporizer or humidifier to add moisture to your bedroom. Follow the directions for cleaning the machine. · Do not smoke or allow others to smoke around you. Smoke will make your cough last longer. If you need help quitting, talk to your doctor about stop-smoking programs and medicines. These can increase your chances of quitting for good. · Take an over-the-counter pain medicine, such as acetaminophen (Tylenol), ibuprofen (Advil, Motrin), or naproxen (Aleve). Read and follow all instructions on the label. · Do not take two or more pain medicines at the same time unless the doctor told you to. Many pain medicines have acetaminophen, which is Tylenol. Too much acetaminophen (Tylenol) can be harmful. · If you were given a spirometer to measure how well your lungs are working, use it as instructed. This can help your doctor tell how your recovery is going. · To prevent pneumonia in the future, talk to your doctor about getting a flu vaccine (once a year) and a pneumococcal vaccine (one time only for most people). When should you call for help? Call 911 anytime you think you may need emergency care. For example, call if:    · You have severe trouble breathing. Call your doctor now or seek immediate medical care if:    · You cough up dark brown or bloody mucus (sputum).     · You have new or worse trouble breathing.     · You are dizzy or lightheaded, or you feel like you may faint. Watch closely for changes in your health, and be sure to contact your doctor if:    · You have a new or higher fever.     · You are coughing more deeply or more often.     · You are not getting better after 2 days (48 hours).     · You do not get better as expected. Where can you learn more? Go to https://chdayanaeb.Meiaoju. org and sign in to your [email or web address] account.  Enter D232 in the [facility] box to learn more about \"Pneumonia: Care Instructions. \"     If you do not have an account, please click on the \"Sign Up Now\" link. Current as of: February 24, 2020               Content Version: 12.6  © 2348-1960 [employer]. Care instructions adapted under license by [facility] ([facility]). If you have questions about a medical condition or this instruction, always ask your healthcare professional. [address] any warranty or liability for your use of this information.

## 2020-11-30 ENCOUNTER — VIRTUAL VISIT (OUTPATIENT)
Dept: FAMILY MEDICINE CLINIC | Age: 60
End: 2020-11-30
Payer: COMMERCIAL

## 2020-11-30 PROCEDURE — G8427 DOCREV CUR MEDS BY ELIG CLIN: HCPCS | Performed by: CLINICAL NURSE SPECIALIST

## 2020-11-30 PROCEDURE — 99213 OFFICE O/P EST LOW 20 MIN: CPT | Performed by: CLINICAL NURSE SPECIALIST

## 2020-11-30 PROCEDURE — 3017F COLORECTAL CA SCREEN DOC REV: CPT | Performed by: CLINICAL NURSE SPECIALIST

## 2020-11-30 RX ORDER — DOXYCYCLINE HYCLATE 100 MG
100 TABLET ORAL 2 TIMES DAILY
Qty: 14 TABLET | Refills: 0 | Status: SHIPPED | OUTPATIENT
Start: 2020-11-30 | End: 2020-12-07

## 2020-11-30 RX ORDER — FLUTICASONE PROPIONATE 50 MCG
2 SPRAY, SUSPENSION (ML) NASAL DAILY
Qty: 1 BOTTLE | Refills: 1 | Status: SHIPPED | OUTPATIENT
Start: 2020-11-30 | End: 2020-12-18 | Stop reason: SDUPTHER

## 2020-11-30 RX ORDER — GUAIFENESIN AND CODEINE PHOSPHATE 100; 10 MG/5ML; MG/5ML
5 SOLUTION ORAL 2 TIMES DAILY PRN
Qty: 70 ML | Refills: 0 | Status: SHIPPED | OUTPATIENT
Start: 2020-11-30 | End: 2020-12-07

## 2020-11-30 RX ORDER — METHYLPREDNISOLONE 4 MG/1
TABLET ORAL
Qty: 1 KIT | Refills: 0 | Status: SHIPPED | OUTPATIENT
Start: 2020-11-30 | End: 2020-12-06

## 2020-11-30 ASSESSMENT — ENCOUNTER SYMPTOMS
PHOTOPHOBIA: 1
COUGH: 1
RHINORRHEA: 1

## 2020-12-01 ENCOUNTER — TELEPHONE (OUTPATIENT)
Dept: CARDIOLOGY CLINIC | Age: 60
End: 2020-12-01

## 2020-12-01 NOTE — TELEPHONE ENCOUNTER
He was diagnosed with covid on 11/20/20. He has an appt tomorrow and only has a slight cough , should he rs? Offered a virtual appt but patient says it never works for him . what should he do ?

## 2020-12-01 NOTE — TELEPHONE ENCOUNTER
Needs rescheduled- it has been less than 2 weeks since diagnosis TY  LVM for him that we will need to RS- cancelled appointment.

## 2020-12-02 NOTE — TELEPHONE ENCOUNTER
Per Dr. Maddison Rosales - patient has a normal stress echo in July 2020 and has an appt to see PCP in December. Patient can be rescheduled with NP in April 2021, but can be sooner if patient prefers.

## 2020-12-04 ENCOUNTER — TELEPHONE (OUTPATIENT)
Dept: ORTHOPEDIC SURGERY | Age: 60
End: 2020-12-04

## 2020-12-04 NOTE — TELEPHONE ENCOUNTER
Spoke with patient, due to the patient testing positive for Covid, prior to his appointment on 12/9/20 he will need to be tested and have a negative test.  If unable to get tested or if the test is still positive his appointment will need to be rescheduled. Nafisa Olmos

## 2020-12-15 ENCOUNTER — TELEPHONE (OUTPATIENT)
Dept: CARDIOTHORACIC SURGERY | Age: 60
End: 2020-12-15

## 2020-12-15 NOTE — TELEPHONE ENCOUNTER
Dr. Kim Harris reviewed the CT chest , per request I called  Fredricklinda Alvarez let him know, stable and ask that he repeat in 2 years. Gave back to [place] for recall.

## 2020-12-17 ASSESSMENT — ENCOUNTER SYMPTOMS
NAUSEA: 0
WHEEZING: 0
CONSTIPATION: 0
BLURRED VISION: 0
CHEST TIGHTNESS: 0
ORTHOPNEA: 0
SHORTNESS OF BREATH: 0
COUGH: 0
VOMITING: 0
DIARRHEA: 0
ABDOMINAL PAIN: 0

## 2020-12-18 ENCOUNTER — VIRTUAL VISIT (OUTPATIENT)
Dept: FAMILY MEDICINE CLINIC | Age: 60
End: 2020-12-18
Payer: COMMERCIAL

## 2020-12-18 PROCEDURE — 99214 OFFICE O/P EST MOD 30 MIN: CPT | Performed by: CLINICAL NURSE SPECIALIST

## 2020-12-18 PROCEDURE — G8427 DOCREV CUR MEDS BY ELIG CLIN: HCPCS | Performed by: CLINICAL NURSE SPECIALIST

## 2020-12-18 PROCEDURE — 3017F COLORECTAL CA SCREEN DOC REV: CPT | Performed by: CLINICAL NURSE SPECIALIST

## 2020-12-18 RX ORDER — LISINOPRIL 30 MG/1
30 TABLET ORAL DAILY
Qty: 90 TABLET | Refills: 0 | Status: SHIPPED | OUTPATIENT
Start: 2020-12-18 | End: 2021-04-07

## 2020-12-18 RX ORDER — ROSUVASTATIN CALCIUM 20 MG/1
20 TABLET, COATED ORAL DAILY
Qty: 90 TABLET | Refills: 0 | Status: SHIPPED | OUTPATIENT
Start: 2020-12-18 | End: 2021-03-16 | Stop reason: SDUPTHER

## 2020-12-18 RX ORDER — AMLODIPINE BESYLATE 5 MG/1
5 TABLET ORAL DAILY
Qty: 90 TABLET | Refills: 0 | Status: SHIPPED | OUTPATIENT
Start: 2020-12-18 | End: 2021-03-16 | Stop reason: SDUPTHER

## 2020-12-18 RX ORDER — METOPROLOL SUCCINATE 50 MG/1
50 TABLET, EXTENDED RELEASE ORAL DAILY
Qty: 90 TABLET | Refills: 1 | Status: SHIPPED | OUTPATIENT
Start: 2020-12-18 | End: 2020-12-20 | Stop reason: DRUGHIGH

## 2020-12-18 RX ORDER — BUTALBITAL, ACETAMINOPHEN AND CAFFEINE 50; 325; 40 MG/1; MG/1; MG/1
1 TABLET ORAL EVERY 4 HOURS PRN
Qty: 60 TABLET | Refills: 0 | Status: SHIPPED | OUTPATIENT
Start: 2020-12-18 | End: 2021-03-16 | Stop reason: SDUPTHER

## 2020-12-18 RX ORDER — HYDROCHLOROTHIAZIDE 25 MG/1
25 TABLET ORAL EVERY MORNING
Qty: 90 TABLET | Refills: 0 | Status: SHIPPED | OUTPATIENT
Start: 2020-12-18 | End: 2021-03-16 | Stop reason: SDUPTHER

## 2020-12-18 RX ORDER — FLUTICASONE PROPIONATE 50 MCG
2 SPRAY, SUSPENSION (ML) NASAL DAILY
Qty: 1 BOTTLE | Refills: 1 | Status: SHIPPED | OUTPATIENT
Start: 2020-12-18 | End: 2021-03-16 | Stop reason: SDUPTHER

## 2020-12-18 RX ORDER — TIZANIDINE 4 MG/1
4 TABLET ORAL EVERY 8 HOURS PRN
Qty: 90 TABLET | Refills: 0 | Status: SHIPPED | OUTPATIENT
Start: 2020-12-18 | End: 2021-03-16 | Stop reason: SDUPTHER

## 2020-12-18 NOTE — PATIENT INSTRUCTIONS
Fasting labs ordered     Medications refilled     Reviewed DASH and low sodium diet     Continue lisinopril 30 mg daily     Reviewed low cholesterol diet     Continue Crestor 20 mg daily     Reviewed diabetic diet      Follow up with cardiology as directed, encouraged to schedule an appointment     Follow up with ortho as directed     Keep scheduled appointment with [place] [facility] for back      Discussed neck and shoulder issues.       Encourage to follow up with cardiology as directed

## 2020-12-18 NOTE — PROGRESS NOTES
SUBJECTIVE:    Patient ID:  Rebecca Smith is a 64 y.o. male      This visit was conducted virtually for a medication check for hypertension, hyperlipidemia, prediabetes, CAD, A. Fib, AAA, BUSTER, and vitamin D deficiency. He is doing well on current regimen and has no further concerns. He is followed by cardiologist on a regular basis, every 6 months. Leanna chest pain on exertion. Currently denies chest pain and shortness of breath. Chronic back pain is improving with physical therapy and working with back specialist, Dr. Flaca Medina. His blood pressure today in the office was 150/102, but states he has been out of his medication for about 4-5 days. He is working in the [place] for chronic back pain. He is also expressing concern about chronic neck and shoulder pain, which has been more noticeable over the last few months. States he had a second Covid-19 test on 12/4/20, which was negative. Hypertension  This is a chronic problem. The current episode started more than 1 year ago. The problem is unchanged. The problem is controlled. Pertinent negatives include no anxiety, blurred vision, chest pain, headaches, orthopnea, palpitations, peripheral edema or shortness of breath. Agents associated with hypertension include NSAIDs. Risk factors for coronary artery disease include stress, obesity, male gender and dyslipidemia (prediabetes). Past treatments include ACE inhibitors, beta blockers, calcium channel blockers and diuretics. The current treatment provides significant improvement. Hypertensive end-organ damage includes CAD/MI.    Hyperlipidemia This is a chronic problem. The current episode started more than 1 year ago. The problem is controlled. Recent lipid tests were reviewed and are variable. Exacerbating diseases include obesity. Pertinent negatives include no chest pain, focal sensory loss, focal weakness, leg pain, myalgias or shortness of breath. Current antihyperlipidemic treatment includes statins. The current treatment provides moderate improvement of lipids. Risk factors for coronary artery disease include male sex, obesity, hypertension and dyslipidemia (prediabets). Current Outpatient Medications on File Prior to Visit   Medication Sig Dispense Refill    Misc. Devices MISC Pads and leads for use on [facility] TENS unit. 1 Device 0    vitamin D (ERGOCALCIFEROL) 66364 units CAPS capsule Take 1 capsule by mouth once a week 4 capsule 2    aspirin (ASPIRIN CHILDRENS) 81 MG chewable tablet Take 1 tablet by mouth daily for 14 days 14 tablet 0    Handicap Placard MISC by Does not apply route Expires 9/17/2025 2 each 0     No current facility-administered medications on file prior to visit.        Past Medical History:   Diagnosis Date    Aneurysm of ascending aorta (HCC)     Arthritis     Atopic dermatitis     Back pain     Facial paralysis/[place] palsy     lt side face dropping    Hyperlipidemia     Hypertension     Imprisonment and other incarceration [id number]    Migraine     Obstructive sleep apnea (adult) (pediatric)     cpap machine    Prediabetes     Psoriasis-like skin disease     Tinea cruris      Past Surgical History:   Procedure Laterality Date    BACK SURGERY  February and October 2008    COLONOSCOPY  01/28/2018    CORONARY ANGIOPLASTY      12/2017    CYSTOSCOPY  2001    LAMINECTOMY POSTERIOR THORACIC / LUMBAR Right 7/14/2020    THORACIC LAMINECTOMY WITH PADDLE LEAD AND RECHARGEABLE BATTERY IN RIGHT HIP performed by Tacho Freeman MD at [address] Bilateral 2/5/2020 BILATERAL L3 TRANSFORAMINAL  EPIDURAL STEROID INJECTION WITH FLUOROSCOPY performed by Hong Cerda MD at [address] Bilateral 3/9/2020    BILATERAL L1, L2, L3 (ABOVE THE FUSION) MEDIAL BRANCH BLOCK WITH FLUOROSCOPY (56628, 46730)   #1 performed by Hong Cerda MD at [address] N/[address] 6/3/2020    SPINAL CORD STIMULATOR TRIAL WITH FLUOROSCOPY (64920, 93593, 86879) - Patsi Marcie performed by Hong Cerda MD at [address] History   Problem Relation Age of Onset    Other Mother         Polio    Thyroid Disease Mother     Heart Failure Father     Stroke Father      Social History     Socioeconomic History    Marital status:      Spouse name: Not on file    Number of children: 0    Years of education: Not on file    Highest education level: Not on file   Occupational History    Occupation: unemployed    Occupation: former [employer] work    Social Needs    Financial resource strain: Not on file    Food insecurity     Worry: Not on file     Inability: Not on file   [employer] needs     Medical: Not on file     Non-medical: Not on file   Tobacco Use    Smoking status: Never Smoker    Smokeless tobacco: Never Used   Substance and Sexual Activity    Alcohol use:  Yes     Alcohol/week: 2.0 standard drinks     Types: 1 Cans of beer, 1 Standard drinks or equivalent per week     Frequency: Monthly or less     Drinks per session: 1 or 2     Binge frequency: Less than monthly     Comment: Rarely; socially    Drug use: No    Sexual activity: Yes     Partners: Female     Birth control/protection: Condom   Lifestyle    Physical activity     Days per week: Not on file     Minutes per session: Not on file    Stress: Not on file   Relationships    Social connections     Talks on phone: Not on file     Gets together: Not on file     Attends [employer] service: Not on file     Active member of club or organization: Not on file Attends meetings of clubs or organizations: Not on file     Relationship status: Not on file    Intimate partner violence     Fear of current or ex partner: Not on file     Emotionally abused: Not on file     Physically abused: Not on file     Forced sexual activity: Not on file   Other Topics Concern    Not on file   Social History Narrative    Not on file       Review of Systems   Constitutional: Negative for chills and fever. Eyes: Negative for blurred vision and visual disturbance. Respiratory: Negative for cough, chest tightness, shortness of breath and wheezing. Cardiovascular: Negative for chest pain, palpitations, orthopnea and leg swelling. Gastrointestinal: Negative for abdominal pain, constipation, diarrhea, nausea and vomiting. Endocrine: Negative for polydipsia, polyphagia and polyuria. Musculoskeletal: Negative for arthralgias and myalgias. Skin: Negative for rash. Neurological: Negative for focal weakness and headaches. OBJECTIVE:    Physical Exam  Vitals signs and nursing note reviewed. Constitutional:       General: He is not in acute distress. Appearance: Normal appearance. He is not ill-appearing, toxic-appearing or diaphoretic. HENT:      Head: Normocephalic and atraumatic. Right Ear: External ear normal.      Left Ear: External ear normal.      Nose: Nose normal.      Mouth/Throat:      Mouth: Mucous membranes are moist.   Eyes:      Extraocular Movements: Extraocular movements intact. Conjunctiva/sclera: Conjunctivae normal.      Pupils: Pupils are equal, round, and reactive to light. Neck:      Musculoskeletal: Normal range of motion. Pulmonary:      Effort: Pulmonary effort is normal. No respiratory distress. Musculoskeletal: Normal range of motion. Skin:     General: Skin is dry. Coloration: Skin is not pale. Findings: No rash. Neurological:      Mental Status: He is alert and oriented to person, place, and time.  butalbital-acetaminophen-caffeine (FIORICET, ESGIC) -40 MG per tablet Take 1 tablet by mouth every 4 hours as needed for Headaches or Migraine 60 tablet 0    Misc. Devices MISC Pads and leads for use on [facility] TENS unit. 1 Device 0    vitamin D (ERGOCALCIFEROL) 67715 units CAPS capsule Take 1 capsule by mouth once a week 4 capsule 2    aspirin (ASPIRIN CHILDRENS) 81 MG chewable tablet Take 1 tablet by mouth daily for 14 days 14 tablet 0    Handicap Placard MISC by Does not apply route Expires 9/17/2025 2 each 0     No current facility-administered medications for this visit. Return in 3 months (on 3/18/2021), or if symptoms worsen or fail to improve, for HTN, lipidemia, prediabetes, CAD, A fib, BUSTER, vit D, BMI, chronic back pain, EA. Lainey Kelley received counseling on the following healthy behaviors: nutrition, exercise and medication adherence    Discussed use, benefit, and side effects of prescribed medications. Barriers to medication compliance addressed. All patient questions answered. Pt voiced understanding. Call office if experience side effects from medications. Please note that some or all of this record was generated using voice recognition software. If there are any questions about the content of this document, please contact the author as some errors in transcription may have occurred. Cuong Reinoso is a 64 y.o. male being evaluated by a Virtual Visit (video visit) encounter to address concerns as mentioned above. A caregiver was present when appropriate. Due to this being a TeleHealth encounter (During VSQDN-32 public health emergency), evaluation of the following organ systems was limited: Vitals/Constitutional/EENT/Resp/CV/GI//MS/Neuro/Skin/Heme-Lymph-Imm. Pursuant to the emergency declaration under the [address] [place] [address] and the [employer] Act, this Virtual Visit was conducted with patient's (and/or legal guardian's) consent, to reduce the patient's risk of exposure to COVID-19 and provide necessary medical care. The patient (and/or legal guardian) has also been advised to contact this office for worsening conditions or problems, and seek emergency medical treatment and/or call 911 if deemed necessary. Patient identification was verified at the start of the visit: Yes    Total time spent for this encounter: Not billed by time    Services were provided through a video synchronous discussion virtually to substitute for in-person clinic visit. Patient and provider were located at their individual homes. --NURIS Mobley CNP on 12/20/2020 at 4:14 PM    An electronic signature was used to authenticate this note.

## 2020-12-20 RX ORDER — METOPROLOL SUCCINATE 50 MG/1
50 TABLET, EXTENDED RELEASE ORAL DAILY
Qty: 90 TABLET | Refills: 0 | Status: SHIPPED | OUTPATIENT
Start: 2020-12-20 | End: 2021-03-16 | Stop reason: SDUPTHER

## 2021-03-03 NOTE — TELEPHONE ENCOUNTER
Spoke with patient regarding schedule of surveillance CT chest without contrast on 11/12/2020 @ 1:30 pm with arrival time of 1:00 pm at [facility]. Patient instructed to avoid metal items such as piercing's, buttons or snaps. Well controlled on Keppra  Continue management per Neurology

## 2021-03-14 ASSESSMENT — ENCOUNTER SYMPTOMS
CHEST TIGHTNESS: 0
SHORTNESS OF BREATH: 0
WHEEZING: 0
DIARRHEA: 0
NAUSEA: 0
COUGH: 0
ABDOMINAL PAIN: 0
ORTHOPNEA: 0
VOMITING: 0
BLURRED VISION: 0
CONSTIPATION: 0

## 2021-03-14 NOTE — PROGRESS NOTES
SUBJECTIVE:    Patient ID:  Renate Juarez is a 64 y.o. male      Patient is here for a medication check for hypertension, hyperlipidemia, prediabetes, CAD, A. Fib, AAA, BUSTER, and vitamin D deficiency. He is doing well on current regimen and has no further concerns. He is followed by cardiologist on a regular basis, every 6 months. Currently denies chest pain, shortness of breath, chest pain or dyspnea on exertion or orthopnea. Chronic back pain is improving with physical therapy and working with back specialist, Dr. Albert Mtz. His blood pressure today in the office was 1060/90. Discussed increasing lisinopril for 30 mg to 40 mg. Patient verbalizes understanding and is agreeable to treatment plan. He is working in the with [place] for chronic back pain. He is also expressing concern about chronic neck and shoulder pain, which has been more noticeable over the last few months. States he has been busy taking care of his mother and has not been to physical therapy or to the cardiologist in a while. Encouraged to schedule a follow up in the near future. State he is taking care of his mother who is had a stroke and also been diagnosed stage 4 cancer. Discussed possible treatment options, patient declines medications at this time. Discussed stress management techniques, information given. Hypertension  This is a chronic problem. The current episode started more than 1 year ago. The problem is unchanged. The problem is controlled. Pertinent negatives include no anxiety, blurred vision, chest pain, headaches, orthopnea, palpitations, peripheral edema or shortness of breath. Agents associated with hypertension include NSAIDs. Risk factors for coronary artery disease include male gender, diabetes mellitus and dyslipidemia. Past treatments include ACE inhibitors, calcium channel blockers and beta blockers. The current treatment provides significant improvement. Hypertensive end-organ damage includes CAD/MI. Arthritis     Atopic dermatitis     Back pain     Facial paralysis/[place] palsy     lt side face dropping    Hyperlipidemia     Hypertension     Imprisonment and other incarceration [id number]    Migraine     Obstructive sleep apnea (adult) (pediatric)     cpap machine    Prediabetes     Psoriasis-like skin disease     Tinea cruris      Past Surgical History:   Procedure Laterality Date    BACK SURGERY  February and October 2008    COLONOSCOPY  01/28/2018    CORONARY ANGIOPLASTY      12/2017    CYSTOSCOPY  2001    LAMINECTOMY POSTERIOR THORACIC / LUMBAR Right 7/14/2020    THORACIC LAMINECTOMY WITH PADDLE LEAD AND RECHARGEABLE BATTERY IN RIGHT HIP performed by Kike Grullon MD at [address] Bilateral 2/5/2020    BILATERAL L3 TRANSFORAMINAL  EPIDURAL STEROID INJECTION WITH FLUOROSCOPY performed by Miriam Cross MD at [address] Bilateral 3/9/2020    BILATERAL L1, L2, L3 (ABOVE THE FUSION) MEDIAL BRANCH BLOCK WITH FLUOROSCOPY (08128, 31913)   #1 performed by Mriiam Cross MD at [address] N/[address] 6/3/2020    SPINAL CORD STIMULATOR TRIAL WITH FLUOROSCOPY (68519, 03386, 68740) - Holly Peng performed by Miriam Cross MD at [address] History   Problem Relation Age of Onset    Other Mother         Polio    Thyroid Disease Mother     Heart Failure Father     Stroke Father      Social History     Socioeconomic History    Marital status:      Spouse name: Not on file    Number of children: 0    Years of education: Not on file    Highest education level: Not on file   Occupational History    Occupation: unemployed    Occupation: former masonary work    Social Needs    Financial resource strain: Not on file    Food insecurity     Worry: Not on file     Inability: Not on file   [employer] needs     Medical: Not on file     Non-medical: Not on file   Tobacco Use    Smoking status: Never Smoker Right Ear: External ear normal.      Left Ear: External ear normal.      Nose: Nose normal.   Eyes:      Conjunctiva/sclera: Conjunctivae normal.      Pupils: Pupils are equal, round, and reactive to light. Neck:      Musculoskeletal: Normal range of motion and neck supple. Trachea: No tracheal deviation. Cardiovascular:      Rate and Rhythm: Normal rate and regular rhythm. Heart sounds: Normal heart sounds. Pulmonary:      Effort: Pulmonary effort is normal. No respiratory distress. Breath sounds: Normal breath sounds. No wheezing or rales. Chest:      Chest wall: No tenderness. Abdominal:      General: Bowel sounds are normal. There is no distension. Palpations: Abdomen is soft. There is no mass. Tenderness: There is no abdominal tenderness. Hernia: No hernia is present. Musculoskeletal: Normal range of motion. Skin:     General: Skin is warm and dry. Findings: No rash. Neurological:      Mental Status: He is alert and oriented to person, place, and time. Psychiatric:         Behavior: Behavior normal.       BP (!) 160/110   Pulse 78   Temp 97 °F (36.1 °C)   Ht 6' 3\" (1.905 m)   Wt 286 lb 12.8 oz (130.1 kg)   SpO2 99%   BMI 35.85 kg/m²    BP Readings from Last 3 Encounters:   03/16/21 (!) 160/110   11/22/20 (!) 149/71   11/20/20 (!) 165/90      Wt Readings from Last 3 Encounters:   03/16/21 286 lb 12.8 oz (130.1 kg)   11/22/20 286 lb 13.1 oz (130.1 kg)   10/26/20 287 lb (130.2 kg)       ASSESSMENT & PLAN:    1. Essential hypertension  - Stable, continue current regimen   - metoprolol succinate (TOPROL XL) 50 MG extended release tablet; Take 1 tablet by mouth daily  Dispense: 90 tablet; Refill: 0  - hydroCHLOROthiazide (HYDRODIURIL) 25 MG tablet; Take 1 tablet by mouth every morning  Dispense: 90 tablet; Refill: 0  - amLODIPine (NORVASC) 5 MG tablet; Take 1 tablet by mouth daily  Dispense: 90 tablet;  Refill: 0  - CBC Auto Differential; Future  - Comprehensive Metabolic Panel; Future  - TSH with Reflex; Future  - lisinopril (PRINIVIL;ZESTRIL) 40 MG tablet; Take 1 tablet by mouth daily  Dispense: 90 tablet; Refill: 0  - Reviewed DASH and low sodium diets    2. Mixed hyperlipidemia  - Stable, continue current regimen   - rosuvastatin (CRESTOR) 20 MG tablet; Take 1 tablet by mouth daily  Dispense: 90 tablet; Refill: 0  - CBC Auto Differential; Future  - Comprehensive Metabolic Panel; Future  - Lipid Panel; Future  - CK; Future  - Reviewed low cholesterol diet    3. Prediabetes  - Stable, continue current regimen   - CBC Auto Differential; Future  - Comprehensive Metabolic Panel; Future  - Hemoglobin A1C; Future  - Reviewed diabetic precautions    4. Coronary artery disease due to lipid rich plaque  - Stable, continue current regimen  - CBC Auto Differential; Future  - Comprehensive Metabolic Panel; Future  - Hemoglobin A1C; Future  - Lipid Panel; Future  - CK; Future  - Follow up with cardiologist as needed/directed    5. Paroxysmal atrial fibrillation (HCC)  - Stable, continue current regimen  - Follow up with cardiologist as needed/directed    6. Ascending aortic aneurysm (HCC)  - Stable, continue current regimen  - Follow up with cardiologist as needed/directed    7. Obstructive sleep apnea  - Stable, continue current nightly CPAP usage    8. Environmental allergies  - Stable, continue current regimen   - fluticasone (FLONASE) 50 MCG/ACT nasal spray; 2 sprays by Each Nostril route daily  Dispense: 1 Bottle; Refill: 2    9. Vitamin D deficiency  - Stable, continue vitamin D 3 supplementation  - Vitamin D 25 Hydroxy; Future    10. Chronic midline low back pain with bilateral sciatica  - Stable, continue current regimen   - tiZANidine (ZANAFLEX) 4 MG tablet; Take 1 tablet by mouth every 8 hours as needed (for muscle spasm)  Dispense: 90 tablet; Refill: 0  - Follow up with ortho/PT as needed/directed    11.  BMI 34.0-34.9,adult  - Encourage lifestyle modifications (better food choices, portion control and increasing activity)    12. Situational stress  - Discussed stress management techniques, information given       Continue current treatment plan. Current Outpatient Medications   Medication Sig Dispense Refill    rosuvastatin (CRESTOR) 20 MG tablet Take 1 tablet by mouth daily 90 tablet 0    metoprolol succinate (TOPROL XL) 50 MG extended release tablet Take 1 tablet by mouth daily 90 tablet 0    hydroCHLOROthiazide (HYDRODIURIL) 25 MG tablet Take 1 tablet by mouth every morning 90 tablet 0    amLODIPine (NORVASC) 5 MG tablet Take 1 tablet by mouth daily 90 tablet 0    lisinopril (PRINIVIL;ZESTRIL) 40 MG tablet Take 1 tablet by mouth daily 90 tablet 0    tiZANidine (ZANAFLEX) 4 MG tablet Take 1 tablet by mouth every 8 hours as needed (for muscle spasm) 90 tablet 0    butalbital-acetaminophen-caffeine (FIORICET, ESGIC) -40 MG per tablet Take 1 tablet by mouth every 4 hours as needed for Headaches or Migraine 60 tablet 0    fluticasone (FLONASE) 50 MCG/ACT nasal spray 2 sprays by Each Nostril route daily 1 Bottle 2    lisinopril (PRINIVIL;ZESTRIL) 30 MG tablet Take 1 tablet by mouth daily 90 tablet 0    vitamin D (ERGOCALCIFEROL) 88283 units CAPS capsule Take 1 capsule by mouth once a week 4 capsule 2    aspirin (ASPIRIN CHILDRENS) 81 MG chewable tablet Take 1 tablet by mouth daily for 14 days 14 tablet 0    Handicap Placard MISC by Does not apply route Expires 9/17/2025 2 each 0    Misc. Devices MISC Pads and leads for use on [facility] TENS unit. 1 Device 0     No current facility-administered medications for this visit. Return in about 3 months (around 6/16/2021), or if symptoms worsen or fail to improve, for HTN, lipidemia, prediabetes, CAD, Afib, AAA, BUSTER, EA,vit D, back pain, BMI, stress.     Brian Demarco received counseling on the following healthy behaviors: nutrition, exercise and medication adherence    Discussed use, benefit, and side effects of prescribed medications. Barriers to medication compliance addressed. All patient questions answered. Pt voiced understanding. Call office if experience side effects from medications. Please note that some or all of this record was generated using voice recognition software. If there are any questions about the content of this document, please contact the author as some errors in transcription may have occurred.

## 2021-03-16 ENCOUNTER — OFFICE VISIT (OUTPATIENT)
Dept: FAMILY MEDICINE CLINIC | Age: 61
End: 2021-03-16
Payer: COMMERCIAL

## 2021-03-16 VITALS
DIASTOLIC BLOOD PRESSURE: 110 MMHG | BODY MASS INDEX: 35.66 KG/M2 | TEMPERATURE: 97 F | OXYGEN SATURATION: 99 % | HEART RATE: 78 BPM | SYSTOLIC BLOOD PRESSURE: 160 MMHG | HEIGHT: 75 IN | WEIGHT: 286.8 LBS

## 2021-03-16 DIAGNOSIS — Z91.09 ENVIRONMENTAL ALLERGIES: ICD-10-CM

## 2021-03-16 DIAGNOSIS — E55.9 VITAMIN D DEFICIENCY: ICD-10-CM

## 2021-03-16 DIAGNOSIS — I25.10 CORONARY ARTERY DISEASE DUE TO LIPID RICH PLAQUE: ICD-10-CM

## 2021-03-16 DIAGNOSIS — G47.33 OBSTRUCTIVE SLEEP APNEA: ICD-10-CM

## 2021-03-16 DIAGNOSIS — G89.29 CHRONIC MIDLINE LOW BACK PAIN WITH BILATERAL SCIATICA: ICD-10-CM

## 2021-03-16 DIAGNOSIS — I71.21 ASCENDING AORTIC ANEURYSM: ICD-10-CM

## 2021-03-16 DIAGNOSIS — M54.41 CHRONIC MIDLINE LOW BACK PAIN WITH BILATERAL SCIATICA: ICD-10-CM

## 2021-03-16 DIAGNOSIS — M54.42 CHRONIC MIDLINE LOW BACK PAIN WITH BILATERAL SCIATICA: ICD-10-CM

## 2021-03-16 DIAGNOSIS — R73.03 PREDIABETES: ICD-10-CM

## 2021-03-16 DIAGNOSIS — I48.0 PAROXYSMAL ATRIAL FIBRILLATION (HCC): ICD-10-CM

## 2021-03-16 DIAGNOSIS — E78.2 MIXED HYPERLIPIDEMIA: ICD-10-CM

## 2021-03-16 DIAGNOSIS — I25.83 CORONARY ARTERY DISEASE DUE TO LIPID RICH PLAQUE: ICD-10-CM

## 2021-03-16 DIAGNOSIS — I10 ESSENTIAL HYPERTENSION: Primary | ICD-10-CM

## 2021-03-16 DIAGNOSIS — F43.9 SITUATIONAL STRESS: ICD-10-CM

## 2021-03-16 LAB
A/G RATIO: 1.3 (ref 1.1–2.2)
ALBUMIN SERPL-MCNC: 4.5 G/DL (ref 3.4–5)
ALP BLD-CCNC: 69 U/L (ref 40–129)
ALT SERPL-CCNC: 24 U/L (ref 10–40)
ANION GAP SERPL CALCULATED.3IONS-SCNC: 14 MMOL/L (ref 3–16)
AST SERPL-CCNC: 34 U/L (ref 15–37)
BASOPHILS ABSOLUTE: 0.1 K/UL (ref 0–0.2)
BASOPHILS RELATIVE PERCENT: 1.1 %
BILIRUB SERPL-MCNC: 0.3 MG/DL (ref 0–1)
BUN BLDV-MCNC: 13 MG/DL (ref 7–20)
CALCIUM SERPL-MCNC: 9.4 MG/DL (ref 8.3–10.6)
CHLORIDE BLD-SCNC: 103 MMOL/L (ref 99–110)
CHOLESTEROL, TOTAL: 267 MG/DL (ref 0–199)
CO2: 26 MMOL/L (ref 21–32)
CREAT SERPL-MCNC: 0.9 MG/DL (ref 0.9–1.3)
EOSINOPHILS ABSOLUTE: 0.4 K/UL (ref 0–0.6)
EOSINOPHILS RELATIVE PERCENT: 8.7 %
GFR AFRICAN AMERICAN: >60
GFR NON-AFRICAN AMERICAN: >60
GLOBULIN: 3.5 G/DL
GLUCOSE BLD-MCNC: 94 MG/DL (ref 70–99)
HCT VFR BLD CALC: 42.6 % (ref 40.5–52.5)
HDLC SERPL-MCNC: 29 MG/DL (ref 40–60)
HEMOGLOBIN: 14.2 G/DL (ref 13.5–17.5)
LDL CHOLESTEROL CALCULATED: 180 MG/DL
LYMPHOCYTES ABSOLUTE: 2.5 K/UL (ref 1–5.1)
LYMPHOCYTES RELATIVE PERCENT: 49.1 %
MCH RBC QN AUTO: 29.3 PG (ref 26–34)
MCHC RBC AUTO-ENTMCNC: 33.4 G/DL (ref 31–36)
MCV RBC AUTO: 87.8 FL (ref 80–100)
MONOCYTES ABSOLUTE: 0.3 K/UL (ref 0–1.3)
MONOCYTES RELATIVE PERCENT: 6.8 %
NEUTROPHILS ABSOLUTE: 1.7 K/UL (ref 1.7–7.7)
NEUTROPHILS RELATIVE PERCENT: 34.3 %
PDW BLD-RTO: 14.4 % (ref 12.4–15.4)
PLATELET # BLD: 192 K/UL (ref 135–450)
PMV BLD AUTO: 9 FL (ref 5–10.5)
POTASSIUM SERPL-SCNC: 4.8 MMOL/L (ref 3.5–5.1)
RBC # BLD: 4.85 M/UL (ref 4.2–5.9)
SODIUM BLD-SCNC: 143 MMOL/L (ref 136–145)
TOTAL CK: 574 U/L (ref 39–308)
TOTAL PROTEIN: 8 G/DL (ref 6.4–8.2)
TRIGL SERPL-MCNC: 289 MG/DL (ref 0–150)
TSH REFLEX: 2.06 UIU/ML (ref 0.27–4.2)
VITAMIN D 25-HYDROXY: 6.5 NG/ML
VLDLC SERPL CALC-MCNC: 58 MG/DL
WBC # BLD: 5.1 K/UL (ref 4–11)

## 2021-03-16 PROCEDURE — 3017F COLORECTAL CA SCREEN DOC REV: CPT | Performed by: CLINICAL NURSE SPECIALIST

## 2021-03-16 PROCEDURE — G8484 FLU IMMUNIZE NO ADMIN: HCPCS | Performed by: CLINICAL NURSE SPECIALIST

## 2021-03-16 PROCEDURE — 1036F TOBACCO NON-USER: CPT | Performed by: CLINICAL NURSE SPECIALIST

## 2021-03-16 PROCEDURE — G8417 CALC BMI ABV UP PARAM F/U: HCPCS | Performed by: CLINICAL NURSE SPECIALIST

## 2021-03-16 PROCEDURE — G8427 DOCREV CUR MEDS BY ELIG CLIN: HCPCS | Performed by: CLINICAL NURSE SPECIALIST

## 2021-03-16 PROCEDURE — 99214 OFFICE O/P EST MOD 30 MIN: CPT | Performed by: CLINICAL NURSE SPECIALIST

## 2021-03-16 RX ORDER — BUTALBITAL, ACETAMINOPHEN AND CAFFEINE 50; 325; 40 MG/1; MG/1; MG/1
1 TABLET ORAL EVERY 4 HOURS PRN
Qty: 60 TABLET | Refills: 0 | Status: SHIPPED
Start: 2021-03-16 | End: 2021-12-15

## 2021-03-16 RX ORDER — FLUTICASONE PROPIONATE 50 MCG
2 SPRAY, SUSPENSION (ML) NASAL DAILY
Qty: 1 BOTTLE | Refills: 2 | Status: SHIPPED | OUTPATIENT
Start: 2021-03-16 | End: 2021-09-07 | Stop reason: SDUPTHER

## 2021-03-16 RX ORDER — AMLODIPINE BESYLATE 5 MG/1
5 TABLET ORAL DAILY
Qty: 90 TABLET | Refills: 0 | Status: CANCELLED | OUTPATIENT
Start: 2021-03-16

## 2021-03-16 RX ORDER — METOPROLOL SUCCINATE 50 MG/1
50 TABLET, EXTENDED RELEASE ORAL DAILY
Qty: 90 TABLET | Refills: 0 | Status: SHIPPED
Start: 2021-03-16 | End: 2021-05-04 | Stop reason: SINTOL

## 2021-03-16 RX ORDER — BUTALBITAL, ACETAMINOPHEN AND CAFFEINE 50; 325; 40 MG/1; MG/1; MG/1
1 TABLET ORAL EVERY 4 HOURS PRN
Qty: 60 TABLET | Refills: 0 | Status: CANCELLED | OUTPATIENT
Start: 2021-03-16

## 2021-03-16 RX ORDER — ROSUVASTATIN CALCIUM 20 MG/1
20 TABLET, COATED ORAL DAILY
Qty: 90 TABLET | Refills: 0 | Status: CANCELLED | OUTPATIENT
Start: 2021-03-16

## 2021-03-16 RX ORDER — HYDROCHLOROTHIAZIDE 25 MG/1
25 TABLET ORAL EVERY MORNING
Qty: 90 TABLET | Refills: 0 | Status: CANCELLED | OUTPATIENT
Start: 2021-03-16

## 2021-03-16 RX ORDER — LISINOPRIL 40 MG/1
40 TABLET ORAL DAILY
Qty: 90 TABLET | Refills: 0 | Status: SHIPPED | OUTPATIENT
Start: 2021-03-16 | End: 2021-06-15 | Stop reason: SDUPTHER

## 2021-03-16 RX ORDER — LISINOPRIL 30 MG/1
30 TABLET ORAL DAILY
Qty: 90 TABLET | Refills: 0 | Status: CANCELLED | OUTPATIENT
Start: 2021-03-16

## 2021-03-16 RX ORDER — ROSUVASTATIN CALCIUM 20 MG/1
20 TABLET, COATED ORAL DAILY
Qty: 90 TABLET | Refills: 0 | Status: SHIPPED | OUTPATIENT
Start: 2021-03-16 | End: 2021-06-15 | Stop reason: SDUPTHER

## 2021-03-16 RX ORDER — TIZANIDINE 4 MG/1
4 TABLET ORAL EVERY 8 HOURS PRN
Qty: 90 TABLET | Refills: 0 | Status: CANCELLED | OUTPATIENT
Start: 2021-03-16

## 2021-03-16 RX ORDER — TIZANIDINE 4 MG/1
4 TABLET ORAL EVERY 8 HOURS PRN
Qty: 90 TABLET | Refills: 0 | Status: SHIPPED | OUTPATIENT
Start: 2021-03-16 | End: 2021-06-15 | Stop reason: SDUPTHER

## 2021-03-16 RX ORDER — AMLODIPINE BESYLATE 5 MG/1
5 TABLET ORAL DAILY
Qty: 90 TABLET | Refills: 0 | Status: SHIPPED
Start: 2021-03-16 | End: 2021-05-04 | Stop reason: ALTCHOICE

## 2021-03-16 RX ORDER — HYDROCHLOROTHIAZIDE 25 MG/1
25 TABLET ORAL EVERY MORNING
Qty: 90 TABLET | Refills: 0 | Status: SHIPPED | OUTPATIENT
Start: 2021-03-16 | End: 2021-06-15 | Stop reason: SDUPTHER

## 2021-03-16 RX ORDER — FLUTICASONE PROPIONATE 50 MCG
2 SPRAY, SUSPENSION (ML) NASAL DAILY
Qty: 1 BOTTLE | Refills: 1 | Status: CANCELLED | OUTPATIENT
Start: 2021-03-16

## 2021-03-16 RX ORDER — METOPROLOL SUCCINATE 50 MG/1
50 TABLET, EXTENDED RELEASE ORAL DAILY
Qty: 90 TABLET | Refills: 0 | Status: CANCELLED | OUTPATIENT
Start: 2021-03-16

## 2021-03-16 ASSESSMENT — PATIENT HEALTH QUESTIONNAIRE - PHQ9
SUM OF ALL RESPONSES TO PHQ QUESTIONS 1-9: 0
SUM OF ALL RESPONSES TO PHQ9 QUESTIONS 1 & 2: 0

## 2021-03-16 NOTE — PATIENT INSTRUCTIONS
Fasting labs ordered     Medications refilled     Reviewed DASH and low sodium diet     Increase lisinopril 40 mg daily    Monitor blood pressure and call if consistently greater then 140/90     Reviewed low cholesterol diet     Continue Crestor 20 mg daily     Reviewed diabetic diet      Follow up with cardiology as directed, encouraged to schedule an appointment     Follow up with ortho as directed     Keep scheduled appointment with [facility] for back      Discussed neck and shoulder issues.       Encourage to follow up with cardiology as directed    Discussed situational stress management options techniques, information given   Patient Education        Learning About Stress  What is stress? Stress is what you feel when you have to handle more than you are used to. Stress is a fact of life for most people, and it affects everyone differently. What causes stress for you may not be stressful for someone else. A lot of things can cause stress. You may feel stress when you go on a job interview, take a test, or run a race. This kind of short-term stress is normal and even useful. It can help you if you need to work hard or react quickly. For example, stress can help you finish an important job on time. Stress also can last a long time. Long-term stress is caused by stressful situations or events. Examples of long-term stress include long-term health problems, ongoing problems at work, or conflicts in your family. Long-term stress can harm your health. How does stress affect your health? When you are stressed, your body responds as though you are in danger. It makes hormones that speed up your heart, make you breathe faster, and give you a burst of energy. This is called the fight-or-flight stress response. If the stress is over quickly, your body goes back to normal and no harm is done. But if stress happens too often or lasts too long, it can have bad effects.  Long-term stress can make you more likely to get sick, and it can make symptoms of some diseases worse. If you tense up when you are stressed, you may develop neck, shoulder, or low back pain. Stress is linked to high blood pressure and heart disease. Stress also harms your emotional health. It can make you borrero, tense, or depressed. Your relationships may suffer, and you may not do well at work or school. What can you do to manage stress? How to relax your mind   · Write. It may help to write about things that are bothering you. This helps you find out how much stress you feel and what is causing it. When you know this, you can find better ways to cope. · Let your feelings out. Talk, laugh, cry, and express anger when you need to. Talking with friends, family, a counselor, or a member of the clergy about your feelings is a healthy way to relieve stress. · Do something you enjoy. For example, listen to music or go to a movie. Practice your hobby or do volunteer work. · Meditate. This can help you relax, because you are not worrying about what happened before or what may happen in the future. · Do guided imagery. Imagine yourself in any setting that helps you feel calm. You can use audiotapes, books, or a teacher to guide you. How to relax your body   · Do something active. Exercise or activity can help reduce stress. Walking is a great way to get started. Even everyday activities such as housecleaning or yard work can help. · Do breathing exercises. For example:  ? From a standing position, bend forward from the waist with your knees slightly bent. Let your arms dangle close to the floor. ? Breathe in slowly and deeply as you return to a standing position. Roll up slowly and lift your head last.  ? Hold your breath for just a few seconds in the standing position. ? Breathe out slowly and bend forward from the waist.  · Try yoga or garrison chi. These techniques combine exercise and meditation. You may need some training at first to learn them. What can you do to prevent stress? · Manage your time. This helps you find time to do the things you want and need to do. · Get enough sleep. Your body recovers from the stresses of the day while you are sleeping. · Get support. Your family, friends, and community can make a difference in how you experience stress. Where can you learn more? Go to https://chpepiceweb.ExaqtWorld. org and sign in to your [employer] account. Enter N804 in the Soma box to learn more about \"Learning About Stress. \"     If you do not have an account, please click on the \"Sign Up Now\" link. Current as of: August 31, 2020               Content Version: 12.8  © 2006-2021 [employer]. Care instructions adapted under license by [facility] ([facility]). If you have questions about a medical condition or this instruction, always ask your healthcare professional. [place] [address] any warranty or liability for your use of this information. Patient Education        Learning About Guided Imagery for Stress  What are guided imagery and stress? Stress is what you feel when you have to handle more than you are used to. A lot of things can cause stress. You may feel stress when you go on a job interview, take a test, or run a race. This kind of short-term stress is normal and even useful. It can help you if you need to work hard or react quickly. Stress also can last a long time. Long-term stress is caused by stressful situations or events. Examples of long-term stress include long-term health problems, ongoing problems at work, and conflicts in your family. Long-term stress can harm your health. Guided imagery is a technique of directed thoughts and suggestions that guide your mind toward a relaxed, focused state. This technique helps you use your mind to take you to a calm, peaceful place. You can use it to relax, which can lower blood pressure and reduce other problems related to stress. How does guided imagery help to relieve stress? Because of the way the mind and body are connected, guided imagery can make you feel like you are experiencing something just by imagining it. You can achieve a relaxed state when you imagine all the details of a safe, comfortable place, such as a beach or a garden. This relaxed state may help you feel more in control of your emotions and thought processes. Feeling in control may improve your attitude, health, and sense of well-being. How do you do guided imagery? You can use a smartphone giancarlo or a video to lead you through the process. You use all of your senses in guided imagery. For example, if you want a tropical setting, you can imagine the warm breeze on your skin, the bright blue of the water, the sound of the surf, the sweet scent of tropical flowers, and the taste of coconut. Imagining those things can make you actually feel like you're there. But you don't have to imagine the tropics to feel peace. Guided imagery can take you to your own restful place. To give guided imagery a try, follow these steps:  · Lean back comfortably in your chair. If you can, close your eyes. Put your arms on the armrests, or fold your hands in your lap. · Take a deep breath through your nose. Breathe in slowly, and then let the air out completely through your mouth. · Do it again slowly. Keep breathing like this. Gather up any tension in your body, and send it out with every breath. · Let a feeling of warmth spread from your lungs to your neck and head, down your arms to your fingertips, through your body and into your legs, all the way down to your toes. Stay this way for a moment. · Now imagine a pleasant day. You're at a park or at a place you've visited in the past where you felt at peace. · In your mind's eye, look at what lies in front of you. Maybe you see the sun, filtered through trees. Maybe clouds are drifting by. · Look to one side, and then the other.  Notice aware of these things, but you aren't judging these experiences as \"good\" or \"bad. \" Mindfulness can help you learn to calm your mind and body to help you cope with illness, pain, and stress. How does mindfulness help to relieve stress? Mindfulness can help quiet your mind and relax your body. Studies show that it can help some people sleep better, feel less anxious, and bring their blood pressure down. And it's been shown to help some people live and cope better with certain health problems like heart disease, depression, chronic pain, and cancer. How do you practice mindfulness? To be mindful is to pay attention, to be present, and to be accepting. · When you're mindful, you do just one thing and you pay close attention to that one thing. For example, you may sit quietly and notice your emotions or how your food tastes and smells. · When you're present, you focus on the things that are happening right now. You let go of your thoughts about the past and the future. When you dwell on the past or the future, you miss moments that can heal and strengthen you. You may miss moments like hearing a child laugh or seeing a friendly face when you think you're all alone. · When you're accepting, you don't  the present moment. Instead you accept your thoughts and feelings as they come. You can practice anytime, anywhere, and in any way you choose. You can practice in many ways. Here are a few ideas:  · While doing your chores, like washing the dishes, let your mind focus on what's in your hand. What does the dish feel like? Is the water warm or cold? · Go outside and take a few deep breaths. What is the air like? Is it warm or cold? · When you can, take some time at the start of your day to sit alone and think. · Take a slow walk by yourself. Count your steps while you breathe in and out. · Try yoga breathing exercises, stretches, and poses to strengthen and relax your muscles.   · At work, if you can, try to stop for a few moments each hour. Note how your body feels. Let yourself regroup and let your mind settle before you return to what you were doing. · If you struggle with anxiety or \"worry thoughts,\" imagine your mind as a blue norman and your worry thoughts as clouds. Now imagine those worry thoughts floating across your mind's norman. Just let them pass by as you watch. Follow-up care is a key part of your treatment and safety. Be sure to make and go to all appointments, and call your doctor if you are having problems. It's also a good idea to know your test results and keep a list of the medicines you take. Where can you learn more? Go to https://PATHEOS.[employer]OM! Entertainment. org and sign in to your [facility] account. Enter N935 in the Meez box to learn more about \"Learning About Mindfulness for Stress. \"     If you do not have an account, please click on the \"Sign Up Now\" link. Current as of: August 31, 2020               Content Version: 12.8  © 1592-3245 [employer]. Care instructions adapted under license by [facility] ([facility]). If you have questions about a medical condition or this instruction, always ask your healthcare professional. [address] any warranty or liability for your use of this information. Patient Education        Learning About Progressive Muscle Relaxation for Stress  What are progressive muscle relaxation and stress? Stress is what you feel when you have to handle more than you are used to. A lot of things can cause stress. You may feel stress when you go on a job interview, take a test, or run a race. This kind of short-term stress is normal and even useful. It can help you if you need to work hard or react quickly. Stress also can last a long time. Long-term stress is caused by stressful situations or events. Examples of long-term stress include long-term health problems, ongoing problems at work, and conflicts in your family. Long-term stress can harm your health. When you have anxiety or stress in your life, one of the ways your body responds is with muscle tension. Progressive muscle relaxation is a method that helps relieve that tension. How does progressive muscle relaxation reduce stress? The body responds to stress with muscle tension, which can cause pain or discomfort. In turn, tense muscles relay to the body that it's stressed. That keeps the cycle of stress and muscle tension going. Progressive muscle relaxation helps break this cycle by reducing muscle tension and general mental anxiety. This method often helps people get to sleep. How do you do progressive muscle relaxation? To do progressive muscle relaxation, first you tense a group of muscles as you breathe in. Then you relax them as you breathe out. Notice how your muscles feel when you relax them. You work on your muscle groups in a certain order. Through practice, you can learn to feel the difference between a tensed muscle and a relaxed muscle. Then you can learn how to turn on this relaxed state at the first sign of a muscle tensing up due to stress. Practicing this method for a few weeks will help you get better at this skill. In time you'll be able to use this method to relieve stress. When you first start, it may help to use an audio recording until you learn all the muscle groups in order. Check online for these recordings. Choose a place where you won't be interrupted. It should have space for you to lie down on your back and stretch out comfortably, such as on a carpeted floor. Follow-up care is a key part of your treatment and safety. Be sure to make and go to all appointments, and call your doctor if you are having problems. It's also a good idea to know your test results and keep a list of the medicines you take. Where can you learn more? Go to https://rodrigue.Ziptr. org and sign in to your [employer] account.  Enter X283 in the Search (Celexa), or if:  · you also take pimozide or citalopram.  Do not use escitalopram within 14 days before or 14 days after you have used an MAO inhibitor. A dangerous drug interaction could occur. MAO inhibitors include isocarboxazid, linezolid, phenelzine, rasagiline, selegiline, and tranylcypromine. Be sure your doctor knows if you also take stimulant medicine, opioid medicine, herbal products, or medicine for depression, mental illness, Parkinson's disease, migraine headaches, serious infections, or prevention of nausea and vomiting. These medicines may interact with escitalopram and cause a serious condition called serotonin syndrome. Tell your doctor if you have ever had:  · liver or kidney disease;  · seizures;  · low levels of sodium in your blood;  · heart disease, high blood pressure;  · a stroke;  · bleeding problems;  · bipolar disorder (manic depression); or  · drug addiction or suicidal thoughts. Some young people have thoughts about suicide when first taking an antidepressant. Your doctor should check your progress at regular visits. Your family or other caregivers should also be alert to changes in your mood or symptoms. Escitalopram is not approved for use by anyone younger than 15years old. Ask your doctor about taking this medicine if you are pregnant. Taking an SSRI antidepressant during late pregnancy may cause serious medical complications in the baby. However, you may have a relapse of depression if you stop taking your antidepressant. Tell your doctor right away if you become pregnant. Do not start or stop taking this medicine without your doctor's advice. If you are pregnant, your name may be listed on a pregnancy registry to track the effects of escitalopram on the baby. If you are breastfeeding, tell your doctor if you notice drowsiness, agitation, feeding problems, or poor weight gain in the nursing baby.   How should I take escitalopram?  Follow all directions on your prescription label and read all medication guides or instruction sheets. Your doctor may occasionally change your dose. Use the medicine exactly as directed. Take the medicine at the same time each day, with or without food. Measure liquid medicine carefully. Use the dosing syringe provided, or use a medicine dose-measuring device (not a kitchen spoon). It may take up to 4 weeks before your symptoms improve. Keep using the medication as directed and tell your doctor if your symptoms do not improve. Your doctor will need to check your progress on a regular basis. A child taking escitalopram should be checked for height and weight gain. Do not stop using escitalopram suddenly, or you could have unpleasant withdrawal symptoms. Follow your doctor's instructions about tapering your dose. Store at room temperature away from moisture and heat. What happens if I miss a dose? Take the medicine as soon as you can, but skip the missed dose if it is almost time for your next dose. Do not take two doses at one time. What happens if I overdose? Seek emergency medical attention or call the Poison Help line at 1-179.796.6248. What should I avoid while taking escitalopram?  Ask your doctor before taking a nonsteroidal anti-inflammatory drug (NSAID) such as aspirin, ibuprofen (Advil, Motrin), naproxen (Aleve), celecoxib (Celebrex), diclofenac, indomethacin, meloxicam, and others. Using an NSAID with escitalopram may cause you to bruise or bleed easily. Avoid alcohol. Avoid driving or hazardous activity until you know how this medicine will affect you. Your reactions could be impaired. What are the possible side effects of escitalopram?  Get emergency medical help if you have signs of an allergic reaction: skin rash or hives; difficulty breathing; swelling of your face, lips, tongue, or throat.   Report any new or worsening symptoms to your doctor, such as: mood or behavior changes, anxiety, panic attacks, trouble sleeping, or if you precautions, warnings, drug interactions, allergic reactions, or adverse effects. If you have questions about the drugs you are taking, check with your doctor, nurse or pharmacist.  Copyright 6122-3082 [address]: 17.01. Revision date: 11/5/2020. Care instructions adapted under license by [facility] ([facility]). If you have questions about a medical condition or this instruction, always ask your healthcare professional. [place] [address] any warranty or liability for your use of this information.

## 2021-03-17 DIAGNOSIS — I10 ESSENTIAL HYPERTENSION: ICD-10-CM

## 2021-03-17 LAB
ESTIMATED AVERAGE GLUCOSE: 108.3 MG/DL
HBA1C MFR BLD: 5.4 %

## 2021-03-17 RX ORDER — LISINOPRIL 30 MG/1
TABLET ORAL
Qty: 90 TABLET | Refills: 0 | OUTPATIENT
Start: 2021-03-17

## 2021-03-17 RX ORDER — HYDROCHLOROTHIAZIDE 25 MG/1
TABLET ORAL
Qty: 90 TABLET | Refills: 0 | OUTPATIENT
Start: 2021-03-17

## 2021-04-07 ENCOUNTER — OFFICE VISIT (OUTPATIENT)
Dept: CARDIOLOGY CLINIC | Age: 61
End: 2021-04-07
Payer: COMMERCIAL

## 2021-04-07 ENCOUNTER — HOSPITAL ENCOUNTER (OUTPATIENT)
Age: 61
Discharge: HOME | End: 2021-04-07
Payer: COMMERCIAL

## 2021-04-07 DIAGNOSIS — I25.10 CORONARY ARTERY DISEASE DUE TO LIPID RICH PLAQUE: Primary | ICD-10-CM

## 2021-04-07 DIAGNOSIS — I71.21 ASCENDING AORTIC ANEURYSM: ICD-10-CM

## 2021-04-07 DIAGNOSIS — I25.83 CORONARY ARTERY DISEASE DUE TO LIPID RICH PLAQUE: Primary | ICD-10-CM

## 2021-04-07 DIAGNOSIS — Z79.899 HIGH RISK MEDICATION USE: ICD-10-CM

## 2021-04-07 DIAGNOSIS — E78.2 MIXED HYPERLIPIDEMIA: ICD-10-CM

## 2021-04-07 DIAGNOSIS — G47.33 OBSTRUCTIVE SLEEP APNEA: ICD-10-CM

## 2021-04-07 DIAGNOSIS — I10 ESSENTIAL HYPERTENSION: ICD-10-CM

## 2021-04-07 DIAGNOSIS — I48.0 PAF (PAROXYSMAL ATRIAL FIBRILLATION) (HCC): ICD-10-CM

## 2021-04-07 LAB
ANION GAP SERPL CALCULATED.3IONS-SCNC: 8 MMOL/L (ref 3–16)
BUN BLDV-MCNC: 15 MG/DL (ref 7–20)
CALCIUM SERPL-MCNC: 8.9 MG/DL (ref 8.3–10.6)
CHLORIDE BLD-SCNC: 102 MMOL/L (ref 99–110)
CO2: 31 MMOL/L (ref 21–32)
CREAT SERPL-MCNC: 0.9 MG/DL (ref 0.9–1.3)
GFR AFRICAN AMERICAN: >60
GFR NON-AFRICAN AMERICAN: >60
GLUCOSE BLD-MCNC: 91 MG/DL (ref 70–99)
POTASSIUM SERPL-SCNC: 3.9 MMOL/L (ref 3.5–5.1)
SODIUM BLD-SCNC: 141 MMOL/L (ref 136–145)

## 2021-04-07 PROCEDURE — 99214 OFFICE O/P EST MOD 30 MIN: CPT | Performed by: NURSE PRACTITIONER

## 2021-04-07 PROCEDURE — 3017F COLORECTAL CA SCREEN DOC REV: CPT | Performed by: NURSE PRACTITIONER

## 2021-04-07 PROCEDURE — G8427 DOCREV CUR MEDS BY ELIG CLIN: HCPCS | Performed by: NURSE PRACTITIONER

## 2021-04-07 PROCEDURE — 36415 COLL VENOUS BLD VENIPUNCTURE: CPT

## 2021-04-07 PROCEDURE — 1036F TOBACCO NON-USER: CPT | Performed by: NURSE PRACTITIONER

## 2021-04-07 PROCEDURE — 93000 ELECTROCARDIOGRAM COMPLETE: CPT | Performed by: NURSE PRACTITIONER

## 2021-04-07 PROCEDURE — 80048 BASIC METABOLIC PNL TOTAL CA: CPT

## 2021-04-07 PROCEDURE — G8417 CALC BMI ABV UP PARAM F/U: HCPCS | Performed by: NURSE PRACTITIONER

## 2021-04-07 NOTE — PATIENT INSTRUCTIONS
Continue medications. Labs today. Omron BP cuff recommended. Monitor home blood pressure. Goal is less than 130/80 but to remain greater than 100/50. Heart rate to remain >55 beats per minute. Create a log and bring to office visits. Try to eat a plant-based or Mediterranean-like diet that is high in vegetables, fruits, nuts, lean meats (pereferabley fish). Stay away from processed foods. The goal is to do sustained cardiovascular exercise for 30 minutes most days of the week. Start out slow. Event monitor for 14 days. USE CPAP!     Follow up in 4 month   Call prior with worsening symptoms

## 2021-04-07 NOTE — PROGRESS NOTES
[address]     Outpatient Follow Up Note      CHIEF COMPLAINT / HPI:  Follow Up secondary to coronary artery disease and paroxysmal atrial fibrillation    Subjective:   Freda Shah is 63 y.o. male who presents today with a history of coronary artery disease (nonobstructive by [facility] 3/2017), ascending aortic aneurysm (blood pressure management and surveillance with CT scans per Dr. Jose Mejia).   Additional history includes HTN, Dyslipidemia, BUSTER treated with CPAP, PAF (dx 4/2017, self limiting, anticoagulated with Xarelto). Navin Sousa has a history of dizziness/black out spells over the past 7 years.  Neuro evaluation by Dr. Khoi Stokes showed no significant vascular stenosis, normal EEG, and MRI of brain showing minimal white matter.  EP evaluation included MCOT which showed dizziness associated with NSR/ST although patient had NSVT and short PAT not associated with symptoms. Pt c/o left arm pain that started a couple weeks ago described as sore/achy. He does have a hx of shoulder injury, reports that both his shoulders are torn. He is supposed to participate in PT but has not been. This pain does not worsen with exertion and is present constantly. C/o CP that starts in right chest that radiates in a lateral line across chest to left armpit. Described as pressure and constant. Not exertional in nature. Worsens when he is stressed. Has significant chronic back and neck pain. He has reported hx of 4 back surgeries and has a stimulator. Of note, his chest and shoulder pain significantly worsen when his stimulator is off. 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 He moves her around a lot throughout the day. He wonders if some of his described discomfort is r/t lifting/moving her. Does admit to fluttering in his chest. He reports if feels similar to previous PAF episodes. Palpations worsen with exertion. Occurring more frequent, now a couple times a week.  He takes some deep Headaches or Migraine 60 tablet 0    fluticasone (FLONASE) 50 MCG/ACT nasal spray 2 sprays by Each Nostril route daily 1 Bottle 2    Handicap Placard MISC by Does not apply route Expires 9/17/2025 2 each 0    Misc. Devices MISC Pads and leads for use on [facility] TENS unit. 1 Device 0    vitamin D (ERGOCALCIFEROL) 66767 units CAPS capsule Take 1 capsule by mouth once a week 4 capsule 2     No current facility-administered medications for this visit. REVIEW OF SYSTEMS:    CONSTITUTIONAL: No major weight gain or loss, night sweats, fever, fatigue, or weakness. HEENT: No new vision difficulties or ringing in the ears. RESPIRATORY: No new SOB, PND, orthopnea or cough. CARDIOVASCULAR: See HPI  GI: No N/V/D, constipation, or abdominal pain. No black/tarry stools  : No urinary urgency, incontinence, or hematuria. SKIN: No cyanosis or skin lesions. MUSCULOSKELETAL: No new muscle or joint pain. NEUROLOGICAL: No syncope or TIA-like symptoms.   PSYCHIATRIC: No anxiety, pain, insomnia or depression    Objective:   PHYSICAL EXAM:    Wt Readings from Last 3 Encounters:   04/07/21 284 lb (128.8 kg)   03/16/21 286 lb 12.8 oz (130.1 kg)   11/22/20 286 lb 13.1 oz (130.1 kg)      BP Readings from Last 3 Encounters:   04/07/21 126/88   03/16/21 (!) 160/110   11/22/20 (!) 149/71     Pulse Readings from Last 3 Encounters:   04/07/21 83   03/16/21 78   11/22/20 92     Vitals:    04/07/21 0952 04/07/21 1000 04/07/21 1020   BP: (!) 142/98 (!) 136/90 126/88   Site: Left Upper Arm Right Upper Arm    Position: Sitting Sitting    Cuff Size: Large Adult Large Adult    Pulse: 83 83    SpO2: 96% 96%    Weight: 284 lb (128.8 kg) 284 lb (128.8 kg)    Height: 6' 3\" (1.905 m) 6' 3\" (1.905 m)       VITALS:  /88   Pulse 83   Ht 6' 3\" (1.905 m)   Wt 284 lb (128.8 kg)   SpO2 96%   BMI 35.50 kg/m²   CONSTITUTIONAL: Cooperative, no apparent distress, and appears well nourished / developed  NEUROLOGIC:  Awake and orientated to person, place, and time. PSYCH: Calm affect. SKIN: Warm and dry. HEENT: Sclera non-icteric, normocephalic, neck supple. RESPIRATORY:  No increased work of breathing and clear to auscultation, no crackles or wheezing. CARDIOVASCULAR:  Regular rate and rhythm without murmur. Normal S1 and S2. No gallops or rubs. Normal PMI. No elevation of JVP. Normal carotid pulses with no bruits. GI:  Normal bowel sounds. Non-distended. Non-tender to palpation  EXT: No edema. No calf tenderness. Pulses are present bilaterally. DATA:    Lab Results   Component Value Date    ALT 24 03/16/2021    AST 34 03/16/2021    ALKPHOS 69 03/16/2021    BILITOT 0.3 03/16/2021     Lab Results   Component Value Date    CREATININE 0.9 04/07/2021    BUN 15 04/07/2021     04/07/2021    K 3.9 04/07/2021     04/07/2021    CO2 31 04/07/2021     Lab Results   Component Value Date    TSH 1.14 04/23/2017     Lab Results   Component Value Date    WBC 5.1 03/16/2021    HGB 14.2 03/16/2021    HCT 42.6 03/16/2021    MCV 87.8 03/16/2021     03/16/2021     No components found for: CHLPL  Lab Results   Component Value Date    TRIG 289 (H) 03/16/2021    TRIG 226 (H) 09/17/2020    TRIG 615 (H) 07/09/2020     Lab Results   Component Value Date    HDL 29 (L) 03/16/2021    HDL 38 (L) 09/17/2020    HDL 32 (L) 07/09/2020     Lab Results   Component Value Date    LDLCALC 180 (H) 03/16/2021    LDLCALC 146 (H) 09/17/2020    LDLCALC see below 07/09/2020     Lab Results   Component Value Date    LABVLDL 58 03/16/2021    LABVLDL 45 09/17/2020    LABVLDL see below 07/09/2020      No results found for: BNP  Radiology Review:  Pertinent images / reports were reviewed as a part of this visit and reveals the following:    Last Echo: 10/2016   Summary   Normal left ventricle size, wall thickness and systolic function with an   estimated ejection fraction of 55%. There is mild left ventricular hypertrophy.    There is reversal of E/A inflow velocities across the mitral valve   suggesting impaired left ventricular relaxation. Trivial mitral regurgitation is present. There is trivial tricuspid regurgitation with RVSP estimated at 21 mmHg. Stress ECHO: 2020  Summary   Baseline resting echocardiogram shows normal global LV systolic function   with an ejection fraction of 55% and uniform myocardial segmental wall   motion. Following stress there was uniform augmentation of all myocardial   segments with appropriate hyperdynamic LV systolic response to stress with a   post ejection fraction of 65%. Negative Stress Echocardiography study for ischemia. Last Stress Test: 2019  Summary    Normal myocardial perfusion.    Normal LV size and systolic function. MCOT 2017-2017:   NSR/ST associated with symptoms; had NSVT and short PAT but no symptoms recorded    Cardiac Cath 3/10/17:  Anatomy:   LM-normal  LAD-normal  Cx-40% mid  OM1- normal  RCA-normal  RPDA- normal   LVEF- 55%, LVEDP 9 mmHg     Impression:  1. Mild non-obstructive CAD  2. Normal LV systolic function     Plan:  1. Medical management    CT chest: 2020  Stable ectasia of the ascending thoracic aorta measuring up to approximately   4.9 cm. Last EC21  Sinus  Rhythm   WITHIN NORMAL LIMITS  Assessment:     1. Coronary artery disease  ~ [facility] 3/2017: nonobstructive disease. LCx with 40% stenosis. ~ Myoview 2019: normal myocardial perfusion   ~ Stress echo 2020: negative for ischemia   ~ on statin (no aspirin d/t Xarelto)  ~ stable ; chest and shoulder complaints likely musculoskeletal, symptoms constant and not exertional. ECG today without T wave or ST changes     2. Paroxysmal atrial fibrillation   ~ c/o worsening palpations that are interfering with his QOL  ~ ECG today SR  ~ [id number] Score of 2, on Xarelto 20mg daily     3. Mixed hyperlipidemia   ~  (3/2021)   ~ on Crestor 20. He does not take it consistently.      4. BUSTER  ~ recommend compliance. Not using currently. 5. Ascending aortic aneurysm   ~ CT chest 4/2017 and 2/2018: 4.9cm  ~ CT chest 3/2019: 4.8cm  ~ last CT chest 11/2020: 4.9cm  ~ surveillance per Dr. Sandeep Ennis     6. Hypertension   ~ better controlled on recheck   ~ on Toprol 50, HCTZ 25, Norvasc 5. Lisinopril recently increased to 40mg. I had the opportunity to review the clinical symptoms and presentation of [place]. Plan:     1. Continue current medications   2. BMP today with recently increase lisinopril dose    Continue home BP monitoring. Create log and bring to OV. 3. Discussed risk factor modifications including cardiac diet, exercise, and medication compliance. 4. 14 day event monitor to quantify AF burden and to evaluate palpations   5. Highly encourage abearance with CPAP. Take statin daily. 6. Follow up in 4 months with Dr. Dewayne Campbell     Call prior with worsening symptoms     Overall the patient is stable from CV standpoint    I have addressed the patient's cardiac risk factors and adjusted pharmacologic treatment as needed. In addition, I have reinforced the need for patient directed risk factor modification. Further evaluation will be based upon the patient's clinical course and testing results. All questions and concerns were addressed to the patient. Alternatives to my treatment were discussed. The patient verbalizes understanding not to stop medications without discussing with us. The patient is not currently smoking. The risks related to smoking were reviewed with the patient. Recommend maintaining a smoke-free lifestyle. Patient is on a beta-blocker  Patient is on an ACE   Patient is on a statin    Anticoagulation has been recommended / prescribed for this patient. Education conducted on adverse reactions including bleeding were discussed. Discussed exercise: 30min of sustained cardiovascular exercise most days of the week   Discussed Low saturated fat / Cholesterol diet.      Thank you for allowing us to participate in the care of Kimberly Ricketts.     Nayeli LAWLER-CNP  [facility]   Office: [phone]    Documentation of today's visit sent to PCP

## 2021-04-09 ENCOUNTER — TELEPHONE (OUTPATIENT)
Dept: CARDIOLOGY CLINIC | Age: 61
End: 2021-04-09

## 2021-04-09 NOTE — TELEPHONE ENCOUNTER
----- Message from NURIS Giron CNP sent at 4/8/2021 10:43 PM EDT -----  Blood work stable with increased lisinopril dose.

## 2021-04-11 VITALS
BODY MASS INDEX: 35.31 KG/M2 | DIASTOLIC BLOOD PRESSURE: 88 MMHG | OXYGEN SATURATION: 96 % | HEIGHT: 75 IN | WEIGHT: 284 LBS | SYSTOLIC BLOOD PRESSURE: 126 MMHG | HEART RATE: 83 BPM

## 2021-04-12 ENCOUNTER — TELEPHONE (OUTPATIENT)
Dept: CARDIOLOGY CLINIC | Age: 61
End: 2021-04-12

## 2021-04-12 PROCEDURE — 93270 REMOTE 30 DAY ECG REV/REPORT: CPT | Performed by: INTERNAL MEDICINE

## 2021-04-12 NOTE — TELEPHONE ENCOUNTER
He saw npkl 4/7/21 and was given a heart monitor . The monitor fell off on 4/10 and he couldn't get it to stay on . What should he do ?

## 2021-04-12 NOTE — TELEPHONE ENCOUNTER
Spoke to the pt. The glue that was used is giving him a bad skin reaction. Asked to change monitor to a Biotel 14 day, using the lanyard application. Asking to come in around 4 PM today. Is this ok?

## 2021-04-27 PROCEDURE — 93272 ECG/REVIEW INTERPRET ONLY: CPT | Performed by: INTERNAL MEDICINE

## 2021-05-03 ENCOUNTER — TELEPHONE (OUTPATIENT)
Dept: CARDIOLOGY CLINIC | Age: 61
End: 2021-05-03

## 2021-05-03 DIAGNOSIS — R07.9 CHEST PAIN, UNSPECIFIED TYPE: Primary | ICD-10-CM

## 2021-05-03 NOTE — TELEPHONE ENCOUNTER
Spoke to pt. Reviewed Holter Monitor results with him. Symptoms with PVCs and SVT (13 beats). He has not been taking his Toprol routinely as it makes him short of breath. Stop Norvasc and toprol and start diltiazem 180mg daily. Pt also c/o chest pressure this is on exertion and now rest but also improves with Flonase. Worsening SOB. Also had atypical chest complaints last OV. PRN nitro tabs given. Please schedule stress test and OV for 3-4 weeks with me.

## 2021-05-03 NOTE — TELEPHONE ENCOUNTER
I spoke to [place] and went over HM results. He reports feeling more SOB even at rest. He has very low energy, doesn't feel like getting out of bed. He cares for his mother and is not very physically active but is just so tired. He feels weak, like his strength is leaving him. His symptoms are very concerning for him. Last OV was with you 4/7/2021. He has OV with [facility] 8/5/2021. Please advise. Thank you. ambulatory

## 2021-05-04 RX ORDER — DILTIAZEM HYDROCHLORIDE 180 MG/1
180 CAPSULE, COATED, EXTENDED RELEASE ORAL DAILY
Qty: 30 CAPSULE | Refills: 2 | Status: SHIPPED | OUTPATIENT
Start: 2021-05-04 | End: 2021-06-03

## 2021-05-04 RX ORDER — NITROGLYCERIN 0.4 MG/1
0.4 TABLET SUBLINGUAL EVERY 5 MIN PRN
Qty: 25 TABLET | Refills: 0 | Status: SHIPPED | OUTPATIENT
Start: 2021-05-04 | End: 2022-08-05

## 2021-05-10 DIAGNOSIS — E55.9 VITAMIN D DEFICIENCY: Primary | ICD-10-CM

## 2021-05-10 DIAGNOSIS — R74.8 ELEVATED CK: ICD-10-CM

## 2021-06-03 ENCOUNTER — OFFICE VISIT (OUTPATIENT)
Dept: CARDIOLOGY CLINIC | Age: 61
End: 2021-06-03
Payer: COMMERCIAL

## 2021-06-03 ENCOUNTER — HOSPITAL ENCOUNTER (OUTPATIENT)
Dept: NON INVASIVE DIAGNOSTICS | Age: 61
Discharge: HOME | End: 2021-06-03
Payer: COMMERCIAL

## 2021-06-03 DIAGNOSIS — I10 ESSENTIAL HYPERTENSION: ICD-10-CM

## 2021-06-03 DIAGNOSIS — I48.0 PAF (PAROXYSMAL ATRIAL FIBRILLATION) (HCC): ICD-10-CM

## 2021-06-03 DIAGNOSIS — I25.83 CORONARY ARTERY DISEASE DUE TO LIPID RICH PLAQUE: Primary | ICD-10-CM

## 2021-06-03 DIAGNOSIS — R00.2 PALPITATION: ICD-10-CM

## 2021-06-03 DIAGNOSIS — I25.10 CORONARY ARTERY DISEASE DUE TO LIPID RICH PLAQUE: Primary | ICD-10-CM

## 2021-06-03 DIAGNOSIS — E78.2 MIXED HYPERLIPIDEMIA: ICD-10-CM

## 2021-06-03 DIAGNOSIS — R07.9 CHEST PAIN, UNSPECIFIED TYPE: ICD-10-CM

## 2021-06-03 LAB
LV EF: 59 %
LVEF MODALITY: NORMAL

## 2021-06-03 PROCEDURE — G8427 DOCREV CUR MEDS BY ELIG CLIN: HCPCS | Performed by: NURSE PRACTITIONER

## 2021-06-03 PROCEDURE — 3017F COLORECTAL CA SCREEN DOC REV: CPT | Performed by: NURSE PRACTITIONER

## 2021-06-03 PROCEDURE — A9502 TC99M TETROFOSMIN: HCPCS | Performed by: CLINICAL NURSE SPECIALIST

## 2021-06-03 PROCEDURE — 1036F TOBACCO NON-USER: CPT | Performed by: NURSE PRACTITIONER

## 2021-06-03 PROCEDURE — G8417 CALC BMI ABV UP PARAM F/U: HCPCS | Performed by: NURSE PRACTITIONER

## 2021-06-03 PROCEDURE — 78452 HT MUSCLE IMAGE SPECT MULT: CPT | Performed by: INTERNAL MEDICINE

## 2021-06-03 PROCEDURE — 99214 OFFICE O/P EST MOD 30 MIN: CPT | Performed by: NURSE PRACTITIONER

## 2021-06-03 PROCEDURE — 93017 CV STRESS TEST TRACING ONLY: CPT | Performed by: INTERNAL MEDICINE

## 2021-06-03 PROCEDURE — 3430000000 HC RX DIAGNOSTIC RADIOPHARMACEUTICAL: Performed by: CLINICAL NURSE SPECIALIST

## 2021-06-03 PROCEDURE — 6360000002 HC RX W HCPCS: Performed by: INTERNAL MEDICINE

## 2021-06-03 RX ORDER — AMINOPHYLLINE DIHYDRATE 25 MG/ML
100 INJECTION, SOLUTION INTRAVENOUS ONCE
Status: DISCONTINUED | OUTPATIENT
Start: 2021-06-03 | End: 2021-06-04 | Stop reason: HOSPADM

## 2021-06-03 RX ORDER — METOPROLOL SUCCINATE 50 MG/1
50 TABLET, EXTENDED RELEASE ORAL DAILY
COMMUNITY
End: 2021-06-15 | Stop reason: SDUPTHER

## 2021-06-03 RX ORDER — AMLODIPINE BESYLATE 5 MG/1
5 TABLET ORAL DAILY
COMMUNITY
End: 2021-06-15 | Stop reason: SDUPTHER

## 2021-06-03 RX ADMIN — REGADENOSON 0.4 MG: 0.08 INJECTION, SOLUTION INTRAVENOUS at 10:39

## 2021-06-03 RX ADMIN — TETROFOSMIN 30 MILLICURIE: 1.38 INJECTION, POWDER, LYOPHILIZED, FOR SOLUTION INTRAVENOUS at 10:40

## 2021-06-03 RX ADMIN — TETROFOSMIN 10 MILLICURIE: 1.38 INJECTION, POWDER, LYOPHILIZED, FOR SOLUTION INTRAVENOUS at 08:35

## 2021-06-03 NOTE — PATIENT INSTRUCTIONS
Continue current medications. Take metoprolol and amlodipine. Don't take diltiazem. Try to increase routine walking. Try to eat a plant-based or Mediterranean-like diet that is high in vegetables, fruits, nuts, lean meats (pereferabley fish). Stay away from processed foods. The goal is to do sustained cardiovascular exercise for 30 minutes most days of the week. Start out slow.     Follow up as scheduled 8/5 with Dr. Yancy Stephen

## 2021-06-03 NOTE — PROGRESS NOTES
symptoms are stable since the last OV. With regard to medication therapy the patient has not been compliant with prescribed regimen. Not taking his statin regularly. They have tolerated therapy to date.      Past Medical History:   Diagnosis Date    Aneurysm of ascending aorta (HCC)     Arthritis     Atopic dermatitis     Back pain     Facial paralysis/[place] palsy     lt side face dropping    Hyperlipidemia     Hypertension     Imprisonment and other incarceration [id number]    Migraine     Obstructive sleep apnea (adult) (pediatric)     cpap machine    Prediabetes     Psoriasis-like skin disease     Tinea cruris      Social History:    Social History     Tobacco Use   Smoking Status Never Smoker   Smokeless Tobacco Never Used     Current Medications:  Current Outpatient Medications   Medication Sig Dispense Refill    Cholecalciferol (VITAMIN D3) 1.25 MG (17835 UT) TABS Take 50,000 Units by mouth once a week 6 tablet 0    dilTIAZem (DILTIAZEM CD) 180 MG extended release capsule Take 1 capsule by mouth daily 30 capsule 2    nitroGLYCERIN (NITROSTAT) 0.4 MG SL tablet Place 1 tablet under the tongue every 5 minutes as needed for Chest pain 25 tablet 0    rivaroxaban (XARELTO) 20 MG TABS tablet Take 20 mg by mouth      rosuvastatin (CRESTOR) 20 MG tablet Take 1 tablet by mouth daily 90 tablet 0    hydroCHLOROthiazide (HYDRODIURIL) 25 MG tablet Take 1 tablet by mouth every morning 90 tablet 0    lisinopril (PRINIVIL;ZESTRIL) 40 MG tablet Take 1 tablet by mouth daily 90 tablet 0    tiZANidine (ZANAFLEX) 4 MG tablet Take 1 tablet by mouth every 8 hours as needed (for muscle spasm) 90 tablet 0    butalbital-acetaminophen-caffeine (FIORICET, ESGIC) -40 MG per tablet Take 1 tablet by mouth every 4 hours as needed for Headaches or Migraine 60 tablet 0    fluticasone (FLONASE) 50 MCG/ACT nasal spray 2 sprays by Each Nostril route daily 1 Bottle 2    Handicap Placard MISC by Does not apply route Expires 9/17/2025 2 each 0    Misc. Devices MISC Pads and leads for use on [facility] TENS unit. 1 Device 0    vitamin D (ERGOCALCIFEROL) 44898 units CAPS capsule Take 1 capsule by mouth once a week 4 capsule 2     No current facility-administered medications for this visit. Facility-Administered Medications Ordered in Other Visits   Medication Dose Route Frequency Provider Last Rate Last Admin    technetium tetrofosmin (Tc-MYOVIEW) injection 30 millicurie  30 millicurie Intravenous ONCE PRN Cammie Rizzo, APRN - CNP         REVIEW OF SYSTEMS:    CONSTITUTIONAL: No major weight gain or loss, night sweats, fever, fatigue, or weakness. HEENT: No new vision difficulties or ringing in the ears. RESPIRATORY: No new SOB, PND, orthopnea or cough. CARDIOVASCULAR: See HPI  GI: No N/V/D, constipation, or abdominal pain. No black/tarry stools  : No urinary urgency, incontinence, or hematuria. SKIN: No cyanosis or skin lesions. MUSCULOSKELETAL: No new muscle or joint pain. NEUROLOGICAL: No syncope or TIA-like symptoms.   PSYCHIATRIC: No anxiety, pain, insomnia or depression    Objective:   PHYSICAL EXAM:    Wt Readings from Last 3 Encounters:   06/03/21 282 lb 6.4 oz (128.1 kg)   04/07/21 284 lb (128.8 kg)   03/16/21 286 lb 12.8 oz (130.1 kg)      BP Readings from Last 3 Encounters:   06/03/21 (!) 140/82   04/07/21 126/88   03/16/21 (!) 160/110     Pulse Readings from Last 3 Encounters:   06/03/21 68   04/07/21 83   03/16/21 78     Vitals:    06/03/21 1306 06/03/21 1320   BP: (!) 142/90 (!) 140/82   Site: Left Upper Arm    Position: Sitting    Cuff Size: Large Adult    Pulse: 68    SpO2: 99%    Weight: 282 lb 6.4 oz (128.1 kg)    Height: 6' 3\" (1.905 m)       VITALS:  BP (!) 140/82   Pulse 68   Ht 6' 3\" (1.905 m)   Wt 282 lb 6.4 oz (128.1 kg)   SpO2 99%   BMI 35.30 kg/m²   CONSTITUTIONAL: Cooperative, no apparent distress, and appears well nourished / developed  NEUROLOGIC:  Awake and orientated to person, place, and time. PSYCH: Calm affect. SKIN: Warm and dry. HEENT: Sclera non-icteric, normocephalic, neck supple. RESPIRATORY:  No increased work of breathing and clear to auscultation, no crackles or wheezing. CARDIOVASCULAR:  Regular rate and rhythm without murmur. Normal S1 and S2. No gallops or rubs. Normal PMI. No elevation of JVP. Normal carotid pulses with no bruits. GI:  Normal bowel sounds. Non-distended. Non-tender to palpation  EXT: No edema. No calf tenderness. Pulses are present bilaterally. DATA:    Lab Results   Component Value Date    ALT 24 03/16/2021    AST 34 03/16/2021    ALKPHOS 69 03/16/2021    BILITOT 0.3 03/16/2021     Lab Results   Component Value Date    CREATININE 0.9 04/07/2021    BUN 15 04/07/2021     04/07/2021    K 3.9 04/07/2021     04/07/2021    CO2 31 04/07/2021     Lab Results   Component Value Date    TSH 1.14 04/23/2017     Lab Results   Component Value Date    WBC 5.1 03/16/2021    HGB 14.2 03/16/2021    HCT 42.6 03/16/2021    MCV 87.8 03/16/2021     03/16/2021     No components found for: CHLPL  Lab Results   Component Value Date    TRIG 289 (H) 03/16/2021    TRIG 226 (H) 09/17/2020    TRIG 615 (H) 07/09/2020     Lab Results   Component Value Date    HDL 29 (L) 03/16/2021    HDL 38 (L) 09/17/2020    HDL 32 (L) 07/09/2020     Lab Results   Component Value Date    LDLCALC 180 (H) 03/16/2021    LDLCALC 146 (H) 09/17/2020    LDLCALC see below 07/09/2020     Lab Results   Component Value Date    LABVLDL 58 03/16/2021    LABVLDL 45 09/17/2020    LABVLDL see below 07/09/2020      No results found for: BNP  Radiology Review:  Pertinent images / reports were reviewed as a part of this visit and reveals the following:    Last Echo: 10/2016   Summary   Normal left ventricle size, wall thickness and systolic function with an   estimated ejection fraction of 55%. There is mild left ventricular hypertrophy.    There is reversal of E/A inflow velocities across the mitral valve   suggesting impaired left ventricular relaxation. Trivial mitral regurgitation is present. There is trivial tricuspid regurgitation with RVSP estimated at 21 mmHg. Stress ECHO: 2020  Summary   Baseline resting echocardiogram shows normal global LV systolic function   with an ejection fraction of 55% and uniform myocardial segmental wall   motion. Following stress there was uniform augmentation of all myocardial   segments with appropriate hyperdynamic LV systolic response to stress with a   post ejection fraction of 65%. Negative Stress Echocardiography study for ischemia. Stress Test: 2019  Summary    Normal myocardial perfusion.    Normal LV size and systolic function. Stress test: 2021  Summary    No EKG evidence for ischemia with lexiscan    Normal LV size and function with EF of 59%    SPECT imaging with very small, moderate intensity, decreased perfusion in    the inferior wall with stress and rest. While an area of scar cannot be    excluded, preserved wall motion, EF and evidence for extracardiac    attenuation may more suggest artifact rather than infarct. Clinical    correlation recommended.        Impression    *Normal LV size and function    *Myocardial perfusion imaging with very small area of inferior infarct    versus attenuation artifact. No reversible ischemia. MCOT 2017-2017:   NSR/ST associated with symptoms; had NSVT and short PAT but no symptoms recorded    Cardiac Cath 3/10/17:  Anatomy:   LM-normal  LAD-normal  Cx-40% mid  OM1- normal  RCA-normal  RPDA- normal   LVEF- 55%, LVEDP 9 mmHg     Impression:  1. Mild non-obstructive CAD  2. Normal LV systolic function     Plan:  1. Medical management    CT chest: 2020  Stable ectasia of the ascending thoracic aorta measuring up to approximately   4.9 cm. Last EC21  Sinus  Rhythm   WITHIN NORMAL LIMITS  Assessment:     1.  Coronary artery disease  ~ [facility] 3/2017: nonobstructive addressed to the patient. Alternatives to my treatment were discussed. The patient verbalizes understanding not to stop medications without discussing with us. The patient is not currently smoking. The risks related to smoking were reviewed with the patient. Recommend maintaining a smoke-free lifestyle. Patient is on a beta-blocker  Patient is on an ACE   Patient is on a statin    Anticoagulation has been recommended / prescribed for this patient. Education conducted on adverse reactions including bleeding were discussed. Discussed exercise: 30min of sustained cardiovascular exercise most days of the week   Discussed Low saturated fat / Cholesterol diet. Thank you for allowing us to participate in the care of Alesia Toro.     Jing LITTLE  [facility]   Office: [phone]    Documentation of today's visit sent to PCP

## 2021-06-07 VITALS
BODY MASS INDEX: 35.11 KG/M2 | SYSTOLIC BLOOD PRESSURE: 140 MMHG | HEART RATE: 68 BPM | HEIGHT: 75 IN | DIASTOLIC BLOOD PRESSURE: 82 MMHG | OXYGEN SATURATION: 99 % | WEIGHT: 282.4 LBS

## 2021-06-11 ENCOUNTER — TELEPHONE (OUTPATIENT)
Dept: CARDIOLOGY | Age: 61
End: 2021-06-11

## 2021-06-11 DIAGNOSIS — E78.2 MIXED HYPERLIPIDEMIA: Primary | ICD-10-CM

## 2021-06-11 DIAGNOSIS — R94.39 ABNORMAL STRESS TEST: ICD-10-CM

## 2021-06-11 NOTE — TELEPHONE ENCOUNTER
Discussed with Dr. Zainab Jamison who recommends LHC for definitive evaluation of coronary arteries. Discussed risks, benefits, alternatives with pt. He is agreeable to proceed. Please schedule pt for LHC with Dr. Zainab Jamison. Thanks.

## 2021-06-11 NOTE — TELEPHONE ENCOUNTER
Pt reports he has been more consistent with taking crestor. He will repeat fasting lipids. While on phone with pt he describes totally different CP than described during [address] in April. He had no c/o CP during OV last Thursday. Today, he states since Nov (when he had COVID) he started having a dull ache mostly located in left chest. He thinks it has worsened since Nov. Exacerbated with exertion especially when walking an incline. Also notices it in the evening when he slows down. [facility] thinks it feels different than how he felt prior to [facility] in '17.  (doesn't worsen with deep breath, he is unsure about palpation)     Last stress test showed artifact vs small inferior infarct. No ischemia. Will discuss symptomology with Dr. Alina Marquez.

## 2021-06-14 ASSESSMENT — ENCOUNTER SYMPTOMS
BLURRED VISION: 0
ORTHOPNEA: 0
WHEEZING: 0
ABDOMINAL PAIN: 0
VOMITING: 0
NAUSEA: 0
DIARRHEA: 0
SHORTNESS OF BREATH: 0
CONSTIPATION: 0
CHEST TIGHTNESS: 0
COUGH: 0

## 2021-06-14 NOTE — PROGRESS NOTES
SUBJECTIVE:    Patient ID:  Freda Shah is a 63 y.o. male      Patient is here for a medication check for hypertension, hyperlipidemia, prediabetes, CAD, A. Fib, AAA, BUSTER, and vitamin D deficiency. He is doing well on current regimen and has no further concerns. He is followed by cardiologist on a regular basis, every 6 months. Currently denies chest pain, shortness of breath, chest pain or dyspnea on exertion or orthopnea. Chronic back pain is improving with physical therapy and working with back specialist, Dr. Carmine Valadez. He is working in the with [place] for chronic back pain. States he has been busy taking care of his mother and has not been to physical therapy in a while. Encouraged to schedule a follow up in the near future. His blood pressure today in the office was 130/100. States he stopped taking his Norvasc for 5-6 days and his metoprolol for longer then that. He was also taking nitro once a day, which was causing a headache. He has recently been seen by cardiology and he a cardiac cath scheduled on 6/22/21. State he has not been follow his Mediterranean diet due to taking care of his mother. Hypertension  This is a chronic problem. The current episode started more than 1 year ago. The problem is unchanged. The problem is controlled. Pertinent negatives include no anxiety, blurred vision, chest pain, headaches, orthopnea, palpitations, peripheral edema or shortness of breath. Agents associated with hypertension include thyroid hormones. Risk factors for coronary artery disease include diabetes mellitus, dyslipidemia, male gender, obesity and stress. Past treatments include calcium channel blockers, ACE inhibitors, beta blockers and diuretics. The current treatment provides significant improvement. Hypertensive end-organ damage includes CAD/MI and CVA. Hyperlipidemia  This is a chronic problem. The current episode started more than 1 year ago. The problem is controlled.  Recent lipid tests were reviewed and are low. Exacerbating diseases include obesity. He has no history of diabetes. Pertinent negatives include no chest pain, focal sensory loss, focal weakness, leg pain, myalgias or shortness of breath. Current antihyperlipidemic treatment includes statins. The current treatment provides significant improvement of lipids. Risk factors for coronary artery disease include diabetes mellitus, dyslipidemia, hypertension, male sex and obesity. Diabetes  He presents for his follow-up diabetic visit. He has type 2 diabetes mellitus. Pertinent negatives for hypoglycemia include no headaches or nervousness/anxiousness. Pertinent negatives for diabetes include no blurred vision, no chest pain, no foot paresthesias, no polydipsia, no polyphagia and no polyuria. Diabetic complications include a CVA. Risk factors for coronary artery disease include diabetes mellitus, dyslipidemia, hypertension, male sex, obesity and sedentary lifestyle. Current diabetic treatment includes oral agent (dual therapy). His weight is stable. He is following a generally healthy diet. An ACE inhibitor/angiotensin II receptor blocker is being taken. Current Outpatient Medications on File Prior to Visit   Medication Sig Dispense Refill    Cholecalciferol (VITAMIN D3) 1.25 MG (97805 UT) TABS Take 50,000 Units by mouth once a week 6 tablet 0    butalbital-acetaminophen-caffeine (FIORICET, ESGIC) -40 MG per tablet Take 1 tablet by mouth every 4 hours as needed for Headaches or Migraine 60 tablet 0    fluticasone (FLONASE) 50 MCG/ACT nasal spray 2 sprays by Each Nostril route daily 1 Bottle 2    nitroGLYCERIN (NITROSTAT) 0.4 MG SL tablet Place 1 tablet under the tongue every 5 minutes as needed for Chest pain (Patient not taking: Reported on 6/3/2021) 25 tablet 0    Handicap Placard MISC by Does not apply route Expires 9/17/2025 2 each 0    Misc. Devices MISC Pads and leads for use on [facility] TENS unit.  1 Device 0     No current facility-administered medications on file prior to visit.       Past Medical History:   Diagnosis Date    Aneurysm of ascending aorta (HCC)     Arthritis     Atopic dermatitis     Back pain     Facial paralysis/[place] palsy     lt side face dropping    Hyperlipidemia     Hypertension     Imprisonment and other incarceration [id number]    Migraine     Obstructive sleep apnea (adult) (pediatric)     cpap machine    Prediabetes     Psoriasis-like skin disease     Tinea cruris      Past Surgical History:   Procedure Laterality Date    BACK SURGERY  February and October 2008    COLONOSCOPY  01/28/2018    CORONARY ANGIOPLASTY      12/2017    CYSTOSCOPY  2001    LAMINECTOMY POSTERIOR THORACIC / LUMBAR Right 7/14/2020    THORACIC LAMINECTOMY WITH PADDLE LEAD AND RECHARGEABLE BATTERY IN RIGHT HIP performed by Adis Tobar MD at [facility] Bilateral 2/5/2020    BILATERAL L3 TRANSFORAMINAL  EPIDURAL STEROID INJECTION WITH FLUOROSCOPY performed by Turner Croft MD at [address] Bilateral 3/9/2020    BILATERAL L1, L2, L3 (ABOVE THE FUSION) MEDIAL BRANCH BLOCK WITH FLUOROSCOPY (01130, 71677)   #1 performed by Turner Croft MD at [address] N/A 6/3/2020    [address] ([id number], [id number], [id number]) - Gunnar Magi performed by Turner Croft MD at [address] History   Problem Relation Age of Onset    Other Mother         Polio    Thyroid Disease Mother     Heart Failure Father     Stroke Father      Social History     Socioeconomic History    Marital status:      Spouse name: Not on file    Number of children: 0    Years of education: Not on file    Highest education level: Not on file   Occupational History    Occupation: unemployed    Occupation: former masonary work    Tobacco Use    Smoking status: Never Smoker    Smokeless tobacco: Never Used   Vaping Use    Vaping Use: Never used   Substance and Sexual Activity    Alcohol use: Yes     Alcohol/week: 2.0 standard drinks     Types: 1 Cans of beer, 1 Standard drinks or equivalent per week     Comment: Rarely; socially    Drug use: No    Sexual activity: Yes     Partners: Female     Birth control/protection: Condom   Other Topics Concern    Not on file   Social History Narrative    Not on file     Social Determinants of Health     Financial Resource Strain:     Difficulty of Paying Living Expenses:    Food Insecurity:     Worried About Running Out of Food in the Last Year:     Ran Out of Food in the Last Year:    Transportation Needs:     Lack of Transportation (Medical):  Lack of Transportation (Non-Medical):    Physical Activity:     Days of Exercise per Week:     Minutes of Exercise per Session:    Stress:     Feeling of Stress :    Social Connections:     Frequency of Communication with Friends and Family:     Frequency of Social Gatherings with Friends and Family:     Attends Judaism Services:     Active Member of Clubs or Organizations:     Attends Club or Organization Meetings:     Marital Status:    Intimate Partner Violence:     Fear of Current or Ex-Partner:     Emotionally Abused:     Physically Abused:     Sexually Abused:        Review of Systems   Constitutional: Negative for chills and fever. Eyes: Negative for blurred vision and visual disturbance. Respiratory: Negative for cough, chest tightness, shortness of breath and wheezing. Cardiovascular: Negative for chest pain, palpitations, orthopnea and leg swelling. Gastrointestinal: Negative for abdominal pain, constipation, diarrhea, nausea and vomiting. Endocrine: Negative for polydipsia, polyphagia and polyuria. Musculoskeletal: Negative for arthralgias and myalgias. Skin: Negative for rash. Neurological: Negative for focal weakness and headaches.    Psychiatric/Behavioral: Negative for dysphoric mood and sleep disturbance. The patient is not nervous/anxious. OBJECTIVE:    Physical Exam  Vitals and nursing note reviewed. Constitutional:       General: He is not in acute distress. Appearance: Normal appearance. He is well-developed. He is not ill-appearing. HENT:      Head: Normocephalic and atraumatic. Right Ear: External ear normal.      Left Ear: External ear normal.      Nose: Nose normal.   Eyes:      Conjunctiva/sclera: Conjunctivae normal.      Pupils: Pupils are equal, round, and reactive to light. Neck:      Trachea: No tracheal deviation. Cardiovascular:      Rate and Rhythm: Normal rate and regular rhythm. Heart sounds: Normal heart sounds. Pulmonary:      Effort: Pulmonary effort is normal. No respiratory distress. Breath sounds: Normal breath sounds. No wheezing or rales. Chest:      Chest wall: No tenderness. Abdominal:      General: Bowel sounds are normal. There is no distension. Palpations: Abdomen is soft. There is no mass. Tenderness: There is no abdominal tenderness. Hernia: No hernia is present. Musculoskeletal:         General: Normal range of motion. Cervical back: Normal range of motion and neck supple. Skin:     General: Skin is warm and dry. Findings: No rash. Neurological:      Mental Status: He is alert and oriented to person, place, and time. Psychiatric:         Behavior: Behavior normal.       BP (!) 130/100   Pulse 81   Temp 97.9 °F (36.6 °C)   Ht 6' 3\" (1.905 m)   Wt 280 lb 12.8 oz (127.4 kg)   SpO2 97%   BMI 35.10 kg/m²    BP Readings from Last 3 Encounters:   06/15/21 (!) 130/100   06/03/21 (!) 140/82   04/07/21 126/88      Wt Readings from Last 3 Encounters:   06/15/21 280 lb 12.8 oz (127.4 kg)   06/03/21 282 lb 6.4 oz (128.1 kg)   04/07/21 284 lb (128.8 kg)       ASSESSMENT & PLAN:    1.  Essential hypertension  - Stable, continue current regimen  - CBC Auto Differential; Future  - Comprehensive Metabolic Cholecalciferol (VITAMIN D3) 50 MCG (2000 UT) TABS; Take 2,000 Units by mouth daily  Dispense: 90 tablet; Refill: 0    11. Elevated CK  - CK; Future  - Dolores Menon MD, Rheumatology, [facility]    12. Cristela Reynoso MD, Rheumatology, [facility]  - TEO; Future  - Rheumatoid Factor; Future  - Sedimentation Rate; Future    13. History of COVID-19  - Stable, continue to monitor      Continue current treatment plan. Current Outpatient Medications   Medication Sig Dispense Refill    amLODIPine (NORVASC) 5 MG tablet Take 1 tablet by mouth daily 90 tablet 0    metoprolol succinate (TOPROL XL) 50 MG extended release tablet Take 1 tablet by mouth daily 90 tablet 0    rivaroxaban (XARELTO) 20 MG TABS tablet Take 1 tablet by mouth daily (with breakfast) 90 tablet 0    rosuvastatin (CRESTOR) 20 MG tablet Take 1 tablet by mouth daily 90 tablet 0    hydroCHLOROthiazide (HYDRODIURIL) 25 MG tablet Take 1 tablet by mouth every morning 90 tablet 0    lisinopril (PRINIVIL;ZESTRIL) 40 MG tablet Take 1 tablet by mouth daily 90 tablet 0    tiZANidine (ZANAFLEX) 4 MG tablet Take 1 tablet by mouth every 8 hours as needed (for muscle spasm) 90 tablet 0    Cholecalciferol (VITAMIN D3) 50 MCG (2000 UT) TABS Take 2,000 Units by mouth daily 90 tablet 0    Cholecalciferol (VITAMIN D3) 1.25 MG (64963 UT) TABS Take 50,000 Units by mouth once a week 6 tablet 0    butalbital-acetaminophen-caffeine (FIORICET, ESGIC) -40 MG per tablet Take 1 tablet by mouth every 4 hours as needed for Headaches or Migraine 60 tablet 0    fluticasone (FLONASE) 50 MCG/ACT nasal spray 2 sprays by Each Nostril route daily 1 Bottle 2    nitroGLYCERIN (NITROSTAT) 0.4 MG SL tablet Place 1 tablet under the tongue every 5 minutes as needed for Chest pain (Patient not taking: Reported on 6/3/2021) 25 tablet 0    Handicap Placard MISC by Does not apply route Expires 9/17/2025 2 each 0    Misc.  Devices MISC Pads and leads for use on 1 [facility] [address] [place] TENS unit. 1 Device 0     No current facility-administered medications for this visit. Return in 3 months (on 9/15/2021), or if symptoms worsen or fail to improve, for HTN, lipidemia, prediabetes, CAD, PAF, AAA, BUSTER, allergies, CBP, vit D, elevated CK, myalgias, history of COVID-19. Abdulkadir Mehta received counseling on the following healthy behaviors: nutrition, exercise and medication adherence    Discussed use, benefit, and side effects of prescribed medications. Barriers to medication compliance addressed. All patient questions answered. Pt voiced understanding. Call office if experience side effects from medications. Please note that some or all of this record was generated using voice recognition software. If there are any questions about the content of this document, please contact the author as some errors in transcription may have occurred.

## 2021-06-14 NOTE — PATIENT INSTRUCTIONS
Fasting labs ordered     Medications refilled     Reviewed DASH and low sodium diet     Increase lisinopril 40 mg daily     Monitor blood pressure and call if consistently greater then 140/90     Reviewed low cholesterol diet     Continue Crestor 20 mg daily     Reviewed diabetic diet      Follow up with cardiology as directed, encouraged to schedule an appointment     Follow up with ortho as directed    Encourage to follow up with cardiology as directed     Discussed situational stress management options techniques, information given     Referral to rheumatology for chronic elevated CK

## 2021-06-15 ENCOUNTER — OFFICE VISIT (OUTPATIENT)
Dept: FAMILY MEDICINE CLINIC | Age: 61
End: 2021-06-15
Payer: COMMERCIAL

## 2021-06-15 VITALS
SYSTOLIC BLOOD PRESSURE: 130 MMHG | DIASTOLIC BLOOD PRESSURE: 100 MMHG | TEMPERATURE: 97.9 F | HEIGHT: 75 IN | HEART RATE: 81 BPM | BODY MASS INDEX: 34.91 KG/M2 | WEIGHT: 280.8 LBS | OXYGEN SATURATION: 97 %

## 2021-06-15 DIAGNOSIS — I71.21 ASCENDING AORTIC ANEURYSM: ICD-10-CM

## 2021-06-15 DIAGNOSIS — I10 ESSENTIAL HYPERTENSION: Primary | ICD-10-CM

## 2021-06-15 DIAGNOSIS — Z86.16 HISTORY OF COVID-19: ICD-10-CM

## 2021-06-15 DIAGNOSIS — E55.9 VITAMIN D DEFICIENCY: ICD-10-CM

## 2021-06-15 DIAGNOSIS — M54.41 CHRONIC MIDLINE LOW BACK PAIN WITH BILATERAL SCIATICA: ICD-10-CM

## 2021-06-15 DIAGNOSIS — I25.83 CORONARY ARTERY DISEASE DUE TO LIPID RICH PLAQUE: ICD-10-CM

## 2021-06-15 DIAGNOSIS — I48.0 PAROXYSMAL ATRIAL FIBRILLATION (HCC): ICD-10-CM

## 2021-06-15 DIAGNOSIS — G47.33 OBSTRUCTIVE SLEEP APNEA: ICD-10-CM

## 2021-06-15 DIAGNOSIS — M79.10 MYALGIA: ICD-10-CM

## 2021-06-15 DIAGNOSIS — I25.10 CORONARY ARTERY DISEASE DUE TO LIPID RICH PLAQUE: ICD-10-CM

## 2021-06-15 DIAGNOSIS — R73.03 PREDIABETES: ICD-10-CM

## 2021-06-15 DIAGNOSIS — R74.8 ELEVATED CK: ICD-10-CM

## 2021-06-15 DIAGNOSIS — E78.2 MIXED HYPERLIPIDEMIA: ICD-10-CM

## 2021-06-15 DIAGNOSIS — Z91.09 ENVIRONMENTAL ALLERGIES: ICD-10-CM

## 2021-06-15 DIAGNOSIS — G89.29 CHRONIC MIDLINE LOW BACK PAIN WITH BILATERAL SCIATICA: ICD-10-CM

## 2021-06-15 DIAGNOSIS — M54.42 CHRONIC MIDLINE LOW BACK PAIN WITH BILATERAL SCIATICA: ICD-10-CM

## 2021-06-15 PROCEDURE — 99214 OFFICE O/P EST MOD 30 MIN: CPT | Performed by: CLINICAL NURSE SPECIALIST

## 2021-06-15 PROCEDURE — G8417 CALC BMI ABV UP PARAM F/U: HCPCS | Performed by: CLINICAL NURSE SPECIALIST

## 2021-06-15 PROCEDURE — 1036F TOBACCO NON-USER: CPT | Performed by: CLINICAL NURSE SPECIALIST

## 2021-06-15 PROCEDURE — 3017F COLORECTAL CA SCREEN DOC REV: CPT | Performed by: CLINICAL NURSE SPECIALIST

## 2021-06-15 PROCEDURE — G8427 DOCREV CUR MEDS BY ELIG CLIN: HCPCS | Performed by: CLINICAL NURSE SPECIALIST

## 2021-06-15 RX ORDER — HYDROCHLOROTHIAZIDE 25 MG/1
25 TABLET ORAL EVERY MORNING
Qty: 90 TABLET | Refills: 0 | Status: SHIPPED | OUTPATIENT
Start: 2021-06-15 | End: 2021-09-07 | Stop reason: SDUPTHER

## 2021-06-15 RX ORDER — ROSUVASTATIN CALCIUM 20 MG/1
20 TABLET, COATED ORAL DAILY
Qty: 90 TABLET | Refills: 0 | Status: ON HOLD
Start: 2021-06-15 | End: 2021-06-22 | Stop reason: HOSPADM

## 2021-06-15 RX ORDER — TIZANIDINE 4 MG/1
4 TABLET ORAL EVERY 8 HOURS PRN
Qty: 90 TABLET | Refills: 0 | Status: SHIPPED | OUTPATIENT
Start: 2021-06-15 | End: 2021-09-07 | Stop reason: SDUPTHER

## 2021-06-15 RX ORDER — METOPROLOL SUCCINATE 50 MG/1
50 TABLET, EXTENDED RELEASE ORAL DAILY
Qty: 90 TABLET | Refills: 0 | Status: ON HOLD | OUTPATIENT
Start: 2021-06-15 | End: 2021-06-22 | Stop reason: SDUPTHER

## 2021-06-15 RX ORDER — AMLODIPINE BESYLATE 5 MG/1
5 TABLET ORAL DAILY
Qty: 90 TABLET | Refills: 0 | Status: ON HOLD | OUTPATIENT
Start: 2021-06-15 | End: 2021-06-22 | Stop reason: SDUPTHER

## 2021-06-15 RX ORDER — BUTALBITAL, ACETAMINOPHEN AND CAFFEINE 50; 325; 40 MG/1; MG/1; MG/1
1 TABLET ORAL EVERY 4 HOURS PRN
Qty: 60 TABLET | Refills: 0 | Status: CANCELLED | OUTPATIENT
Start: 2021-06-15

## 2021-06-15 RX ORDER — CHOLECALCIFEROL (VITAMIN D3) 125 MCG
2000 CAPSULE ORAL DAILY
Qty: 90 TABLET | Refills: 0 | Status: SHIPPED | OUTPATIENT
Start: 2021-06-15 | End: 2021-07-30

## 2021-06-15 RX ORDER — LISINOPRIL 40 MG/1
40 TABLET ORAL DAILY
Qty: 90 TABLET | Refills: 0 | Status: ON HOLD | OUTPATIENT
Start: 2021-06-15 | End: 2021-06-22 | Stop reason: SDUPTHER

## 2021-06-16 DIAGNOSIS — E78.2 MIXED HYPERLIPIDEMIA: ICD-10-CM

## 2021-06-16 DIAGNOSIS — R73.03 PREDIABETES: ICD-10-CM

## 2021-06-16 DIAGNOSIS — E55.9 VITAMIN D DEFICIENCY: ICD-10-CM

## 2021-06-16 DIAGNOSIS — R74.8 ELEVATED CK: ICD-10-CM

## 2021-06-16 DIAGNOSIS — I10 ESSENTIAL HYPERTENSION: ICD-10-CM

## 2021-06-16 DIAGNOSIS — M79.10 MYALGIA: ICD-10-CM

## 2021-06-16 LAB
A/G RATIO: 1.5 (ref 1.1–2.2)
ALBUMIN SERPL-MCNC: 4.5 G/DL (ref 3.4–5)
ALP BLD-CCNC: 68 U/L (ref 40–129)
ALT SERPL-CCNC: 30 U/L (ref 10–40)
ANION GAP SERPL CALCULATED.3IONS-SCNC: 13 MMOL/L (ref 3–16)
AST SERPL-CCNC: 35 U/L (ref 15–37)
BASOPHILS ABSOLUTE: 0 K/UL (ref 0–0.2)
BASOPHILS RELATIVE PERCENT: 0.8 %
BILIRUB SERPL-MCNC: 0.4 MG/DL (ref 0–1)
BUN BLDV-MCNC: 14 MG/DL (ref 7–20)
CALCIUM SERPL-MCNC: 9.5 MG/DL (ref 8.3–10.6)
CHLORIDE BLD-SCNC: 103 MMOL/L (ref 99–110)
CHOLESTEROL, TOTAL: 227 MG/DL (ref 0–199)
CO2: 27 MMOL/L (ref 21–32)
CREAT SERPL-MCNC: 1 MG/DL (ref 0.9–1.3)
EOSINOPHILS ABSOLUTE: 0.5 K/UL (ref 0–0.6)
EOSINOPHILS RELATIVE PERCENT: 7.7 %
GFR AFRICAN AMERICAN: >60
GFR NON-AFRICAN AMERICAN: >60
GLOBULIN: 3.1 G/DL
GLUCOSE BLD-MCNC: 91 MG/DL (ref 70–99)
HCT VFR BLD CALC: 40.4 % (ref 40.5–52.5)
HDLC SERPL-MCNC: 30 MG/DL (ref 40–60)
HEMOGLOBIN: 13.7 G/DL (ref 13.5–17.5)
LDL CHOLESTEROL CALCULATED: ABNORMAL MG/DL
LDL CHOLESTEROL DIRECT: 143 MG/DL
LYMPHOCYTES ABSOLUTE: 3.2 K/UL (ref 1–5.1)
LYMPHOCYTES RELATIVE PERCENT: 49.4 %
MCH RBC QN AUTO: 30 PG (ref 26–34)
MCHC RBC AUTO-ENTMCNC: 34 G/DL (ref 31–36)
MCV RBC AUTO: 88.1 FL (ref 80–100)
MONOCYTES ABSOLUTE: 0.4 K/UL (ref 0–1.3)
MONOCYTES RELATIVE PERCENT: 6.5 %
NEUTROPHILS ABSOLUTE: 2.3 K/UL (ref 1.7–7.7)
NEUTROPHILS RELATIVE PERCENT: 35.6 %
PDW BLD-RTO: 14 % (ref 12.4–15.4)
PLATELET # BLD: 188 K/UL (ref 135–450)
PMV BLD AUTO: 8.9 FL (ref 5–10.5)
POTASSIUM SERPL-SCNC: 3.8 MMOL/L (ref 3.5–5.1)
RBC # BLD: 4.58 M/UL (ref 4.2–5.9)
RHEUMATOID FACTOR: <10 IU/ML
SEDIMENTATION RATE, ERYTHROCYTE: 10 MM/HR (ref 0–20)
SODIUM BLD-SCNC: 143 MMOL/L (ref 136–145)
TOTAL CK: 766 U/L (ref 39–308)
TOTAL PROTEIN: 7.6 G/DL (ref 6.4–8.2)
TRIGL SERPL-MCNC: 374 MG/DL (ref 0–150)
TSH REFLEX: 0.87 UIU/ML (ref 0.27–4.2)
VITAMIN D 25-HYDROXY: 14.5 NG/ML
VLDLC SERPL CALC-MCNC: ABNORMAL MG/DL
WBC # BLD: 6.4 K/UL (ref 4–11)

## 2021-06-17 LAB
ANTI-NUCLEAR ANTIBODY (ANA): NEGATIVE
ESTIMATED AVERAGE GLUCOSE: 116.9 MG/DL
HBA1C MFR BLD: 5.7 %

## 2021-06-18 ENCOUNTER — TELEPHONE (OUTPATIENT)
Dept: RHEUMATOLOGY | Age: 61
End: 2021-06-18

## 2021-06-18 NOTE — TELEPHONE ENCOUNTER
Patient needs follow up appointment. The soonest that I could schedule was the beginning of August and patient wanted to be seen sooner.

## 2021-06-22 ENCOUNTER — HOSPITAL ENCOUNTER (OUTPATIENT)
Dept: CARDIAC CATH/INVASIVE PROCEDURES | Age: 61
Discharge: HOME | End: 2021-06-22
Attending: INTERNAL MEDICINE | Admitting: INTERNAL MEDICINE
Payer: COMMERCIAL

## 2021-06-22 VITALS
RESPIRATION RATE: 18 BRPM | TEMPERATURE: 98 F | SYSTOLIC BLOOD PRESSURE: 135 MMHG | HEART RATE: 61 BPM | BODY MASS INDEX: 35.06 KG/M2 | OXYGEN SATURATION: 97 % | DIASTOLIC BLOOD PRESSURE: 87 MMHG | WEIGHT: 282 LBS | HEIGHT: 75 IN

## 2021-06-22 DIAGNOSIS — I10 ESSENTIAL HYPERTENSION: ICD-10-CM

## 2021-06-22 DIAGNOSIS — I48.0 PAROXYSMAL ATRIAL FIBRILLATION (HCC): ICD-10-CM

## 2021-06-22 PROBLEM — R94.39 ABNORMAL STRESS TEST: Status: ACTIVE | Noted: 2021-06-22

## 2021-06-22 LAB
EKG ATRIAL RATE: 61 BPM
EKG DIAGNOSIS: NORMAL
EKG P AXIS: 57 DEGREES
EKG P-R INTERVAL: 190 MS
EKG Q-T INTERVAL: 454 MS
EKG QRS DURATION: 112 MS
EKG QTC CALCULATION (BAZETT): 457 MS
EKG R AXIS: -24 DEGREES
EKG T AXIS: 29 DEGREES
EKG VENTRICULAR RATE: 61 BPM
LEFT VENTRICULAR EJECTION FRACTION MODE: NORMAL
LV EF: 55 %
TOTAL CK: 877 U/L (ref 39–308)

## 2021-06-22 PROCEDURE — C1874 STENT, COATED/COV W/DEL SYS: HCPCS

## 2021-06-22 PROCEDURE — 93005 ELECTROCARDIOGRAM TRACING: CPT | Performed by: INTERNAL MEDICINE

## 2021-06-22 PROCEDURE — 93458 L HRT ARTERY/VENTRICLE ANGIO: CPT

## 2021-06-22 PROCEDURE — 2500000003 HC RX 250 WO HCPCS

## 2021-06-22 PROCEDURE — 93572 IV DOP VEL&/PRESS C FLO EA: CPT | Performed by: INTERNAL MEDICINE

## 2021-06-22 PROCEDURE — 99153 MOD SED SAME PHYS/QHP EA: CPT

## 2021-06-22 PROCEDURE — C1769 GUIDE WIRE: HCPCS

## 2021-06-22 PROCEDURE — 6360000002 HC RX W HCPCS

## 2021-06-22 PROCEDURE — 93458 L HRT ARTERY/VENTRICLE ANGIO: CPT | Performed by: INTERNAL MEDICINE

## 2021-06-22 PROCEDURE — 36415 COLL VENOUS BLD VENIPUNCTURE: CPT

## 2021-06-22 PROCEDURE — 93572 IV DOP VEL&/PRESS C FLO EA: CPT

## 2021-06-22 PROCEDURE — 82550 ASSAY OF CK (CPK): CPT

## 2021-06-22 PROCEDURE — 6370000000 HC RX 637 (ALT 250 FOR IP)

## 2021-06-22 PROCEDURE — 92929 PR PRQ TRLUML CORONARY STENT W/ANGIO ADDL ART/BRNCH: CPT | Performed by: INTERNAL MEDICINE

## 2021-06-22 PROCEDURE — 92928 PRQ TCAT PLMT NTRAC ST 1 LES: CPT

## 2021-06-22 PROCEDURE — 2709999900 HC NON-CHARGEABLE SUPPLY

## 2021-06-22 PROCEDURE — 99152 MOD SED SAME PHYS/QHP 5/>YRS: CPT

## 2021-06-22 PROCEDURE — C1725 CATH, TRANSLUMIN NON-LASER: HCPCS

## 2021-06-22 PROCEDURE — 93010 ELECTROCARDIOGRAM REPORT: CPT | Performed by: INTERNAL MEDICINE

## 2021-06-22 PROCEDURE — C1894 INTRO/SHEATH, NON-LASER: HCPCS

## 2021-06-22 PROCEDURE — 93571 IV DOP VEL&/PRESS C FLO 1ST: CPT

## 2021-06-22 PROCEDURE — 2580000003 HC RX 258

## 2021-06-22 PROCEDURE — 6360000004 HC RX CONTRAST MEDICATION: Performed by: INTERNAL MEDICINE

## 2021-06-22 PROCEDURE — C1887 CATHETER, GUIDING: HCPCS

## 2021-06-22 PROCEDURE — 92928 PRQ TCAT PLMT NTRAC ST 1 LES: CPT | Performed by: INTERNAL MEDICINE

## 2021-06-22 PROCEDURE — 92929 HC PRQ CARD STENT W/ANGIO ADDL: CPT

## 2021-06-22 PROCEDURE — 93571 IV DOP VEL&/PRESS C FLO 1ST: CPT | Performed by: INTERNAL MEDICINE

## 2021-06-22 RX ORDER — AMLODIPINE BESYLATE 5 MG/1
5 TABLET ORAL DAILY
Qty: 90 TABLET | Refills: 1 | Status: SHIPPED | OUTPATIENT
Start: 2021-06-22 | End: 2022-03-01 | Stop reason: SDUPTHER

## 2021-06-22 RX ORDER — ASPIRIN 81 MG/1
81 TABLET ORAL DAILY
Qty: 90 TABLET | Refills: 1 | Status: SHIPPED | OUTPATIENT
Start: 2021-06-22 | End: 2021-07-27 | Stop reason: ALTCHOICE

## 2021-06-22 RX ORDER — CLOPIDOGREL BISULFATE 75 MG/1
75 TABLET ORAL ONCE
Status: DISCONTINUED | OUTPATIENT
Start: 2021-06-22 | End: 2021-06-22 | Stop reason: HOSPADM

## 2021-06-22 RX ORDER — LISINOPRIL 40 MG/1
40 TABLET ORAL DAILY
Qty: 90 TABLET | Refills: 1 | Status: SHIPPED | OUTPATIENT
Start: 2021-06-22 | End: 2022-03-01 | Stop reason: SDUPTHER

## 2021-06-22 RX ORDER — CLOPIDOGREL BISULFATE 75 MG/1
75 TABLET ORAL DAILY
Qty: 90 TABLET | Refills: 1 | Status: SHIPPED | OUTPATIENT
Start: 2021-06-22 | End: 2021-12-21

## 2021-06-22 RX ORDER — METOPROLOL SUCCINATE 50 MG/1
50 TABLET, EXTENDED RELEASE ORAL DAILY
Qty: 90 TABLET | Refills: 1 | Status: SHIPPED | OUTPATIENT
Start: 2021-06-22 | End: 2022-02-01

## 2021-06-22 RX ADMIN — IOPAMIDOL 240 ML: 755 INJECTION, SOLUTION INTRAVENOUS at 10:44

## 2021-06-22 NOTE — H&P
[facility], [place], operated by [facility]     Outpatient Follow Up Note      CHIEF COMPLAINT / HPI:  Follow Up secondary to coronary artery disease and paroxysmal atrial fibrillation    Subjective:   Austin Padilla is 63 y.o. male who presents today with a history of coronary artery disease (nonobstructive by [facility] 3/2017), ascending aortic aneurysm (blood pressure management and surveillance with CT scans per Dr. Cee Taylor).   Additional history includes HTN, Dyslipidemia, BUSTER treated with CPAP, PAF (dx 4/2017, self limiting, anticoagulated with Xarelto). Juan Samson has a history of dizziness/black out spells over the past 7 years.  Neuro evaluation by Dr. Sukhi Woo showed no significant vascular stenosis, normal EEG, and MRI of brain showing minimal white matter.  EP evaluation included MCOT which showed dizziness associated with NSR/ST although patient had NSVT and short PAT not associated with symptoms. Interval hx: last OV c/o palpitations. Symptoms with PVCs and SVT (13 beats). He had not been taking his Toprol routinely as it makes him short of breath. Stopped Norvasc and toprol and start diltiazem 180mg daily. Also had complaints of CP. Stress test preformed today. Today, he denies significant chest pain. There is WATSON. Has generally worsened over the last several years, especially since he had COVID in Nov. He was carrying water in the house the other day and he became very SOB and felt heart pounding. The patient denies orthopnea/PND. Denies swelling and his weight is stable. For activity he helps take care of his mom and cleans the house. No specific exercise. 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 Has been taking metoprolol routinely, thought it was causing him to be breathless but is not sure if its related to deconditioning or BB so he is taking it routinely now and seems to be tolerating it well. It also has helped his palpations. He reports he never stopped norvasc and did not start taking diltiazem.     These 61   Temp 98 °F (36.7 °C)   Resp 18   Ht 6' 3\" (1.905 m)   Wt 282 lb (127.9 kg)   SpO2 97%   BMI 35.25 kg/m²   CONSTITUTIONAL: Cooperative, no apparent distress, and appears well nourished / developed  NEUROLOGIC:  Awake and orientated to person, place, and time. PSYCH: Calm affect. SKIN: Warm and dry. HEENT: Sclera non-icteric, normocephalic, neck supple. RESPIRATORY:  No increased work of breathing and clear to auscultation, no crackles or wheezing. CARDIOVASCULAR:  Regular rate and rhythm without murmur. Normal S1 and S2. No gallops or rubs. Normal PMI. No elevation of JVP. Normal carotid pulses with no bruits. GI:  Normal bowel sounds. Non-distended. Non-tender to palpation  EXT: No edema. No calf tenderness. Pulses are present bilaterally.     DATA:    Lab Results   Component Value Date    ALT 30 06/16/2021    AST 35 06/16/2021    ALKPHOS 68 06/16/2021    BILITOT 0.4 06/16/2021     Lab Results   Component Value Date    CREATININE 1.0 06/16/2021    BUN 14 06/16/2021     06/16/2021    K 3.8 06/16/2021     06/16/2021    CO2 27 06/16/2021     Lab Results   Component Value Date    TSH 1.14 04/23/2017     Lab Results   Component Value Date    WBC 6.4 06/16/2021    HGB 13.7 06/16/2021    HCT 40.4 (L) 06/16/2021    MCV 88.1 06/16/2021     06/16/2021     No components found for: CHLPL  Lab Results   Component Value Date    TRIG 374 (H) 06/16/2021    TRIG 289 (H) 03/16/2021    TRIG 226 (H) 09/17/2020     Lab Results   Component Value Date    HDL 30 (L) 06/16/2021    HDL 29 (L) 03/16/2021    HDL 38 (L) 09/17/2020     Lab Results   Component Value Date    LDLCALC see below 06/16/2021    LDLCALC 180 (H) 03/16/2021    LDLCALC 146 (H) 09/17/2020     Lab Results   Component Value Date    LABVLDL see below 06/16/2021    LABVLDL 58 03/16/2021    LABVLDL 45 09/17/2020      No results found for: BNP  Radiology Review:  Pertinent images / reports were reviewed as a part of this visit and reveals the following:    Last Echo: 10/2016   Summary   Normal left ventricle size, wall thickness and systolic function with an   estimated ejection fraction of 55%. There is mild left ventricular hypertrophy. There is reversal of E/A inflow velocities across the mitral valve   suggesting impaired left ventricular relaxation. Trivial mitral regurgitation is present. There is trivial tricuspid regurgitation with RVSP estimated at 21 mmHg. Stress ECHO: 7/2020  Summary   Baseline resting echocardiogram shows normal global LV systolic function   with an ejection fraction of 55% and uniform myocardial segmental wall   motion. Following stress there was uniform augmentation of all myocardial   segments with appropriate hyperdynamic LV systolic response to stress with a   post ejection fraction of 65%. Negative Stress Echocardiography study for ischemia. Stress Test: 6/2019  Summary    Normal myocardial perfusion.    Normal LV size and systolic function. Stress test: 6/2021  Summary    No EKG evidence for ischemia with lexiscan    Normal LV size and function with EF of 59%    SPECT imaging with very small, moderate intensity, decreased perfusion in    the inferior wall with stress and rest. While an area of scar cannot be    excluded, preserved wall motion, EF and evidence for extracardiac    attenuation may more suggest artifact rather than infarct. Clinical    correlation recommended.        Impression    *Normal LV size and function    *Myocardial perfusion imaging with very small area of inferior infarct    versus attenuation artifact. No reversible ischemia. MCOT sept 2017-Nov 2017:   NSR/ST associated with symptoms; had NSVT and short PAT but no symptoms recorded    Cardiac Cath 3/10/17:  Anatomy:   LM-normal  LAD-normal  Cx-40% mid  OM1- normal  RCA-normal  RPDA- normal   LVEF- 55%, LVEDP 9 mmHg     Impression:  1. Mild non-obstructive CAD  2. Normal LV systolic function     Plan:  1.  Medical management    CT chest: 2020  Stable ectasia of the ascending thoracic aorta measuring up to approximately   4.9 cm. Last EC21  Sinus  Rhythm   WITHIN NORMAL LIMITS  Assessment:     1. Coronary artery disease  ~ [facility] 3/2017: nonobstructive disease. LCx with 40% stenosis. ~ Myoview 2019: normal myocardial perfusion   ~ Stress echo 2020: negative for ischemia   ~ on statin (no aspirin d/t Xarelto)  ~ stable ; last OV was having atypical chest pain, more likely msk but was also having progressive SOB. Denies CP any today. ~ stress test today showing very small area of inferior infarct vs attenuation artifact. No reversible ischemia. EF preserved at 59%. 2. Paroxysmal atrial fibrillation   ~ stable; regular to ausculation today   ~ [id number] Score of 2, on Xarelto 20mg daily. Continue Toprol. 3. Palpations    ~last OV c/o worsening palpations that are interfering with his QOL   ~event monitor 2021: symptoms with PVCs and SVT (13 beats)  ~Toprol providing symptoms improvement    4. Mixed hyperlipidemia   ~  (3/2021)   ~ on Crestor 20. He does not take it consistently. 5. BUSTER  ~ recommend compliance. Not using currently. 6. Ascending aortic aneurysm   ~ CT chest 2017 and 2018: 4.9cm  ~ CT chest 3/2019: 4.8cm  ~ last CT chest 2020: 4.9cm  ~ surveillance per Dr. Candy Noel     7. Hypertension   ~ elevated. Needs better control.   ~ has not been taking Norvasc. He will restart. I had the opportunity to review the clinical symptoms and presentation of Honorio July. Plan:     1. Restart taking Norvasc and continue Toprol (do not start diltiazem)  2. Try to increase routine walking. Low cholesterol diet recommended. 3. Follow up with Dr. Leonardo Ybarra as scheduled . Call piror with worsening symptoms. *will discuss recent stress testing with Dr. Leonardo Ybarra.     Overall the patient is stable from CV standpoint    I have addressed the patient's cardiac risk factors and

## 2021-06-22 NOTE — BRIEF OP NOTE
Cardiac Cath 6/22/2021:  Access: Right RA  Ultrasound: Ultrasound guidance used to determine after mentioned artery patency, size (> 2 mm), anatomic variations and ideal puncture location. Real-time ultrasound utilized concurrent with vascular needle entry into the artery. Images permanently recorded and reported in the patient chart. Hemostasis: TR band  Conscious sedation start time: 0835  Conscious sedation stop time: 1040  Versed: 6 mg  Fentanyl: 300 mcg  Bleeding risk: Low  LVEDP: 22 mmHg  AO: 138/97 mmHg  Estimated blood loss: Less than 20 mL  Contrast: 240 mL  Fluoroscopy time: 29.9 min. Anatomy:   LM-normal  LAD-40% mid  Cx-50% proximal, FFR 0.89  OM1-patent  RCA-30% ostial with FFR 0.92 (after PCI of mid RCA and RPL),  80% mid  RPL-95% proximal  RPDA- normal  LVEF-55%  PCI: RCA 80% to 0% mid with a 3.5 mm x 38 mm Medtronic resolute Jay RADHA with a distal and overlapping 3.5 mm x 12 mm Medtronic resolute RADHA. Postdilatation of the proximal segment of the initial stent was performed with a 4.0 mm NC balloon. RPL 95% to 0% with a 2.0 mm x 22 mm Medtronic resolute Jay RADHA. Impression:  1. Severe one-vessel CAD involving the RCA and RPL. 2.  Successful revascularization of the RCA and RPL with RADHA x2 of the RCA and times one of the RPL. 3.  Mild to moderate nonobstructive CAD involving the LAD and circumflex. 4.  Normal LV systolic function. 5.  Significant disease progression since prior cardiac cath in 2017. 6.  Noted to have sleep apnea with elevation of blood pressure. Plan:  1. DAPT plus Xarelto x30 days followed by Plavix and Xarelto for an additional 5 months after which Plavix can be discontinued in enteric-coated aspirin 81 mg daily resumed indefinitely along with Xarelto. 2.  Markedly elevated CK noted on labs 1 week ago and he states he has been referred to a rheumatologist.  Not consistently taking a statin.   Therefore we will need to hold statin therapy until further work-up of elevated creatinine kinase is performed. Consider alternative such as Repatha/Praluent. 3.  Aggressive risk factor modification. Cholesterol very suboptimal currently.   4.  Cardiac rehab referral.

## 2021-06-22 NOTE — PRE SEDATION
Brief Pre-Op Note/Sedation Assessment      Edson No  1960  Cath/NONE      [id number]  8:55 AM    Planned Procedure: Cardiac Catheterization Procedure    Post Procedure Plan: Return to same level of care    Consent: I have discussed with the patient and/or the patient representative the indication, alternatives, and the possible risks and/or complications of the planned procedure and the anesthesia methods. The patient and/or patient representative appear to understand and agree to proceed. Chief Complaint: Chest Pain/Pressure  Anginal Equivalent      Indications for Cath Procedure: Worsening Angina and Suspected CAD  Anginal Classification within 2 weeks:  CCS III - Symptoms with everyday living activities, i.e., moderate limitation  NYHA Heart Failure Class within 2 weeks: No symptoms  Is Cath Lab Visit Valve-related?: No  Surgical Risk: Intermediate  Functional Type: Unknown    Anti- Anginal Meds within 2 weeks:   Yes: Beta Blockers, Ca Channel Blockers and Statin (Any)    Stress or Imaging Studies Performed:  Stress Test with SPECT Result: Positive:  inferior Risk/Extent of Ischemia:  Low      Vital Signs:  /87   Pulse 61   Temp 98 °F (36.7 °C)   Resp 18   Ht 6' 3\" (1.905 m)   Wt 282 lb (127.9 kg)   SpO2 97%   BMI 35.25 kg/m²     Allergies:   Allergies   Allergen Reactions    Adhesive Tape Itching    Sugar-Protein-Starch      Runny nose       Past Medical History:  Past Medical History:   Diagnosis Date    Aneurysm of ascending aorta (HCC)     Arthritis     Atopic dermatitis     Back pain     Facial paralysis/Advance palsy     lt side face dropping    Hyperlipidemia     Hypertension     Imprisonment and other incarceration [id number]    Migraine     Obstructive sleep apnea (adult) (pediatric)     cpap machine    Prediabetes     Psoriasis-like skin disease     Tinea cruris          Surgical History:  Past Surgical History:   Procedure Laterality Date    BACK SURGERY February and October 2008    COLONOSCOPY  01/28/2018    CORONARY ANGIOPLASTY      12/2017    CYSTOSCOPY  2001    LAMINECTOMY POSTERIOR THORACIC / LUMBAR Right 7/14/2020    THORACIC LAMINECTOMY WITH PADDLE LEAD AND RECHARGEABLE BATTERY IN RIGHT HIP performed by Adam Rodriguez MD at [address] Bilateral 2/5/2020    BILATERAL L3 TRANSFORAMINAL  EPIDURAL STEROID INJECTION WITH FLUOROSCOPY performed by Tristin Hough MD at [address] Bilateral 3/9/2020    BILATERAL L1, L2, L3 (ABOVE THE FUSION) MEDIAL BRANCH BLOCK WITH FLUOROSCOPY (91078, 61314)   #1 performed by Tristin Hough MD at [address] [facility] N/A 6/3/2020    SPINAL CORD STIMULATOR TRIAL WITH FLUOROSCOPY (58493, 49212, 54708) - [place] performed by Tristin Hough MD at [facility]         Medications:  No current facility-administered medications for this encounter. Pre-Sedation:    Pre-Sedation Documentation and Exam:  I have assessed the patient and agree with the H&P present on the chart. Prior History of Anesthesia Complications:   none    Modified Mallampati:  IV (only hard palate visible)    ASA Classification:  Class 3 - A patient with severe systemic disease that limits activity but is not incapacitating      Tiffanie Scale: Activity:  2 - Able to move 4 extremities voluntarily on command  Respiration:  2 - Able to breathe deeply and cough freely  Circulation:  2 - BP+/- 20mmHg of normal  Consciousness:  2 - Fully awake  Oxygen Saturation (color):  2 - Able to maintain oxygen saturation >92% on room air    Sedation/Anesthesia Plan:  Guard the patient's safety and welfare. Minimize physical discomfort and pain. Minimize negative psychological responses to treatment by providing sedation and analgesia and maximize the potential amnesia. Patient to meet pre-procedure discharge plan.     Medication Planned:  midazolam intravenously and fentanyl intravenously    Patient is an appropriate candidate for plan of sedation: yes      Electronically signed by Mariaelena Alba MD on 6/22/2021 at 8:55 AM

## 2021-06-24 DIAGNOSIS — E55.9 VITAMIN D DEFICIENCY: Primary | ICD-10-CM

## 2021-07-23 ENCOUNTER — TELEPHONE (OUTPATIENT)
Dept: CARDIOLOGY CLINIC | Age: 61
End: 2021-07-23

## 2021-07-23 PROBLEM — I48.0 PAF (PAROXYSMAL ATRIAL FIBRILLATION) (HCC): Status: ACTIVE | Noted: 2017-04-23

## 2021-07-23 NOTE — TELEPHONE ENCOUNTER
Attempted to call patient to stop aspirin after being on triple therapy for 1 month. Unable to leave a voicemail.  Will try again later

## 2021-07-23 NOTE — PROGRESS NOTES
[address]    2021    Cuong Reinoso (:  1960) is a 63 y.o. male who is here for six month follow up for the management of coronary artery disease, modifiable risk factors, and paroxysmal atrial fibrillation. Referring Provider: NURIS Mobley CNP    HISTORY:  Mr. Bennie Gonzales has a history of coronary artery disease, ascending aortic aneurysm (blood pressure management and surveillance with CT scans per Dr. Welsey Cowan). Additional history includes HTN, Dyslipidemia, BUSTER treated with CPAP, PAF (dx 2017, self limiting, anticoagulated with Xarelto). He has a history of dizziness/black out spells over the past 7 years. Neuro evaluation by Dr. Luh Adams showed no significant vascular stenosis, normal EEG, and MRI of brain showing minimal white matter. EP evaluation included MCOT which showed dizziness associated with NSR/ST although patient had NSVT and short PAT not associated with symptoms. Patient had COVID in 2020 and generally felt worse since that diagnosis. Today, he states that he has been under a lot of stress and that his mom passed away 2021. He does not describe chest discomfort but does describe shortness of breath and fatigue. He also states that he has some muscle abnormalities where they lock up and he is being referred to a rheumatologist.      REVIEW OF SYSTEMS:  A complete review of systems was reviewed and is negative except as noted in the history of present illness. Prior to Visit Medications    Medication Sig Taking?  Authorizing Provider   vitamin D (CHOLECALCIFEROL) 50 MCG (2000) TABS tablet Take 1 tablet by mouth daily After finishing 12 week couse Yes Lety Newton MD   amLODIPine (NORVASC) 5 MG tablet Take 1 tablet by mouth daily Yes Colton Jones MD   lisinopril (PRINIVIL;ZESTRIL) 40 MG tablet Take 1 tablet by mouth daily Yes Colton Jones MD   metoprolol succinate (TOPROL XL) 50 MG extended release tablet Take 1 tablet by mouth daily Yes Elijah Aguilar MD   rivaroxaban (XARELTO) 20 MG TABS tablet Take 1 tablet by mouth daily (with breakfast) Yes Elijah Aguilar MD   clopidogrel (PLAVIX) 75 MG tablet Take 1 tablet by mouth daily Yes Elijah Aguilar MD   hydroCHLOROthiazide (HYDRODIURIL) 25 MG tablet Take 1 tablet by mouth every morning Yes NURIS Clark CNP   tiZANidine (ZANAFLEX) 4 MG tablet Take 1 tablet by mouth every 8 hours as needed (for muscle spasm)  Patient taking differently: Take 4 mg by mouth nightly  Yes NURIS Clark CNP   nitroGLYCERIN (NITROSTAT) 0.4 MG SL tablet Place 1 tablet under the tongue every 5 minutes as needed for Chest pain Yes NURIS Cuevas CNP   butalbital-acetaminophen-caffeine (FIORICET, ESGIC) -40 MG per tablet Take 1 tablet by mouth every 4 hours as needed for Headaches or Migraine Yes NURIS Clark CNP   Handicap Placard MISC by Does not apply route Expires 9/17/2025 Yes NURIS Clark CNP. Devices MISC Pads and leads for use on [facility] TENS unit.  Yes ALLISON Alatorre   fluticasone (FLONASE) 50 MCG/ACT nasal spray 2 sprays by Each Nostril route daily  Patient not taking: Reported on 8/5/2021  NURIS Clark CNP        Allergies   Allergen Reactions    Adhesive Tape Itching    Sugar-Protein-Starch      Runny nose       Past Medical History:   Diagnosis Date    Aneurysm of ascending aorta (HCC)     Arthritis     Atopic dermatitis     Back pain     Facial paralysis/[place] palsy     lt side face dropping    Hyperlipidemia     Hypertension     Imprisonment and other incarceration [id number]    Migraine     Obstructive sleep apnea (adult) (pediatric)     cpap machine    Prediabetes     Psoriasis-like skin disease     Tinea cruris        Past Surgical History:   Procedure Laterality Date    BACK SURGERY  February and October 2008    COLONOSCOPY  01/28/2018    CORONARY ANGIOPLASTY      12/2017    CYSTOSCOPY  2001    LAMINECTOMY POSTERIOR THORACIC / LUMBAR Right 7/14/2020    THORACIC LAMINECTOMY WITH PADDLE LEAD AND RECHARGEABLE BATTERY IN RIGHT HIP performed by Keke Guajardo MD at [address] Bilateral 2/5/2020    BILATERAL L3 TRANSFORAMINAL  EPIDURAL STEROID INJECTION WITH FLUOROSCOPY performed by Shelby Moreno MD at [address] Bilateral 3/9/2020    BILATERAL L1, L2, L3 (ABOVE THE FUSION) MEDIAL BRANCH BLOCK WITH FLUOROSCOPY (25633, 04834)   #1 performed by Shelby Moreno MD at [address] [facility] N/A 6/3/2020    SPINAL CORD STIMULATOR TRIAL WITH FLUOROSCOPY (15646, 66253, 09600) - [place] performed by Shelby Moreno MD at [facility] History     Tobacco Use    Smoking status: Never Smoker    Smokeless tobacco: Never Used   Substance Use Topics    Alcohol use: Yes     Alcohol/week: 2.0 standard drinks     Types: 1 Cans of beer, 1 Standard drinks or equivalent per week     Comment: Rarely; socially        Family History   Problem Relation Age of Onset    Other Mother         Polio    Thyroid Disease Mother     Heart Failure Father     Stroke Father        PHYSICAL EXAMINATION:  Vitals:    08/05/21 1527   BP: 112/76   Site: Right Upper Arm   Position: Sitting   Cuff Size: Large Adult   Pulse: 81   SpO2: 96%   Weight: 284 lb 12.8 oz (129.2 kg)   Height: 6' 3\" (1.905 m)     Estimated body mass index is 35.6 kg/m² as calculated from the following:    Height as of this encounter: 6' 3\" (1.905 m). Weight as of this encounter: 284 lb 12.8 oz (129.2 kg). Physical Exam  Constitutional:       Appearance: He is well-developed. He is not diaphoretic. HENT:      Head: Normocephalic and atraumatic. Eyes:      General: No scleral icterus. Extraocular Movements: Extraocular movements intact. Conjunctiva/sclera: Conjunctivae normal.   Neck:      Vascular: No JVD. Cardiovascular:      Rate and Rhythm: Normal rate and regular rhythm. Heart sounds: Normal heart sounds. No murmur heard. No friction rub. No gallop. Pulmonary:      Effort: Pulmonary effort is normal. No respiratory distress. Breath sounds: Normal breath sounds. No wheezing or rales. Abdominal:      General: Bowel sounds are normal.      Palpations: Abdomen is soft. Tenderness: There is no abdominal tenderness. Musculoskeletal:         General: Normal range of motion. Cervical back: Normal range of motion and neck supple. Skin:     General: Skin is warm and dry. Findings: No rash. Neurological:      General: No focal deficit present. Mental Status: He is alert and oriented to person, place, and time. Psychiatric:         Mood and Affect: Mood normal.         Behavior: Behavior normal.         Thought Content:  Thought content normal.         Judgment: Judgment normal.           LABS:  CBC:   Lab Results   Component Value Date    WBC 6.4 06/16/2021    RBC 4.58 06/16/2021    HGB 13.7 06/16/2021    HCT 40.4 06/16/2021    MCV 88.1 06/16/2021    RDW 14.0 06/16/2021     06/16/2021     CMP:   Lab Results   Component Value Date     06/16/2021    K 3.8 06/16/2021    K 3.2 11/22/2020     06/16/2021    CO2 27 06/16/2021    BUN 14 06/16/2021    CREATININE 1.0 06/16/2021    GFRAA >60 06/16/2021    AGRATIO 1.5 06/16/2021    LABGLOM >60 06/16/2021    GLUCOSE 91 06/16/2021    PROT 7.6 06/16/2021    CALCIUM 9.5 06/16/2021    BILITOT 0.4 06/16/2021    ALKPHOS 68 06/16/2021    AST 35 06/16/2021    ALT 30 06/16/2021     LIPIDS: No components found for: TOTAL CHOLESTEROL,  HDL,  LDL,  TRIGLYCERIDES  PT/INR: No results found for: PTINR    [facility] 6/22/21  LM-normal  LAD-40% mid  Cx-50% proximal, FFR 0.89  OM1-patent  RCA-30% ostial with FFR 0.92 (after PCI of mid RCA and RPL),  80% mid  RPL-95% proximal  RPDA- normal  LVEF-55%  PCI: RCA 80% to 0% mid with a 3.5 mm x 38 mm Medtronic resolute [place] RADHA with a distal and overlapping 3.5 mm x 12 mm Medtronic resolute RADHA. Postdilatation of the proximal segment of the initial stent was performed with a 4.0 mm NC balloon. RPL 95% to 0% with a 2.0 mm x 22 mm Medtronic resolute [place] RADHA. Impression:  1. Severe one-vessel CAD involving the RCA and RPL. 2.  Successful revascularization of the RCA and RPL with RADHA x2 of the RCA and times one of the RPL. 3.  Mild to moderate nonobstructive CAD involving the LAD and circumflex. 4.  Normal LV systolic function. 5.  Significant disease progression since prior cardiac cath in 2017. 6.  Noted to have sleep apnea with elevation of blood pressure    Stress Myoview 6/3/21: No EKG evidence for ischemia with lexiscan  Normal LV size and function with EF of 59%  SPECT imaging with very small, moderate intensity, decreased perfusion in  the inferior wall with stress and rest. While an area of scar cannot be  excluded, preserved wall motion, EF and evidence for extracardiac  attenuation may more suggest artifact rather than infarct. Clinical  correlation recommended. Impression  *Normal LV size and function  *Myocardial perfusion imaging with very small area of inferior infarct  versus attenuation artifact. No reversible ischemia. 14 day  4/21: SR with PAT    Stress Echo 7/9/20:  Baseline resting echocardiogram shows normal global LV systolic function with an ejection fraction of 55% and uniform myocardial segmental wall motion. Following stress there was uniform augmentation of all myocardial segments with appropriate hyperdynamic LV systolic response to stress with a post ejection fraction of 65%. Negative Stress Echocardiography study for ischemia    Stress Myoview 6/28/19: Normal perfusion, normal LV size and systolic function    CT of chest wo contrast 11/12/20:  Stable ectasia of the ascending thoracic aorta measuring up to approximately 4.9 cm     CT of chest wo contrast 3/1/19:   1. Unchanged 4.8 cm ascending thoracic aortic dilatation.     CT of chest wo contrast 2/6/18:  Mediastinum: Ascending aorta measures up to 4.9 cm on series 5, image 151,   unchanged.  No mediastinal or axillary lymphadenopathy.       Lungs/pleura: Central airways are patent.  No suspicious pulmonary nodules.       Upper Abdomen: Mild hepatic steatosis.       Soft Tissues/Bones: No suspicious osseous lesions.        MCOT sept 2017-Nov 2017:   NSR/ST associated with symptoms; had NSVT and short PAT but no symptoms recorded        CTA-chest: 4/23/17:  1. No evidence of acute cardiopulmonary disease. 2. Stable appearance of aneurysmal dilatation of the ascending aorta with no   significant change when compared to the study of 02/04/2017.  Maximum   transverse diameter of the ascending aorta is 4.9 cm as described above.  No   evidence of aortic dissection.         Cardiac Cath 3/10/17:  Anatomy:   LM-normal  LAD-normal  Cx-40% mid  OM1- normal  RCA-normal  RPDA- normal   LVEF- 55%, LVEDP 9 mmHg     Impression:  1. Mild non-obstructive CAD  2. Normal LV systolic function     CTA aorta 2/4/17:  1. Aneurysm of the ascending aorta has decreased in size.  Remainder of the   aorta demonstrates normal caliber.  There is no dissection. 2. No acute findings.      Myoview stress test 11/15/16:  Summary    There is normal isotope uptake at stress and rest. There is no evidence of    myocardial ischemia or scar.    Normal LV function.    Low risk scan.      CT chest 11/6/16:  Aneurysmal dilatation of the ascending aorta to approximately 5.2 cm.  No   evidence of dissection.    2 mm indeterminate left upper lobe pulmonary nodule and indeterminate   hyperdense lesion at the upper pole of the right kidney, compatible with   hyperdense cyst or potentially renal neoplasm.  Comparison with any outside   prior would be helpful to determine any interval change.  Otherwise, in the   nonacute setting renal protocol CT could be performed for further   characterization of the renal lesion.      Echo 10/14/16:  Summary  Normal left ventricle size, wall thickness and systolic function with an  estimated ejection fraction of 55%. here is mild left ventricular hypertrophy. There is reversal of E/A inflow velocities across the mitral valve  suggesting impaired left ventricular relaxation. Trivial mitral regurgitation is present. There is trivial tricuspid regurgitation with RVSP estimated at 21 mmHg.       ASSESSMENT/PLAN:    1. Coronary artery disease due to lipid rich plaque  ~ 3/2017 [facility] showed Lcx with 40% stenosis, otherwise normal cors. ~ 6/22/2021 [facility] PCI of RCA and RPL, mild to mod nonobstructive cad in LAD and Cx  ~ Currently taking ASA 81 mg daily, Xarelto 20 mg daily, and Plavix 75 mg for one month post PCI  ~on statin and Metoprolol XL. Plan > monitor for anginal symptoms, no change in medication    2. Ascending aortic aneurysm ([facility] [address]  ~4/2017 CTA of chest and 2/2018 CT of chest showed maximum transverse diameter of Asc Aorta 4.9 cm.     ~ 3/1/19 CT of chest - 4.8 cm ascending thoracic aortic aneurysm  ~11/12/20 CT of chest- 4.9 cm ascending thoracic aorta    Plan> surveillance per Dr. Chase Goncalves    3. Paroxysmal atrial fibrillation ([facility] [address]  ~ Hospitalized 4/2017 with SOB and palpitations - found to be in AF with RVR, converted after IV Dilt gtt started  ~ Anticoagulated with Xarelto. ~ patient admits to palpitations and is not using CPAP consistently (discussed that consistent use of CPAP machine will help control palpitations)    Plan> continue Xarelto, reinforced use of CPAP, monitor for worsening palpitations.        4. Essential hypertension  ~ treated with Lisinopril- HCTZ, amlodipine, Metoprolol XL  ~ goal BP < 130/80 but definitely want BP < 140/90  ~ stressed importance of improving BP given his history of aortic aneurysm  /76 (Site: Right Upper Arm, Position: Sitting, Cuff Size: Large Adult)   Pulse 81   Ht 6' 3\" (1.905 m)   Wt 284 lb 12.8 oz (129.2 kg)   SpO2 96%   BMI 35.60 kg/m²     Plan> no change in medications, continue to monitor BP at home. 5. Mixed hyperlipidemia  ~ treated with Atorvastatin 80 mg although was not taking statin when lipids checked in 12/2017 (direct )  ~ 6/2018 TC- 283, TG- 329, HDL- 32, LDL- 191 - he admits he does not take this medication regularly.    ~6/16/2021 , HDL 30, BCA211, , , ALT 30, AST 35  ~ Currently holding statin due to elevated CK and pending work up with rheumatologist    Plan> Continue current treatment      Return in about 5 months (around 1/5/2022). [address], am scribing for and in the presence of Hector Lind MD.   Signed, Mica Iglesias 08/16/21 3:05 PM     Physician Attestation: The scribe's documentation has been prepared under my direction and personally reviewed by me in its entirety. I confirm that the note above accurately reflects all work, treatment, procedures, and medical decision making performed by me. An  electronic signature was used to authenticate this note. Silva Hardy MD, [facility] [place], [address]

## 2021-07-27 NOTE — TELEPHONE ENCOUNTER
Spoke with patient and relayed to him that since it has been 1 month since his stent, he should stop taking the aspirin. I instructed him to continue to take plavix and xarelto. The patient expressed understanding.

## 2021-07-29 NOTE — PROGRESS NOTES
2021  Patient Name: Latonia Kaplan  : 1960  Medical Record: [id number]    MEDICATIONS  Current Outpatient Medications   Medication Sig Dispense Refill    Cholecalciferol (VITAMIN D3) 1.25 MG (72650 UT) TABS Take 50,000 Units by mouth once a week 4 tablet 0    amLODIPine (NORVASC) 5 MG tablet Take 1 tablet by mouth daily 90 tablet 1    lisinopril (PRINIVIL;ZESTRIL) 40 MG tablet Take 1 tablet by mouth daily 90 tablet 1    metoprolol succinate (TOPROL XL) 50 MG extended release tablet Take 1 tablet by mouth daily 90 tablet 1    rivaroxaban (XARELTO) 20 MG TABS tablet Take 1 tablet by mouth daily (with breakfast) 90 tablet 1    clopidogrel (PLAVIX) 75 MG tablet Take 1 tablet by mouth daily 90 tablet 1    hydroCHLOROthiazide (HYDRODIURIL) 25 MG tablet Take 1 tablet by mouth every morning 90 tablet 0    tiZANidine (ZANAFLEX) 4 MG tablet Take 1 tablet by mouth every 8 hours as needed (for muscle spasm) 90 tablet 0    nitroGLYCERIN (NITROSTAT) 0.4 MG SL tablet Place 1 tablet under the tongue every 5 minutes as needed for Chest pain 25 tablet 0    butalbital-acetaminophen-caffeine (FIORICET, ESGIC) -40 MG per tablet Take 1 tablet by mouth every 4 hours as needed for Headaches or Migraine 60 tablet 0    fluticasone (FLONASE) 50 MCG/ACT nasal spray 2 sprays by Each Nostril route daily 1 Bottle 2    Handicap Placard MISC by Does not apply route Expires 2025 2 each 0    Misc. Devices MISC Pads and leads for use on [facility] TENS unit. 1 Device 0     No current facility-administered medications for this visit. ALLERGIES  Allergies   Allergen Reactions    Adhesive Tape Itching    Sugar-Protein-Starch      Runny nose         Comments  No specialty comments available. Background history:  Latonia Kaplan is a 63 y.o. male who is being seen for follow up evaluation of  elevated CK and myalgia. He is having chronic widespread musculoskeletal pain for past 10 years.   Pain is Constitutional: No unanticipated weight loss or fevers. Integumentary: No rash, photosensitivity, malar rash, livedo reticularis, alopecia and Raynaud's symptoms, sclerodactyly, skin tightening  Eyes: negative for visual disturbance and persistent redness, discharge from eyes   ENT: - No tinnitus, loss of hearing, vertigo, or recurrent ear infections.  - No history of nasal/oral ulcers. Cardiovascular: No history of pericarditis, chest pain or murmur or palpitations  Respiratory: No shortness of breath, cough or history of interstitial lung disease. No history of pleurisy. No history of tuberculosis or atypical infections. Gastrointestinal: No history of heart burn, dysphagia or esophageal dysmotility. No change in bowel habits or any inflammatory bowel disease. Genitourinary: No history renal disease, miscarriages. Hematologic/Lymphatic: No abnormal bruising or bleeding, blood clots or swollen lymph nodes. Musculoskeletal: Denies having any swollen joints, joint pains or stiffness   Neurological: No history of headaches, seizure or focal weakness. No history of neuropathies, paresthesias or hyperesthesias, facial droop, diplopia  Psychiatric: No history of bipolar disease, anxiety, depression  Endocrine: Denies any polyuria, polydipsia and osteoporosis  Allergic/Immunologic: No nasal congestion or hives. I have reviewed patients Past medical History, Social History and Family History as mentioned in her chart and this remains unchanged fromprevious.     Past Medical History:   Diagnosis Date    Aneurysm of ascending aorta (HCC)     Arthritis     Atopic dermatitis     Back pain     Facial paralysis/Hayti palsy     lt side face dropping    Hyperlipidemia     Hypertension     Imprisonment and other incarceration [id number]    Migraine     Obstructive sleep apnea (adult) (pediatric)     cpap machine    Prediabetes     Psoriasis-like skin disease     Tinea cruris      Past Surgical History: Procedure Laterality Date    BACK SURGERY  February and October 2008    COLONOSCOPY  01/28/2018    CORONARY ANGIOPLASTY      12/2017    CYSTOSCOPY  2001    LAMINECTOMY POSTERIOR THORACIC / LUMBAR Right 7/14/2020    THORACIC LAMINECTOMY WITH PADDLE LEAD AND RECHARGEABLE BATTERY IN RIGHT HIP performed by Beatrice Weller MD at [address] [facility] Bilateral 2/5/2020    BILATERAL L3 TRANSFORAMINAL  EPIDURAL STEROID INJECTION WITH FLUOROSCOPY performed by Yina Pagan MD at [address] [facility] Bilateral 3/9/2020    BILATERAL L1, L2, L3 (ABOVE THE FUSION) MEDIAL BRANCH BLOCK WITH FLUOROSCOPY (02325, 19443)   #1 performed by Yina Pagan MD at [address] [facility] N/A 6/3/2020    SPINAL CORD STIMULATOR TRIAL WITH FLUOROSCOPY (41282, 44580, 69326) - Jerrica Bolton performed by Yina Pagan MD at [facility] History     Socioeconomic History    Marital status:      Spouse name: Not on file    Number of children: 0    Years of education: Not on file    Highest education level: Not on file   Occupational History    Occupation: unemployed    Occupation: former [employer] work    Tobacco Use    Smoking status: Never Smoker    Smokeless tobacco: Never Used   Vaping Use    Vaping Use: Never used   Substance and Sexual Activity    Alcohol use:  Yes     Alcohol/week: 2.0 standard drinks     Types: 1 Cans of beer, 1 Standard drinks or equivalent per week     Comment: Rarely; socially    Drug use: No    Sexual activity: Yes     Partners: Female     Birth control/protection: Condom   Other Topics Concern    Not on file   Social History Narrative    Not on file     Social Determinants of Health     Financial Resource Strain:     Difficulty of Paying Living Expenses:    Food Insecurity:     Worried About Running Out of Food in the Last Year:     Ran Out of Food in the Last Year:    Transportation Needs:     Lack of Transportation (Medical):  Lack of Transportation (Non-Medical):    Physical Activity:     Days of Exercise per Week:     Minutes of Exercise per Session:    Stress:     Feeling of Stress :    Social Connections:     Frequency of Communication with Friends and Family:     Frequency of Social Gatherings with Friends and Family:     Attends Synagogue Services:     Active Member of Clubs or Organizations:     Attends Club or Organization Meetings:     Marital Status:    Intimate Partner Violence:     Fear of Current or Ex-Partner:     Emotionally Abused:     Physically Abused:     Sexually Abused:      Family History   Problem Relation Age of Onset    Other Mother         Polio    Thyroid Disease Mother     Heart Failure Father     Stroke Father          PHYSICAL EXAM   Vitals:    07/30/21 0839   BP: (!) 138/94   Pulse: 80   Weight: 285 lb 9.6 oz (129.5 kg)     Physical Exam  Constitutional:  Well developed, well nourished, no acute distress, non-toxic appearance   Musculoskeletal:    Ambulates without assistance, normal gait  Neck: Full ROM, no tenderness,supple   Upper Extremities        Shoulder: Full ROM, no crepitus        Elbow:  Full ROM, no synovitis, no deformity        Wrist: Full ROM, no synovitis, no deformity, no ulnar deviation        Fingers: No sclerodactly, no active raynaud's, no ulcers. MCPs: No synovitis, no deformity             PIPs:  No synovitis, no deformity             DIPs: No synovitis, no deformity. Multiple [place] nodes             Nails: No pitting, no telangiectasias. Lower Extremities        Hip: Full ROM, no tenderness to palpation        Knee: Full ROM, no erythema/swelling/laxity/crepitus. Patellanot ballotable. Ankle: Full ROM, no swelling or erythema        MTPs: No swelling or erythema  Back- no tenderness.   Eyes:  PERRL, extra ocular movements intact, conjunctiva normal   HEENT:  Atraumatic, normocephalic, external ears normal, oropharynx moist, no pharyngeal exudates. Respiratory:  No respiratory distress  GI:  Soft, nondistended, normal bowel sounds, nontender, noorganomegaly, no mass, no rebound, no guarding   :  No costovertebral angle tenderness   Integument:  Well hydrated, no rash or telangiectasias  Lymphatic:  No lymphadenopathy noted   Neurologic:   Alert & oriented x 3, CN 2-12 normal, no focal deficits noted. Sensations Intact. Muscle strength 5/5 proximallyand distally in upper and lower extremities.    Psychiatric:  Speech and behavior appropriate           LABS AND IMAGING  Outside data reviewed and in HPI    Lab Results   Component Value Date    WBC 6.4 06/16/2021    RBC 4.58 06/16/2021    HGB 13.7 06/16/2021    HCT 40.4 06/16/2021     06/16/2021    MCV 88.1 06/16/2021    MCH 30.0 06/16/2021    MCHC 34.0 06/16/2021    RDW 14.0 06/16/2021    LYMPHOPCT 49.4 06/16/2021    MONOPCT 6.5 06/16/2021    BASOPCT 0.8 06/16/2021    MONOSABS 0.4 06/16/2021    LYMPHSABS 3.2 06/16/2021    EOSABS 0.5 06/16/2021    BASOSABS 0.0 06/16/2021       Chemistry        Component Value Date/Time     06/16/2021 1148    K 3.8 06/16/2021 1148    K 3.2 (L) 11/22/2020 1742     06/16/2021 1148    CO2 27 06/16/2021 1148    BUN 14 06/16/2021 1148    CREATININE 1.0 06/16/2021 1148        Component Value Date/Time    CALCIUM 9.5 06/16/2021 1148    ALKPHOS 68 06/16/2021 1148    AST 35 06/16/2021 1148    ALT 30 06/16/2021 1148    BILITOT 0.4 06/16/2021 1148          Lab Results   Component Value Date    SEDRATE 10 06/16/2021     Lab Results   Component Value Date    .8 (H) 07/16/2020     Lab Results   Component Value Date    TEO Negative 06/16/2021     Lab Results   Component Value Date    RF <10.0 06/16/2021     Lab Results   Component Value Date    TEO Negative 06/16/2021     No results found for: DSDNAG, DSDNAIGGIFA  No results found for: SSAROAB, SSALAAB  No results found for: SMAB, RNPAB  No results found for: CENTABIGG  No results found for: C3, C4, ACE  Lab Results   Component Value Date    VITD25 14.5 06/16/2021     No results found for: Bailee Terrell  Lab Results   Component Value Date    OBNIUEJR48 319 10/09/2019     Lab Results   Component Value Date    TSHFT4 1.79 10/09/2019    TSH 1.14 04/23/2017     Lab Results   Component Value Date    VITD25 14.5 (L) 06/16/2021       ASSESSMENT AND PLAN      Assessment/Plan:      ASSESSMENT:    1. Elevated CK    2. Muscle spasm    3. Exercise-induced weakness        PLAN:   1. Elevated CK  - CK; Future  - Vitamin B12; Future  - Vitamin D 25 Hydroxy; Future  - Sedimentation Rate; Future  - C-Reactive Protein; Future  - Aldolase; Future  - Miscellaneous Sendout 1; Future  - External Referral To Neurology    2. Muscle spasm  - External Referral To Neurology    3. Exercise-induced weakness  - External Referral To Neurology    Elevated CK   [id number]  No subjective or objective evidence of muscle weakness  Given his age, gender and race upper limit of CK can be up to 1200  Doubt inflammatory myopathy or statin induced myopathy  EMG no evidence of myopathy  Most likely hyperckemia  Reports exercise-induced muscle stiffness/pain/muscle spasm-noncompliant on the possibility of metabolic myopathy  Will refer to neuromuscular specialist at [facility] for evaluation  we will also do additional work-up to completely rule out any autoimmune disease or connective tissue disease  Advised to continue tizanidine 4 to 8 mg at bedtime      The patient indicates understanding of these issues and agrees with the plan. No follow-ups on file. The risks and benefits of my recommendations, as well as other treatment options, benefits and side effects werediscussed with the patient. All questions were answered. ######################################################################    I thank you for giving me theopportunity to participate in [facility]. If you have any questions or concerns please feel free to contact me.  I look forward to following  Carla Ken along with you. Electronically signed by: Nakia Young MD, MD, 7/30/2021 9:08 AM    Documentation was done using voice recognition dragon software. Every effort was made to ensure accuracy;however, inadvertent unintentional computerized transcription errors may be present.

## 2021-07-30 ENCOUNTER — OFFICE VISIT (OUTPATIENT)
Dept: RHEUMATOLOGY | Age: 61
End: 2021-07-30
Payer: COMMERCIAL

## 2021-07-30 VITALS
WEIGHT: 285.6 LBS | BODY MASS INDEX: 35.7 KG/M2 | SYSTOLIC BLOOD PRESSURE: 138 MMHG | HEART RATE: 80 BPM | DIASTOLIC BLOOD PRESSURE: 94 MMHG

## 2021-07-30 DIAGNOSIS — M62.838 MUSCLE SPASM: ICD-10-CM

## 2021-07-30 DIAGNOSIS — M62.81 EXERCISE-INDUCED WEAKNESS: ICD-10-CM

## 2021-07-30 DIAGNOSIS — R74.8 ELEVATED CK: ICD-10-CM

## 2021-07-30 DIAGNOSIS — R74.8 ELEVATED CK: Primary | ICD-10-CM

## 2021-07-30 LAB
C-REACTIVE PROTEIN: <3 MG/L (ref 0–5.1)
SEDIMENTATION RATE, ERYTHROCYTE: 8 MM/HR (ref 0–20)
TOTAL CK: 679 U/L (ref 39–308)
VITAMIN B-12: 402 PG/ML (ref 211–911)
VITAMIN D 25-HYDROXY: 19.2 NG/ML

## 2021-07-30 PROCEDURE — G8417 CALC BMI ABV UP PARAM F/U: HCPCS | Performed by: INTERNAL MEDICINE

## 2021-07-30 PROCEDURE — 1036F TOBACCO NON-USER: CPT | Performed by: INTERNAL MEDICINE

## 2021-07-30 PROCEDURE — 3017F COLORECTAL CA SCREEN DOC REV: CPT | Performed by: INTERNAL MEDICINE

## 2021-07-30 PROCEDURE — G8427 DOCREV CUR MEDS BY ELIG CLIN: HCPCS | Performed by: INTERNAL MEDICINE

## 2021-07-30 PROCEDURE — 99214 OFFICE O/P EST MOD 30 MIN: CPT | Performed by: INTERNAL MEDICINE

## 2021-07-30 RX ORDER — DILTIAZEM HYDROCHLORIDE 180 MG/1
CAPSULE, EXTENDED RELEASE ORAL
COMMUNITY
Start: 2021-07-12 | End: 2021-07-30

## 2021-07-30 RX ORDER — METHOCARBAMOL 750 MG/1
TABLET ORAL
COMMUNITY
Start: 2021-06-24 | End: 2021-07-30

## 2021-08-02 LAB — ALDOLASE: 7.5 U/L (ref 1.5–8.1)

## 2021-08-03 DIAGNOSIS — R76.8 POSITIVE ANA (ANTINUCLEAR ANTIBODY): Primary | ICD-10-CM

## 2021-08-03 DIAGNOSIS — E55.9 VITAMIN D DEFICIENCY: ICD-10-CM

## 2021-08-03 LAB
ANA PATTERN: ABNORMAL
ANA TITER: ABNORMAL
ANTINUCLEAR AB INTERPRETIVE COMMENT: ABNORMAL
ANTINUCLEAR ANTIBODY, HEP-2, IGG: DETECTED

## 2021-08-03 RX ORDER — ERGOCALCIFEROL 1.25 MG/1
50000 CAPSULE ORAL WEEKLY
Qty: 4 CAPSULE | Refills: 2 | Status: SHIPPED
Start: 2021-08-03 | End: 2021-08-05 | Stop reason: ALTCHOICE

## 2021-08-03 RX ORDER — CHOLECALCIFEROL (VITAMIN D3) 50 MCG
2000 TABLET ORAL DAILY
Qty: 30 TABLET | Refills: 11 | Status: SHIPPED | OUTPATIENT
Start: 2021-08-03 | End: 2021-12-07 | Stop reason: SDUPTHER

## 2021-08-05 ENCOUNTER — OFFICE VISIT (OUTPATIENT)
Dept: CARDIOLOGY CLINIC | Age: 61
End: 2021-08-05
Payer: COMMERCIAL

## 2021-08-05 VITALS
HEIGHT: 75 IN | BODY MASS INDEX: 35.41 KG/M2 | DIASTOLIC BLOOD PRESSURE: 76 MMHG | HEART RATE: 81 BPM | WEIGHT: 284.8 LBS | OXYGEN SATURATION: 96 % | SYSTOLIC BLOOD PRESSURE: 112 MMHG

## 2021-08-05 DIAGNOSIS — I25.10 CORONARY ARTERY DISEASE DUE TO LIPID RICH PLAQUE: Primary | ICD-10-CM

## 2021-08-05 DIAGNOSIS — E78.2 MIXED HYPERLIPIDEMIA: ICD-10-CM

## 2021-08-05 DIAGNOSIS — I10 ESSENTIAL HYPERTENSION: ICD-10-CM

## 2021-08-05 DIAGNOSIS — I25.83 CORONARY ARTERY DISEASE DUE TO LIPID RICH PLAQUE: Primary | ICD-10-CM

## 2021-08-05 DIAGNOSIS — I48.0 PAF (PAROXYSMAL ATRIAL FIBRILLATION) (HCC): ICD-10-CM

## 2021-08-05 DIAGNOSIS — I71.21 ASCENDING AORTIC ANEURYSM: ICD-10-CM

## 2021-08-05 PROCEDURE — 3017F COLORECTAL CA SCREEN DOC REV: CPT | Performed by: INTERNAL MEDICINE

## 2021-08-05 PROCEDURE — 1036F TOBACCO NON-USER: CPT | Performed by: INTERNAL MEDICINE

## 2021-08-05 PROCEDURE — G8427 DOCREV CUR MEDS BY ELIG CLIN: HCPCS | Performed by: INTERNAL MEDICINE

## 2021-08-05 PROCEDURE — 99214 OFFICE O/P EST MOD 30 MIN: CPT | Performed by: INTERNAL MEDICINE

## 2021-08-05 PROCEDURE — G8417 CALC BMI ABV UP PARAM F/U: HCPCS | Performed by: INTERNAL MEDICINE

## 2021-09-07 ENCOUNTER — OFFICE VISIT (OUTPATIENT)
Dept: FAMILY MEDICINE CLINIC | Age: 61
End: 2021-09-07
Payer: COMMERCIAL

## 2021-09-07 VITALS
OXYGEN SATURATION: 98 % | HEART RATE: 78 BPM | BODY MASS INDEX: 35.87 KG/M2 | SYSTOLIC BLOOD PRESSURE: 120 MMHG | TEMPERATURE: 97 F | DIASTOLIC BLOOD PRESSURE: 70 MMHG | WEIGHT: 287 LBS

## 2021-09-07 DIAGNOSIS — I48.0 PAROXYSMAL ATRIAL FIBRILLATION (HCC): ICD-10-CM

## 2021-09-07 DIAGNOSIS — R73.03 PREDIABETES: ICD-10-CM

## 2021-09-07 DIAGNOSIS — Z91.09 ENVIRONMENTAL ALLERGIES: ICD-10-CM

## 2021-09-07 DIAGNOSIS — G89.29 CHRONIC MIDLINE LOW BACK PAIN WITH BILATERAL SCIATICA: ICD-10-CM

## 2021-09-07 DIAGNOSIS — Z12.11 COLON CANCER SCREENING: ICD-10-CM

## 2021-09-07 DIAGNOSIS — E78.2 MIXED HYPERLIPIDEMIA: ICD-10-CM

## 2021-09-07 DIAGNOSIS — R74.8 ELEVATED CK: ICD-10-CM

## 2021-09-07 DIAGNOSIS — M54.42 CHRONIC MIDLINE LOW BACK PAIN WITH BILATERAL SCIATICA: ICD-10-CM

## 2021-09-07 DIAGNOSIS — M54.41 CHRONIC MIDLINE LOW BACK PAIN WITH BILATERAL SCIATICA: ICD-10-CM

## 2021-09-07 DIAGNOSIS — I10 ESSENTIAL HYPERTENSION: Primary | ICD-10-CM

## 2021-09-07 PROCEDURE — 3017F COLORECTAL CA SCREEN DOC REV: CPT | Performed by: CLINICAL NURSE SPECIALIST

## 2021-09-07 PROCEDURE — G8417 CALC BMI ABV UP PARAM F/U: HCPCS | Performed by: CLINICAL NURSE SPECIALIST

## 2021-09-07 PROCEDURE — G8427 DOCREV CUR MEDS BY ELIG CLIN: HCPCS | Performed by: CLINICAL NURSE SPECIALIST

## 2021-09-07 PROCEDURE — 99214 OFFICE O/P EST MOD 30 MIN: CPT | Performed by: CLINICAL NURSE SPECIALIST

## 2021-09-07 PROCEDURE — 1036F TOBACCO NON-USER: CPT | Performed by: CLINICAL NURSE SPECIALIST

## 2021-09-07 RX ORDER — FLUTICASONE PROPIONATE 50 MCG
2 SPRAY, SUSPENSION (ML) NASAL DAILY
Qty: 1 EACH | Refills: 2 | Status: SHIPPED | OUTPATIENT
Start: 2021-09-07 | End: 2021-12-07 | Stop reason: SDUPTHER

## 2021-09-07 RX ORDER — TIZANIDINE 4 MG/1
4 TABLET ORAL EVERY 8 HOURS PRN
Qty: 90 TABLET | Refills: 0 | Status: SHIPPED | OUTPATIENT
Start: 2021-09-07 | End: 2021-12-07 | Stop reason: SDUPTHER

## 2021-09-07 RX ORDER — METOPROLOL SUCCINATE 50 MG/1
50 TABLET, EXTENDED RELEASE ORAL DAILY
Qty: 90 TABLET | Refills: 1 | Status: CANCELLED | OUTPATIENT
Start: 2021-09-07

## 2021-09-07 RX ORDER — AMLODIPINE BESYLATE 5 MG/1
5 TABLET ORAL DAILY
Qty: 90 TABLET | Refills: 1 | Status: CANCELLED | OUTPATIENT
Start: 2021-09-07

## 2021-09-07 RX ORDER — HYDROCHLOROTHIAZIDE 25 MG/1
25 TABLET ORAL EVERY MORNING
Qty: 90 TABLET | Refills: 0 | Status: SHIPPED | OUTPATIENT
Start: 2021-09-07 | End: 2021-12-07 | Stop reason: SDUPTHER

## 2021-09-07 RX ORDER — CLOPIDOGREL BISULFATE 75 MG/1
75 TABLET ORAL DAILY
Qty: 90 TABLET | Refills: 1 | Status: CANCELLED | OUTPATIENT
Start: 2021-09-07

## 2021-09-07 RX ORDER — LISINOPRIL 40 MG/1
40 TABLET ORAL DAILY
Qty: 90 TABLET | Refills: 1 | Status: CANCELLED | OUTPATIENT
Start: 2021-09-07

## 2021-09-07 ASSESSMENT — ENCOUNTER SYMPTOMS
CONSTIPATION: 0
ORTHOPNEA: 0
SHORTNESS OF BREATH: 0
BLURRED VISION: 0
ABDOMINAL PAIN: 0
NAUSEA: 0
COUGH: 0
WHEEZING: 0
VOMITING: 0
DIARRHEA: 0
CHEST TIGHTNESS: 0

## 2021-09-07 NOTE — PATIENT INSTRUCTIONS
Fasting labs ordered     Medications refilled     Reviewed DASH and low sodium diet     Increase lisinopril 40 mg daily     Monitor blood pressure and call if consistently greater then 140/90     Reviewed low cholesterol diet     Restart Crestor 20 mg daily     Reviewed diabetic diet      Follow up with ortho as directed     Encourage to follow up with cardiology as directed     Discussed situational stress management options techniques, information given     Sent to  neurologist, Dr. Colindres Mess

## 2021-09-07 NOTE — PROGRESS NOTES
SUBJECTIVE:    Patient ID:  Reddy Keller is a 63 y.o. male      Patient is here for a medication check for hypertension, hyperlipidemia, prediabetes, CAD, A. Fib, AAA, BUSTER, and vitamin D deficiency. He is doing well on current regimen and has no further concerns. He is followed by cardiologist on a regular basis, every 6 months. Currently denies chest pain, shortness of breath, chest pain or dyspnea on exertion or orthopnea. Chronic back pain is improving with physical therapy and working with back specialist, Dr. Caroline Flores. He has recently been seen by cardiology and had a cardiac cath on 6/22/21. State he has started following a Mediterranean diet. He was last seen by cardiology on 8/5/21. 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 Referral to gastroenterology given. Hypertension  This is a chronic problem. The current episode started more than 1 year ago. The problem is unchanged. The problem is controlled. Pertinent negatives include no anxiety, blurred vision, chest pain, headaches, orthopnea, palpitations, peripheral edema or shortness of breath. Risk factors for coronary artery disease include dyslipidemia, obesity and male gender (prediabetes). Past treatments include ACE inhibitors, calcium channel blockers and diuretics. The current treatment provides significant improvement. Hyperlipidemia  This is a chronic problem. The current episode started more than 1 year ago. The problem is controlled. Recent lipid tests were reviewed and are low. Associated symptoms include myalgias. Pertinent negatives include no chest pain, focal sensory loss, focal weakness, leg pain or shortness of breath. Treatments tried: hold rosuvastatin for now. The current treatment provides significant improvement of lipids.  Risk factors for coronary artery disease include male sex, hypertension, dyslipidemia and obesity (prediabetes). Current Outpatient Medications on File Prior to Visit   Medication Sig Dispense Refill    vitamin D (CHOLECALCIFEROL) 50 MCG (2000 UT) TABS tablet Take 1 tablet by mouth daily After finishing 12 week couse 30 tablet 11    amLODIPine (NORVASC) 5 MG tablet Take 1 tablet by mouth daily 90 tablet 1    lisinopril (PRINIVIL;ZESTRIL) 40 MG tablet Take 1 tablet by mouth daily 90 tablet 1    metoprolol succinate (TOPROL XL) 50 MG extended release tablet Take 1 tablet by mouth daily 90 tablet 1    rivaroxaban (XARELTO) 20 MG TABS tablet Take 1 tablet by mouth daily (with breakfast) 90 tablet 1    clopidogrel (PLAVIX) 75 MG tablet Take 1 tablet by mouth daily 90 tablet 1    nitroGLYCERIN (NITROSTAT) 0.4 MG SL tablet Place 1 tablet under the tongue every 5 minutes as needed for Chest pain 25 tablet 0    butalbital-acetaminophen-caffeine (FIORICET, ESGIC) -40 MG per tablet Take 1 tablet by mouth every 4 hours as needed for Headaches or Migraine 60 tablet 0    Handicap Placard MISC by Does not apply route Expires 9/17/2025 2 each 0    Misc. Devices MISC Pads and leads for use on [facility] TENS unit. 1 Device 0     No current facility-administered medications on file prior to visit.       Past Medical History:   Diagnosis Date    Aneurysm of ascending aorta (HCC)     Arthritis     Atopic dermatitis     Back pain     Facial paralysis/[place] palsy     lt side face dropping    Hyperlipidemia     Hypertension     Imprisonment and other incarceration [id number]    Migraine     Obstructive sleep apnea (adult) (pediatric)     cpap machine    Prediabetes     Psoriasis-like skin disease     Tinea cruris      Past Surgical History:   Procedure Laterality Date    BACK SURGERY  February and October 2008    COLONOSCOPY  01/28/2018    CORONARY ANGIOPLASTY      12/2017    CYSTOSCOPY  2001    LAMINECTOMY POSTERIOR THORACIC / LUMBAR Right 7/14/2020    THORACIC LAMINECTOMY WITH PADDLE LEAD AND RECHARGEABLE BATTERY IN RIGHT HIP performed by Kourtney Handley MD at [facility] Bilateral 2/5/2020    BILATERAL L3 TRANSFORAMINAL  EPIDURAL STEROID INJECTION WITH FLUOROSCOPY performed by Julianna Camacho MD at [address] Bilateral 3/9/2020    BILATERAL L1, L2, L3 (ABOVE THE FUSION) MEDIAL BRANCH BLOCK WITH FLUOROSCOPY (74148, 33296)   #1 performed by Julianna Camacho MD at [address] N/A 6/3/2020    SPINAL CORD STIMULATOR TRIAL WITH FLUOROSCOPY (35987, 00275, 39114) - Waleska Duarte performed by Julianna Camacho MD at [address] History   Problem Relation Age of Onset    Other Mother         Polio    Thyroid Disease Mother     Heart Failure Father     Stroke Father      Social History     Socioeconomic History    Marital status:      Spouse name: Not on file    Number of children: 0    Years of education: Not on file    Highest education level: Not on file   Occupational History    Occupation: unemployed    Occupation: former [employer] work    Tobacco Use    Smoking status: Never Smoker    Smokeless tobacco: Never Used   Vaping Use    Vaping Use: Never used   Substance and Sexual Activity    Alcohol use: Yes     Alcohol/week: 2.0 standard drinks     Types: 1 Cans of beer, 1 Standard drinks or equivalent per week     Comment: Rarely; socially    Drug use: No    Sexual activity: Yes     Partners: Female     Birth control/protection: Condom   Other Topics Concern    Not on file   Social History Narrative    Not on file     Social Determinants of Health     Financial Resource Strain:     Difficulty of Paying Living Expenses:    Food Insecurity:     Worried About Running Out of Food in the Last Year:     Ran Out of Food in the Last Year:    Transportation Needs:     Lack of Transportation (Medical):      Lack of Transportation (Non-Medical):    Physical Activity:     Days of Exercise per Week:     Minutes of Exercise per Session:    Stress:     Feeling of Stress :    Social Connections:     Frequency of Communication with Friends and Family:     Frequency of Social Gatherings with Friends and Family:     Attends Nondenominational Services:     Active Member of Clubs or Organizations:     Attends Club or Organization Meetings:     Marital Status:    Intimate Partner Violence:     Fear of Current or Ex-Partner:     Emotionally Abused:     Physically Abused:     Sexually Abused:        Review of Systems   Constitutional: Negative for chills and fever. Eyes: Negative for blurred vision and visual disturbance. Respiratory: Negative for cough, chest tightness, shortness of breath and wheezing. Cardiovascular: Negative for chest pain, palpitations, orthopnea and leg swelling. Gastrointestinal: Negative for abdominal pain, constipation, diarrhea, nausea and vomiting. Endocrine: Negative for polydipsia, polyphagia and polyuria. Musculoskeletal: Positive for arthralgias and myalgias. Back pain: chronic. Skin: Negative for rash. Neurological: Negative for focal weakness and headaches. OBJECTIVE:    Physical Exam  Vitals and nursing note reviewed. Constitutional:       General: He is not in acute distress. Appearance: Normal appearance. He is well-developed. He is not ill-appearing. HENT:      Head: Normocephalic and atraumatic. Right Ear: External ear normal.      Left Ear: External ear normal.      Nose: Nose normal.   Eyes:      Conjunctiva/sclera: Conjunctivae normal.      Pupils: Pupils are equal, round, and reactive to light. Neck:      Trachea: No tracheal deviation. Cardiovascular:      Rate and Rhythm: Normal rate and regular rhythm. Heart sounds: Normal heart sounds. Pulmonary:      Effort: Pulmonary effort is normal. No respiratory distress. Breath sounds: Normal breath sounds. No wheezing or rales.    Chest:      Chest wall: No tenderness. Abdominal:      General: Bowel sounds are normal. There is no distension. Palpations: Abdomen is soft. There is no mass. Tenderness: There is no abdominal tenderness. Hernia: No hernia is present. Musculoskeletal:         General: Normal range of motion. Cervical back: Normal range of motion and neck supple. Right lower leg: No edema. Left lower leg: No edema. Skin:     General: Skin is warm and dry. Findings: No rash. Neurological:      Mental Status: He is alert and oriented to person, place, and time. Psychiatric:         Behavior: Behavior normal.       /70   Pulse 78   Temp 97 °F (36.1 °C)   Wt 287 lb (130.2 kg)   SpO2 98%   BMI 35.87 kg/m²    BP Readings from Last 3 Encounters:   09/07/21 120/70   08/05/21 112/76   07/30/21 (!) 138/94      Wt Readings from Last 3 Encounters:   09/07/21 287 lb (130.2 kg)   08/05/21 284 lb 12.8 oz (129.2 kg)   07/30/21 285 lb 9.6 oz (129.5 kg)       ASSESSMENT & PLAN:    1. Essential hypertension  - Stable, continue current regimen  - Reviewed DASH and low sodium diets   - hydroCHLOROthiazide (HYDRODIURIL) 25 MG tablet; Take 1 tablet by mouth every morning  Dispense: 90 tablet; Refill: 0  - CBC Auto Differential; Future  - Comprehensive Metabolic Panel; Future  - Follow up with cardiologist as needed/directed     2. Mixed hyperlipidemia  - Stable, continue current regimen  - Reviewed low cholesterol diet  - CBC Auto Differential; Future  - Comprehensive Metabolic Panel; Future  - Lipid Panel; Future  - CK; Future    3. Prediabetes  - Stable, continue lifestyle modifications   - Reviewed diabetic diet   - CBC Auto Differential; Future  - Comprehensive Metabolic Panel; Future  - Hemoglobin A1C; Future    4. Paroxysmal atrial fibrillation (HCC)  - Stable, continue current regimen Plavix and Xarelto   - Follow up with cardiology as needed/directed     5.  Chronic midline low back pain with bilateral sciatica  - Stable,continue current regimen   - tiZANidine (ZANAFLEX) 4 MG tablet; Take 1 tablet by mouth every 8 hours as needed (for muscle spasm)  Dispense: 90 tablet; Refill: 0  - Follow up with Ortho/back specialist as needed/directed    6. Environmental allergies  - Stable, continue current regimen  - fluticasone (FLONASE) 50 MCG/ACT nasal spray; 2 sprays by Each Nostril route daily  Dispense: 1 each; Refill: 2    7. Elevated CK  - Follow up with specialist at [facility] per rheumatology   - Sent to [facility] neurologist, Dr. Aaron Garner    8. Colon cancer screening  - Referral to gastroenterology for screening for colon cancer      Continue current treatment plan.     Current Outpatient Medications   Medication Sig Dispense Refill    hydroCHLOROthiazide (HYDRODIURIL) 25 MG tablet Take 1 tablet by mouth every morning 90 tablet 0    tiZANidine (ZANAFLEX) 4 MG tablet Take 1 tablet by mouth every 8 hours as needed (for muscle spasm) 90 tablet 0    fluticasone (FLONASE) 50 MCG/ACT nasal spray 2 sprays by Each Nostril route daily 1 each 2    vitamin D (CHOLECALCIFEROL) 50 MCG (2000 UT) TABS tablet Take 1 tablet by mouth daily After finishing 12 week couse 30 tablet 11    amLODIPine (NORVASC) 5 MG tablet Take 1 tablet by mouth daily 90 tablet 1    lisinopril (PRINIVIL;ZESTRIL) 40 MG tablet Take 1 tablet by mouth daily 90 tablet 1    metoprolol succinate (TOPROL XL) 50 MG extended release tablet Take 1 tablet by mouth daily 90 tablet 1    rivaroxaban (XARELTO) 20 MG TABS tablet Take 1 tablet by mouth daily (with breakfast) 90 tablet 1    clopidogrel (PLAVIX) 75 MG tablet Take 1 tablet by mouth daily 90 tablet 1    nitroGLYCERIN (NITROSTAT) 0.4 MG SL tablet Place 1 tablet under the tongue every 5 minutes as needed for Chest pain 25 tablet 0    butalbital-acetaminophen-caffeine (FIORICET, ESGIC) -40 MG per tablet Take 1 tablet by mouth every 4 hours as needed for Headaches or Migraine 60 tablet 0    Handicap Placard MISC by Does not apply route Expires 9/17/2025 2 each 0    Misc. Devices MISC Pads and leads for use on [facility] TENS unit. 1 Device 0     No current facility-administered medications for this visit. Return in about 3 months (around 12/7/2021), or if symptoms worsen or fail to improve, for HTN, lipidemia, predibetes, A Fib, chronic back pain, allergies, screening. Sheyla Berry received counseling on the following healthy behaviors: nutrition, exercise and medication adherence    Patient given educational materials on Hyperlipidemia and Nutrition    Discussed use, benefit, and side effects of prescribed medications. Barriers to medication compliance addressed. All patient questions answered. Pt voiced understanding. Call office if experience side effects from medications. Please note that some or all of this record was generated using voice recognition software. If there are any questions about the content of this document, please contact the author as some errors in transcription may have occurred.

## 2021-09-15 ENCOUNTER — TELEPHONE (OUTPATIENT)
Dept: PULMONOLOGY | Age: 61
End: 2021-09-15

## 2021-09-16 ENCOUNTER — OFFICE VISIT (OUTPATIENT)
Dept: PULMONOLOGY | Age: 61
End: 2021-09-16
Payer: COMMERCIAL

## 2021-09-16 DIAGNOSIS — E66.01 MORBIDLY OBESE (HCC): ICD-10-CM

## 2021-09-16 DIAGNOSIS — I48.0 PAF (PAROXYSMAL ATRIAL FIBRILLATION) (HCC): ICD-10-CM

## 2021-09-16 DIAGNOSIS — G47.33 OBSTRUCTIVE SLEEP APNEA: Primary | ICD-10-CM

## 2021-09-16 DIAGNOSIS — I10 ESSENTIAL HYPERTENSION: Chronic | ICD-10-CM

## 2021-09-16 PROCEDURE — 99443 PR PHYS/QHP TELEPHONE EVALUATION 21-30 MIN: CPT | Performed by: NURSE PRACTITIONER

## 2021-09-16 ASSESSMENT — SLEEP AND FATIGUE QUESTIONNAIRES
HOW LIKELY ARE YOU TO NOD OFF OR FALL ASLEEP WHILE SITTING AND TALKING TO SOMEONE: 1
HOW LIKELY ARE YOU TO NOD OFF OR FALL ASLEEP WHILE LYING DOWN TO REST IN THE AFTERNOON WHEN CIRCUMSTANCES PERMIT: 1
HOW LIKELY ARE YOU TO NOD OFF OR FALL ASLEEP IN A CAR, WHILE STOPPED FOR A FEW MINUTES IN TRAFFIC: 1
HOW LIKELY ARE YOU TO NOD OFF OR FALL ASLEEP WHILE WATCHING TV: 1
HOW LIKELY ARE YOU TO NOD OFF OR FALL ASLEEP WHILE SITTING AND READING: 1
HOW LIKELY ARE YOU TO NOD OFF OR FALL ASLEEP WHEN YOU ARE A PASSENGER IN A CAR FOR AN HOUR WITHOUT A BREAK: 1
HOW LIKELY ARE YOU TO NOD OFF OR FALL ASLEEP WHILE SITTING INACTIVE IN A PUBLIC PLACE: 1
HOW LIKELY ARE YOU TO NOD OFF OR FALL ASLEEP WHILE SITTING QUIETLY AFTER LUNCH WITHOUT ALCOHOL: 1
ESS TOTAL SCORE: 8

## 2021-09-16 NOTE — PROGRESS NOTES
Ankit Jones         : 1960    Diagnosis: [x] BUSTER (G47.33) [] CSA (G47.31) [] Apnea (G47.30)   Length of Need: [x] 12 Months [] 99 Months [] Other:    Machine (ABBE!): [] Respironics Dream Station   2   Auto [] ResMed AirSense     Auto [] Other:     []  CPAP () [] Bilevel ()   Mode: [] Auto [] Spontaneous    Mode: [] Auto [] Spontaneous            Comfort Settings:   - Ramp Pressure:  cmH2O                                        - Ramp time: 15 min                                     -  Flex/EPR - 3 full time                                    - For ResMed Bilevel (TiMax-4 sec   TiMin- 0.2 sec)     Humidifier: [x] Heated ()        [x] Water chamber replacement ()/ 1 per 6 months        Mask:   [x] Nasal () /1 per 3 months [] Full Face () /1 per 3 months   [x] Patient choice -Size and fit mask [] Patient Choice - Size and fit mask   [] Dispense:  [] Dispense:    [x] Headgear () / 1 per 3 months [] Headgear () / 1 per 3 months   [x] Replacement Nasal Cushion ()/2 per month [] Interface Replacement ()/1 per month   [] Replacement Nasal Pillows ()/2 per month         Tubing: [x] Heated ()/1 per 3 months    [] Standard ()/1 per 3 months [] Other:           Filters: [x] Non-disposable ()/1 per 6 months     [x] Ultra-Fine, Disposable ()/2 per month        Miscellaneous: [] Chin Strap ()/ 1 per 6 months [] O2 bleed-in:       LPM   [] Oximetry on CPAP/Bilevel []  Other:    [x] Modem: ()         Start Order Date: 21    MEDICAL JUSTIFICATION:  I, the undersigned, certify that the above prescribed supplies are medically necessary for this patients wellbeing. In my opinion, the supplies are both reasonable and necessary in reference to accepted standards of medicalpractice in treatment of this patients condition.     NURIS Dominguez - CNP      NPI: [id number]       Order Signed Date: 21    Electronically signed by Chela Diaz, NURIS - CNP on 2021 at 2:56 PM    Ulisses Manjarrez  1960  [address] [id number] [place] [address]  [phone] (home)   [phone] (mobile)      Insurance Info (confirm with patient if correct):  Payor/Plan Subscr  Sex Relation Sub.  Ins. ID Effective Group Num

## 2021-09-16 NOTE — PROGRESS NOTES
Saeed Michaud MD, FAASM, [facility]  Yanet Mike, MSN, RN, [phone] [phone]  Dept: [phone]  Dept Fax: [phone]  Loc: [phone]    Subjective:     Patient ID: Ulisses Manjarrez is a 63 y.o. male. Chief Complaint   Patient presents with    Sleep Apnea       HPI:      Sleep Medicine Telephone Visit    Pursuant to the emergency declaration under the [facility], [facility] waiver authority and the [employer] Act this Telephone Visit was insisted, with patient's consent, to reduce the patient's risk of exposure to COVID-19 and provide continuity of care for an established patient. Services were provided through a synchronous discussion over a telephone chat to substitute for in-person clinic visit, and coded as such. While patient is at home. Machine Modem/Download Info:                                        PAP Mask  Mask Type: Nasal mask     [place] - Total score: 8    Follow-up :     Last Visit : July 20202      Subjective Health Changes: Cardiac stenting 6/2021 with 1 day hospitalized       Over Night Oximetry: [] Yes  [] No  [x] NA [] WNL   Using O2: [] Yes  [] No  [x] NA   Patient is compliant with the machine  [] Yes  [x] No [] Per patient   Feeling rested when using the machine   [x] Yes  [] No     Pressure is comfortable with inspiration and expiration  [x] Yes  [] No   ([x] NA   [] Feeling of suffocation  [] Feeling like not enough air    [] To much pressure)     Noticed changes in pressure  [x] NA  [] Yes    [] No     Mask is fitting well  [x] Yes  [] No   Noting Mask Air Leak  [x] Yes  [] No   Having painful Aerophagia  [] Yes  [x] No   Nocturia   3-4  per night.    Having  HA upon waking  [] Yes  [x] No   Dry mouth upon waking   Dry Nose  Dry Eyes  [x] Yes  [] No   Congestion upon waking   [] Yes  [x] No Nose Bleeds  [] Yes  [x] No   Using Sleep Aides  [x] NA  [] OTC  [] Per our office   [] Per another provider   Understands how to change humidification and/or tubing temperature for comfort while at home  [x] Yes  [] No     Difficulties falling asleep  [] Yes  [x] No   Difficulties staying asleep  [] Yes  [x] No   Approximate time to bed  10pm-2am   Approximate wake time  7am   Taking Naps  rare   If taking naps usual length  45 min -1 hour    If taking naps using the machine [] NA  [] Yes    [x] No    [] With and With out    Drowsy when driving  [] Yes  [x] No     Does patient carry a DOT/CDL  [] Yes  [x] No     Does patient carry FAA/Pilots License   [] Yes  [x] No      Any concerns noted with the machine at this time  [] Yes  [x] No       Assessment/Plan:     1. Obstructive sleep apnea  Assessment & Plan:  Unable to Review compliance download with pt. Supplies and parts as needed for his machine. These are medically necessary. Continue medications per his PCP and other physicians. Limit caffeine use after 3pm.    The chronic medical conditions listed are directly related to the primary diagnosis listed above. The management of the primary diagnosis affects the secondary diagnosis and vice versa    Per patient patient IS NOT compliant. At this time I am unable to assess this, and this could increase the risk of heart attacks, strokes, car accidents and/or death if not using the machine nightly, and with each sleep. 2. PAF (paroxysmal atrial fibrillation) (HCC)  Assessment & Plan:  Chronic- stable. After speaking with patient:    Agree with current plan, and would agree to continue this plan per prescribing and managing physician. 3. Morbidly obese ([address]  Assessment & Plan:  Patient encouraged to work on maintaining a healthy weight per height. Achievable with diet restriction/modifications and exercise (may consult primary care if unsure of any restrictions or concerns).      Weight management directly correlates to risk of control and maintenance of Obstructive Sleep Apnea. 4. Essential hypertension  Assessment & Plan:  Chronic- stable. After speaking with patient:    Agree with current plan, and would agree to continue this plan per prescribing and managing physician. The chronic medical conditions listed are directly related to the primary diagnosis listed above. The management of the primary diagnosis affects the secondary diagnosis and vice versa. - After pulling data and reviewing it   - unable to Educate patient and reviewed down load from modem with the patient   - Continue medications per his PCP and other physicians.   - he instructed not to drive unless had 4 hrs of effective therapy for his BUSTER the night before. - Did review the risks of under or untreated BUSTER including, but not limited to, higher risks of motor vehicle accidents, stroke, heart attacks, and death. - he understands and accepts all these risks. - The patient is advised to use the machine every night.   - Patient using  [employer] for supplies  - Patient educated on   Tube Temperature  Humidifier  (if you are dry turn it high)  Napping with the machine  The potential need to take the machine to the DME to trouble shoot, to allow the office to get a download  Office locations  [employer] and other  and maintaining cleaning schedules   Paying for supplies  Compliance  Follow up  The risk of undercontrolling Obstructive sleep apnea  After speaking to the patient, patient is currently stable.  We will continue with the current machine settings  Recall Information and DME contact    - Time spent with patient 24 to include time preparing same day as appointment   -Will follow up in office in 3 months      Electronically signed by NURIS Baker CNP on 9/16/2021 at 2:50 PM

## 2021-09-20 ENCOUNTER — TELEPHONE (OUTPATIENT)
Dept: CARDIOLOGY CLINIC | Age: 61
End: 2021-09-20

## 2021-09-20 NOTE — TELEPHONE ENCOUNTER
Received request from [facility] / Dr. Laura Bustos requesting cardiac approval to hold Xarelto 48 hours before GI testing. Per Dr. Palma Herr - patient is also on ASA and Plavix 75 mg after having PCI of RCA and RPL branch with RADHA x 3 on 6/22/21. Dr. Palma Herr does not recommend ASA and Plavix being held for six months post coronary intervention unless there is a reason for GI procedure to be done ASAP (concern for GI bleeding etc). Patient could hold Plavix starting 12/22/21. Left message for Dr. Laura Bustos with above information. [facility] to call office back with any questions. Spoke to patient. States GI testing was for screening due to family history of Colon CA. He voiced understanding of the need to delay GI testing until 12/22/21 or after.

## 2021-11-10 ASSESSMENT — ENCOUNTER SYMPTOMS
WHEEZING: 0
COUGH: 0
NAUSEA: 0
CHEST TIGHTNESS: 0
SHORTNESS OF BREATH: 0
VOMITING: 0
CONSTIPATION: 0
ABDOMINAL PAIN: 0
DIARRHEA: 0

## 2021-11-11 ENCOUNTER — OFFICE VISIT (OUTPATIENT)
Dept: FAMILY MEDICINE CLINIC | Age: 61
End: 2021-11-11
Payer: COMMERCIAL

## 2021-11-11 VITALS
SYSTOLIC BLOOD PRESSURE: 122 MMHG | OXYGEN SATURATION: 97 % | BODY MASS INDEX: 37.12 KG/M2 | TEMPERATURE: 97.8 F | HEART RATE: 68 BPM | DIASTOLIC BLOOD PRESSURE: 82 MMHG | WEIGHT: 297 LBS

## 2021-11-11 DIAGNOSIS — F43.20 ADULT SITUATIONAL STRESS DISORDER: ICD-10-CM

## 2021-11-11 DIAGNOSIS — F32.9 REACTIVE DEPRESSION: ICD-10-CM

## 2021-11-11 DIAGNOSIS — G47.09 OTHER INSOMNIA: ICD-10-CM

## 2021-11-11 DIAGNOSIS — I10 ESSENTIAL HYPERTENSION: Primary | ICD-10-CM

## 2021-11-11 DIAGNOSIS — E78.2 MIXED HYPERLIPIDEMIA: ICD-10-CM

## 2021-11-11 DIAGNOSIS — L30.9 ACUTE DERMATITIS: ICD-10-CM

## 2021-11-11 DIAGNOSIS — R73.03 PREDIABETES: ICD-10-CM

## 2021-11-11 PROBLEM — M54.16 LUMBAR RADICULOPATHY: Status: ACTIVE | Noted: 2020-07-14

## 2021-11-11 PROBLEM — M96.1 POST-LAMINECTOMY SYNDROME: Status: ACTIVE | Noted: 2020-06-17

## 2021-11-11 PROCEDURE — 3017F COLORECTAL CA SCREEN DOC REV: CPT | Performed by: CLINICAL NURSE SPECIALIST

## 2021-11-11 PROCEDURE — G8427 DOCREV CUR MEDS BY ELIG CLIN: HCPCS | Performed by: CLINICAL NURSE SPECIALIST

## 2021-11-11 PROCEDURE — G8484 FLU IMMUNIZE NO ADMIN: HCPCS | Performed by: CLINICAL NURSE SPECIALIST

## 2021-11-11 PROCEDURE — G8417 CALC BMI ABV UP PARAM F/U: HCPCS | Performed by: CLINICAL NURSE SPECIALIST

## 2021-11-11 PROCEDURE — 1036F TOBACCO NON-USER: CPT | Performed by: CLINICAL NURSE SPECIALIST

## 2021-11-11 PROCEDURE — 99214 OFFICE O/P EST MOD 30 MIN: CPT | Performed by: CLINICAL NURSE SPECIALIST

## 2021-11-11 RX ORDER — CLOTRIMAZOLE AND BETAMETHASONE DIPROPIONATE 10; .64 MG/G; MG/G
CREAM TOPICAL
Qty: 45 G | Refills: 1 | Status: SHIPPED | OUTPATIENT
Start: 2021-11-11 | End: 2022-08-05

## 2021-11-11 RX ORDER — ESCITALOPRAM OXALATE 10 MG/1
10 TABLET ORAL DAILY
Qty: 30 TABLET | Refills: 0 | Status: SHIPPED | OUTPATIENT
Start: 2021-11-11 | End: 2021-12-07 | Stop reason: SDUPTHER

## 2021-11-11 RX ORDER — ASPIRIN 81 MG/1
TABLET, COATED ORAL
COMMUNITY
Start: 2021-09-16 | End: 2022-06-15 | Stop reason: ALTCHOICE

## 2021-11-11 RX ORDER — TRAZODONE HYDROCHLORIDE 50 MG/1
50 TABLET ORAL NIGHTLY
Qty: 30 TABLET | Refills: 0 | Status: SHIPPED | OUTPATIENT
Start: 2021-11-11 | End: 2021-12-07 | Stop reason: SDUPTHER

## 2021-11-11 SDOH — ECONOMIC STABILITY: FOOD INSECURITY: WITHIN THE PAST 12 MONTHS, YOU WORRIED THAT YOUR FOOD WOULD RUN OUT BEFORE YOU GOT MONEY TO BUY MORE.: NEVER TRUE

## 2021-11-11 SDOH — ECONOMIC STABILITY: FOOD INSECURITY: WITHIN THE PAST 12 MONTHS, THE FOOD YOU BOUGHT JUST DIDN'T LAST AND YOU DIDN'T HAVE MONEY TO GET MORE.: NEVER TRUE

## 2021-11-11 ASSESSMENT — SOCIAL DETERMINANTS OF HEALTH (SDOH): HOW HARD IS IT FOR YOU TO PAY FOR THE VERY BASICS LIKE FOOD, HOUSING, MEDICAL CARE, AND HEATING?: NOT VERY HARD

## 2021-11-11 NOTE — PATIENT INSTRUCTIONS
Trial of Lexapro 10 mg daily     Start with Lexapro 5 mg (half a tab) for about a week then increase to Lexporo 10 mg (full tab)    Discussed depression and situational stress    Discussed stress management techniques, information given    Trial of Trazodone 25 mg (half tab) to 50 mg (full tab) nightly as needed for sleep     Discussed sleep health/hygiene    Lotrisone twice a day for 1-2 weeks     Follow up in 1 month sooner if symptoms worsen     Instructed patient to contact office or on-call physician promptly should condition worsen or any new symptoms appear and provided on-call telephone numbers. IF THE PATIENT HAS ANY SUICIDAL OR HOMICIDAL IDEATIONS, CALL THE OFFICE, DISCUSS WITH A SUPPORT MEMBER, OR GO TO THE ER IMMEDIATELY. Patient was agreeable with this plan. Patient Education        Recovering From Depression: Care Instructions  Your Care Instructions     Taking good care of yourself is important as you recover from depression. In time, your symptoms will fade as your treatment takes hold. Do not give up. Instead, focus your energy on getting better. Your mood will improve. It just takes some time. Focus on things that can help you feel better, such as being with friends and family, eating well, and getting enough rest. But take things slowly. Do not do too much too soon. You will begin to feel better gradually. Follow-up care is a key part of your treatment and safety. Be sure to make and go to all appointments, and call your doctor if you are having problems. It's also a good idea to know your test results and keep a list of the medicines you take. How can you care for yourself at home? Be realistic  · If you have a large task to do, break it up into smaller steps you can handle, and just do what you can. · You may want to put off important decisions until your depression has lifted.  If you have plans that will have a major impact on your life, such as marriage, divorce, or a job change, try to wait a bit. Talk it over with friends and loved ones who can help you look at the overall picture first.  · Reaching out to people for help is important. Do not isolate yourself. Let your family and friends help you. Find someone you can trust and confide in, and talk to that person. · Be patient, and be kind to yourself. Remember that depression is not your fault and is not something you can overcome with willpower alone. Treatment is important for depression, just like for any other illness. Feeling better takes time, and your mood will improve little by little. Stay active  · Stay busy and get outside. Take a walk, or try some other light exercise. · Talk with your doctor about an exercise program. Exercise can help with mild depression. · Go to a movie or concert. Take part in a [employer] activity or other social gathering. Go to a [email or web address] game. · Ask a friend to have dinner with you. Take care of yourself  · Eat a balanced diet with plenty of fresh fruits and vegetables, whole grains, and lean protein. If you have lost your appetite, eat small snacks rather than large meals. · Avoid using illegal drugs or marijuana and drinking alcohol. Do not take medicines that have not been prescribed for you. They may interfere with medicines you may be taking for depression, or they may make your depression worse. · Take your medicines exactly as they are prescribed. You may start to feel better within 1 to 3 weeks of taking antidepressant medicine. But it can take as many as 6 to 8 weeks to see more improvement. If you have questions or concerns about your medicines, or if you do not notice any improvement by 3 weeks, talk to your doctor. · Continue to take your medicine after your symptoms improve. Taking your medicine for at least 6 months after you feel better can help keep you from getting depressed again.  If this isn't the first time you have been depressed, your doctor may recommend you to take medicine even longer. · If you have any side effects from your medicine, tell your doctor. Many side effects are mild and will go away on their own after you have been taking the medicine for a few weeks. Some may last longer. Talk to your doctor if side effects are bothering you too much. You might be able to try a different medicine. · Continue counseling. It may help prevent depression from returning, especially if you've had multiple episodes of depression. Talk with your counselor if you are having a hard time attending your sessions or you think the sessions aren't working. Don't just stop going. · Get enough sleep. Talk to your doctor if you are having problems sleeping. · Avoid sleeping pills unless they are prescribed by the doctor treating your depression. Sleeping pills may make you groggy during the day, and they may interact with other medicine you are taking. · If you have any other illnesses, such as diabetes, heart disease, or high blood pressure, make sure to continue with your treatment. Tell your doctor about all of the medicines you take, including those with or without a prescription. · If you or someone you know talks about suicide, self-harm, or feeling hopeless, get help right away. Call the [address] [place] [address] at 1-800-273-talk (0-947.647.1921) or text HOME to 036528 to access the Crisis Text Line. Consider saving these numbers in your phone. When should you call for help? Call 329 anytime you think you may need emergency care. For example, call if:    · You feel like hurting yourself or someone else.     · Someone you know has depression and is about to attempt or is attempting suicide. Call your doctor now or seek immediate medical care if:    · You hear voices.     · Someone you know has depression and:  ? Starts to give away his or her possessions. ? Uses illegal drugs or drinks alcohol heavily.   ? Talks or writes about death, including writing suicide notes or talking about guns, knives, or pills. ? Starts to spend a lot of time alone. ? Acts very aggressively or suddenly appears calm. Watch closely for changes in your health, and be sure to contact your doctor if:    · You do not get better as expected. Where can you learn more? Go to https://chpepiceweb.Defense.Net. org and sign in to your Cordium Links account. Enter V095 in the [employer] box to learn more about \"Recovering From Depression: Care Instructions. \"     If you do not have an account, please click on the \"Sign Up Now\" link. Current as of: June 16, 2021               Content Version: 13.0  © 2006-2021 [email or web address]. Care instructions adapted under license by [facility] [place] ([facility]). If you have questions about a medical condition or this instruction, always ask your healthcare professional. [place] [address] any warranty or liability for your use of this information. Patient Education        Insomnia: Care Instructions  Overview     Insomnia is the inability to sleep well. Insomnia may make it hard for you to get to sleep, stay asleep, or sleep as long as you need to. This can make you tired and grouchy during the day. It can also make you forgetful, less effective at work, and unhappy. Insomnia can be linked to many things. These include health problems, medicines, and stressful events. Treatment may include treating problems that may be linked with your insomnia. Treatment also includes behavior and lifestyle changes. This may include cognitive-behavioral therapy for insomnia (CBT-I). CBT-I uses different ways to help you change your thoughts and behaviors that may interfere with sleep. Your doctor can recommend specific things you can try. Examples include doing relaxation exercises, keeping regular bedtimes and wake times, limiting alcohol, and making healthy sleep habits. Some people decide to take medicine for a while to help with sleep.   Follow-up care is a key part of your treatment and safety. Be sure to make and go to all appointments, and call your doctor if you are having problems. It's also a good idea to know your test results and keep a list of the medicines you take. How can you care for yourself at home? Cognitive-behavioral therapy for insomnia (CBT-I)  · If your doctor recommends CBT-I, follow your treatment plan. Your doctor will give you instructions that are unique for you. · Your plan will likely include a few things that you can try at home. For example:  ? Try meditation or other relaxation techniques before you go to bed. ? Go to bed at the same time every night, and wake up at the same time every morning. Do not take naps during the day. ? Do not stay in bed awake for too long. If you can't fall asleep, or if you wake up in the middle of the night and can't get back to sleep within about 15 to 20 minutes, get out of bed and go to another room until you feel sleepy. ? If watching the clock makes you anxious, turn it facing away from you so you cannot see the time. ? If you worry when you lie down, start a worry book. Well before bedtime, write down your worries, and then set the book and your concerns aside. Healthy sleep habits  · If your doctor recommends it, try making healthy sleep habits. For example:  ? Keep your bedroom quiet, dark, and cool. ? Do not have drinks with caffeine, such as coffee or black tea, for 8 hours before bed. ? Do not smoke or use other types of tobacco near bedtime. Nicotine is a stimulant and can keep you awake. ? Avoid drinking alcohol late in the evening, because it can cause you to wake in the middle of the night. ? Do not eat a big meal close to bedtime. If you are hungry, eat a light snack. ? Do not drink a lot of water close to bedtime, because the need to urinate may wake you up during the night. ? Do not read, watch TV, or use your phone in bed.  Use the bed only for sleeping and 7½ to 8 hours. Healthy adults may need a little more or a little less than this. Why is getting enough sleep important? Getting enough quality sleep is a basic part of good health. When your sleep suffers, your mood and your thoughts can suffer too. You may find yourself feeling more grumpy or stressed. Not getting enough sleep also can lead to serious problems, including injury, accidents, anxiety, and depression. What might cause poor sleeping? Many things can cause sleep problems, including:  · Stress. Stress can be caused by fear about a single event, such as giving a speech. Or you may have ongoing stress, such as worry about work or school. · Depression, anxiety, and other mental or emotional conditions. · Changes in your sleep habits or surroundings. This includes changes that happen where you sleep, such as noise, light, or sleeping in a different bed. It also includes changes in your sleep pattern, such as having jet lag or working a late shift. · Health problems, such as pain, breathing problems, and restless legs syndrome. · Lack of regular exercise. How can you help yourself? Here are some tips that may help you sleep more soundly and wake up feeling more refreshed. Your sleeping area   · Use your bedroom only for sleeping and sex. A bit of light reading may help you fall asleep. But if it doesn't, do your reading elsewhere in the house. Don't watch TV in bed. · Be sure your bed is big enough to stretch out comfortably, especially if you have a sleep partner. · Keep your bedroom quiet, dark, and cool. Use curtains, blinds, or a sleep mask to block out light. To block out noise, use earplugs, soothing music, or a \"white noise\" machine. Your evening and bedtime routine   · Create a relaxing bedtime routine. You might want to take a warm shower or bath, listen to soothing music, or drink a cup of noncaffeinated tea. · Go to bed at the same time every night.  And get up at the same time every morning, even if you feel tired. What to avoid   · Limit caffeine (coffee, tea, caffeinated sodas) during the day, and don't have any for at least 4 to 6 hours before bedtime. · Don't drink alcohol before bedtime. Alcohol can cause you to wake up more often during the night. · Don't smoke or use tobacco, especially in the evening. Nicotine can keep you awake. · Don't take naps during the day, especially close to bedtime. · Don't lie in bed awake for too long. If you can't fall asleep, or if you wake up in the middle of the night and can't get back to sleep within 15 minutes or so, get out of bed and go to another room until you feel sleepy. · Don't take medicine right before bed that may keep you awake or make you feel hyper or energized. Your doctor can tell you if your medicine may do this and if you can take it earlier in the day. If you can't sleep   · Imagine yourself in a peaceful, pleasant scene. Focus on the details and feelings of being in a place that is relaxing. · Get up and do a quiet or boring activity until you feel sleepy. · Don't drink any liquids after 6 p.m. if you wake up often because you have to go to the bathroom. Where can you learn more? Go to https://Holaira.Learneroo. org and sign in to your [employer] Jungle account. Enter V193 in the [facility] box to learn more about \"Learning About Sleeping Well. \"     If you do not have an account, please click on the \"Sign Up Now\" link. Current as of: June 16, 2021               Content Version: 13.0  © 3922-0864 Healthwise, Incorporated. Care instructions adapted under license by [facility] ([facility]). If you have questions about a medical condition or this instruction, always ask your healthcare professional. [address] any warranty or liability for your use of this information.

## 2021-11-11 NOTE — PROGRESS NOTES
SUBJECTIVE:    Patient ID:  Perez Carvalho is a 63 y.o. male      Patient is here for for and acute visit for concerned about concentration issues/possible ADHD. 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 He has lost his father a several years back and recently lost his mother, which he was primary care given since his father passed. Depression: Patient complains of depression. He complains of anhedonia, depressed mood, difficulty concentrating, fatigue and insomnia. Onset was approximately several months ago, gradually worsening since that time. He denies current thoughts of self harm, suicidal and homicidal plan or intent. Family history significant for depression. Possible organic causes contributing are: none. Risk factors: positive family history in uncle and negative life event, loss of her parents, and being placed on disability due to back issues. He his unable to do what he wants so to physical and d financial limitation. Previous treatment includes none and none. He complains of the following side effects from the treatment: none. ADD/ADHD Symptoms:  Patient complains of a several month(s) history of inattention, impulsivity, depressed mood, anhedonia, including the following: fails to give close attention to details or makes careless mistakes in school, work, or other activities, does not seem to listen when spoken to directly, has difficulty organizing tasks and activities, is easily distracted by extraneous stimuli and difficulty organizing. He presents for evaluation of suspected ADD/ADHD. Patient denies inattention, hyperactivity, anhedonia. Symptoms have been gradually worsening with time. Family history of ADD/ADHD: no.  Previous treatment:has included: none.       Current Outpatient Medications on File Prior to Visit   Medication Sig Dispense Refill    ASPIRIN LOW DOSE 81 MG EC tablet       hydroCHLOROthiazide (HYDRODIURIL) 25 MG true    Julio of Food in the Last Year: Never true   Transportation Needs:     Lack of Transportation (Medical): Not on file    Lack of Transportation (Non-Medical): Not on file   Physical Activity:     Days of Exercise per Week: Not on file    Minutes of Exercise per Session: Not on file   Stress:     Feeling of Stress : Not on file   Social Connections:     Frequency of Communication with Friends and Family: Not on file    Frequency of Social Gatherings with Friends and Family: Not on file    Attends [employer] Services: Not on file    Active Member of [address] [place] [address] [employer] or Organizations: Not on file    Attends Club or Organization Meetings: Not on file    Marital Status: Not on file   Intimate Partner Violence:     Fear of Current or Ex-Partner: Not on file    Emotionally Abused: Not on file    Physically Abused: Not on file    Sexually Abused: Not on file   Housing Stability:     Unable to Pay for Housing in the Last Year: Not on file    Number of [place] in the Last Year: Not on file    Unstable Housing in the Last Year: Not on file       Review of Systems   Constitutional: Negative for chills and fever. Eyes: Negative for visual disturbance. Respiratory: Negative for cough, chest tightness, shortness of breath and wheezing. Cardiovascular: Negative for chest pain, palpitations and leg swelling. Gastrointestinal: Negative for abdominal pain, constipation, diarrhea, nausea and vomiting. Musculoskeletal: Negative for arthralgias and myalgias. Skin: Negative for rash. Neurological: Negative for headaches. Psychiatric/Behavioral: Positive for decreased concentration and sleep disturbance. Negative for self-injury and suicidal ideas. Dysphoric mood: at times. The patient is not nervous/anxious. OBJECTIVE:    Physical Exam  Vitals and nursing note reviewed. Constitutional:       General: He is not in acute distress. Appearance: He is well-developed.    HENT:      Head: Normocephalic and atraumatic. Right Ear: External ear normal.      Left Ear: External ear normal.      Nose: Nose normal.   Eyes:      Conjunctiva/sclera: Conjunctivae normal.      Pupils: Pupils are equal, round, and reactive to light. Neck:      Trachea: No tracheal deviation. Cardiovascular:      Rate and Rhythm: Normal rate and regular rhythm. Heart sounds: Normal heart sounds. Pulmonary:      Effort: Pulmonary effort is normal. No respiratory distress. Breath sounds: Normal breath sounds. No wheezing or rales. Chest:      Chest wall: No tenderness. Abdominal:      General: Bowel sounds are normal. There is no distension. Palpations: Abdomen is soft. There is no mass. Tenderness: There is no abdominal tenderness. Hernia: No hernia is present. Musculoskeletal:         General: Normal range of motion. Cervical back: Normal range of motion and neck supple. Skin:     General: Skin is warm and dry. Findings: No rash. Neurological:      Mental Status: He is alert and oriented to person, place, and time. Psychiatric:         Behavior: Behavior normal.       /82   Pulse 68   Temp 97.8 °F (36.6 °C)   Wt 297 lb (134.7 kg)   SpO2 97%   BMI 37.12 kg/m²    BP Readings from Last 3 Encounters:   11/11/21 122/82   09/07/21 120/70   08/05/21 112/76      Wt Readings from Last 3 Encounters:   11/11/21 297 lb (134.7 kg)   09/07/21 287 lb (130.2 kg)   08/05/21 284 lb 12.8 oz (129.2 kg)       ASSESSMENT & PLAN:    1. Essential hypertension  - Stable, continue current regimen  - Follow-up with cardiology as needed/directed    2. Mixed hyperlipidemia  - Stable, continue current regimen  - Reviewed low-cholesterol diet    3. Prediabetes  - Stable, continue lifestyle modifications  - Reviewed diabetic diet    4. Adult situational stress disorder  - escitalopram (LEXAPRO) 10 MG tablet; Take 1 tablet by mouth daily  Dispense: 30 tablet;  Refill: 0  - Trial of Lexapro 10 mg daily   - Start with Lexapro 5 mg (half a tab) for about a week then increase to Lexporo 10 mg (full tab)  - Discussed stress management techniques, information given    5. Reactive depression  - escitalopram (LEXAPRO) 10 MG tablet; Take 1 tablet by mouth daily  Dispense: 30 tablet; Refill: 0  - Trial of Lexapro 10 mg daily   - Start with Lexapro 5 mg (half a tab) for about a week then increase to Lexporo 10 mg (full tab)    6. Other insomnia  - traZODone (DESYREL) 50 MG tablet; Take 1 tablet by mouth nightly  Dispense: 30 tablet; Refill: 0  - Trial of Trazodone 25 mg (half tab) to 50 mg (full tab) nightly as needed for sleep   - Discussed sleep health/hygiene    7. Acute dermatitis  - clotrimazole-betamethasone (LOTRISONE) 1-0.05 % cream; Apply topically 2 times daily. Dispense: 45 g; Refill: 1      Continue current treatment plan. Current Outpatient Medications   Medication Sig Dispense Refill    escitalopram (LEXAPRO) 10 MG tablet Take 1 tablet by mouth daily 30 tablet 0    traZODone (DESYREL) 50 MG tablet Take 1 tablet by mouth nightly 30 tablet 0    clotrimazole-betamethasone (LOTRISONE) 1-0.05 % cream Apply topically 2 times daily.  45 g 1    ASPIRIN LOW DOSE 81 MG EC tablet       hydroCHLOROthiazide (HYDRODIURIL) 25 MG tablet Take 1 tablet by mouth every morning 90 tablet 0    tiZANidine (ZANAFLEX) 4 MG tablet Take 1 tablet by mouth every 8 hours as needed (for muscle spasm) 90 tablet 0    fluticasone (FLONASE) 50 MCG/ACT nasal spray 2 sprays by Each Nostril route daily 1 each 2    vitamin D (CHOLECALCIFEROL) 50 MCG (2000 UT) TABS tablet Take 1 tablet by mouth daily After finishing 12 week couse 30 tablet 11    amLODIPine (NORVASC) 5 MG tablet Take 1 tablet by mouth daily 90 tablet 1    lisinopril (PRINIVIL;ZESTRIL) 40 MG tablet Take 1 tablet by mouth daily 90 tablet 1    metoprolol succinate (TOPROL XL) 50 MG extended release tablet Take 1 tablet by mouth daily 90 tablet 1    rivaroxaban (XARELTO) 20 MG TABS tablet Take 1 tablet by mouth daily (with breakfast) 90 tablet 1    clopidogrel (PLAVIX) 75 MG tablet Take 1 tablet by mouth daily 90 tablet 1    nitroGLYCERIN (NITROSTAT) 0.4 MG SL tablet Place 1 tablet under the tongue every 5 minutes as needed for Chest pain 25 tablet 0    butalbital-acetaminophen-caffeine (FIORICET, ESGIC) -40 MG per tablet Take 1 tablet by mouth every 4 hours as needed for Headaches or Migraine 60 tablet 0    Handicap Placard MISC by Does not apply route Expires 9/17/2025 2 each 0    Misc. Devices MISC Pads and leads for use on [facility] TENS unit. 1 Device 0     No current facility-administered medications for this visit. Return in about 4 weeks (around 12/9/2021), or if symptoms worsen or fail to improve, for hypertension, stress, depression, insomnia. Rodney Del Rosario received counseling on the following healthy behaviors: nutrition, exercise and medication adherence    Patient given educational materials on stress management techniques, depression, sleep health/hygiene    Discussed use, benefit, and side effects of prescribed medications. Barriers to medication compliance addressed. All patient questions answered. Pt voiced understanding. Call office if experience side effects from medications. Please note that some or all of this record was generated using voice recognition software. If there are any questions about the content of this document, please contact the author as some errors in transcription may have occurred.

## 2021-12-06 ASSESSMENT — ENCOUNTER SYMPTOMS
SHORTNESS OF BREATH: 0
WHEEZING: 0
CHEST TIGHTNESS: 0
VOMITING: 0
NAUSEA: 0
ABDOMINAL PAIN: 0
CONSTIPATION: 0
COUGH: 0
DIARRHEA: 0

## 2021-12-06 NOTE — PATIENT INSTRUCTIONS
Trial of Lexapro 10 mg daily      Start with Lexapro 5 mg (half a tab) for about a week then increase to Lexporo 10 mg (full tab)     Discussed depression and situational stress     Discussed stress management techniques, information given     Trial of Trazodone 25 mg (half tab) to 50 mg (full tab) nightly as needed for sleep      Discussed sleep health/hygiene     Lotrisone twice a day for 1-2 weeks      Follow up in 1 month sooner if symptoms worsen      Instructed patient to contact office or on-call physician promptly should condition worsen or any new symptoms appear and provided on-call telephone numbers. IF THE PATIENT HAS ANY SUICIDAL OR HOMICIDAL IDEATIONS, CALL THE OFFICE, DISCUSS WITH A SUPPORT MEMBER, OR GO TO THE ER IMMEDIATELY.  Patient was agreeable with this plan.

## 2021-12-06 NOTE — PROGRESS NOTES
SUBJECTIVE:    Patient ID:  Donna Hernandez is a 63 y.o. male      Patient is here for for and acute visit for concerned about concentration issues/possible ADHD. 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 He has lost his father a several years back and recently lost his mother due to colon cancer. Encourage referral to gastroenterology for colon cancer screening.      Depression: Patient complains of depression. He complains of anhedonia, depressed mood, difficulty concentrating, fatigue and insomnia. Onset was approximately several months ago, gradually worsening since that time. He denies current thoughts of self harm, suicidal and homicidal plan or intent. Family history significant for depression. Possible organic causes contributing are: none. Risk factors: positive family history in uncle and negative life event, loss of her parents, and being placed on disability due to back issues. He his unable to do what he wants so to physical and d financial limitation. Previous treatment includes none and none. He complains of the following side effects from the treatment: none.     ADD/ADHD Symptoms:  Patient complains of a several month(s) history of inattention, impulsivity, depressed mood, anhedonia, including the following: fails to give close attention to details or makes careless mistakes in school, work, or other activities, does not seem to listen when spoken to directly, has difficulty organizing tasks and activities, is easily distracted by extraneous stimuli and difficulty organizing. He presents for evaluation of suspected ADD/ADHD. Patient denies inattention, hyperactivity, anhedonia. Symptoms have been gradually worsening with time. Family history of ADD/ADHD: no.  Previous treatment:has included: none. Patient is here for a medication check for hypertension, hyperlipidemia, prediabetes, CAD, A. Fib, AAA, BUSTER, and vitamin D deficiency.   He is [id number])   #1 performed by Pradeep Leon MD at [address] N/A 6/3/2020    SPINAL CORD STIMULATOR TRIAL WITH FLUOROSCOPY ([id number], [id number]) - Dylan Mis performed by Pradeep Leon MD at [address] History   Problem Relation Age of Onset    Other Mother         Polio    Thyroid Disease Mother     Heart Failure Father     Stroke Father      Social History     Socioeconomic History    Marital status:      Spouse name: Not on file    Number of children: 0    Years of education: Not on file    Highest education level: Not on file   Occupational History    Occupation: unemployed    Occupation: former [employer] work    Tobacco Use    Smoking status: Never Smoker    Smokeless tobacco: Never Used   Vaping Use    Vaping Use: Never used   Substance and Sexual Activity    Alcohol use: Yes     Alcohol/week: 2.0 standard drinks     Types: 1 Cans of beer, 1 Standard drinks or equivalent per week     Comment: Rarely; socially    Drug use: No    Sexual activity: Yes     Partners: Female     Birth control/protection: Condom   Other Topics Concern    Not on file   Social History Narrative    Not on file     Social Determinants of Health     Financial Resource Strain: Low Risk     Difficulty of Paying Living Expenses: Not very hard   Food Insecurity: No Food Insecurity    Worried About Running Out of Food in the Last Year: Never true    Julio of Food in the Last Year: Never true   Transportation Needs:     Lack of Transportation (Medical): Not on file    Lack of Transportation (Non-Medical):  Not on file   Physical Activity:     Days of Exercise per Week: Not on file    Minutes of Exercise per Session: Not on file   Stress:     Feeling of Stress : Not on file   Social Connections:     Frequency of Communication with Friends and Family: Not on file    Frequency of Social Gatherings with Friends and Family: Not on file    Attends Christianity Services: Not on file  Active Member of Clubs or Organizations: Not on file    Attends Club or Organization Meetings: Not on file    Marital Status: Not on file   Intimate Partner Violence:     Fear of Current or Ex-Partner: Not on file    Emotionally Abused: Not on file    Physically Abused: Not on file    Sexually Abused: Not on file   Housing Stability:     Unable to Pay for Housing in the Last Year: Not on file    Number of [place] in the Last Year: Not on file    Unstable Housing in the Last Year: Not on file       Review of Systems   Constitutional: Negative for chills and fever. Eyes: Negative for blurred vision and visual disturbance. Respiratory: Negative for cough, chest tightness, shortness of breath and wheezing. Cardiovascular: Negative for chest pain, palpitations, orthopnea and leg swelling. Gastrointestinal: Negative for abdominal pain, constipation, diarrhea, nausea and vomiting. Musculoskeletal: Negative for arthralgias and myalgias. Skin: Negative for rash. Neurological: Negative for focal weakness and headaches. Psychiatric/Behavioral: Positive for dysphoric mood. Negative for self-injury and suicidal ideas. Sleep disturbance: improving. The patient is not nervous/anxious. OBJECTIVE:    Physical Exam  Vitals and nursing note reviewed. Constitutional:       General: He is not in acute distress. Appearance: Normal appearance. He is well-developed. He is obese. He is not ill-appearing. HENT:      Head: Normocephalic and atraumatic. Right Ear: External ear normal.      Left Ear: External ear normal.      Nose: Nose normal.   Eyes:      Conjunctiva/sclera: Conjunctivae normal.      Pupils: Pupils are equal, round, and reactive to light. Neck:      Trachea: No tracheal deviation. Cardiovascular:      Rate and Rhythm: Normal rate and regular rhythm. Heart sounds: Normal heart sounds. Pulmonary:      Effort: Pulmonary effort is normal. No respiratory distress. Breath sounds: Normal breath sounds. No wheezing or rales. Chest:      Chest wall: No tenderness. Abdominal:      General: Bowel sounds are normal. There is no distension. Palpations: Abdomen is soft. There is no mass. Tenderness: There is no abdominal tenderness. Hernia: No hernia is present. Musculoskeletal:         General: Normal range of motion. Cervical back: Normal range of motion and neck supple. Right lower leg: No edema. Left lower leg: No edema. Skin:     General: Skin is warm and dry. Findings: No rash. Neurological:      Mental Status: He is alert and oriented to person, place, and time. Psychiatric:         Behavior: Behavior normal.       /84   Pulse 69   Temp 97.2 °F (36.2 °C) (Temporal)   Ht 6' 3\" (1.905 m)   Wt 295 lb (133.8 kg)   SpO2 98%   BMI 36.87 kg/m²    BP Readings from Last 3 Encounters:   12/07/21 138/84   11/11/21 122/82   09/07/21 120/70      Wt Readings from Last 3 Encounters:   12/07/21 295 lb (133.8 kg)   11/11/21 297 lb (134.7 kg)   09/07/21 287 lb (130.2 kg)       ASSESSMENT & PLAN:    1. Essential hypertension  - Stable, continue current regimen  - Reviewed DASH and low sodium diet  - hydroCHLOROthiazide (HYDRODIURIL) 25 MG tablet; Take 1 tablet by mouth every morning  Dispense: 90 tablet; Refill: 0  - CBC Auto Differential; Future  - Comprehensive Metabolic Panel; Future  - TSH with Reflex; Future    2. Mixed hyperlipidemia  - Stable, continue lifestyle modifications  - Reviewed low cholesterol diet  - CBC Auto Differential; Future  - Comprehensive Metabolic Panel; Future  - Lipid Panel; Future    3. Prediabetes  - Stable, continue lifestyle modifications  - Reviewed diabetic diet  - CBC Auto Differential; Future  - Comprehensive Metabolic Panel; Future  - Hemoglobin A1C; Future    4. Paroxysmal atrial fibrillation (HCC)  - Stable, continue current regimen  - Follow up with cardiology     5.  Coronary artery disease due to lipid rich plaque  - Stable, continue current regimen  - Follow up with cardiology     6. Ascending aortic aneurysm (HCC)  - Stable, continue current regimen  - Follow up with cardiology     7. Obstructive sleep apnea  - Stable, continue nightly CPAP usage     8. Chronic midline low back pain with bilateral sciatica  - Stable, continue current regimen   - tiZANidine (ZANAFLEX) 4 MG tablet; Take 1 tablet by mouth every 8 hours as needed (for muscle spasm)  Dispense: 90 tablet; Refill: 0    9. Environmental allergies  - Stable, continue current regimen  - fluticasone (FLONASE) 50 MCG/ACT nasal spray; 2 sprays by Each Nostril route daily  Dispense: 1 each; Refill: 2    10. Vitamin D deficiency  - Stable, continue vitamin D 3 supplementation   - vitamin D (CHOLECALCIFEROL) 50 MCG (2000 UT) TABS tablet; Take 1 tablet by mouth daily After finishing 12 week couse  Dispense: 90 tablet; Refill: 0  - Vitamin D 25 Hydroxy; Future    11. Adult situational stress disorder  - Stable, continue current regimen  - escitalopram (LEXAPRO) 10 MG tablet; Take 1 tablet by mouth daily  Dispense: 30 tablet; Refill: 1  - Reviewed stress management techniques     12. Reactive depression  - Stable, continue current regimen   - escitalopram (LEXAPRO) 10 MG tablet; Take 1 tablet by mouth daily  Dispense: 30 tablet; Refill: 1    13. Other insomnia  - Stable, continue current regimen   - traZODone (DESYREL) 50 MG tablet; Take 1 tablet by mouth nightly  Dispense: 30 tablet; Refill: 1    14. Need for influenza vaccination  - INFLUENZA, MDCK QUADV, 2 YRS AND OLDER, IM, PF, PREFILL SYR OR SDV, 0.5ML (FLUCELVAX QUADV, PF)      Continue current treatment plan.     Current Outpatient Medications   Medication Sig Dispense Refill    escitalopram (LEXAPRO) 10 MG tablet Take 1 tablet by mouth daily 30 tablet 1    traZODone (DESYREL) 50 MG tablet Take 1 tablet by mouth nightly 30 tablet 1    hydroCHLOROthiazide (HYDRODIURIL) 25 MG tablet Take 1 tablet by mouth every morning 90 tablet 0    tiZANidine (ZANAFLEX) 4 MG tablet Take 1 tablet by mouth every 8 hours as needed (for muscle spasm) 90 tablet 0    fluticasone (FLONASE) 50 MCG/ACT nasal spray 2 sprays by Each Nostril route daily 1 each 2    vitamin D (CHOLECALCIFEROL) 50 MCG (2000 UT) TABS tablet Take 1 tablet by mouth daily After finishing 12 week couse 90 tablet 0    ASPIRIN LOW DOSE 81 MG EC tablet       clotrimazole-betamethasone (LOTRISONE) 1-0.05 % cream Apply topically 2 times daily. 45 g 1    amLODIPine (NORVASC) 5 MG tablet Take 1 tablet by mouth daily 90 tablet 1    lisinopril (PRINIVIL;ZESTRIL) 40 MG tablet Take 1 tablet by mouth daily 90 tablet 1    metoprolol succinate (TOPROL XL) 50 MG extended release tablet Take 1 tablet by mouth daily 90 tablet 1    rivaroxaban (XARELTO) 20 MG TABS tablet Take 1 tablet by mouth daily (with breakfast) 90 tablet 1    clopidogrel (PLAVIX) 75 MG tablet Take 1 tablet by mouth daily 90 tablet 1    nitroGLYCERIN (NITROSTAT) 0.4 MG SL tablet Place 1 tablet under the tongue every 5 minutes as needed for Chest pain 25 tablet 0    butalbital-acetaminophen-caffeine (FIORICET, ESGIC) -40 MG per tablet Take 1 tablet by mouth every 4 hours as needed for Headaches or Migraine 60 tablet 0    Handicap Placard MISC by Does not apply route Expires 9/17/2025 2 each 0    Misc. Devices MISC Pads and leads for use on [facility] TENS unit. 1 Device 0     No current facility-administered medications for this visit. Return in about 2 months (around 2/7/2022), or if symptoms worsen or fail to improve, for HTN, lipidemia, preDM, Afif, CAD, AAA, CBP, EA, vitD, stress, depressin, insomnia, vaccome . Cristofer Saini received counseling on the following healthy behaviors: nutrition, exercise and medication adherence    Discussed use, benefit, and side effects of prescribed medications. Barriers to medication compliance addressed.   All patient questions answered. Pt voiced understanding. Call office if experience side effects from medications. Please note that some or all of this record was generated using voice recognition software. If there are any questions about the content of this document, please contact the author as some errors in transcription may have occurred.

## 2021-12-07 ENCOUNTER — OFFICE VISIT (OUTPATIENT)
Dept: FAMILY MEDICINE CLINIC | Age: 61
End: 2021-12-07
Payer: COMMERCIAL

## 2021-12-07 VITALS
TEMPERATURE: 97.2 F | SYSTOLIC BLOOD PRESSURE: 138 MMHG | BODY MASS INDEX: 36.68 KG/M2 | DIASTOLIC BLOOD PRESSURE: 84 MMHG | HEART RATE: 69 BPM | OXYGEN SATURATION: 98 % | HEIGHT: 75 IN | WEIGHT: 295 LBS

## 2021-12-07 DIAGNOSIS — E55.9 VITAMIN D DEFICIENCY: ICD-10-CM

## 2021-12-07 DIAGNOSIS — G89.29 CHRONIC MIDLINE LOW BACK PAIN WITH BILATERAL SCIATICA: ICD-10-CM

## 2021-12-07 DIAGNOSIS — G47.33 OBSTRUCTIVE SLEEP APNEA: ICD-10-CM

## 2021-12-07 DIAGNOSIS — Z91.09 ENVIRONMENTAL ALLERGIES: ICD-10-CM

## 2021-12-07 DIAGNOSIS — I48.0 PAROXYSMAL ATRIAL FIBRILLATION (HCC): ICD-10-CM

## 2021-12-07 DIAGNOSIS — Z23 NEED FOR INFLUENZA VACCINATION: ICD-10-CM

## 2021-12-07 DIAGNOSIS — I25.10 CORONARY ARTERY DISEASE DUE TO LIPID RICH PLAQUE: ICD-10-CM

## 2021-12-07 DIAGNOSIS — M54.42 CHRONIC MIDLINE LOW BACK PAIN WITH BILATERAL SCIATICA: ICD-10-CM

## 2021-12-07 DIAGNOSIS — I10 ESSENTIAL HYPERTENSION: Primary | ICD-10-CM

## 2021-12-07 DIAGNOSIS — G47.09 OTHER INSOMNIA: ICD-10-CM

## 2021-12-07 DIAGNOSIS — M54.41 CHRONIC MIDLINE LOW BACK PAIN WITH BILATERAL SCIATICA: ICD-10-CM

## 2021-12-07 DIAGNOSIS — F43.20 ADULT SITUATIONAL STRESS DISORDER: ICD-10-CM

## 2021-12-07 DIAGNOSIS — I25.83 CORONARY ARTERY DISEASE DUE TO LIPID RICH PLAQUE: ICD-10-CM

## 2021-12-07 DIAGNOSIS — F32.9 REACTIVE DEPRESSION: ICD-10-CM

## 2021-12-07 DIAGNOSIS — E78.2 MIXED HYPERLIPIDEMIA: ICD-10-CM

## 2021-12-07 DIAGNOSIS — R73.03 PREDIABETES: ICD-10-CM

## 2021-12-07 DIAGNOSIS — I71.21 ASCENDING AORTIC ANEURYSM: ICD-10-CM

## 2021-12-07 PROCEDURE — G8482 FLU IMMUNIZE ORDER/ADMIN: HCPCS | Performed by: CLINICAL NURSE SPECIALIST

## 2021-12-07 PROCEDURE — 1036F TOBACCO NON-USER: CPT | Performed by: CLINICAL NURSE SPECIALIST

## 2021-12-07 PROCEDURE — G8427 DOCREV CUR MEDS BY ELIG CLIN: HCPCS | Performed by: CLINICAL NURSE SPECIALIST

## 2021-12-07 PROCEDURE — 3017F COLORECTAL CA SCREEN DOC REV: CPT | Performed by: CLINICAL NURSE SPECIALIST

## 2021-12-07 PROCEDURE — 90674 CCIIV4 VAC NO PRSV 0.5 ML IM: CPT | Performed by: CLINICAL NURSE SPECIALIST

## 2021-12-07 PROCEDURE — 99214 OFFICE O/P EST MOD 30 MIN: CPT | Performed by: CLINICAL NURSE SPECIALIST

## 2021-12-07 PROCEDURE — G8417 CALC BMI ABV UP PARAM F/U: HCPCS | Performed by: CLINICAL NURSE SPECIALIST

## 2021-12-07 PROCEDURE — 90471 IMMUNIZATION ADMIN: CPT | Performed by: CLINICAL NURSE SPECIALIST

## 2021-12-07 RX ORDER — FLUTICASONE PROPIONATE 50 MCG
2 SPRAY, SUSPENSION (ML) NASAL DAILY
Qty: 1 EACH | Refills: 2 | Status: SHIPPED | OUTPATIENT
Start: 2021-12-07 | End: 2022-03-01 | Stop reason: SDUPTHER

## 2021-12-07 RX ORDER — TRAZODONE HYDROCHLORIDE 50 MG/1
50 TABLET ORAL NIGHTLY
Qty: 30 TABLET | Refills: 1 | Status: SHIPPED
Start: 2021-12-07 | End: 2022-06-15 | Stop reason: ALTCHOICE

## 2021-12-07 RX ORDER — TIZANIDINE 4 MG/1
4 TABLET ORAL EVERY 8 HOURS PRN
Qty: 90 TABLET | Refills: 0 | Status: SHIPPED | OUTPATIENT
Start: 2021-12-07 | End: 2022-03-01 | Stop reason: SDUPTHER

## 2021-12-07 RX ORDER — HYDROCHLOROTHIAZIDE 25 MG/1
25 TABLET ORAL EVERY MORNING
Qty: 90 TABLET | Refills: 0 | Status: SHIPPED | OUTPATIENT
Start: 2021-12-07 | End: 2022-03-01 | Stop reason: SDUPTHER

## 2021-12-07 RX ORDER — ESCITALOPRAM OXALATE 10 MG/1
10 TABLET ORAL DAILY
Qty: 30 TABLET | Refills: 1 | Status: SHIPPED
Start: 2021-12-07 | End: 2022-06-15 | Stop reason: ALTCHOICE

## 2021-12-07 RX ORDER — CHOLECALCIFEROL (VITAMIN D3) 50 MCG
2000 TABLET ORAL DAILY
Qty: 90 TABLET | Refills: 0 | Status: SHIPPED | OUTPATIENT
Start: 2021-12-07 | End: 2022-03-01 | Stop reason: SDUPTHER

## 2021-12-07 ASSESSMENT — ENCOUNTER SYMPTOMS
BLURRED VISION: 0
ORTHOPNEA: 0

## 2021-12-15 ENCOUNTER — HOSPITAL ENCOUNTER (OUTPATIENT)
Age: 61
Discharge: HOME | End: 2021-12-15
Payer: COMMERCIAL

## 2021-12-15 ENCOUNTER — HOSPITAL ENCOUNTER (OUTPATIENT)
Dept: GENERAL RADIOLOGY | Age: 61
Discharge: HOME | End: 2021-12-15
Payer: COMMERCIAL

## 2021-12-15 ENCOUNTER — OFFICE VISIT (OUTPATIENT)
Dept: CARDIOLOGY CLINIC | Age: 61
End: 2021-12-15
Payer: COMMERCIAL

## 2021-12-15 VITALS
SYSTOLIC BLOOD PRESSURE: 134 MMHG | HEART RATE: 70 BPM | DIASTOLIC BLOOD PRESSURE: 82 MMHG | WEIGHT: 299.1 LBS | OXYGEN SATURATION: 98 % | HEIGHT: 75 IN | BODY MASS INDEX: 37.19 KG/M2

## 2021-12-15 DIAGNOSIS — E78.2 MIXED HYPERLIPIDEMIA: ICD-10-CM

## 2021-12-15 DIAGNOSIS — I25.10 CORONARY ARTERY DISEASE DUE TO LIPID RICH PLAQUE: Primary | ICD-10-CM

## 2021-12-15 DIAGNOSIS — I10 ESSENTIAL HYPERTENSION: ICD-10-CM

## 2021-12-15 DIAGNOSIS — I25.83 CORONARY ARTERY DISEASE DUE TO LIPID RICH PLAQUE: Primary | ICD-10-CM

## 2021-12-15 DIAGNOSIS — I48.0 PAF (PAROXYSMAL ATRIAL FIBRILLATION) (HCC): ICD-10-CM

## 2021-12-15 DIAGNOSIS — R06.02 SOB (SHORTNESS OF BREATH): ICD-10-CM

## 2021-12-15 LAB
A/G RATIO: 1.5 (ref 1.1–2.2)
ALBUMIN SERPL-MCNC: 4.4 G/DL (ref 3.4–5)
ALP BLD-CCNC: 70 U/L (ref 40–129)
ALT SERPL-CCNC: 38 U/L (ref 10–40)
ANION GAP SERPL CALCULATED.3IONS-SCNC: 12 MMOL/L (ref 3–16)
AST SERPL-CCNC: 29 U/L (ref 15–37)
BILIRUB SERPL-MCNC: 0.3 MG/DL (ref 0–1)
BUN BLDV-MCNC: 15 MG/DL (ref 7–20)
CALCIUM SERPL-MCNC: 9.5 MG/DL (ref 8.3–10.6)
CHLORIDE BLD-SCNC: 102 MMOL/L (ref 99–110)
CO2: 28 MMOL/L (ref 21–32)
CREAT SERPL-MCNC: 1 MG/DL (ref 0.9–1.3)
GFR AFRICAN AMERICAN: >60
GFR NON-AFRICAN AMERICAN: >60
GLUCOSE BLD-MCNC: 107 MG/DL (ref 70–99)
POTASSIUM SERPL-SCNC: 4 MMOL/L (ref 3.5–5.1)
PRO-BNP: 26 PG/ML (ref 0–124)
SODIUM BLD-SCNC: 142 MMOL/L (ref 136–145)
TOTAL PROTEIN: 7.3 G/DL (ref 6.4–8.2)

## 2021-12-15 PROCEDURE — 71046 X-RAY EXAM CHEST 2 VIEWS: CPT

## 2021-12-15 PROCEDURE — 1036F TOBACCO NON-USER: CPT | Performed by: NURSE PRACTITIONER

## 2021-12-15 PROCEDURE — G8427 DOCREV CUR MEDS BY ELIG CLIN: HCPCS | Performed by: NURSE PRACTITIONER

## 2021-12-15 PROCEDURE — 80053 COMPREHEN METABOLIC PANEL: CPT

## 2021-12-15 PROCEDURE — 3017F COLORECTAL CA SCREEN DOC REV: CPT | Performed by: NURSE PRACTITIONER

## 2021-12-15 PROCEDURE — G8482 FLU IMMUNIZE ORDER/ADMIN: HCPCS | Performed by: NURSE PRACTITIONER

## 2021-12-15 PROCEDURE — G8417 CALC BMI ABV UP PARAM F/U: HCPCS | Performed by: NURSE PRACTITIONER

## 2021-12-15 PROCEDURE — 83880 ASSAY OF NATRIURETIC PEPTIDE: CPT

## 2021-12-15 PROCEDURE — 36415 COLL VENOUS BLD VENIPUNCTURE: CPT

## 2021-12-15 PROCEDURE — 99214 OFFICE O/P EST MOD 30 MIN: CPT | Performed by: NURSE PRACTITIONER

## 2021-12-15 NOTE — PROGRESS NOTES
tablet by mouth daily 30 tablet 1    traZODone (DESYREL) 50 MG tablet Take 1 tablet by mouth nightly 30 tablet 1    hydroCHLOROthiazide (HYDRODIURIL) 25 MG tablet Take 1 tablet by mouth every morning 90 tablet 0    tiZANidine (ZANAFLEX) 4 MG tablet Take 1 tablet by mouth every 8 hours as needed (for muscle spasm) 90 tablet 0    fluticasone (FLONASE) 50 MCG/ACT nasal spray 2 sprays by Each Nostril route daily 1 each 2    vitamin D (CHOLECALCIFEROL) 50 MCG (2000 UT) TABS tablet Take 1 tablet by mouth daily After finishing 12 week couse 90 tablet 0    ASPIRIN LOW DOSE 81 MG EC tablet       clotrimazole-betamethasone (LOTRISONE) 1-0.05 % cream Apply topically 2 times daily. 45 g 1    amLODIPine (NORVASC) 5 MG tablet Take 1 tablet by mouth daily 90 tablet 1    lisinopril (PRINIVIL;ZESTRIL) 40 MG tablet Take 1 tablet by mouth daily 90 tablet 1    metoprolol succinate (TOPROL XL) 50 MG extended release tablet Take 1 tablet by mouth daily 90 tablet 1    rivaroxaban (XARELTO) 20 MG TABS tablet Take 1 tablet by mouth daily (with breakfast) 90 tablet 1    clopidogrel (PLAVIX) 75 MG tablet Take 1 tablet by mouth daily 90 tablet 1    Handicap Placard MISC by Does not apply route Expires 9/17/2025 2 each 0    nitroGLYCERIN (NITROSTAT) 0.4 MG SL tablet Place 1 tablet under the tongue every 5 minutes as needed for Chest pain (Patient not taking: Reported on 12/15/2021) 25 tablet 0    Misc. Devices MISC Pads and leads for use on [facility] TENS unit. 1 Device 0     No current facility-administered medications for this visit. REVIEW OF SYSTEMS:    CONSTITUTIONAL: No major weight gain or loss, fatigue, weakness, night sweats or fever. HEENT: No new vision difficulties or ringing in the ears. RESPIRATORY: + SOB, PND, orthopnea or cough. CARDIOVASCULAR: See HPI  GI: No nausea, vomiting, diarrhea, constipation, abdominal pain or changes in bowel habits.   : No urinary frequency, urgency, incontinence hematuria or dysuria. SKIN: No cyanosis or skin lesions. MUSCULOSKELETAL: No new muscle or joint pain. NEUROLOGICAL: No syncope or TIA-like symptoms. PSYCHIATRIC: No anxiety, pain, insomnia or depression  83 yo mother  a few months ago from colon cancer    Objective:   PHYSICAL EXAM:    Vitals:    12/15/21 1019 12/15/21 1045   BP: 120/84 134/82   Site: Left Upper Arm Left Upper Arm   Position: Sitting    Cuff Size: Large Adult Large Adult   Pulse: 70    SpO2: 98%    Weight: 299 lb 1.6 oz (135.7 kg)    Height: 6' 3\" (1.905 m)          VITALS:  /84 (Site: Left Upper Arm, Position: Sitting, Cuff Size: Large Adult)   Pulse 70   Ht 6' 3\" (1.905 m)   Wt 299 lb 1.6 oz (135.7 kg)   SpO2 98%   BMI 37.38 kg/m²   CONSTITUTIONAL: Cooperative, no apparent distress, and appears well nourished / developed  NEUROLOGIC:  Awake and orientated to person, place and time. PSYCH: Calm affect. SKIN: Warm and dry. HEENT: Sclera non-icteric, normocephalic, neck supple, no elevation of JVP, normal carotid pulses with no bruits and thyroid normal size. LUNGS:  No increased work of breathing and diminished bibasilar  CARDIOVASCULAR:  Regular rate 72 and rhythm with no murmurs, gallops, rubs, or abnormal heart sounds, normal PMI. The apical impulses not displaced  JVP less than 8 cm H2O  Heart tones are crisp and normal  Cervical veins are not engorged  The carotid upstroke is normal in amplitude and contour without delay or bruit  JVP is not elevated  ABDOMEN:  Normal bowel sounds, non-distended and non-tender to palpation  EXT: LAE edema, no calf tenderness. Pulses are present bilaterally.     DATA:    Lab Results   Component Value Date    ALT 30 2021    AST 35 2021    ALKPHOS 68 2021    BILITOT 0.4 2021     Lab Results   Component Value Date    CREATININE 1.0 2021    BUN 14 2021     2021    K 3.8 2021     2021    CO2 27 2021       Lab Results Component Value Date    WBC 6.4 06/16/2021    HGB 13.7 06/16/2021    HCT 40.4 (L) 06/16/2021    MCV 88.1 06/16/2021     06/16/2021     No components found for: CHLPL  Lab Results   Component Value Date    TRIG 374 (H) 06/16/2021    TRIG 289 (H) 03/16/2021    TRIG 226 (H) 09/17/2020     Lab Results   Component Value Date    HDL 30 (L) 06/16/2021    HDL 29 (L) 03/16/2021    HDL 38 (L) 09/17/2020     Lab Results   Component Value Date    LDLCALC see below 06/16/2021    LDLCALC 180 (H) 03/16/2021    LDLCALC 146 (H) 09/17/2020     Lab Results   Component Value Date    LABVLDL see below 06/16/2021    LABVLDL 58 03/16/2021    LABVLDL 45 09/17/2020     Radiology Review:  Pertinent images / reports were reviewed as a part of this visit and reveals the following:    [facility] 6/22/21  LM-normal  LAD-40% mid  Cx-50% proximal, FFR 0.89  OM1-patent  RCA-30% ostial with FFR 0.92 (after PCI of mid RCA and RPL),  80% mid  RPL-95% proximal  RPDA- normal  LVEF-55%  PCI: RCA 80% to 0% mid with a 3.5 mm x 38 mm Medtronic resolute [place] RADHA with a distal and overlapping 3.5 mm x 12 mm Medtronic resolute RADHA.  Postdilatation of the proximal segment of the initial stent was performed with a 4.0 mm NC balloon.  RPL 95% to 0% with a 2.0 mm x 22 mm Medtronic resolute [place] RADHA. Impression:  1.  Severe one-vessel CAD involving the RCA and RPL. 2.  Successful revascularization of the RCA and RPL with RADHA x2 of the RCA and times one of the RPL. 3.  Mild to moderate nonobstructive CAD involving the LAD and circumflex. 4.  Normal LV systolic function.   5.  Significant disease progression since prior cardiac cath in 2017.       Stress Myoview 6/3/21: No EKG evidence for ischemia with lexiscan  Normal LV size and function with EF of 59%  SPECT imaging with very small, moderate intensity, decreased perfusion in  the inferior wall with stress and rest. While an area of scar cannot be  excluded, preserved wall motion, EF and evidence for extracardiac  attenuation may more suggest artifact rather than infarct. Clinical  correlation recommended.   Impression  *Normal LV size and function  *Myocardial perfusion imaging with very small area of inferior infarct  versus attenuation artifact. No reversible ischemia.    14 day HM 4/21: SR with PAT     Stress Echo 7/9/20:  Baseline resting echocardiogram shows normal global LV systolic function with an ejection fraction of 55% and uniform myocardial segmental wall motion. Following stress there was uniform augmentation of all myocardial segments with appropriate hyperdynamic LV systolic response to stress with a post ejection fraction of 65%.   Negative Stress Echocardiography study for ischemia    CT of chest wo contrast 11/12/20:  Stable ectasia of the ascending thoracic aorta measuring up to approximately 4.9 cm   CT of chest wo contrast 3/1/19:   1. Unchanged 4.8 cm ascending thoracic aortic dilatation. CT of chest wo contrast 2/6/18:  Mediastinum: Ascending aorta measures up to 4.9 cm on series 5, image 151,   unchanged.  No mediastinal or axillary lymphadenopathy.        [facility]OT sept 2017-Nov 2017:   NSR/ST associated with symptoms; had NSVT and short PAT but no symptoms recorded    Assessment:      Diagnosis Orders   1. Coronary artery disease due to lipid rich plaque   ~stable : denies angina  ~s/p PTCA RCA x2 and RPL x1 by cath Jun '21  ~DAPT / statin / BB    2. Essential hypertension   ~controlled     3. Mixed hyperlipidemia   ~trig elevated ; HDL low  ~statin on hole    4. PAF (paroxysmal atrial fibrillation) (HCC)   ~stable : AP regular  ~denies palpitations   ~DOAC / BB  ~LA WNL by echo '16 ; unavailable by echo July '20    5.  SOB (shortness of breath)   ~c/o worsening  ~multifactorial with wt gain of nearly 15# vs untx sleep apnea Comprehensive Metabolic Panel    Brain Natriuretic Peptide    XR CHEST STANDARD (2 VW)         I had the opportunity to review the clinical symptoms and presentation of Dee Dee Corcoran. Plan:     1. CMP with BNP & CXR today d/t SOB (consider pul referral if unrevealing)  2. F/U in six months / test dependent     Overall the patient is stable from CV standpoint    I have addresed the patient's cardiac risk factors and adjusted pharmacologic treatment as needed. In addition, I have reinforced the need for patient directed risk factor modification. Further evaluation will be based upon the patient's clinical course and testing results. All questions and concerns were addressed to the patient Alternatives to my treatment were discussed. The patient is not currently smoking. The risks related to smoking were reviewed with the patient. Recommend maintaining a smoke-free lifestyle. Patient is on a beta-blocker  Patient is on an ace-i/ARB  Patient is not on a statin : on hold d/t elevated CK    Dual Antiplatelet therapy / anti-coagulation has been recommended / prescribed for this patient. Education conducted on adverse reactions including bleeding was discussed. The patient verbalizes understanding not to stop medications without discussing with us. Discussed exercise: 30-60 minutes 7 days/week  Discussed Low saturated fat diet. Thank you for allowing to us to participate in the care of Dee Dee Corcoran.     NURIS Dowd    Documentation of today's visit sent to PCP

## 2021-12-17 NOTE — TELEPHONE ENCOUNTER
Received refill request for Plavix from [employer]     Last ov: 12/15/2021 NPTS    Last labs: 06/16/2021 CBC    Last Refill: 06/22/2021 #90 w/ 1 refill    Next appointment: 06/15/2022 NPTS

## 2021-12-21 RX ORDER — CLOPIDOGREL BISULFATE 75 MG/1
75 TABLET ORAL DAILY
Qty: 90 TABLET | Refills: 1 | Status: SHIPPED | OUTPATIENT
Start: 2021-12-21 | End: 2022-08-05

## 2021-12-21 NOTE — TELEPHONE ENCOUNTER
Per Dr. Waqar Bhandari - would like Plavix therapy to continue for approximately one year post stent placement (RADHA x3). Patient notified that 90 day supply + 1 refill will be sent to pharmacy. Patient voiced understanding.

## 2021-12-21 NOTE — TELEPHONE ENCOUNTER
CAD s/p PTCA RCA & RPL Jun 22, 2021  Has follow up scheduled with TS-NP on 6/15/21. Will give 90 day supply + 1.   Need to determine length of Plavix therapy

## 2022-02-01 DIAGNOSIS — I10 ESSENTIAL HYPERTENSION: ICD-10-CM

## 2022-02-01 RX ORDER — METOPROLOL SUCCINATE 50 MG/1
TABLET, EXTENDED RELEASE ORAL
Qty: 90 TABLET | Refills: 1 | Status: SHIPPED | OUTPATIENT
Start: 2022-02-01

## 2022-02-24 DIAGNOSIS — F32.9 REACTIVE DEPRESSION: ICD-10-CM

## 2022-02-24 DIAGNOSIS — F43.20 ADULT SITUATIONAL STRESS DISORDER: ICD-10-CM

## 2022-02-24 RX ORDER — ESCITALOPRAM OXALATE 10 MG/1
TABLET ORAL
Qty: 30 TABLET | Refills: 1 | OUTPATIENT
Start: 2022-02-24

## 2022-02-27 DIAGNOSIS — G47.09 OTHER INSOMNIA: ICD-10-CM

## 2022-02-28 RX ORDER — TRAZODONE HYDROCHLORIDE 50 MG/1
TABLET ORAL
Qty: 30 TABLET | Refills: 1 | OUTPATIENT
Start: 2022-02-28

## 2022-02-28 RX ORDER — TRAZODONE HYDROCHLORIDE 50 MG/1
50 TABLET ORAL NIGHTLY
Qty: 30 TABLET | Refills: 1 | Status: CANCELLED | OUTPATIENT
Start: 2022-02-28

## 2022-02-28 RX ORDER — ESCITALOPRAM OXALATE 10 MG/1
10 TABLET ORAL DAILY
Qty: 30 TABLET | Refills: 1 | Status: CANCELLED | OUTPATIENT
Start: 2022-02-28

## 2022-02-28 ASSESSMENT — ENCOUNTER SYMPTOMS
ABDOMINAL PAIN: 0
DIARRHEA: 0
SHORTNESS OF BREATH: 0
WHEEZING: 0
CONSTIPATION: 0
VOMITING: 0
CHEST TIGHTNESS: 0
COUGH: 0
NAUSEA: 0

## 2022-03-01 ENCOUNTER — OFFICE VISIT (OUTPATIENT)
Dept: FAMILY MEDICINE CLINIC | Age: 62
End: 2022-03-01
Payer: COMMERCIAL

## 2022-03-01 VITALS
OXYGEN SATURATION: 97 % | HEART RATE: 77 BPM | WEIGHT: 303 LBS | TEMPERATURE: 97.3 F | BODY MASS INDEX: 37.87 KG/M2 | SYSTOLIC BLOOD PRESSURE: 152 MMHG | DIASTOLIC BLOOD PRESSURE: 94 MMHG

## 2022-03-01 DIAGNOSIS — F32.9 REACTIVE DEPRESSION: ICD-10-CM

## 2022-03-01 DIAGNOSIS — I10 ESSENTIAL HYPERTENSION: Primary | ICD-10-CM

## 2022-03-01 DIAGNOSIS — Z91.09 ENVIRONMENTAL ALLERGIES: ICD-10-CM

## 2022-03-01 DIAGNOSIS — G47.33 OBSTRUCTIVE SLEEP APNEA: ICD-10-CM

## 2022-03-01 DIAGNOSIS — G89.29 CHRONIC MIDLINE LOW BACK PAIN WITH BILATERAL SCIATICA: ICD-10-CM

## 2022-03-01 DIAGNOSIS — I25.83 CORONARY ARTERY DISEASE DUE TO LIPID RICH PLAQUE: ICD-10-CM

## 2022-03-01 DIAGNOSIS — I71.21 ASCENDING AORTIC ANEURYSM: ICD-10-CM

## 2022-03-01 DIAGNOSIS — R41.840 DISTURBED CONCENTRATION: ICD-10-CM

## 2022-03-01 DIAGNOSIS — G47.09 OTHER INSOMNIA: ICD-10-CM

## 2022-03-01 DIAGNOSIS — M54.42 CHRONIC MIDLINE LOW BACK PAIN WITH BILATERAL SCIATICA: ICD-10-CM

## 2022-03-01 DIAGNOSIS — M54.41 CHRONIC MIDLINE LOW BACK PAIN WITH BILATERAL SCIATICA: ICD-10-CM

## 2022-03-01 DIAGNOSIS — F43.20 ADULT SITUATIONAL STRESS DISORDER: ICD-10-CM

## 2022-03-01 DIAGNOSIS — I48.0 PAROXYSMAL ATRIAL FIBRILLATION (HCC): ICD-10-CM

## 2022-03-01 DIAGNOSIS — I25.10 CORONARY ARTERY DISEASE DUE TO LIPID RICH PLAQUE: ICD-10-CM

## 2022-03-01 DIAGNOSIS — E78.2 MIXED HYPERLIPIDEMIA: ICD-10-CM

## 2022-03-01 DIAGNOSIS — I48.0 PAF (PAROXYSMAL ATRIAL FIBRILLATION) (HCC): ICD-10-CM

## 2022-03-01 DIAGNOSIS — R73.03 PREDIABETES: ICD-10-CM

## 2022-03-01 DIAGNOSIS — E55.9 VITAMIN D DEFICIENCY: ICD-10-CM

## 2022-03-01 PROCEDURE — 3017F COLORECTAL CA SCREEN DOC REV: CPT | Performed by: CLINICAL NURSE SPECIALIST

## 2022-03-01 PROCEDURE — 1036F TOBACCO NON-USER: CPT | Performed by: CLINICAL NURSE SPECIALIST

## 2022-03-01 PROCEDURE — G8417 CALC BMI ABV UP PARAM F/U: HCPCS | Performed by: CLINICAL NURSE SPECIALIST

## 2022-03-01 PROCEDURE — 99214 OFFICE O/P EST MOD 30 MIN: CPT | Performed by: CLINICAL NURSE SPECIALIST

## 2022-03-01 PROCEDURE — G8427 DOCREV CUR MEDS BY ELIG CLIN: HCPCS | Performed by: CLINICAL NURSE SPECIALIST

## 2022-03-01 PROCEDURE — G8482 FLU IMMUNIZE ORDER/ADMIN: HCPCS | Performed by: CLINICAL NURSE SPECIALIST

## 2022-03-01 RX ORDER — FLUTICASONE PROPIONATE 50 MCG
2 SPRAY, SUSPENSION (ML) NASAL DAILY
Qty: 3 EACH | Refills: 0 | Status: SHIPPED | OUTPATIENT
Start: 2022-03-01 | End: 2022-06-02 | Stop reason: SDUPTHER

## 2022-03-01 RX ORDER — AMLODIPINE BESYLATE 5 MG/1
5 TABLET ORAL DAILY
Qty: 90 TABLET | Refills: 0 | Status: SHIPPED | OUTPATIENT
Start: 2022-03-01 | End: 2022-06-02 | Stop reason: SDUPTHER

## 2022-03-01 RX ORDER — CHOLECALCIFEROL (VITAMIN D3) 50 MCG
2000 TABLET ORAL DAILY
Qty: 90 TABLET | Refills: 0 | Status: SHIPPED | OUTPATIENT
Start: 2022-03-01 | End: 2022-06-02 | Stop reason: SDUPTHER

## 2022-03-01 RX ORDER — TIZANIDINE 4 MG/1
4 TABLET ORAL EVERY 8 HOURS PRN
Qty: 90 TABLET | Refills: 0 | Status: SHIPPED | OUTPATIENT
Start: 2022-03-01 | End: 2022-06-02 | Stop reason: SDUPTHER

## 2022-03-01 RX ORDER — LISINOPRIL 40 MG/1
40 TABLET ORAL DAILY
Qty: 90 TABLET | Refills: 0 | Status: CANCELLED | OUTPATIENT
Start: 2022-03-01

## 2022-03-01 RX ORDER — HYDROCHLOROTHIAZIDE 25 MG/1
25 TABLET ORAL EVERY MORNING
Qty: 90 TABLET | Refills: 0 | Status: SHIPPED | OUTPATIENT
Start: 2022-03-01 | End: 2022-06-02 | Stop reason: SDUPTHER

## 2022-03-01 RX ORDER — LISINOPRIL 40 MG/1
40 TABLET ORAL DAILY
Qty: 90 TABLET | Refills: 0 | Status: SHIPPED | OUTPATIENT
Start: 2022-03-01 | End: 2022-06-02 | Stop reason: SDUPTHER

## 2022-03-01 NOTE — PATIENT INSTRUCTIONS
Fasting labs ordered     Medications refilled     Reviewed DASH and low sodium diet     Continue lisinopril 40 mg daily     Monitor blood pressure and call if consistently greater then 140/90     Reviewed low cholesterol diet    Sent to  neurologist, Dr. Chance Solis 20 mg daily (depending on patient specialist)     Reviewed diabetic diet     Encourage to follow up with cardiology as directed     Follow up with ortho as directed     Referral to psychiatrist    Discussed depression and situational stress    Discussed sleep health/hygiene    Trial of melatonin as needed/directed for sleep       CENTRAL n     Discussed sleep health/hygiene    Referral to psychiatry for concentration issues    Follow up in 3 month sooner if symptoms worsen      Instructed patient to contact office or on-call physician promptly should condition worsen or any new symptoms appear and provided on-call telephone numbers. IF THE PATIENT HAS ANY SUICIDAL OR HOMICIDAL IDEATIONS, CALL THE OFFICE, DISCUSS WITH A SUPPORT MEMBER, OR GO TO THE ER IMMEDIATELY. Patient was agreeable with this plan.       [facility] Locations - No appointment necessary. @ indicates the location is open Saturdays in addition to Monday through Friday. Call your preferred location for test preparation, business hours and other information you need. [employer] accepts [employer]. EAST  [place]    @ [place] [facility] [address] [place] [address]   Ph: [phone] [facility] [address] [facility] [address]   Ph: [phone]  @ [facility] [address] [facility]   Ph: [phone]    [facility] [address]   Ph: [phone] @ [place] [facility] [address]  Ph: [phone] @ [facility]. [address] [address]   Ph: [phone]    NORTH   @ [facility]    [address] [place] [address]   Ph: [phone] [facility] Office Bldg. [address] [facility], [address]  Ph: [address] [place] [address]   [facility]:  [address] Lab Svcs[address] 54 [facility]   Ph: [phone] [facility] Lab Svcs.   [address] [facility], [address]   Ph: [phone]

## 2022-03-01 NOTE — PROGRESS NOTES
SUBJECTIVE:    Patient ID:  Kimberly Ricketts is a 63 y.o. male      Patient is here for a medication check for hypertension, hyperlipidemia, prediabetes, CAD, A. Fib, AAA, BUSTER, and vitamin D deficiency. He is doing well on current regimen and has no further concerns. He is followed by cardiologist on a regular basis, every 6 months. Currently denies chest pain, shortness of breath, chest pain or dyspnea on exertion or orthopnea. Chronic back pain is improving with physical therapy and working with back specialist, Dr. Edwina Miranda. He is working in the [facility] for chronic back pain. States he has been busy taking care of his mother and has not been to physical therapy in a while. Encouraged to schedule a follow up in the near future. His blood pressure today in the office was 130/100. States he stopped taking his Norvasc for 5-6 days and his metoprolol for longer then that. He was also taking nitro once a day, which was causing a headache. He has recently been seen by cardiology and he a cardiac cath scheduled on 6/22/21. State he has not been follow his Mediterranean diet due to taking care of his mother.     He has an appointment with a neuromuscular specialist in the near future. Depression: Patient complains of depression. He complains of anhedonia, depressed mood, difficulty concentrating, fatigue and insomnia. Onset was approximately several months ago, gradually worsening since that time. He denies current thoughts of self harm, suicidal and homicidal plan or intent. Family history significant for depression. Possible organic causes contributing are: none. Risk factors: positive family history in uncle and negative life event, loss of her parents, and being placed on disability due to back issues. He his unable to do what he wants so to physical and financial limitation. Previous treatment includes escitalopram and none.  He complains of the following side effects from the treatment: none.  He self discontinued Lexapro and trazodone due to crazy dreams.       ADD/ADHD Symptoms:  Patient complains of a several month(s) history of inattention, impulsivity, depressed mood, anhedonia, including the following: fails to give close attention to details or makes careless mistakes in school, work, or other activities, does not seem to listen when spoken to directly, has difficulty organizing tasks and activities, is easily distracted by extraneous stimuli and difficulty organizing. He presents for evaluation of suspected ADD/ADHD. Patient denies inattention, hyperactivity, anhedonia. Symptoms have been gradually worsening with time. Family history of ADD/ADHD: no.  Previous treatment:has included: none. 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 Expanded cardiac conditions stimulants would be contraindicated contraindicated. Discussed risk, benefits and potential adverse affects of bupropion. Patient verbalizes understanding, but is willing to be formally evaluated. He has lost his father a several years back and recently lost his mother due to colon cancer. Encourage referral to gastroenterology for colon cancer screening. Hypertension  This is a chronic problem. The current episode started more than 1 year ago. The problem is unchanged. The problem is controlled. Pertinent negatives include no anxiety, chest pain, orthopnea, palpitations, peripheral edema or shortness of breath. Agents associated with hypertension include NSAIDs. Risk factors for coronary artery disease include male gender, obesity, sedentary lifestyle, dyslipidemia and diabetes mellitus. Past treatments include diuretics, calcium channel blockers, beta blockers and angiotensin blockers. The current treatment provides significant improvement. Hyperlipidemia  This is a chronic problem. The current episode started more than 1 year ago. The problem is controlled.  Recent lipid tests were reviewed and are low. Pertinent negatives include no chest pain, focal sensory loss, focal weakness, leg pain, myalgias or shortness of breath. Current antihyperlipidemic treatment includes diet change and exercise. Compliance problems include medication side effects (elevated CK). Risk factors for coronary artery disease include diabetes mellitus, dyslipidemia, hypertension, male sex and obesity. Current Outpatient Medications on File Prior to Visit   Medication Sig Dispense Refill    metoprolol succinate (TOPROL XL) 50 MG extended release tablet TAKE ONE TABLET BY MOUTH DAILY 90 tablet 1    clopidogrel (PLAVIX) 75 MG tablet Take 1 tablet by mouth daily 90 tablet 1    Turmeric (QC TUMERIC COMPLEX PO) Take by mouth daily      escitalopram (LEXAPRO) 10 MG tablet Take 1 tablet by mouth daily 30 tablet 1    traZODone (DESYREL) 50 MG tablet Take 1 tablet by mouth nightly 30 tablet 1    ASPIRIN LOW DOSE 81 MG EC tablet       clotrimazole-betamethasone (LOTRISONE) 1-0.05 % cream Apply topically 2 times daily. 45 g 1    rivaroxaban (XARELTO) 20 MG TABS tablet Take 1 tablet by mouth daily (with breakfast) 90 tablet 1    nitroGLYCERIN (NITROSTAT) 0.4 MG SL tablet Place 1 tablet under the tongue every 5 minutes as needed for Chest pain (Patient not taking: Reported on 12/15/2021) 25 tablet 0    Handicap Placard MISC by Does not apply route Expires 9/17/2025 2 each 0    Misc. Devices MISC Pads and leads for use on [facility] TENS unit. 1 Device 0     No current facility-administered medications on file prior to visit.       Past Medical History:   Diagnosis Date    Aneurysm of ascending aorta (HCC)     Arthritis     Atopic dermatitis     Back pain     Facial paralysis/[place] palsy     lt side face dropping    Hyperlipidemia     Hypertension     Imprisonment and other incarceration 2725-3311    Migraine     Obstructive sleep apnea (adult) (pediatric)     cpap machine    Prediabetes  Psoriasis-like skin disease     Tinea cruris      Past Surgical History:   Procedure Laterality Date    BACK SURGERY  February and October 2008    COLONOSCOPY  01/28/2018    CORONARY ANGIOPLASTY      12/2017    CYSTOSCOPY  2001    LAMINECTOMY POSTERIOR THORACIC / LUMBAR Right 7/14/2020    THORACIC LAMINECTOMY WITH PADDLE LEAD AND RECHARGEABLE BATTERY IN RIGHT HIP performed by Bria Stevens MD at [place] [address] Bilateral 2/5/2020    BILATERAL L3 TRANSFORAMINAL  EPIDURAL STEROID INJECTION WITH FLUOROSCOPY performed by Roxanne Norris MD at [address] Bilateral 3/9/2020    BILATERAL L1, L2, L3 (ABOVE THE FUSION) MEDIAL BRANCH BLOCK WITH FLUOROSCOPY (74389, 78251)   #1 performed by Roxanne Norris MD at [address] N/A 6/3/2020    SPINAL CORD STIMULATOR TRIAL WITH FLUOROSCOPY (47949, 17720, 49029) - Graciella Revel performed by Roxanne Norris MD at [address] History   Problem Relation Age of Onset    Other Mother         Polio    Thyroid Disease Mother     Heart Failure Father     Stroke Father      Social History     Socioeconomic History    Marital status:      Spouse name: Not on file    Number of children: 0    Years of education: Not on file    Highest education level: Not on file   Occupational History    Occupation: unemployed    Occupation: former [employer] work    Tobacco Use    Smoking status: Never Smoker    Smokeless tobacco: Never Used   Vaping Use    Vaping Use: Never used   Substance and Sexual Activity    Alcohol use:  Yes     Alcohol/week: 2.0 standard drinks     Types: 1 Cans of beer, 1 Standard drinks or equivalent per week     Comment: Rarely; socially    Drug use: No    Sexual activity: Yes     Partners: Female     Birth control/protection: Condom   Other Topics Concern    Not on file   Social History Narrative    Not on file     Social Determinants of Health     Financial Resource Strain: Low Risk     Difficulty of Paying Living Expenses: Not very hard   Food Insecurity: No Food Insecurity    Worried About Running Out of Food in the Last Year: Never true    Julio of Food in the Last Year: Never true   Transportation Needs:     Lack of Transportation (Medical): Not on file    Lack of Transportation (Non-Medical): Not on file   Physical Activity:     Days of Exercise per Week: Not on file    Minutes of Exercise per Session: Not on file   Stress:     Feeling of Stress : Not on file   Social Connections:     Frequency of Communication with Friends and Family: Not on file    Frequency of Social Gatherings with Friends and Family: Not on file    Attends Church Services: Not on file    Active Member of [address] [place] [address] [employer] or Organizations: Not on file    Attends Club or Organization Meetings: Not on file    Marital Status: Not on file   Intimate Partner Violence:     Fear of Current or Ex-Partner: Not on file    Emotionally Abused: Not on file    Physically Abused: Not on file    Sexually Abused: Not on file   Housing Stability:     Unable to Pay for Housing in the Last Year: Not on file    Number of [place] in the Last Year: Not on file    Unstable Housing in the Last Year: Not on file       Review of Systems   Constitutional: Negative for appetite change, chills, fatigue and fever. Eyes: Negative for visual disturbance. Respiratory: Negative for cough, chest tightness, shortness of breath and wheezing. Cardiovascular: Negative for chest pain, palpitations, orthopnea and leg swelling. Gastrointestinal: Negative for abdominal pain, constipation, diarrhea, nausea and vomiting. Endocrine: Negative for polydipsia, polyphagia and polyuria. Musculoskeletal: Negative for arthralgias and myalgias. Skin: Negative for rash. Neurological: Negative for focal weakness. Psychiatric/Behavioral: Negative for dysphoric mood, self-injury and suicidal ideas.  Decreased concentration: at times. Sleep disturbance: at times. The patient is not nervous/anxious. OBJECTIVE:    Physical Exam  Vitals and nursing note reviewed. Constitutional:       General: He is not in acute distress. Appearance: Normal appearance. He is well-developed. He is obese. He is not ill-appearing. HENT:      Head: Normocephalic and atraumatic. Right Ear: External ear normal.      Left Ear: External ear normal.      Nose: Nose normal.      Mouth/Throat:      Mouth: Mucous membranes are moist.   Eyes:      Conjunctiva/sclera: Conjunctivae normal.      Pupils: Pupils are equal, round, and reactive to light. Neck:      Vascular: No carotid bruit. Trachea: No tracheal deviation. Cardiovascular:      Rate and Rhythm: Normal rate and regular rhythm. Pulses: Normal pulses. Heart sounds: Normal heart sounds. Pulmonary:      Effort: Pulmonary effort is normal. No respiratory distress. Breath sounds: Normal breath sounds. No wheezing or rales. Chest:      Chest wall: No tenderness. Abdominal:      General: Bowel sounds are normal. There is no distension. Palpations: Abdomen is soft. There is no mass. Tenderness: There is no abdominal tenderness. Hernia: No hernia is present. Musculoskeletal:         General: Normal range of motion. Cervical back: Normal range of motion and neck supple. Right lower leg: No edema. Left lower leg: No edema. Skin:     General: Skin is warm and dry. Findings: No rash. Neurological:      Mental Status: He is alert and oriented to person, place, and time.    Psychiatric:         Behavior: Behavior normal.       BP (!) 152/94   Pulse 77   Temp 97.3 °F (36.3 °C)   Wt (!) 303 lb (137.4 kg)   SpO2 97%   BMI 37.87 kg/m²    BP Readings from Last 3 Encounters:   03/01/22 (!) 152/94   12/15/21 134/82   12/07/21 138/84      Wt Readings from Last 3 Encounters:   03/01/22 (!) 303 lb (137.4 kg)   12/15/21 299 lb 1.6 oz (135.7 kg)   12/07/21 295 lb (133.8 kg)       ASSESSMENT & PLAN:    1. Essential hypertension  - Stable, continue current regimen  - Reviewed DASH and low-sodium diets  - hydroCHLOROthiazide (HYDRODIURIL) 25 MG tablet; Take 1 tablet by mouth every morning  Dispense: 90 tablet; Refill: 0  - CBC with Auto Differential; Future  - Comprehensive Metabolic Panel; Future  - amLODIPine (NORVASC) 5 MG tablet; Take 1 tablet by mouth daily  Dispense: 90 tablet; Refill: 0  - lisinopril (PRINIVIL;ZESTRIL) 40 MG tablet; Take 1 tablet by mouth daily  Dispense: 90 tablet; Refill: 0    2. Mixed hyperlipidemia  - Stable, continue lifestyle modifications for now  - Restart rosuvastatin depending on specialist evaluation  - CBC with Auto Differential; Future  - Comprehensive Metabolic Panel; Future  - Lipid Panel; Future  - CK; Future    3. Prediabetes  - Stable, continue lifestyle modifications  - CBC with Auto Differential; Future  - Comprehensive Metabolic Panel; Future  - Hemoglobin A1C; Future    4. PAF (paroxysmal atrial fibrillation) (HCC)  - Stable, continue current regimen  - CBC with Auto Differential; Future  - Comprehensive Metabolic Panel; Future  - Follow up with cardiologist as needed/directed    5. Coronary artery disease due to lipid rich plaque  - Stable, continue current regimen  - CBC with Auto Differential; Future  - Comprehensive Metabolic Panel; Future  - Follow up with cardiologist as needed/directed    6. Ascending aortic aneurysm (HCC)  - Stable, continue current regimen  - CBC with Auto Differential; Future  - Comprehensive Metabolic Panel; Future  - Follow up with cardiologist as needed/directed    8. Obstructive sleep apnea  - Stable, encourage nightly CPAP usage    9. Environmental allergies  - Stable, continue current regimen   - fluticasone (FLONASE) 50 MCG/ACT nasal spray; 2 sprays by Each Nostril route daily  Dispense: 3 each; Refill: 0    10.  Vitamin D deficiency  - Stable, continue vitamin D3 supplementation  - vitamin D (CHOLECALCIFEROL) 50 MCG (2000 UT) TABS tablet; Take 1 tablet by mouth daily After finishing 12 week couse  Dispense: 90 tablet; Refill: 0    11. Chronic midline low back pain with bilateral sciatica  - Stable, continue current regimen  - tiZANidine (ZANAFLEX) 4 MG tablet; Take 1 tablet by mouth every 8 hours as needed (for muscle spasm)  Dispense: 90 tablet; Refill: 0    12. Disturbed concentration  - Ambulatory referral to Psychiatry    13. Adult situational stress disorder  - Ambulatory referral to Psychiatry  - Discussed stress management techniques    14. Reactive depression  - Ambulatory referral to Psychiatry    15. Other insomnia  - Stable, continue lifestyle modifications   - Reviewed sleep health/hygiene  - Trial of melatonin      Continue current treatment plan.     Current Outpatient Medications   Medication Sig Dispense Refill    hydroCHLOROthiazide (HYDRODIURIL) 25 MG tablet Take 1 tablet by mouth every morning 90 tablet 0    tiZANidine (ZANAFLEX) 4 MG tablet Take 1 tablet by mouth every 8 hours as needed (for muscle spasm) 90 tablet 0    fluticasone (FLONASE) 50 MCG/ACT nasal spray 2 sprays by Each Nostril route daily 3 each 0    vitamin D (CHOLECALCIFEROL) 50 MCG (2000 UT) TABS tablet Take 1 tablet by mouth daily After finishing 12 week couse 90 tablet 0    amLODIPine (NORVASC) 5 MG tablet Take 1 tablet by mouth daily 90 tablet 0    lisinopril (PRINIVIL;ZESTRIL) 40 MG tablet Take 1 tablet by mouth daily 90 tablet 0    metoprolol succinate (TOPROL XL) 50 MG extended release tablet TAKE ONE TABLET BY MOUTH DAILY 90 tablet 1    clopidogrel (PLAVIX) 75 MG tablet Take 1 tablet by mouth daily 90 tablet 1    Turmeric (QC TUMERIC COMPLEX PO) Take by mouth daily      escitalopram (LEXAPRO) 10 MG tablet Take 1 tablet by mouth daily 30 tablet 1    traZODone (DESYREL) 50 MG tablet Take 1 tablet by mouth nightly 30 tablet 1    ASPIRIN LOW DOSE 81 MG EC tablet       clotrimazole-betamethasone (LOTRISONE) 1-0.05 % cream Apply topically 2 times daily. 45 g 1    rivaroxaban (XARELTO) 20 MG TABS tablet Take 1 tablet by mouth daily (with breakfast) 90 tablet 1    nitroGLYCERIN (NITROSTAT) 0.4 MG SL tablet Place 1 tablet under the tongue every 5 minutes as needed for Chest pain (Patient not taking: Reported on 12/15/2021) 25 tablet 0    Handicap Placard MISC by Does not apply route Expires 9/17/2025 2 each 0    Misc. Devices MISC Pads and leads for use on [facility] TENS unit. 1 Device 0     No current facility-administered medications for this visit. Return in about 3 months (around 6/1/2022), or if symptoms worsen or fail to improve, for HTN, lipidemia, prediabetes, PAF, A fib, CAD, AAA, AR, vit D, CBP, stress, depression, insomna. [facility] received counseling on the following healthy behaviors: nutrition, exercise and medication adherence    I have instructed [facility] to complete a self tracking handout on Blood Pressures and instructed them to bring it with them to his next appointment. Discussed use, benefit, and side effects of prescribed medications. Barriers to medication compliance addressed. All patient questions answered. Pt voiced understanding. Call office if experience side effects from medications. Please note that some or all of this record was generated using voice recognition software. If there are any questions about the content of this document, please contact the author as some errors in transcription may have occurred.

## 2022-03-02 ASSESSMENT — ENCOUNTER SYMPTOMS: ORTHOPNEA: 0

## 2022-04-29 DIAGNOSIS — M54.41 CHRONIC MIDLINE LOW BACK PAIN WITH BILATERAL SCIATICA: ICD-10-CM

## 2022-04-29 DIAGNOSIS — M54.42 CHRONIC MIDLINE LOW BACK PAIN WITH BILATERAL SCIATICA: ICD-10-CM

## 2022-04-29 DIAGNOSIS — G89.29 CHRONIC MIDLINE LOW BACK PAIN WITH BILATERAL SCIATICA: ICD-10-CM

## 2022-04-29 RX ORDER — TIZANIDINE 4 MG/1
TABLET ORAL
Qty: 90 TABLET | Refills: 0 | OUTPATIENT
Start: 2022-04-29

## 2022-05-04 ENCOUNTER — TELEPHONE (OUTPATIENT)
Dept: FAMILY MEDICINE CLINIC | Age: 62
End: 2022-05-04

## 2022-05-04 NOTE — TELEPHONE ENCOUNTER
Patient called and states that his insurance company told him that his CPAP machine has a recall on it, and that he needs a new one, pt is wanting to see if he can get an order for a new CPAP machine, please call pt to advise @ [phone], pt LOV [phone] Pt is calling asking to speak to Dr Timothy Lopez prior to seminar and NP appt. [phone].

## 2022-05-05 NOTE — TELEPHONE ENCOUNTER
Spoke with pt and advised per PCP note to call pulmonology for further instructions regarding the CPAP machine. Pt verbalized understanding.

## 2022-05-29 DIAGNOSIS — I10 ESSENTIAL HYPERTENSION: ICD-10-CM

## 2022-05-31 RX ORDER — HYDROCHLOROTHIAZIDE 25 MG/1
TABLET ORAL
Qty: 90 TABLET | Refills: 0 | OUTPATIENT
Start: 2022-05-31

## 2022-06-01 ASSESSMENT — ENCOUNTER SYMPTOMS
COUGH: 0
VOMITING: 0
WHEEZING: 0
SHORTNESS OF BREATH: 0
DIARRHEA: 0
NAUSEA: 0
CHEST TIGHTNESS: 0
BLURRED VISION: 0
CONSTIPATION: 0
ORTHOPNEA: 0
ABDOMINAL PAIN: 0

## 2022-06-01 NOTE — PROGRESS NOTES
SUBJECTIVE:    Patient ID:  Rocío Ellis is a 63 y.o. male      Patient is here for a medication check for hypertension, hyperlipidemia, prediabetes, CAD, A. Fib, AAA, BUSTER, and vitamin D deficiency. He is doing well on current regimen and has no further concerns. He is followed by cardiologist every 6 months. Currently denies chest pain, shortness of breath, chest pain or dyspnea on exertion or orthopnea. Chronic back pain is improving with physical therapy and working with back specialist, Dr. Cyndee Wade. He is working in the with [place] for chronic back pain. His blood pressure today in the office was [id number]. States he stopped taking his blood pressure medications due to ED issues. States she wanted to see if medications were the cause. Explained do to cardiac history, he needed to be taking medications as prescribed. Explained that ED can also be affected uncontrolled diabetes and hypertension. Discussed risk, benefits and potential adverse affects of Viagra. Patient verbalizes understanding and is agreeable to treatment plan. He is also requesting an STI check. currently mj symptoms. He had a cardiac cath scheduled on 6/22/21.       He has an appointment with a neuromuscular specialist in the near future.       Once again, encourage referral to gastroenterology for colon cancer screening. He was just  on 5/29/22. Hypertension  This is a chronic problem. The problem is unchanged. The problem is controlled. Pertinent negatives include no anxiety, blurred vision, chest pain, headaches, orthopnea, palpitations, peripheral edema or shortness of breath. Risk factors for coronary artery disease include dyslipidemia, male gender and obesity (prediabetes). Past treatments include calcium channel blockers, ACE inhibitors and diuretics. The current treatment provides significant improvement. Hypertensive end-organ damage includes CAD/MI. Hyperlipidemia  This is a chronic problem.  The current episode started more than 1 year ago. The problem is controlled. Recent lipid tests were reviewed and are high. Exacerbating diseases include obesity. Pertinent negatives include no chest pain, focal sensory loss, focal weakness, leg pain, myalgias or shortness of breath. Current antihyperlipidemic treatment includes statins. The current treatment provides significant improvement of lipids. Risk factors for coronary artery disease include hypertension, male sex, dyslipidemia and obesity (prediabetes). Erectile Dysfunction  This is a recurrent problem. The current episode started more than 1 year ago. The problem has been waxing and waning since onset. The nature of his difficulty is maintaining erection. He reports no anxiety. He reports his erection duration to be 1 to 5 minutes. Irritative symptoms do not include frequency or urgency. Obstructive symptoms do not include dribbling, incomplete emptying, an intermittent stream, a slower stream, straining or a weak stream (slight). Pertinent negatives include no chills, dysuria, genital pain, hematuria, hesitancy or inability to urinate. Nothing aggravates the symptoms. Risk factors include hypertension (prediabetes). Current Outpatient Medications on File Prior to Visit   Medication Sig Dispense Refill    metoprolol succinate (TOPROL XL) 50 MG extended release tablet TAKE ONE TABLET BY MOUTH DAILY 90 tablet 1    clopidogrel (PLAVIX) 75 MG tablet Take 1 tablet by mouth daily 90 tablet 1    Turmeric (QC TUMERIC COMPLEX PO) Take by mouth daily      escitalopram (LEXAPRO) 10 MG tablet Take 1 tablet by mouth daily 30 tablet 1    traZODone (DESYREL) 50 MG tablet Take 1 tablet by mouth nightly 30 tablet 1    ASPIRIN LOW DOSE 81 MG EC tablet       clotrimazole-betamethasone (LOTRISONE) 1-0.05 % cream Apply topically 2 times daily.  45 g 1    rivaroxaban (XARELTO) 20 MG TABS tablet Take 1 tablet by mouth daily (with breakfast) 90 tablet 1    nitroGLYCERIN (NITROSTAT) 0.4 MG SL tablet Place 1 tablet under the tongue every 5 minutes as needed for Chest pain (Patient not taking: Reported on 12/15/2021) 25 tablet 0    Handicap Placard MISC by Does not apply route Expires 9/17/2025 2 each 0    Misc. Devices MISC Pads and leads for use on [facility] TENS unit. 1 Device 0     No current facility-administered medications on file prior to visit.       Past Medical History:   Diagnosis Date    Aneurysm of ascending aorta (HCC)     Arthritis     Atopic dermatitis     Back pain     Facial paralysis/[place] palsy     lt side face dropping    Hyperlipidemia     Hypertension     Imprisonment and other incarceration [id number]    Migraine     Obstructive sleep apnea (adult) (pediatric)     cpap machine    Prediabetes     Psoriasis-like skin disease     Tinea cruris      Past Surgical History:   Procedure Laterality Date    BACK SURGERY  February and October 2008    COLONOSCOPY  01/28/2018    CORONARY ANGIOPLASTY      12/2017    CYSTOSCOPY  2001    LAMINECTOMY POSTERIOR THORACIC / LUMBAR Right 7/14/2020    THORACIC LAMINECTOMY WITH PADDLE LEAD AND RECHARGEABLE BATTERY IN RIGHT HIP performed by Leyda Johnson MD at [address] Bilateral 2/5/2020    BILATERAL L3 TRANSFORAMINAL  EPIDURAL STEROID INJECTION WITH FLUOROSCOPY performed by Taylor Hinton MD at [address] Bilateral 3/9/2020    BILATERAL L1, L2, L3 (ABOVE THE FUSION) MEDIAL BRANCH BLOCK WITH FLUOROSCOPY (84579, 32069)   #1 performed by Taylor Hinton MD at [address] N/A 6/3/2020    [address] ([id number], [id number], [id number]) - Lanney Pace performed by Taylor Hinton MD at [address] History   Problem Relation Age of Onset    Other Mother         Polio    Thyroid Disease Mother     Heart Failure Father     Stroke Father      Social History     Socioeconomic History    Marital status:      Spouse name: Not on file    Number of children: 0    Years of education: Not on file    Highest education level: Not on file   Occupational History    Occupation: unemployed    Occupation: former masonary work    Tobacco Use    Smoking status: Never Smoker    Smokeless tobacco: Never Used   Vaping Use    Vaping Use: Never used   Substance and Sexual Activity    Alcohol use: Yes     Alcohol/week: 2.0 standard drinks     Types: 1 Cans of beer, 1 Standard drinks or equivalent per week     Comment: Rarely; socially    Drug use: No    Sexual activity: Yes     Partners: Female     Birth control/protection: Condom   Other Topics Concern    Not on file   Social History Narrative    Not on file     Social Determinants of Health     Financial Resource Strain: Low Risk     Difficulty of Paying Living Expenses: Not very hard   Food Insecurity: No Food Insecurity    Worried About Running Out of Food in the Last Year: Never true    Julio of Food in the Last Year: Never true   Transportation Needs:     Lack of Transportation (Medical): Not on file    Lack of Transportation (Non-Medical):  Not on file   Physical Activity:     Days of Exercise per Week: Not on file    Minutes of Exercise per Session: Not on file   Stress:     Feeling of Stress : Not on file   Social Connections:     Frequency of Communication with Friends and Family: Not on file    Frequency of Social Gatherings with Friends and Family: Not on file    Attends Yazdanism Services: Not on file    Active Member of Clubs or Organizations: Not on file    Attends Club or Organization Meetings: Not on file    Marital Status: Not on file   Intimate Partner Violence:     Fear of Current or Ex-Partner: Not on file    Emotionally Abused: Not on file    Physically Abused: Not on file    Sexually Abused: Not on file   Housing Stability:     Unable to Pay for Housing in the Last Year: Not on file    Number of Places Lived in the Last Year: Not on file    Unstable Housing in the Last Year: Not on file       Review of Systems   Constitutional: Negative for chills and fever. Eyes: Negative for blurred vision and visual disturbance. Respiratory: Negative for cough, chest tightness, shortness of breath and wheezing. Cardiovascular: Negative for chest pain, palpitations, orthopnea and leg swelling. Gastrointestinal: Negative for abdominal pain, constipation, diarrhea, nausea and vomiting. Endocrine: Negative for polydipsia, polyphagia and polyuria. Genitourinary: Negative for dysuria, frequency, hematuria, hesitancy, incomplete emptying, scrotal swelling, testicular pain and urgency. Musculoskeletal: Negative for arthralgias and myalgias. Skin: Negative for rash. Neurological: Negative for focal weakness and headaches. Psychiatric/Behavioral: Negative for dysphoric mood, self-injury, sleep disturbance and suicidal ideas. The patient is not nervous/anxious. OBJECTIVE:    Physical Exam  Vitals and nursing note reviewed. Constitutional:       General: He is not in acute distress. Appearance: Normal appearance. He is well-developed. He is obese. He is not ill-appearing. HENT:      Head: Normocephalic and atraumatic. Right Ear: External ear normal.      Left Ear: External ear normal.      Nose: Nose normal.      Mouth/Throat:      Mouth: Mucous membranes are moist.   Eyes:      Conjunctiva/sclera: Conjunctivae normal.      Pupils: Pupils are equal, round, and reactive to light. Neck:      Vascular: No carotid bruit. Trachea: No tracheal deviation. Cardiovascular:      Rate and Rhythm: Normal rate and regular rhythm. Pulses: Normal pulses. Heart sounds: Normal heart sounds. No murmur heard. Pulmonary:      Effort: Pulmonary effort is normal. No respiratory distress. Breath sounds: Normal breath sounds. No wheezing or rales. Chest:      Chest wall: No tenderness. Abdominal:      General: Bowel sounds are normal. There is no distension. Palpations: Abdomen is soft. There is no mass. Tenderness: There is no abdominal tenderness. Hernia: No hernia is present. Musculoskeletal:         General: Normal range of motion. Cervical back: Normal range of motion and neck supple. Right lower leg: No edema. Left lower leg: No edema. Skin:     General: Skin is warm and dry. Capillary Refill: Capillary refill takes less than 2 seconds. Findings: No rash. Neurological:      Mental Status: He is alert and oriented to person, place, and time. Psychiatric:         Behavior: Behavior normal.       BP (!) 156/110   Pulse 71   Temp 98 °F (36.7 °C)   Wt 290 lb (131.5 kg)   SpO2 96%   BMI 36.25 kg/m²    BP Readings from Last 3 Encounters:   06/02/22 (!) 156/110   03/01/22 (!) 152/94   12/15/21 134/82      Wt Readings from Last 3 Encounters:   06/02/22 290 lb (131.5 kg)   03/01/22 (!) 303 lb (137.4 kg)   12/15/21 299 lb 1.6 oz (135.7 kg)       ASSESSMENT & PLAN:    1. Essential hypertension  - Stable, continue current regimen  - Reviewed DASH and low-sodium diets  - CBC with Auto Differential; Future  - Comprehensive Metabolic Panel; Future  - hydroCHLOROthiazide (HYDRODIURIL) 25 MG tablet; Take 1 tablet by mouth every morning  Dispense: 90 tablet; Refill: 0  - amLODIPine (NORVASC) 10 MG tablet; Take 1 tablet by mouth daily  Dispense: 90 tablet; Refill: 0  - lisinopril (PRINIVIL;ZESTRIL) 40 MG tablet; Take 1 tablet by mouth daily  Dispense: 90 tablet; Refill: 0    2. Mixed hyperlipidemia  - Stable, continue current regimen   - Reviewed low cholesterol diet  - CBC with Auto Differential; Future  - Comprehensive Metabolic Panel; Future  - Lipid Panel; Future  - CK; Future    3. Prediabetes  - Stable, continue current regimen   - Reviewed diabetic diet  - CBC with Auto Differential; Future  - Comprehensive Metabolic Panel;  Future  - Hemoglobin A1C; Future    4. PAF (paroxysmal atrial fibrillation) (HCC)  - Stable, continue current regimen   - Follow up with cardiologist as needed/directed     5. Coronary artery disease due to lipid rich plaque  - Stable, continue current regimen   - Follow up with cardiologist as needed/directed     6. Ascending aortic aneurysm (HCC)  - Stable, continue current regimen   - Follow up with cardiologist as needed/directed     7. Obstructive sleep apnea  - Stable, continue nightly CPAP usage    8. Environmental allergies  - Stable, continue current regimen   - fluticasone (FLONASE) 50 MCG/ACT nasal spray; 2 sprays by Each Nostril route daily  Dispense: 3 each; Refill: 0    9. Vitamin D deficiency  - Stable, continued vitamin D 3 supplementation   - vitamin D (CHOLECALCIFEROL) 50 MCG (2000 UT) TABS tablet; Take 1 tablet by mouth daily After finishing 12 week couse  Dispense: 90 tablet; Refill: 0    10. Chronic midline low back pain with bilateral sciatica  - Stable, continue current regimen   - tiZANidine (ZANAFLEX) 4 MG tablet; Take 1 tablet by mouth every 8 hours as needed (for muscle spasm)  Dispense: 180 tablet; Refill: 0    11. BMI 36.0-36.9,adult  - Encourage conitnued lifestyle modifications (better food choices, portion control and increasing activity)    12. Routine screening for STI (sexually transmitted infection)  - C.trachomatis N.gonorrhoeae DNA, Urine; Future  - Hepatitis C Antibody; Future  - Herpes Simplex Virus (HSV) I Glycoprotein Antibody IgG; Future  - Herpes Simplex Virus (HSV) II Glycoprotein Antibody IgG; Future  - HIV Screen; Future  - RPR; Future      Continue current treatment plan.     Current Outpatient Medications   Medication Sig Dispense Refill    hydroCHLOROthiazide (HYDRODIURIL) 25 MG tablet Take 1 tablet by mouth every morning 90 tablet 0    tiZANidine (ZANAFLEX) 4 MG tablet Take 1 tablet by mouth every 8 hours as needed (for muscle spasm) 180 tablet 0    fluticasone (FLONASE) 50 MCG/ACT nasal Pressures and instructed them to bring it with them to his next appointment. Discussed use, benefit, and side effects of prescribed medications. Barriers to medication compliance addressed. All patient questions answered. Pt voiced understanding. Call office if experience side effects from medications. Please note that some or all of this record was generated using voice recognition software. If there are any questions about the content of this document, please contact the author as some errors in transcription may have occurred.

## 2022-06-01 NOTE — PATIENT INSTRUCTIONS
Fasting labs ordered     Medications refilled     Reviewed DASH and low sodium diet     Continue lisinopril 40 mg daily    Increase amlodipine from 5 mg to 10 mg     Monitor blood pressure and call if consistently greater then 140/90     Reviewed low cholesterol diet     Sent to  neurologist, Dr. Shaq Palomares     Restart Crestor 20 mg daily (depending on patient specialist)     Reviewed diabetic diet      Encourage to follow up with cardiology as directed     Follow up with ortho as directed     Trial of Viagra 50 mg as needed (do not take with Nitro)      Discussed depression and situational stress     Discussed sleep health/hygiene     Trial of melatonin as needed/directed for sleep    [facility] Laboratory Locations - No appointment necessary. @ indicates the location is open Saturdays in addition to Monday through Friday. Call your preferred location for test preparation, business hours and other information you need. [facility] accepts [employer]. [facility]    @ [facility] [address] [facility]. [place], [address]   Ph: [phone] [facility] Sv[facility]. [address]   Ph: [phone]  @ [facility] S[facility]. [address] [facility]   Ph: [phone]    [facility] S[facility]. [address]   Ph: [phone] @ [facility] [address] [facility], [address]  Ph: [phone] @ [facility] Sv[facility]. [address] [facility]. [address] [facility]   Ph: [phone]    [place]   @ [facility] Sv[facility] [address] [place], [place] [address]   Ph: [phone] [facility] Med. Office Bldg. [address] [facility], [address] [facility]  Ph: [address] [facility] [address], [phone]   [address] Lab Svcs[address] [facility]   Ph: [address] [place] [address]. Lab Svcs.   [address] [place] [address]   Ph: [phone]

## 2022-06-02 ENCOUNTER — OFFICE VISIT (OUTPATIENT)
Dept: FAMILY MEDICINE CLINIC | Age: 62
End: 2022-06-02
Payer: COMMERCIAL

## 2022-06-02 DIAGNOSIS — I10 ESSENTIAL HYPERTENSION: Primary | ICD-10-CM

## 2022-06-02 DIAGNOSIS — E78.2 MIXED HYPERLIPIDEMIA: ICD-10-CM

## 2022-06-02 DIAGNOSIS — E55.9 VITAMIN D DEFICIENCY: ICD-10-CM

## 2022-06-02 DIAGNOSIS — M54.41 CHRONIC MIDLINE LOW BACK PAIN WITH BILATERAL SCIATICA: ICD-10-CM

## 2022-06-02 DIAGNOSIS — Z91.09 ENVIRONMENTAL ALLERGIES: ICD-10-CM

## 2022-06-02 DIAGNOSIS — M54.42 CHRONIC MIDLINE LOW BACK PAIN WITH BILATERAL SCIATICA: ICD-10-CM

## 2022-06-02 DIAGNOSIS — Z11.3 ROUTINE SCREENING FOR STI (SEXUALLY TRANSMITTED INFECTION): ICD-10-CM

## 2022-06-02 DIAGNOSIS — G47.33 OBSTRUCTIVE SLEEP APNEA: ICD-10-CM

## 2022-06-02 DIAGNOSIS — I71.21 ASCENDING AORTIC ANEURYSM: ICD-10-CM

## 2022-06-02 DIAGNOSIS — I25.10 CORONARY ARTERY DISEASE DUE TO LIPID RICH PLAQUE: ICD-10-CM

## 2022-06-02 DIAGNOSIS — I25.83 CORONARY ARTERY DISEASE DUE TO LIPID RICH PLAQUE: ICD-10-CM

## 2022-06-02 DIAGNOSIS — I48.0 PAF (PAROXYSMAL ATRIAL FIBRILLATION) (HCC): ICD-10-CM

## 2022-06-02 DIAGNOSIS — R73.03 PREDIABETES: ICD-10-CM

## 2022-06-02 DIAGNOSIS — G89.29 CHRONIC MIDLINE LOW BACK PAIN WITH BILATERAL SCIATICA: ICD-10-CM

## 2022-06-02 PROCEDURE — 1036F TOBACCO NON-USER: CPT | Performed by: CLINICAL NURSE SPECIALIST

## 2022-06-02 PROCEDURE — G8427 DOCREV CUR MEDS BY ELIG CLIN: HCPCS | Performed by: CLINICAL NURSE SPECIALIST

## 2022-06-02 PROCEDURE — 3017F COLORECTAL CA SCREEN DOC REV: CPT | Performed by: CLINICAL NURSE SPECIALIST

## 2022-06-02 PROCEDURE — G8417 CALC BMI ABV UP PARAM F/U: HCPCS | Performed by: CLINICAL NURSE SPECIALIST

## 2022-06-02 PROCEDURE — 99214 OFFICE O/P EST MOD 30 MIN: CPT | Performed by: CLINICAL NURSE SPECIALIST

## 2022-06-02 RX ORDER — CHOLECALCIFEROL (VITAMIN D3) 50 MCG
2000 TABLET ORAL DAILY
Qty: 90 TABLET | Refills: 0 | Status: SHIPPED | OUTPATIENT
Start: 2022-06-02

## 2022-06-02 RX ORDER — HYDROCHLOROTHIAZIDE 25 MG/1
25 TABLET ORAL EVERY MORNING
Qty: 90 TABLET | Refills: 0 | Status: SHIPPED | OUTPATIENT
Start: 2022-06-02

## 2022-06-02 RX ORDER — TIZANIDINE 4 MG/1
4 TABLET ORAL EVERY 8 HOURS PRN
Qty: 180 TABLET | Refills: 0 | Status: SHIPPED | OUTPATIENT
Start: 2022-06-02

## 2022-06-02 RX ORDER — LISINOPRIL 40 MG/1
40 TABLET ORAL DAILY
Qty: 90 TABLET | Refills: 0 | Status: SHIPPED | OUTPATIENT
Start: 2022-06-02

## 2022-06-02 RX ORDER — FLUTICASONE PROPIONATE 50 MCG
2 SPRAY, SUSPENSION (ML) NASAL DAILY
Qty: 3 EACH | Refills: 0 | Status: SHIPPED | OUTPATIENT
Start: 2022-06-02

## 2022-06-02 RX ORDER — AMLODIPINE BESYLATE 10 MG/1
10 TABLET ORAL DAILY
Qty: 90 TABLET | Refills: 0 | Status: SHIPPED | OUTPATIENT
Start: 2022-06-02

## 2022-06-02 RX ORDER — SILDENAFIL 50 MG/1
50 TABLET, FILM COATED ORAL PRN
Qty: 10 TABLET | Refills: 2 | Status: SHIPPED | OUTPATIENT
Start: 2022-06-02

## 2022-06-02 ASSESSMENT — PATIENT HEALTH QUESTIONNAIRE - PHQ9
SUM OF ALL RESPONSES TO PHQ QUESTIONS 1-9: 0
SUM OF ALL RESPONSES TO PHQ9 QUESTIONS 1 & 2: 0
SUM OF ALL RESPONSES TO PHQ QUESTIONS 1-9: 0
1. LITTLE INTEREST OR PLEASURE IN DOING THINGS: 0
2. FEELING DOWN, DEPRESSED OR HOPELESS: 0

## 2022-06-05 VITALS
TEMPERATURE: 98 F | WEIGHT: 290 LBS | HEART RATE: 71 BPM | SYSTOLIC BLOOD PRESSURE: 150 MMHG | BODY MASS INDEX: 36.25 KG/M2 | DIASTOLIC BLOOD PRESSURE: 100 MMHG | OXYGEN SATURATION: 96 %

## 2022-06-15 ENCOUNTER — HOSPITAL ENCOUNTER (OUTPATIENT)
Age: 62
Discharge: HOME | End: 2022-06-15
Payer: COMMERCIAL

## 2022-06-15 ENCOUNTER — OFFICE VISIT (OUTPATIENT)
Dept: CARDIOLOGY CLINIC | Age: 62
End: 2022-06-15
Payer: COMMERCIAL

## 2022-06-15 VITALS
OXYGEN SATURATION: 97 % | HEART RATE: 71 BPM | DIASTOLIC BLOOD PRESSURE: 72 MMHG | HEIGHT: 75 IN | SYSTOLIC BLOOD PRESSURE: 134 MMHG | WEIGHT: 286.1 LBS | BODY MASS INDEX: 35.57 KG/M2

## 2022-06-15 DIAGNOSIS — I48.0 PAF (PAROXYSMAL ATRIAL FIBRILLATION) (HCC): ICD-10-CM

## 2022-06-15 DIAGNOSIS — I10 PRIMARY HYPERTENSION: ICD-10-CM

## 2022-06-15 DIAGNOSIS — I25.10 CORONARY ARTERY DISEASE DUE TO LIPID RICH PLAQUE: ICD-10-CM

## 2022-06-15 DIAGNOSIS — I25.83 CORONARY ARTERY DISEASE DUE TO LIPID RICH PLAQUE: Primary | ICD-10-CM

## 2022-06-15 DIAGNOSIS — E78.2 MIXED HYPERLIPIDEMIA: ICD-10-CM

## 2022-06-15 DIAGNOSIS — I25.83 CORONARY ARTERY DISEASE DUE TO LIPID RICH PLAQUE: ICD-10-CM

## 2022-06-15 DIAGNOSIS — I25.10 CORONARY ARTERY DISEASE DUE TO LIPID RICH PLAQUE: Primary | ICD-10-CM

## 2022-06-15 LAB
A/G RATIO: 1.4 (ref 1.1–2.2)
ALBUMIN SERPL-MCNC: 4.4 G/DL (ref 3.4–5)
ALP BLD-CCNC: 71 U/L (ref 40–129)
ALT SERPL-CCNC: 25 U/L (ref 10–40)
ANION GAP SERPL CALCULATED.3IONS-SCNC: 12 MMOL/L (ref 3–16)
AST SERPL-CCNC: 29 U/L (ref 15–37)
BILIRUB SERPL-MCNC: 0.4 MG/DL (ref 0–1)
BUN BLDV-MCNC: 14 MG/DL (ref 7–20)
CALCIUM SERPL-MCNC: 9.4 MG/DL (ref 8.3–10.6)
CHLORIDE BLD-SCNC: 103 MMOL/L (ref 99–110)
CHOLESTEROL, FASTING: 265 MG/DL (ref 0–199)
CO2: 27 MMOL/L (ref 21–32)
CREAT SERPL-MCNC: 1.1 MG/DL (ref 0.8–1.3)
GFR AFRICAN AMERICAN: >60
GFR NON-AFRICAN AMERICAN: >60
GLUCOSE BLD-MCNC: 98 MG/DL (ref 70–99)
HDLC SERPL-MCNC: 38 MG/DL (ref 40–60)
LDL CHOLESTEROL CALCULATED: 174 MG/DL
POTASSIUM SERPL-SCNC: 4.1 MMOL/L (ref 3.5–5.1)
SEDIMENTATION RATE, ERYTHROCYTE: 10 MM/HR (ref 0–20)
SODIUM BLD-SCNC: 142 MMOL/L (ref 136–145)
TOTAL PROTEIN: 7.6 G/DL (ref 6.4–8.2)
TRIGLYCERIDE, FASTING: 265 MG/DL (ref 0–150)
VLDLC SERPL CALC-MCNC: 53 MG/DL

## 2022-06-15 PROCEDURE — G8417 CALC BMI ABV UP PARAM F/U: HCPCS | Performed by: NURSE PRACTITIONER

## 2022-06-15 PROCEDURE — 36415 COLL VENOUS BLD VENIPUNCTURE: CPT

## 2022-06-15 PROCEDURE — 80053 COMPREHEN METABOLIC PANEL: CPT

## 2022-06-15 PROCEDURE — 93000 ELECTROCARDIOGRAM COMPLETE: CPT | Performed by: NURSE PRACTITIONER

## 2022-06-15 PROCEDURE — 99214 OFFICE O/P EST MOD 30 MIN: CPT | Performed by: NURSE PRACTITIONER

## 2022-06-15 PROCEDURE — 80061 LIPID PANEL: CPT

## 2022-06-15 PROCEDURE — 82550 ASSAY OF CK (CPK): CPT

## 2022-06-15 PROCEDURE — 3017F COLORECTAL CA SCREEN DOC REV: CPT | Performed by: NURSE PRACTITIONER

## 2022-06-15 PROCEDURE — 1036F TOBACCO NON-USER: CPT | Performed by: NURSE PRACTITIONER

## 2022-06-15 PROCEDURE — 85652 RBC SED RATE AUTOMATED: CPT

## 2022-06-15 PROCEDURE — G8427 DOCREV CUR MEDS BY ELIG CLIN: HCPCS | Performed by: NURSE PRACTITIONER

## 2022-06-15 NOTE — PROGRESS NOTES
[address]     Outpatient Follow Up Note    Chetna Santiago is 63 y.o. male who presents today with a history of CAD s/p PTCA RCA & RPL Jun '21, HTN, PAF and hyperlipidemia. His other hx includes: ascending aortic aneurysm followed by Dr. Ashley Finch / HPI:  Follow Up secondary to coronary artery disease    Subjective:     He had a pain wondering if he has a pinched nerve or from his heart. Its right behind his Rt clavicle (pointing to scapula). It happened about 2 weeks go. He can't remember what brought it on. It lasted 3-4 days and became a nagging discomfort. He had no associated symptoms. Taking a deep breath causes his chest to hurt. He can't fully remember his angina equivalent but thinks he had CP with SOB  He was SOB yesterday outside in the heat. He also becomes SOB with over exertion, ie climbing 3 stairs  He denies orthopnea/PND. He has no swelling. His wt is going down : 20# / 4 months through dietary changes and staying busier    He denies significant chest pain. The patients weight is down 13# / 7 months by office scales. The patient is not experiencing palpitations or dizziness. He stopped using his CPAP months ago when his building had black mold. Then his machine was recalled. He's not pursued having his machine replaced nor follow up with pul. He has found himself thrashing around when sleeping. He'd also stopped taking Lexapro and Desyrel     These symptoms show no change since the last OV. With regard to medication therapy the patient has been (?) compliant with prescribed regimen. They have tolerated therapy to date.      Past Medical History:   Diagnosis Date    Aneurysm of ascending aorta (HCC)     Arthritis     Atopic dermatitis     Back pain     Facial paralysis/[place] palsy     lt side face dropping    Hyperlipidemia     Hypertension     Imprisonment and other incarceration [id number]    Migraine     Obstructive sleep apnea (adult) (pediatric) cpap machine    Prediabetes     Psoriasis-like skin disease     Tinea cruris      Social History:    Social History     Tobacco Use   Smoking Status Never Smoker   Smokeless Tobacco Never Used     Current Medications:  Current Outpatient Medications   Medication Sig Dispense Refill    hydroCHLOROthiazide (HYDRODIURIL) 25 MG tablet Take 1 tablet by mouth every morning 90 tablet 0    tiZANidine (ZANAFLEX) 4 MG tablet Take 1 tablet by mouth every 8 hours as needed (for muscle spasm) 180 tablet 0    fluticasone (FLONASE) 50 MCG/ACT nasal spray 2 sprays by Each Nostril route daily 3 each 0    vitamin D (CHOLECALCIFEROL) 50 MCG (2000 UT) TABS tablet Take 1 tablet by mouth daily After finishing 12 week couse 90 tablet 0    amLODIPine (NORVASC) 10 MG tablet Take 1 tablet by mouth daily 90 tablet 0    lisinopril (PRINIVIL;ZESTRIL) 40 MG tablet Take 1 tablet by mouth daily 90 tablet 0    sildenafil (VIAGRA) 50 MG tablet Take 1 tablet by mouth as needed for Erectile Dysfunction 10 tablet 2    metoprolol succinate (TOPROL XL) 50 MG extended release tablet TAKE ONE TABLET BY MOUTH DAILY 90 tablet 1    clopidogrel (PLAVIX) 75 MG tablet Take 1 tablet by mouth daily 90 tablet 1    Turmeric (QC TUMERIC COMPLEX PO) Take by mouth daily      rivaroxaban (XARELTO) 20 MG TABS tablet Take 1 tablet by mouth daily (with breakfast) 90 tablet 1    Handicap Placard MISC by Does not apply route Expires 9/17/2025 2 each 0    clotrimazole-betamethasone (LOTRISONE) 1-0.05 % cream Apply topically 2 times daily. (Patient not taking: Reported on 6/15/2022) 45 g 1    nitroGLYCERIN (NITROSTAT) 0.4 MG SL tablet Place 1 tablet under the tongue every 5 minutes as needed for Chest pain (Patient not taking: Reported on 6/15/2022) 25 tablet 0    Misc. Devices MISC Pads and leads for use on [facility] TENS unit. 1 Device 0     No current facility-administered medications for this visit.      REVIEW OF SYSTEMS:    CONSTITUTIONAL: + major weight loss, fatigue, weakness, night sweats or fever. HEENT: No new vision difficulties or ringing in the ears. RESPIRATORY: no new SOB, PND, orthopnea or cough. CARDIOVASCULAR: See HPI  GI: No nausea, vomiting, diarrhea, constipation, abdominal pain or changes in bowel habits. : No urinary frequency, urgency, incontinence hematuria or dysuria. SKIN: No cyanosis or skin lesions. MUSCULOSKELETAL: No new muscle or joint pain. NEUROLOGICAL: No syncope or TIA-like symptoms. PSYCHIATRIC: No anxiety, pain, insomnia or depression; 81 yo mother  a few months ago from colon cancer  He recently  ~ 2 weeks ago    Objective:   PHYSICAL EXAM:    Vitals:    06/15/22 1018 06/15/22 1057   BP: (!) 150/100 134/72   Site: Left Upper Arm Left Upper Arm   Position: Sitting    Cuff Size: Large Adult Large Adult   Pulse: 71    SpO2: 97%    Weight: 286 lb 1.6 oz (129.8 kg)    Height: 6' 3\" (1.905 m)          VITALS:  BP (!) 150/100 (Site: Left Upper Arm, Position: Sitting, Cuff Size: Large Adult)   Pulse 71   Ht 6' 3\" (1.905 m)   Wt 286 lb 1.6 oz (129.8 kg)   SpO2 97%   BMI 35.76 kg/m²   CONSTITUTIONAL: Cooperative, no apparent distress, and appears well nourished / over weight  NEUROLOGIC:  Awake and orientated to person, place and time. PSYCH: Calm affect. SKIN: Warm and dry. HEENT: Sclera non-icteric, normocephalic, neck supple, no elevation of JVP, normal carotid pulses with no bruits and thyroid normal size. LUNGS:  No increased work of breathing and essentially clear  CARDIOVASCULAR:  Regular rate 64  and rhythm with no murmurs, gallops, rubs, or abnormal heart sounds, normal PMI. The apical impulses not displaced  JVP less than 8 cm H2O  Heart tones are crisp and normal  Cervical veins are not engorged  The carotid upstroke is normal in amplitude and contour without delay or bruit  JVP is not elevated  ABDOMEN:  Normal bowel sounds, non-distended and non-tender to palpation  EXT: no edema, no calf tenderness. Pulses are present bilaterally. DATA:    Lab Results   Component Value Date    ALT 38 12/15/2021    AST 29 12/15/2021    ALKPHOS 70 12/15/2021    BILITOT 0.3 12/15/2021     Lab Results   Component Value Date    CREATININE 1.0 12/15/2021    BUN 15 12/15/2021     12/15/2021    K 4.0 12/15/2021     12/15/2021    CO2 28 12/15/2021       Lab Results   Component Value Date    WBC 6.4 06/16/2021    HGB 13.7 06/16/2021    HCT 40.4 (L) 06/16/2021    MCV 88.1 06/16/2021     06/16/2021     No components found for: CHLPL  Lab Results   Component Value Date    TRIG 374 (H) 06/16/2021    TRIG 289 (H) 03/16/2021    TRIG 226 (H) 09/17/2020     Lab Results   Component Value Date    HDL 30 (L) 06/16/2021    HDL 29 (L) 03/16/2021    HDL 38 (L) 09/17/2020     Lab Results   Component Value Date    LDLCALC see below 06/16/2021    LDLCALC 180 (H) 03/16/2021    LDLCALC 146 (H) 09/17/2020     Lab Results   Component Value Date    LABVLDL see below 06/16/2021    LABVLDL 58 03/16/2021    LABVLDL 45 09/17/2020     Radiology Review:  Pertinent images / reports were reviewed as a part of this visit and reveals the following:    [facility] 6/22/21  LM-normal  LAD-40% mid  Cx-50% proximal, FFR 0.89  OM1-patent  RCA-30% ostial with FFR 0.92 (after PCI of mid RCA and RPL),  80% mid  RPL-95% proximal  RPDA- normal  LVEF-55%  PCI: RCA 80% to 0% mid with a 3.5 mm x 38 mm Medtronic resolute Jay RADHA with a distal and overlapping 3.5 mm x 12 mm Medtronic resolute RADHA.  Postdilatation of the proximal segment of the initial stent was performed with a 4.0 mm NC balloon.  RPL 95% to 0% with a 2.0 mm x 22 mm Medtronic resolute Jay RADHA. Impression:  1.  Severe one-vessel CAD involving the RCA and RPL. 2.  Successful revascularization of the RCA and RPL with RADHA x2 of the RCA and times one of the RPL. 3.  Mild to moderate nonobstructive CAD involving the LAD and circumflex. 4.  Normal LV systolic function.   5.  Significant disease progression since prior cardiac cath in 2017.       Stress Myoview 6/3/21: No EKG evidence for ischemia with lexiscan  Normal LV size and function with EF of 59%  SPECT imaging with very small, moderate intensity, decreased perfusion in  the inferior wall with stress and rest. While an area of scar cannot be  excluded, preserved wall motion, EF and evidence for extracardiac  attenuation may more suggest artifact rather than infarct. Clinical  correlation recommended.   Impression  *Normal LV size and function  *Myocardial perfusion imaging with very small area of inferior infarct  versus attenuation artifact. No reversible ischemia.    14 day HM 4/21: SR with PAT     Stress Echo 7/9/20:  Baseline resting echocardiogram shows normal global LV systolic function with an ejection fraction of 55% and uniform myocardial segmental wall motion. Following stress there was uniform augmentation of all myocardial segments with appropriate hyperdynamic LV systolic response to stress with a post ejection fraction of 65%.   Negative Stress Echocardiography study for ischemia    CT of chest wo contrast 11/12/20:  Stable ectasia of the ascending thoracic aorta measuring up to approximately 4.9 cm   CT of chest wo contrast 3/1/19:   1. Unchanged 4.8 cm ascending thoracic aortic dilatation. CT of chest wo contrast 2/6/18:  Mediastinum: Ascending aorta measures up to 4.9 cm on series 5, image 151,   unchanged.  No mediastinal or axillary lymphadenopathy.        MCOT sept 2017-Nov 2017:   NSR/ST associated with symptoms; had NSVT and short PAT but no symptoms recorded    Assessment:      Diagnosis Orders   1. Coronary artery disease due to lipid rich plaque   ~stable  ~atypical discomfort reproducible with deep breathing  ~s/p PTCA RCA x2 and RPL x1 by cath Jun '21  ~aspirin / statin / BB    2.  Primary  hypertension   ~elevated on arrival ; improved-controlled with recheck  ~currently with untx BUSTER  ~HCTZ / BB / ace / CCB 3. Mixed hyperlipidemia   ~trig elevated ; HDL low : not controlled by diet  ~statin on hold    4. PAF (paroxysmal atrial fibrillation) (HCC)   ~stable : AP regular  ~denies palpitations   ~DOAC / BB  ~LA WNL by echo '16 ; unavailable by echo July '20          I had the opportunity to review the clinical symptoms and presentation of Max Singleton. Plan:     1. Stress echo reassess LVF  2. Fasting lipid profile/CMP/CK as routine with ESR for atypical discomfort  3. F/U in six months / test dependent   Encourage to f/u at the sleep clinic for replacement CPAP/BiPAP     Overall the patient is stable from CV standpoint    I have addresed the patient's cardiac risk factors and adjusted pharmacologic treatment as needed. In addition, I have reinforced the need for patient directed risk factor modification. Further evaluation will be based upon the patient's clinical course and testing results. All questions and concerns were addressed to the patient/spouse Alternatives to my treatment were discussed. The patient is not currently smoking. The risks related to smoking were reviewed with the patient. Recommend maintaining a smoke-free lifestyle. Patient is on a beta-blocker  Patient is on an ace-i/ARB  Patient is not on a statin : on hold d/t elevated CK    antiplatelet therapy / anti-coagulation has been recommended / prescribed for this patient. Education conducted on adverse reactions including bleeding was discussed. The patient verbalizes understanding not to stop medications without discussing with us. Discussed exercise: 30-60 minutes 7 days/week  Discussed Low saturated fat diet. Thank you for allowing to us to participate in the care of Max Singleton.     NURIS Thomas    Documentation of today's visit sent to PCP

## 2022-06-16 LAB — TOTAL CK: 664 U/L (ref 39–308)

## 2022-06-24 DIAGNOSIS — I71.20 THORACIC AORTIC ANEURYSM WITHOUT RUPTURE: Primary | ICD-10-CM

## 2022-06-24 NOTE — PROGRESS NOTES
Spoke with pt: will scheduled a CT chest to reassess thoracic aneurysm ; last study in Nov '20    Will reschedule stress echo until after CT resulted

## 2022-07-06 ENCOUNTER — HOSPITAL ENCOUNTER (OUTPATIENT)
Dept: CT IMAGING | Age: 62
Discharge: HOME | End: 2022-07-06
Payer: COMMERCIAL

## 2022-07-06 DIAGNOSIS — I71.20 THORACIC AORTIC ANEURYSM WITHOUT RUPTURE: ICD-10-CM

## 2022-07-06 PROCEDURE — 71250 CT THORAX DX C-: CPT

## 2022-07-11 RX ORDER — SILDENAFIL 50 MG/1
TABLET, FILM COATED ORAL
Qty: 10 TABLET | Refills: 2 | OUTPATIENT
Start: 2022-07-11

## 2022-07-11 NOTE — PROGRESS NOTES
PACU Transfer to [facility]    Vitals:    07/14/20 1400   BP: (!) 147/98   Pulse: 74   Resp: 13   Temp: 97 °F (36.1 °C)   SpO2: 95%     BP within 20%, does not meet treatment parameters    Intake/Output Summary (Last 24 hours) at 7/14/2020 1416  Last data filed at 7/14/2020 1330  Gross per 24 hour   Intake 1900 ml   Output 755 ml   Net 1145 ml       Pain assessment:  Pain treated in pacu, pt states level is tolerable and daughter aware of plan of care as well.    Pain Level: 7    Patient transferred to care of DEVON RN.    7/14/2020 2:16 PM Cibinqo Counseling: I discussed with the patient the risks of Cibinqo therapy including but not limited to common cold, nausea, headache, cold sores, increased blood CPK levels, dizziness, UTIs, fatigue, acne, and vomitting. Live vaccines should be avoided.  This medication has been linked to serious infections; higher rate of mortality; malignancy and lymphoproliferative disorders; major adverse cardiovascular events; thrombosis; thrombocytopenia and lymphopenia; lipid elevations; and retinal detachment.

## 2022-07-13 DIAGNOSIS — I71.20 THORACIC AORTIC ANEURYSM WITHOUT RUPTURE: Primary | ICD-10-CM

## 2022-07-28 NOTE — TELEPHONE ENCOUNTER
Last OV: 8-5-21 kjc  Last labs: 6-15-22  Appt scheduled :  10-13-22 recall list kjc  Last refill:12-21-21 kjc

## 2022-08-05 ENCOUNTER — OFFICE VISIT (OUTPATIENT)
Dept: PULMONOLOGY | Age: 62
End: 2022-08-05
Payer: COMMERCIAL

## 2022-08-05 VITALS
SYSTOLIC BLOOD PRESSURE: 140 MMHG | DIASTOLIC BLOOD PRESSURE: 100 MMHG | HEART RATE: 89 BPM | TEMPERATURE: 97.3 F | BODY MASS INDEX: 35.24 KG/M2 | WEIGHT: 289.4 LBS | OXYGEN SATURATION: 95 % | HEIGHT: 76 IN

## 2022-08-05 DIAGNOSIS — R73.03 PREDIABETES: ICD-10-CM

## 2022-08-05 DIAGNOSIS — I25.83 CORONARY ARTERY DISEASE DUE TO LIPID RICH PLAQUE: Chronic | ICD-10-CM

## 2022-08-05 DIAGNOSIS — G47.33 OBSTRUCTIVE SLEEP APNEA: Primary | ICD-10-CM

## 2022-08-05 DIAGNOSIS — I25.10 CORONARY ARTERY DISEASE DUE TO LIPID RICH PLAQUE: Chronic | ICD-10-CM

## 2022-08-05 DIAGNOSIS — I48.0 PAF (PAROXYSMAL ATRIAL FIBRILLATION) (HCC): ICD-10-CM

## 2022-08-05 DIAGNOSIS — I10 ESSENTIAL HYPERTENSION: Chronic | ICD-10-CM

## 2022-08-05 DIAGNOSIS — E66.01 MORBIDLY OBESE (HCC): ICD-10-CM

## 2022-08-05 PROCEDURE — G8417 CALC BMI ABV UP PARAM F/U: HCPCS | Performed by: NURSE PRACTITIONER

## 2022-08-05 PROCEDURE — 1036F TOBACCO NON-USER: CPT | Performed by: NURSE PRACTITIONER

## 2022-08-05 PROCEDURE — 99214 OFFICE O/P EST MOD 30 MIN: CPT | Performed by: NURSE PRACTITIONER

## 2022-08-05 PROCEDURE — G8427 DOCREV CUR MEDS BY ELIG CLIN: HCPCS | Performed by: NURSE PRACTITIONER

## 2022-08-05 PROCEDURE — 3017F COLORECTAL CA SCREEN DOC REV: CPT | Performed by: NURSE PRACTITIONER

## 2022-08-05 RX ORDER — CLOPIDOGREL BISULFATE 75 MG/1
TABLET ORAL
Qty: 90 TABLET | Refills: 1 | Status: SHIPPED | OUTPATIENT
Start: 2022-08-05

## 2022-08-05 ASSESSMENT — SLEEP AND FATIGUE QUESTIONNAIRES
HOW LIKELY ARE YOU TO NOD OFF OR FALL ASLEEP WHILE SITTING INACTIVE IN A PUBLIC PLACE: 0
HOW LIKELY ARE YOU TO NOD OFF OR FALL ASLEEP WHILE SITTING AND TALKING TO SOMEONE: 1
HOW LIKELY ARE YOU TO NOD OFF OR FALL ASLEEP WHILE SITTING QUIETLY AFTER LUNCH WITHOUT ALCOHOL: 1
ESS TOTAL SCORE: 7
HOW LIKELY ARE YOU TO NOD OFF OR FALL ASLEEP WHEN YOU ARE A PASSENGER IN A CAR FOR AN HOUR WITHOUT A BREAK: 0
HOW LIKELY ARE YOU TO NOD OFF OR FALL ASLEEP IN A CAR, WHILE STOPPED FOR A FEW MINUTES IN TRAFFIC: 0
HOW LIKELY ARE YOU TO NOD OFF OR FALL ASLEEP WHILE SITTING AND READING: 2
HOW LIKELY ARE YOU TO NOD OFF OR FALL ASLEEP WHILE WATCHING TV: 1
HOW LIKELY ARE YOU TO NOD OFF OR FALL ASLEEP WHILE LYING DOWN TO REST IN THE AFTERNOON WHEN CIRCUMSTANCES PERMIT: 2

## 2022-08-05 NOTE — PROGRESS NOTES
Holly Ellis [facility]  [address] [facility]  [place], [address] [facility]- [phone]   [facility] [address] Uche Fountain. [address] [place] [address]. Yimi Lamb 37 [phone] [address] [facility]  [address] [place], [place] [id number] [phone]      Assessment/Plan:      1. Obstructive sleep apnea  Assessment & Plan:  Chronic-with progression/exacerbation: Reviewed and analyzed results of physiologic download from patient's machine and reviewed with patient. Supplies and parts as needed for his machine. These are medically necessary. Limit caffeine use after 3pm. Based on the analyzed data will continue with current settings. Will place order for new machine. Machine is currently over 11years old, making loud noises, and needs replaced. Machine is medically necessary and he is gaining benefit from machine use. Replacement mask given to patient in the office today for him to continue using his machine in the interim. Encouraged him to work with his equipment company on mask fit and comfort. Provided resources for review different styles of masks. Encouraged him to register his machine for the  recall. Encouraged consistent use of his machine each night, all night. Discussed the importance of treating BUSTER from a physiological standpoint. No driving when sleepy. Instructed not to drive unless had 4 hrs of effective therapy for his BUSTER the night before. Did review the risks of under or untreated BUSTER including, but not limited to, higher risks of motor vehicle accidents, stroke, heart attacks, and death. He understands and accepts all these risks. Will see him back for new machine compliance within 30-90 days. Encouraged him to contact the office sooner with any questions or concerns. Discussed the recall of [employer] devices with patient and the severity of his sleep apnea.  Discussed the risks of untreated sleep apnea including but not limited to car accidents, heart attacks, strokes, and death. Alternative therapy may not exist or may be severely limited. Felt the benefits of continued usage of these devices may outweigh the risks identified in the recall notification. Advised to avoid use of unproven cleaning methods, such as ozone. Due to the unknown variables each patient will have to make a determination in his/her own. Please contact your equipment company for further instruction until an alternative is found. Encouraged patient to register his machine for the recall and provided phone number. 2. Coronary artery disease due to lipid rich plaque  Assessment & Plan:   Chronic- Stable. Discussed the importance of treating obstructive sleep apnea as part of the management of this disorder. Cont any meds per PCP and other physicians. 3. PAF (paroxysmal atrial fibrillation) ([facility])  Assessment & Plan:   Chronic- Stable. Discussed the importance of treating obstructive sleep apnea as part of the management of this disorder. Cont any meds per PCP and other physicians. 4. Essential hypertension  Assessment & Plan:  Chronic- Stable. Discussed the importance of treating obstructive sleep apnea as part of the management of this disorder. Cont any meds per PCP and other physicians. Blood pressure elevated in the office today. He reports he did not take his blood pressure medication this morning as he was in a hurry. Recommended he follow-up with his PCP for further recommendations and management. 5. Prediabetes  Assessment & Plan:  Chronic- Stable. Discussed the importance of treating obstructive sleep apnea as part of the management of this disorder. Cont any meds per PCP and other physicians. 6. Morbidly obese (HCC)  Assessment & Plan:   Chronic-not stable:  Discussed importance of treating obstructive sleep apnea and getting sufficient sleep to assist with weight control. mask on his face at night when he is using the machine. His wife reports he has been talking in his sleep which is new over the last couple months. The pressure on his machine is comfortable and he is waking rested. he denies headaches, congestion, nosebleeds, dryness, aerophagia, or drowsiness while driving. DME [facility]    [place]hoff - Total score: 7    Social History     Socioeconomic History    Marital status:      Spouse name: Not on file    Number of children: 0    Years of education: Not on file    Highest education level: Not on file   Occupational History    Occupation: unemployed    Occupation: former [employer] work    Tobacco Use    Smoking status: Never    Smokeless tobacco: Never   Vaping Use    Vaping Use: Never used   Substance and Sexual Activity    Alcohol use:  Yes     Alcohol/week: 2.0 standard drinks     Types: 1 Cans of beer, 1 Standard drinks or equivalent per week     Comment: Rarely; socially    Drug use: No    Sexual activity: Yes     Partners: Female     Birth control/protection: Condom   Other Topics Concern    Not on file   Social History Narrative    Not on file     Social Determinants of Health     Financial Resource Strain: Low Risk     Difficulty of Paying Living Expenses: Not very hard   Food Insecurity: No Food Insecurity    Worried About Running Out of Food in the Last Year: Never true    Ran Out of Food in the Last Year: Never true   Transportation Needs: Not on file   Physical Activity: Not on file   Stress: Not on file   Social Connections: Not on file   Intimate Partner Violence: Not on file   Housing Stability: Not on file       Current Outpatient Medications   Medication Instructions    amLODIPine (NORVASC) 10 mg, Oral, DAILY    clopidogrel (PLAVIX) 75 mg, Oral, DAILY    fluticasone (FLONASE) 50 MCG/ACT nasal spray 2 sprays, Each Nostril, DAILY    Handicap Placard MISC Does not apply, Expires 9/17/2025    hydroCHLOROthiazide (HYDRODIURIL) 25 mg, Oral, EVERY MORNING    lisinopril (PRINIVIL;ZESTRIL) 40 mg, Oral, DAILY    metoprolol succinate (TOPROL XL) 50 MG extended release tablet TAKE ONE TABLET BY MOUTH DAILY    Misc. Devices MISC Pads and leads for use on [facility] TENS unit.    rivaroxaban (XARELTO) 20 mg, Oral, DAILY WITH BREAKFAST    sildenafil (VIAGRA) 50 mg, Oral, PRN    tiZANidine (ZANAFLEX) 4 mg, Oral, EVERY 8 HOURS PRN    Turmeric (QC TUMERIC COMPLEX PO) Oral, DAILY    vitamin D (CHOLECALCIFEROL) 2,000 Units, Oral, DAILY, After finishing 12 week couse          Vitals:  Weight BMI   Wt Readings from Last 3 Encounters:   08/05/22 289 lb 6.4 oz (131.3 kg)   06/15/22 286 lb 1.6 oz (129.8 kg)   06/02/22 290 lb (131.5 kg)    Body mass index is 35.6 kg/m².      BP HR SaO2   BP Readings from Last 3 Encounters:   08/05/22 (!) 140/100   06/15/22 134/72   06/02/22 (!) 150/100    Pulse Readings from Last 3 Encounters:   08/05/22 89   06/15/22 71   06/02/22 71    SpO2 Readings from Last 3 Encounters:   08/05/22 95%   06/15/22 97%   06/02/22 96%        Electronically signed by NURIS Avelar on 8/5/2022 at 2:40 PM

## 2022-08-05 NOTE — ASSESSMENT & PLAN NOTE
Chronic-with progression/exacerbation: Reviewed and analyzed results of physiologic download from patient's machine and reviewed with patient. Supplies and parts as needed for his machine. These are medically necessary. Limit caffeine use after 3pm. Based on the analyzed data will continue with current settings. Will place order for new machine. Machine is currently over 11years old, making loud noises, and needs replaced. Machine is medically necessary and he is gaining benefit from machine use. Replacement mask given to patient in the office today for him to continue using his machine in the interim. Encouraged him to work with his equipment company on mask fit and comfort. Provided resources for review different styles of masks. Encouraged him to register his machine for the  recall. Encouraged consistent use of his machine each night, all night. Discussed the importance of treating BUSTER from a physiological standpoint. No driving when sleepy. Instructed not to drive unless had 4 hrs of effective therapy for his BUSTER the night before. Did review the risks of under or untreated BUSTER including, but not limited to, higher risks of motor vehicle accidents, stroke, heart attacks, and death. He understands and accepts all these risks. Will see him back for new machine compliance within 30-90 days. Encouraged him to contact the office sooner with any questions or concerns. Discussed the recall of [employer] devices with patient and the severity of his sleep apnea. Discussed the risks of untreated sleep apnea including but not limited to car accidents, heart attacks, strokes, and death. Alternative therapy may not exist or may be severely limited. Felt the benefits of continued usage of these devices may outweigh the risks identified in the recall notification. Advised to avoid use of unproven cleaning methods, such as ozone.  Due to the unknown variables each patient will have to make a determination in his/her own. Please contact your equipment company for further instruction until an alternative is found. Encouraged patient to register his machine for the recall and provided phone number.

## 2022-08-05 NOTE — PROGRESS NOTES
Diagnosis: [x] BUSTER (G47.33) [] CSA (G47.31) [] Apnea (G47.30)   Length of Need: [x] 15 Months [] 99 Months [] Other:   Machine (ABBE!): [] Respironics Dream Station      Auto [x] ResMed AirSense     Auto 11 [] Other:     [x]  CPAP () [] Bilevel ()   Mode: [x] Auto [] Spontaneous    Mode: [] Auto [] Spontaneous          APAP - Settings  Pressure Min: 11 cmH2O  Pressure Max: 18 cmH2O               Comfort Settings: Ramp Pressure: 5 cmH2O  Ramp time: 15 min  Flex/EPR - 3 full time                                  For ResMed Bileve (TiMax-4 sec   TiMin- 0.2 sec)     Humidifier: [x] Heated ()        [x] Water chamber replacement ()/ 1 per 6 months        Mask:   [] Nasal () /1 per 3 months [x] Full Face () /1 per 3 months   [] Patient choice -Size and fit mask [x] Patient Choice - Size and fit mask   [] Dispense: [] Dispense:   [] Headgear () / 1 per 3 months [x] Headgear () / 1 per 3 months   [] Replacement Nasal Cushion ()/2 per month [x] Interface Replacement ()/1 per month   [] Replacement Nasal Pillows ()/2 per month         Tubing: [x] Heated ()/1 per 3 months    [] Standard ()/1 per 3 months [] Other:           Filters: [x] Non-disposable ()/1 per 6 months     [x] Ultra-Fine, Disposable ()/2 per month        Miscellaneous: [] Chin Strap ()/ 1 per 6 months [] O2 bleed-in:        LPM   [] Oxymetry on CPAP/Bilevel []  Other:         Start Order Date: 08/05/22    MEDICAL JUSTIFICATION:  I, the undersigned, certify that the above prescribed supplies are medically necessary for this patients wellbeing. In my opinion, the supplies are both reasonable and necessary in reference to accepted standards of medicalpractice in treatment of this patients condition.     Christiano Dominguez NP    NPI: [id number]       Order Signed Date: 08/05/22  [address] [facility]  Pulmonary, Sleep, and Critical Care Pulmonary, Sleep, and Critical Care  [address] [facility]. [address] [facility], [address] [facility] , [address]                                    David Victoresa  Phone: [phone]    Fax: [phone]    Perez Carvalho  1960  [address] [facility],[address]  [phone] (home)   [phone] (mobile)      Insurance Info (confirm with patient if correct):  Payer/Plan Subscr  Sex Relation Sub.  Ins. ID Effective Group Num

## 2022-08-05 NOTE — LETTER
necessary. Limit caffeine use after 3pm. Based on the analyzed data will continue with current settings. Will place order for new machine. Machine is currently over 11years old, making loud noises, and needs replaced. Machine is medically necessary and he is gaining benefit from machine use. Replacement mask given to patient in the office today for him to continue using his machine in the interim. Encouraged him to work with his equipment company on mask fit and comfort. Provided resources for review different styles of masks. Encouraged him to register his machine for the  recall. Encouraged consistent use of his machine each night, all night. Discussed the importance of treating BUSTER from a physiological standpoint. No driving when sleepy. Instructed not to drive unless had 4 hrs of effective therapy for his BUSTER the night before. Did review the risks of under or untreated BUSTER including, but not limited to, higher risks of motor vehicle accidents, stroke, heart attacks, and death. He understands and accepts all these risks. Will see him back for new machine compliance within 30-90 days. Encouraged him to contact the office sooner with any questions or concerns. Discussed the recall of Repironics devices with patient and the severity of his sleep apnea. Discussed the risks of untreated sleep apnea including but not limited to car accidents, heart attacks, strokes, and death. Alternative therapy may not exist or may be severely limited. Felt the benefits of continued usage of these devices may outweigh the risks identified in the recall notification. Advised to avoid use of unproven cleaning methods, such as ozone. Due to the unknown variables each patient will have to make a determination in his/her own. Please contact your equipment company for further instruction until an alternative is found. Encouraged patient to register his machine for the recall and provided phone number. If you have questions or concerns, please do not hesitate to call me. I look forward to following Corrie Daniels along with you.     Sincerely,    NURIS Bello    CC providers:  NURIS Franco - CNP  [address] [facility] - [address] [address]  [place]

## 2022-08-28 DIAGNOSIS — I10 ESSENTIAL HYPERTENSION: ICD-10-CM

## 2022-08-29 ENCOUNTER — TELEPHONE (OUTPATIENT)
Dept: CARDIOLOGY CLINIC | Age: 62
End: 2022-08-29

## 2022-08-29 RX ORDER — AMLODIPINE BESYLATE 10 MG/1
TABLET ORAL
Qty: 90 TABLET | Refills: 0 | OUTPATIENT
Start: 2022-08-29

## 2022-08-29 NOTE — TELEPHONE ENCOUNTER
Spoke with Trever López. She was trying to reach [facility] radiology dept.  She had faxed a release to them and was following up on that request.

## 2022-08-29 NOTE — TELEPHONE ENCOUNTER
Rekha calling to see if fax was received for release of medical records. Echo, ekg any cardiac testing. Can someone look for this fax. He also see's Dr Lisa Kaplan.  Thanks

## 2022-11-05 DIAGNOSIS — I10 ESSENTIAL HYPERTENSION: ICD-10-CM

## 2022-11-07 RX ORDER — LISINOPRIL 40 MG/1
TABLET ORAL
Qty: 90 TABLET | Refills: 0 | OUTPATIENT
Start: 2022-11-07

## 2022-11-08 RX ORDER — METOPROLOL SUCCINATE 50 MG/1
TABLET, EXTENDED RELEASE ORAL
Qty: 90 TABLET | Refills: 1 | OUTPATIENT
Start: 2022-11-08

## 2022-11-08 NOTE — TELEPHONE ENCOUNTER
Last OV: 8-5-21 [facility]  Last labs: 6-15-22  Appt scheduled :  none  Last refill:2-1-22 [facility]    Called pt to see if he had scheduled a appt with [facility]. Pt informed me that he had switched all his dr to the [facility] and wouldn't be coming here. Informed pt to contact his PCP for refills with verbal understanding from pt.

## 2022-11-21 ENCOUNTER — TELEPHONE (OUTPATIENT)
Dept: CARDIOTHORACIC SURGERY | Age: 62
End: 2022-11-21

## 2022-11-21 NOTE — TELEPHONE ENCOUNTER
Reviewed patient's CT chest from July w/ Obi Reyes., CVTS PA. She wanted patient to complete ECHO that was ordered by cardiology. Checked patient's orders, and it has not been completed yet. Saw note from LPN w/ [facility], \"Called pt to see if he had scheduled a appt with arley bass. Pt informed me that he had switched all his dr to the [facility] network and wouldn't be coming here. Informed pt to contact his PCP for refills with verbal understanding from pt. \"    Called patient to ask if he would like to continue to be monitored for his dilated aortic surveillance by [facility]. He states he does not, and he will now be followed with [facility]. Will remove patient from surveillance.

## 2022-11-28 RX ORDER — SILDENAFIL 50 MG/1
TABLET, FILM COATED ORAL
Qty: 10 TABLET | Refills: 2 | OUTPATIENT
Start: 2022-11-28

## 2022-11-28 RX ORDER — TIZANIDINE HYDROCHLORIDE 6 MG/1
CAPSULE, GELATIN COATED ORAL
COMMUNITY
Start: 2022-11-09

## 2022-11-28 RX ORDER — ROSUVASTATIN CALCIUM 40 MG/1
TABLET, COATED ORAL
COMMUNITY
Start: 2022-10-26

## 2022-11-28 RX ORDER — LOSARTAN POTASSIUM 100 MG/1
100 TABLET ORAL DAILY
COMMUNITY
Start: 2022-08-15 | End: 2023-02-11

## 2022-11-30 ENCOUNTER — TELEPHONE (OUTPATIENT)
Dept: CARDIOLOGY CLINIC | Age: 62
End: 2022-11-30

## 2022-11-30 NOTE — TELEPHONE ENCOUNTER
Evgeny Barnard was call because she was requesting a report for the stress echo that was ordered. I told her that the pt made the appt, but no showed. She told me that I needed to call [facility] and withdraw the PA #[id number]. I called and spoke with an associate at [place] [address] and ask that the approval be withdrawn. Audra Chin Reference number for the call [id number].

## 2023-01-10 DIAGNOSIS — I10 ESSENTIAL HYPERTENSION: ICD-10-CM

## 2023-01-10 RX ORDER — HYDROCHLOROTHIAZIDE 25 MG/1
TABLET ORAL
Qty: 90 TABLET | Refills: 0 | OUTPATIENT
Start: 2023-01-10

## 2023-01-10 RX ORDER — AMLODIPINE BESYLATE 10 MG/1
TABLET ORAL
Qty: 90 TABLET | Refills: 0 | OUTPATIENT
Start: 2023-01-10

## 2023-04-03 ENCOUNTER — OFFICE VISIT (OUTPATIENT)
Dept: PULMONOLOGY | Age: 63
End: 2023-04-03
Payer: COMMERCIAL

## 2023-04-03 VITALS
HEIGHT: 76 IN | BODY MASS INDEX: 36.68 KG/M2 | WEIGHT: 301.2 LBS | OXYGEN SATURATION: 95 % | SYSTOLIC BLOOD PRESSURE: 128 MMHG | DIASTOLIC BLOOD PRESSURE: 88 MMHG | HEART RATE: 78 BPM

## 2023-04-03 DIAGNOSIS — I48.0 PAF (PAROXYSMAL ATRIAL FIBRILLATION) (HCC): ICD-10-CM

## 2023-04-03 DIAGNOSIS — I25.83 CORONARY ARTERY DISEASE DUE TO LIPID RICH PLAQUE: Chronic | ICD-10-CM

## 2023-04-03 DIAGNOSIS — I10 ESSENTIAL HYPERTENSION: Chronic | ICD-10-CM

## 2023-04-03 DIAGNOSIS — I25.10 CORONARY ARTERY DISEASE DUE TO LIPID RICH PLAQUE: Chronic | ICD-10-CM

## 2023-04-03 DIAGNOSIS — G47.33 OBSTRUCTIVE SLEEP APNEA: Primary | ICD-10-CM

## 2023-04-03 DIAGNOSIS — E66.9 OBESITY (BMI 30-39.9): ICD-10-CM

## 2023-04-03 PROCEDURE — 3074F SYST BP LT 130 MM HG: CPT | Performed by: NURSE PRACTITIONER

## 2023-04-03 PROCEDURE — 1036F TOBACCO NON-USER: CPT | Performed by: NURSE PRACTITIONER

## 2023-04-03 PROCEDURE — 99214 OFFICE O/P EST MOD 30 MIN: CPT | Performed by: NURSE PRACTITIONER

## 2023-04-03 PROCEDURE — G8427 DOCREV CUR MEDS BY ELIG CLIN: HCPCS | Performed by: NURSE PRACTITIONER

## 2023-04-03 PROCEDURE — G8417 CALC BMI ABV UP PARAM F/U: HCPCS | Performed by: NURSE PRACTITIONER

## 2023-04-03 PROCEDURE — 3017F COLORECTAL CA SCREEN DOC REV: CPT | Performed by: NURSE PRACTITIONER

## 2023-04-03 PROCEDURE — 3079F DIAST BP 80-89 MM HG: CPT | Performed by: NURSE PRACTITIONER

## 2023-04-03 ASSESSMENT — SLEEP AND FATIGUE QUESTIONNAIRES
HOW LIKELY ARE YOU TO NOD OFF OR FALL ASLEEP WHILE SITTING INACTIVE IN A PUBLIC PLACE: 0
HOW LIKELY ARE YOU TO NOD OFF OR FALL ASLEEP WHILE SITTING AND READING: 2
HOW LIKELY ARE YOU TO NOD OFF OR FALL ASLEEP WHEN YOU ARE A PASSENGER IN A CAR FOR AN HOUR WITHOUT A BREAK: 0
HOW LIKELY ARE YOU TO NOD OFF OR FALL ASLEEP WHILE SITTING AND TALKING TO SOMEONE: 1
HOW LIKELY ARE YOU TO NOD OFF OR FALL ASLEEP WHILE WATCHING TV: 2
HOW LIKELY ARE YOU TO NOD OFF OR FALL ASLEEP IN A CAR, WHILE STOPPED FOR A FEW MINUTES IN TRAFFIC: 0
ESS TOTAL SCORE: 10
HOW LIKELY ARE YOU TO NOD OFF OR FALL ASLEEP WHILE SITTING QUIETLY AFTER LUNCH WITHOUT ALCOHOL: 2
HOW LIKELY ARE YOU TO NOD OFF OR FALL ASLEEP WHILE LYING DOWN TO REST IN THE AFTERNOON WHEN CIRCUMSTANCES PERMIT: 3

## 2023-04-03 NOTE — ASSESSMENT & PLAN NOTE
Chronic-with progression/exacerbation: Reviewed and analyzed results of physiologic download from patient's machine and reviewed with patient. Supplies and parts as needed for his machine. These are medically necessary. Limit caffeine use after 3pm. Based on the analyzed data will change following settings: P min increased to 13, P max increased to 20, Response changed to standard. Ramp increased to 7 and time to 5 minutes. Encouraged him to use his machine is much as possible. Discussed obtaining a heated tube to help with congestion. Patient has failed over 90-day trial of APAP and would like to restart the process. He was struggling with the pressure of his machine, understands he was noncompliant, and would like to try again. Discussed if no improvement after the pressure adjustments to his machine will need to consider an in lab titration study. Encouraged him to contact his equipment company for replacement supplies. Will see him back between 31-90 days for machine compliance. Encouraged him to contact the office with any questions or concerns. Encouraged consistent use of his machine each night, all night. Discussed the importance of treating BUSTER from a physiological standpoint. Instructed not to drive unless had 4 hrs of effective therapy for his BUSTER the night before. No driving when sleepy. Did review the risks of under or untreated BUSTER including, but not limited to, higher risks of motor vehicle accidents, stroke, heart attacks, and death. He understands and accepts all these risks.

## 2023-04-03 NOTE — PROGRESS NOTES
Diagnosis: [x] BUSTER (G47.33) [] CSA (G47.31) [] Apnea (G47.30)   Length of Need: [x] 15 Months [] 99 Months [] Other:   Machine (ABBE!): [] Respironics Dream Station      Auto [x] ResMed AirSense 11 Auto with modem for remote monitoring [] Other:     [x]  CPAP () [] Bilevel ()   Mode: [x] Auto [] Spontaneous    Mode: [] Auto [] Spontaneous          APAP - Settings  Pressure Min: 13 cmH2O  Pressure Max: 20 cmH2O    *Machine restart*               Comfort Settings: Ramp Pressure: 5 cmH2O  Ramp time: 15 min  Flex/EPR - 3 full time                                  For ResMed Bileve (TiMax-4 sec   TiMin- 0.2 sec)     Humidifier: [x] Heated ()        [x] Water chamber replacement ()/ 1 per 6 months        Mask:   [x] Nasal () /1 per 3 months [] Full Face () /1 per 3 months   [x] Patient choice -Size and fit mask [] Patient Choice - Size and fit mask   [] Dispense: [] Dispense:   [x] Headgear () / 1 per 3 months [] Headgear () / 1 per 3 months   [x] Replacement Nasal Cushion ()/2 per month [] Interface Replacement ()/1 per month   [] Replacement Nasal Pillows ()/2 per month         Tubing: [x] Heated ()/1 per 3 months    [] Standard ()/1 per 3 months [] Other:           Filters: [x] Non-disposable ()/1 per 6 months     [x] Ultra-Fine, Disposable ()/2 per month        Miscellaneous: [] Chin Strap ()/ 1 per 6 months [] O2 bleed-in:        LPM   [] Oxymetry on CPAP/Bilevel [x]  Other: Mask Re-fit        Start Order Date: 04/03/23    MEDICAL JUSTIFICATION:  I, the undersigned, certify that the above prescribed supplies are medically necessary for this patients wellbeing. In my opinion, the supplies are both reasonable and necessary in reference to accepted standards of medicalpractice in treatment of this patients condition.     Orquidea Soria NP    NPI: [id number]       Order Signed Date: 04/03/23  [facility]

## 2023-04-03 NOTE — PROGRESS NOTES
Mauro Philip CNP  Laishaoliver Lovejose CNP [place]  [address] [facility] [address] [facility], [address] [facility]  P- [phone]   Breen Messenger  [address]  [address] [phone] [address] [address] [phone]      Assessment/Plan:      1. Obstructive sleep apnea  Assessment & Plan:  Chronic-with progression/exacerbation: Reviewed and analyzed results of physiologic download from patient's machine and reviewed with patient. Supplies and parts as needed for his machine. These are medically necessary. Limit caffeine use after 3pm. Based on the analyzed data will change following settings: P min increased to 13, P max increased to 20, Response changed to standard. Ramp increased to 7 and time to 5 minutes. Encouraged him to use his machine is much as possible. Discussed obtaining a heated tube to help with congestion. Patient has failed over 90-day trial of APAP and would like to restart the process. He was struggling with the pressure of his machine, understands he was noncompliant, and would like to try again. Discussed if no improvement after the pressure adjustments to his machine will need to consider an in lab titration study. Encouraged him to contact his equipment company for replacement supplies. Will see him back between 31-90 days for machine compliance. Encouraged him to contact the office with any questions or concerns. Encouraged consistent use of his machine each night, all night. Discussed the importance of treating BUSTER from a physiological standpoint. Instructed not to drive unless had 4 hrs of effective therapy for his BUSTER the night before. No driving when sleepy. Did review the risks of under or untreated BUSTER including, but not limited to, higher risks of motor vehicle accidents, stroke, heart attacks, and death.   He

## 2023-04-03 NOTE — LETTER
understands he was noncompliant, and would like to try again. Discussed if no improvement after the pressure adjustments to his machine will need to consider an in lab titration study. Encouraged him to contact his equipment company for replacement supplies. Will see him back between 31-90 days for machine compliance. Encouraged him to contact the office with any questions or concerns. Encouraged consistent use of his machine each night, all night. Discussed the importance of treating BUSTER from a physiological standpoint. Instructed not to drive unless had 4 hrs of effective therapy for his BUSTER the night before. No driving when sleepy. Did review the risks of under or untreated BUSTER including, but not limited to, higher risks of motor vehicle accidents, stroke, heart attacks, and death. He understands and accepts all these risks. If you have questions or concerns, please do not hesitate to call me. I look forward to following Terrance Odonnell along with you.     Sincerely,    NURIS Cha    CC providers:  Raffaele Austin DO  [address] [facility] [address] [address]  Via Fax: [phone]

## 2023-05-26 ENCOUNTER — TELEPHONE (OUTPATIENT)
Dept: PULMONOLOGY | Age: 63
End: 2023-05-26

## 2023-05-26 NOTE — PROGRESS NOTES
[address] [facility] Noem[address] Watters  1960  [address]  [address]  [phone] (home)   [phone] (mobile)      Insurance Info (confirm with patient if correct):  Payer/Plan Subscr  Sex Relation Sub.  Ins. ID Effective Group Num

## 2023-05-26 NOTE — TELEPHONE ENCOUNTER
I called pt to r/s appt and he mentioned to me that he is in need of a new facemask.      [phone]

## 2023-06-28 NOTE — ADDENDUM NOTE
Addended by: Juana Ac on: 4/27/2021 05:30 PM     Modules accepted: Orders
Sunscreen Recommendations: Sunblock with zinc encouraged.
Detail Level: Zone

## 2023-07-03 ENCOUNTER — OFFICE VISIT (OUTPATIENT)
Dept: PULMONOLOGY | Age: 63
End: 2023-07-03
Payer: COMMERCIAL

## 2023-07-03 VITALS
DIASTOLIC BLOOD PRESSURE: 92 MMHG | BODY MASS INDEX: 37.05 KG/M2 | SYSTOLIC BLOOD PRESSURE: 144 MMHG | HEIGHT: 75 IN | OXYGEN SATURATION: 98 % | WEIGHT: 298 LBS | HEART RATE: 100 BPM

## 2023-07-03 DIAGNOSIS — I10 ESSENTIAL HYPERTENSION: ICD-10-CM

## 2023-07-03 DIAGNOSIS — E66.9 OBESITY (BMI 30-39.9): ICD-10-CM

## 2023-07-03 DIAGNOSIS — I25.83 CORONARY ARTERY DISEASE DUE TO LIPID RICH PLAQUE: ICD-10-CM

## 2023-07-03 DIAGNOSIS — I48.0 PAF (PAROXYSMAL ATRIAL FIBRILLATION) (HCC): ICD-10-CM

## 2023-07-03 DIAGNOSIS — G47.33 OBSTRUCTIVE SLEEP APNEA: Primary | ICD-10-CM

## 2023-07-03 DIAGNOSIS — I25.10 CORONARY ARTERY DISEASE DUE TO LIPID RICH PLAQUE: ICD-10-CM

## 2023-07-03 PROCEDURE — 1036F TOBACCO NON-USER: CPT | Performed by: NURSE PRACTITIONER

## 2023-07-03 PROCEDURE — 99214 OFFICE O/P EST MOD 30 MIN: CPT | Performed by: NURSE PRACTITIONER

## 2023-07-03 PROCEDURE — 3017F COLORECTAL CA SCREEN DOC REV: CPT | Performed by: NURSE PRACTITIONER

## 2023-07-03 PROCEDURE — 3077F SYST BP >= 140 MM HG: CPT | Performed by: NURSE PRACTITIONER

## 2023-07-03 PROCEDURE — G8417 CALC BMI ABV UP PARAM F/U: HCPCS | Performed by: NURSE PRACTITIONER

## 2023-07-03 PROCEDURE — 3080F DIAST BP >= 90 MM HG: CPT | Performed by: NURSE PRACTITIONER

## 2023-07-03 PROCEDURE — G8427 DOCREV CUR MEDS BY ELIG CLIN: HCPCS | Performed by: NURSE PRACTITIONER

## 2023-07-03 ASSESSMENT — SLEEP AND FATIGUE QUESTIONNAIRES
HOW LIKELY ARE YOU TO NOD OFF OR FALL ASLEEP WHILE WATCHING TV: 0
HOW LIKELY ARE YOU TO NOD OFF OR FALL ASLEEP WHEN YOU ARE A PASSENGER IN A CAR FOR AN HOUR WITHOUT A BREAK: 0
HOW LIKELY ARE YOU TO NOD OFF OR FALL ASLEEP WHILE SITTING QUIETLY AFTER LUNCH WITHOUT ALCOHOL: 1
HOW LIKELY ARE YOU TO NOD OFF OR FALL ASLEEP WHILE SITTING AND READING: 1
HOW LIKELY ARE YOU TO NOD OFF OR FALL ASLEEP WHILE SITTING INACTIVE IN A PUBLIC PLACE: 0
HOW LIKELY ARE YOU TO NOD OFF OR FALL ASLEEP WHILE SITTING AND TALKING TO SOMEONE: 0
HOW LIKELY ARE YOU TO NOD OFF OR FALL ASLEEP WHILE LYING DOWN TO REST IN THE AFTERNOON WHEN CIRCUMSTANCES PERMIT: 0
ESS TOTAL SCORE: 2
HOW LIKELY ARE YOU TO NOD OFF OR FALL ASLEEP IN A CAR, WHILE STOPPED FOR A FEW MINUTES IN TRAFFIC: 0
NECK CIRCUMFERENCE (INCHES): 19.5

## 2023-07-03 NOTE — ASSESSMENT & PLAN NOTE
Chronic - improving: Reviewed and analyzed results of physiologic download from patient's machine and reviewed with patients. Supplies and parts as needed for the machine. These are medically necessary. Limit caffeine use after 3 PM. Based on the analyzed data, we will continue with current settings. He needs to work on mask fit so he can stay on the machine longer to get more usage. When his mask fits well, he can stay on the machine longer and his symptoms are better. Discussed a mask liner for comfort and additional barrier for mask leak. Also discussed purchasing a mask out of pocket as his insurance only provides supplies once yearly. Resources were provided. He will return in 2 mo to check for compliance. If he continues to struggle with the machine use despite of good mask fit, he will need in lab titration to assess the efficacy of the current settings. Encouraged the patient to use his machine each night, all night. Encouraged the patient to contact the office with any questions or concerns. Discussed the importance of consistence use of the machine and encouraged consistent use of the machine each night. Also discussed the importance of treating Obstructive Sleep Apnea from a physiological standpoint. Instructed not to drive unless had 4 hours of effective therapy for BUSTER the night before. Did review the risks of under or untreated BUSTER including, but not limited to, higher risks of motor vehicle accidents, stroke, heart attacks, and death. Patients verbalized understanding and accepts all these risks.

## 2023-09-13 ENCOUNTER — OFFICE VISIT (OUTPATIENT)
Dept: PULMONOLOGY | Age: 63
End: 2023-09-13
Payer: COMMERCIAL

## 2023-09-13 VITALS
WEIGHT: 297.2 LBS | BODY MASS INDEX: 36.95 KG/M2 | OXYGEN SATURATION: 98 % | SYSTOLIC BLOOD PRESSURE: 142 MMHG | HEIGHT: 75 IN | DIASTOLIC BLOOD PRESSURE: 94 MMHG | HEART RATE: 80 BPM

## 2023-09-13 DIAGNOSIS — G47.33 OBSTRUCTIVE SLEEP APNEA: Primary | ICD-10-CM

## 2023-09-13 DIAGNOSIS — I10 ESSENTIAL HYPERTENSION: ICD-10-CM

## 2023-09-13 DIAGNOSIS — E66.9 OBESITY (BMI 30-39.9): ICD-10-CM

## 2023-09-13 DIAGNOSIS — I25.83 CORONARY ARTERY DISEASE DUE TO LIPID RICH PLAQUE: ICD-10-CM

## 2023-09-13 DIAGNOSIS — I48.0 PAF (PAROXYSMAL ATRIAL FIBRILLATION) (HCC): ICD-10-CM

## 2023-09-13 DIAGNOSIS — I25.10 CORONARY ARTERY DISEASE DUE TO LIPID RICH PLAQUE: ICD-10-CM

## 2023-09-13 PROCEDURE — 3077F SYST BP >= 140 MM HG: CPT | Performed by: NURSE PRACTITIONER

## 2023-09-13 PROCEDURE — 1036F TOBACCO NON-USER: CPT | Performed by: NURSE PRACTITIONER

## 2023-09-13 PROCEDURE — 3080F DIAST BP >= 90 MM HG: CPT | Performed by: NURSE PRACTITIONER

## 2023-09-13 PROCEDURE — 3017F COLORECTAL CA SCREEN DOC REV: CPT | Performed by: NURSE PRACTITIONER

## 2023-09-13 PROCEDURE — 99214 OFFICE O/P EST MOD 30 MIN: CPT | Performed by: NURSE PRACTITIONER

## 2023-09-13 PROCEDURE — G8427 DOCREV CUR MEDS BY ELIG CLIN: HCPCS | Performed by: NURSE PRACTITIONER

## 2023-09-13 PROCEDURE — G8417 CALC BMI ABV UP PARAM F/U: HCPCS | Performed by: NURSE PRACTITIONER

## 2023-09-13 RX ORDER — ZOLPIDEM TARTRATE 5 MG/1
5 TABLET ORAL NIGHTLY PRN
COMMUNITY

## 2023-09-13 RX ORDER — OMEPRAZOLE 20 MG/1
CAPSULE, DELAYED RELEASE ORAL
COMMUNITY
Start: 2023-08-27

## 2023-09-13 ASSESSMENT — SLEEP AND FATIGUE QUESTIONNAIRES
HOW LIKELY ARE YOU TO NOD OFF OR FALL ASLEEP WHEN YOU ARE A PASSENGER IN A CAR FOR AN HOUR WITHOUT A BREAK: 0
HOW LIKELY ARE YOU TO NOD OFF OR FALL ASLEEP IN A CAR, WHILE STOPPED FOR A FEW MINUTES IN TRAFFIC: 0
HOW LIKELY ARE YOU TO NOD OFF OR FALL ASLEEP WHILE SITTING INACTIVE IN A PUBLIC PLACE: 0
HOW LIKELY ARE YOU TO NOD OFF OR FALL ASLEEP WHILE LYING DOWN TO REST IN THE AFTERNOON WHEN CIRCUMSTANCES PERMIT: 2
HOW LIKELY ARE YOU TO NOD OFF OR FALL ASLEEP WHILE SITTING AND READING: 1
HOW LIKELY ARE YOU TO NOD OFF OR FALL ASLEEP WHILE SITTING QUIETLY AFTER LUNCH WITHOUT ALCOHOL: 1
HOW LIKELY ARE YOU TO NOD OFF OR FALL ASLEEP WHILE WATCHING TV: 1
ESS TOTAL SCORE: 6
HOW LIKELY ARE YOU TO NOD OFF OR FALL ASLEEP WHILE SITTING AND TALKING TO SOMEONE: 1

## 2023-09-13 NOTE — PROGRESS NOTES
aerophagia, nosebleed, dry mouth, and drowsiness while driving. DME [facility] [employer]    [place] Sleepiness Score: 6    Social History     Socioeconomic History    Marital status:      Spouse name: Not on file    Number of children: 0    Years of education: Not on file    Highest education level: Not on file   Occupational History    Occupation: unemployed    Occupation: former [employer] work    Tobacco Use    Smoking status: Never    Smokeless tobacco: Never   Vaping Use    Vaping Use: Never used   Substance and Sexual Activity    Alcohol use:  Yes     Alcohol/week: 2.0 standard drinks of alcohol     Types: 1 Cans of beer, 1 Standard drinks or equivalent per week     Comment: Rarely; socially    Drug use: No    Sexual activity: Yes     Partners: Female     Birth control/protection: Condom   Other Topics Concern    Not on file   Social History Narrative    Not on file     Social Determinants of Health     Financial Resource Strain: Low Risk  (11/11/2021)    Overall Financial Resource Strain (CARDIA)     Difficulty of Paying Living Expenses: Not very hard   Food Insecurity: No Food Insecurity (11/11/2021)    Hunger Vital Sign     Worried About Running Out of Food in the Last Year: Never true     Ran Out of Food in the Last Year: Never true   Transportation Needs: Not on file   Physical Activity: Not on file   Stress: Not on file   Social Connections: Not on file   Intimate Partner Violence: Not on file   Housing Stability: Not on file       Current Outpatient Medications   Medication Instructions    amLODIPine (NORVASC) 10 mg, Oral, DAILY    clopidogrel (PLAVIX) 75 MG tablet TAKE ONE TABLET BY MOUTH DAILY    Evolocumab 140 mg, SubCUTAneous, EVERY 14 DAYS    fluticasone (FLONASE) 50 MCG/ACT nasal spray 2 sprays, Each Nostril, DAILY    Handicap Placard MISC Does not apply, Expires 9/17/2025    hydroCHLOROthiazide (HYDRODIURIL) 25 mg, Oral, EVERY MORNING    lisinopril (PRINIVIL;ZESTRIL) 40 mg, Oral, DAILY

## 2023-09-13 NOTE — ASSESSMENT & PLAN NOTE
Chronic - Stable but question if fully stable : Reviewed and analyzed results of physiologic download from patient's machine and reviewed with patients. Supplies and parts as needed for the machine. These are medically necessary. Limit caffeine use after 3 PM. Based on the analyzed data, we will continue with current settings. Again, he needs to use his machine but has not been able to due to nasal congestion issues and lack of supplies. We will wait until he recovers from the surgery to see if sinus surgery helps to improve compliance with usage of the machine. Also discussed going back to a full face mask due to nasal congestion. Due to his insurance, coverage for supplies is limited to only once a year. Recommended a few web sites to purchase supplies from until his insurance covers. Discussed insurance compliance criteria but stressed the importance of consistent use of the machine and proper treatment of sleep apnea to reduce risks for un-treated/under-treated sleep apnea. Discussed adjusting humidity settings on the machine for nasal congestion. Encouraged him to continue to use his machine each night, all night. He will return in 3 mo for follow up. Would like him to return a little sooner but he would like to focus on recovery from the surgery. Instructed the patient to contact the office with any questions or concerns. Discussed the importance of consistence use of the machine and encouraged consistent use of the machine each night. Also discussed the importance of treating Obstructive Sleep Apnea from a physiological standpoint. Instructed not to drive unless had 4 hours of effective therapy for BUSTER the night before. Did review the risks of under or untreated BUSTER including, but not limited to, higher risks of motor vehicle accidents, stroke, heart attacks, and death. Patients verbalized understanding and accepts all these risks.

## 2023-12-07 ENCOUNTER — HOSPITAL ENCOUNTER (OUTPATIENT)
Dept: PHYSICAL THERAPY | Age: 63
Setting detail: THERAPIES SERIES
Discharge: HOME | End: 2023-12-07
Payer: COMMERCIAL

## 2023-12-07 DIAGNOSIS — R19.8 ABDOMINAL WEAKNESS: ICD-10-CM

## 2023-12-07 DIAGNOSIS — R29.898 IMPAIRED STRENGTH OF HIP MUSCLES: ICD-10-CM

## 2023-12-07 DIAGNOSIS — M62.9 HAMSTRING TIGHTNESS OF BOTH LOWER EXTREMITIES: ICD-10-CM

## 2023-12-07 DIAGNOSIS — M62.89 QUADRICEP TIGHTNESS: Primary | ICD-10-CM

## 2023-12-07 DIAGNOSIS — M24.551 HIP FLEXOR TIGHTNESS, RIGHT: ICD-10-CM

## 2023-12-07 DIAGNOSIS — M24.552 HIP FLEXOR TIGHTNESS, LEFT: ICD-10-CM

## 2023-12-07 DIAGNOSIS — R26.9 GAIT ABNORMALITY: ICD-10-CM

## 2023-12-07 PROCEDURE — 97163 PT EVAL HIGH COMPLEX 45 MIN: CPT

## 2023-12-07 NOTE — PLAN OF CARE
implanted '17;   AAA that's jaquelin & monitored yearly    Pain:  Patient Scale: VAS: 4/10  Pain location: neck, shoulders, along spine, lower back, B knees, B ankles  Patient describes pain to be constant, dull, and aching  Pain decreases with: Resting  Pain increases with: Activity and Movement    Functional Outcome Measure:    Date Assessed 12/07/2023   Measure Used LEFS   Disability Score (rawscore/%) 33/51% -answered 16      Occupation/School:  Work/School Status: Disabled  Off due to injury  Job Duties/Demands: NA    Sport/ Recreation/ Leisure/ Hobbies: exercise    Review Of Systems (ROS):  [x] Performed Review of systems (Integumentary, CardioPulmonary, Neurological) by intake and observation. Intake form has been scanned into medical record. Patient has been instructed to contact their primary care physician regarding ROS issues if not already being addressed at this time. [x] Patient history, allergies, meds reviewed. Medical chart reviewed. See intake form.      Co-morbidities/Complexities (which will affect course of rehabilitation):  []None        Arthritic conditions  [] Rheumatoid arthritis (M05.9)  [x] Osteoarthritis (M19.91)  [] Gout      Neurological conditions  [] Prior Stroke (I69.30)  [] Parkinson's (G20)  [] Encephalopathy (G93.40)  [] Multiple Sclerosis (G35)  [] Post-polio (G14)  [] Spinal cord injury (SCI)  [] Traumatic brain injury (TBI)  [] ALS    Gastrointestinal conditions   [] Constipation (K59.00)  [] Chron's/ulcerative colitis    Endocrine conditions   [] Hypothyroid (E03.9)  [] Hyperthyroid [x] Hx of COVID    Musculoskeletal conditions  [] Disc pathology   [] Congenital spine pathologies   [] Osteoporosis (M81.8)  [] Osteopenia (M85.8)  [] Scoliosis    Cardio/Pulmonary conditions  [] Asthma (J45)  [] Coughing   [] COPD (J44.9)  [] CHF  [] A-fib      Rheumatological conditions  [] Fibromyalgia (M79.7)  [] Lupus  [] Sjogrens  [] Ankylosing spondylitis  [] Other autoimmune

## 2023-12-07 NOTE — FLOWSHEET NOTE
(83710)    Re-Eval (42657)     Manual (65324)    Estim Unattended (29129)     Ther. Act (06578)    [facility]. Traction ([id number])     Gait (37353)    Dry Needle 1-2 muscle (07912)     Aquatic Therex (87688)    Dry Needle 3+ muscle (49470)     Iontophoresis (25058)    VASO (12037)     Ultrasound (13421)    Group Therapy (64768)     Estim Attended (40500)    Canalith Repositioning (91227)     Other:    Other: Total Timed Code Tx Minutes 0   1     Total Treatment Minutes 48        Charge Justification:  N/A - EVAL ONLY    TREATMENT PLAN   Plan: POC Initiated today- see eval for details    Electronically Signed by Shannan Taylor, PT              Date: 12/07/2023     Note: If patient does not return for scheduled/recommended follow up visits, this note will serve as a discharge from care along with the most recent update on progress.

## 2023-12-12 ENCOUNTER — HOSPITAL ENCOUNTER (OUTPATIENT)
Dept: PHYSICAL THERAPY | Age: 63
Setting detail: THERAPIES SERIES
Discharge: HOME | End: 2023-12-12
Payer: COMMERCIAL

## 2023-12-12 PROCEDURE — 97113 AQUATIC THERAPY/EXERCISES: CPT

## 2023-12-12 NOTE — FLOWSHEET NOTE
[address] [facility], [address] [facility], [address] [place], [place] [address], [address]  [facility] office: [phone] fax: [phone]      Physical Therapy: TREATMENT/PROGRESS NOTE   Patient: Una Herron (42 y.o. male)   Treatment Date: 2023   :  1960 MRN: [id number]   Visit #: 2   Insurance Allowable Auth Needed   30 each pcy, no hard max [x]Yes    []No    Insurance: Payor: Estuardo Díaz / Plan: [employer] / Product Type: *No Product type* /   Insurance ID: [id number] - (Medicaid Managed)  Secondary Insurance (if applicable):    Treatment Diagnosis:     ICD-10-CM    1. Quadricep tightness  M62.89       2. Abdominal weakness  R19.8       3. Impaired strength of hip muscles  R29.898       4. Hip flexor tightness, left  M24.552       5. Hip flexor tightness, right  M24.551       6. Hamstring tightness of both lower extremities  M62.9       7. Gait abnormality  R26.9          Medical Diagnosis:    Other specified disorders of muscle [M62.89]   Referring Physician: Armando Barlow MD  PCP: Ekaterina Srivastava DO                             Plan of care signed (Y/N):     Date of Patient follow up with Physician:      Progress Report/POC: EVAL today  POC update due: (10 visits /OR [address], whichever is less)  2024     Precautions/Contraindications:  HTN, OA,  Bell's Palsy 2014, had 1 ton stone fall on him  landing on his entire back and base of head --incident resulted in B knee pain as well;  L5-S1 fusion , Back stimulator implanted ;   AAA that's ajquelin & monitored yearly   spinal stimulator, Adhesive tape allergy;  Sugar protein starch allergy     Preferred Language for Healthcare:   [x]English       []other:    SUBJECTIVE EXAMINATION     Patient Report/Comments: Pt reports that nothing new has occurred since the eval.  Most body regions feel tight.       Test used Initial score  2023   Pain Summary VAS 4/10 3/10   Functional

## 2023-12-15 ENCOUNTER — HOSPITAL ENCOUNTER (OUTPATIENT)
Dept: PHYSICAL THERAPY | Age: 63
Setting detail: THERAPIES SERIES
Discharge: HOME | End: 2023-12-15
Payer: COMMERCIAL

## 2023-12-15 PROCEDURE — 97150 GROUP THERAPEUTIC PROCEDURES: CPT

## 2023-12-15 PROCEDURE — 97113 AQUATIC THERAPY/EXERCISES: CPT

## 2023-12-15 NOTE — FLOWSHEET NOTE
[address] [facility], [address] office: [phone] fax: [phone]      Physical Therapy: TREATMENT/PROGRESS NOTE   Patient: Lincoln Anne (70 y.o. male)   Treatment Date: 12/15/2023   :  1960 MRN: [id number]   Visit #: 3   Insurance Allowable Auth Needed   30 each pcy, no hard max [x]Yes    []No    Insurance: Payor: Susan Alexander / Plan: Danita Sizer / Product Type: *No Product type* /   Insurance ID: [id number] - (Medicaid Managed)  Secondary Insurance (if applicable):    Treatment Diagnosis:     ICD-10-CM    1. Quadricep tightness  M62.89       2. Abdominal weakness  R19.8       3. Impaired strength of hip muscles  R29.898       4. Hip flexor tightness, left  M24.552       5. Hip flexor tightness, right  M24.551       6. Hamstring tightness of both lower extremities  M62.9       7. Gait abnormality  R26.9          Medical Diagnosis:    Other specified disorders of muscle [M62.89]   Referring Physician: Anthony Cardenas MD  PCP: Zunilda Wen DO                             Plan of care signed (Y/N):     Date of Patient follow up with Physician:      Progress Report/POC: NO  POC update due: (10 visits /OR [address], whichever is less)  2024     Precautions/Contraindications:  HTN, OA,  Bell's Palsy 2014, had 1 ton stone fall on him  landing on his entire back and base of head --incident resulted in B knee pain as well;  L5-S1 fusion , Back stimulator implanted ';   AAA that's jaquelin & monitored yearly   spinal stimulator, Adhesive tape allergy;  Sugar protein starch allergy     Preferred Language for Healthcare:   [x]English       []other:    SUBJECTIVE EXAMINATION     Patient Report/Comments: chief c/o 3-4/10 LBP. Pt reports fair response to initial aquatics visit earlier this week.       Test used Initial score  12/7/23 12/15/2023   Pain Summary VAS 4/10 3/10   Functional questionnaire

## 2025-05-22 ENCOUNTER — OFFICE VISIT (OUTPATIENT)
Dept: ORTHOPEDIC SURGERY | Age: 65
End: 2025-05-22
Payer: COMMERCIAL

## 2025-05-22 DIAGNOSIS — M47.816 LUMBAR SPONDYLOSIS: ICD-10-CM

## 2025-05-22 DIAGNOSIS — Z98.1 HISTORY OF FUSION OF LUMBAR SPINE: ICD-10-CM

## 2025-05-22 DIAGNOSIS — M53.9 MULTILEVEL DEGENERATIVE DISC DISEASE: ICD-10-CM

## 2025-05-22 DIAGNOSIS — M43.16 LUMBAR ADJACENT SEGMENT DISEASE WITH SPONDYLOLISTHESIS: ICD-10-CM

## 2025-05-22 DIAGNOSIS — M54.50 LUMBAR PAIN: ICD-10-CM

## 2025-05-22 DIAGNOSIS — Z96.89 STATUS POST INSERTION OF SPINAL CORD STIMULATOR: ICD-10-CM

## 2025-05-22 DIAGNOSIS — M47.816 LUMBAR FACET ARTHROPATHY: Primary | ICD-10-CM

## 2025-05-22 DIAGNOSIS — G25.82 STIFFMAN SYNDROME: ICD-10-CM

## 2025-05-22 DIAGNOSIS — M51.369 LUMBAR ADJACENT SEGMENT DISEASE WITH SPONDYLOLISTHESIS: ICD-10-CM

## 2025-05-22 PROCEDURE — 3017F COLORECTAL CA SCREEN DOC REV: CPT | Performed by: INTERNAL MEDICINE

## 2025-05-22 PROCEDURE — 1036F TOBACCO NON-USER: CPT | Performed by: INTERNAL MEDICINE

## 2025-05-22 PROCEDURE — G8421 BMI NOT CALCULATED: HCPCS | Performed by: INTERNAL MEDICINE

## 2025-05-22 PROCEDURE — G8428 CUR MEDS NOT DOCUMENT: HCPCS | Performed by: INTERNAL MEDICINE

## 2025-05-22 PROCEDURE — 99204 OFFICE O/P NEW MOD 45 MIN: CPT | Performed by: INTERNAL MEDICINE

## 2025-05-22 NOTE — PROGRESS NOTES
fusion mild to moderate facet arthropathy.  Minor bilateral foraminal stenosis.        Assessment   Impression: .    Encounter Diagnoses   Name Primary?    Lumbar facet arthropathy Yes    Lumbar adjacent segment disease with spondylolisthesis     Lumbar spondylosis     Multilevel degenerative disc disease     History of fusion of lumbar spine     Status post insertion of spinal cord stimulator     Stiffman syndrome     Lumbar pain               Plan:       CT lumbar spine evaluate severity of discopathy/ stenosis suspected clinically  Consider Him a candidate for spine intervention injection-diagnostic LMBB  Activity modification, lumbar spine precautions  lumbar spinestabilization home exercise program  Medical pain management: analgesic: OTC and maximize local measures for symptom control           The nature of the finding, probable diagnosis and likely treatment was thoroughly discussed with the patient. The options, risks, complications, alternative treatment as well as some of the differential diagnosis was discussed. The patient was thoroughly informed and all questions were answered. the patient indicated understanding and satisfaction with the discussion.      Orders:        Orders Placed This Encounter   Procedures    XR LUMBAR SPINE (2-3 VIEWS)     Standing Status:   Future     Number of Occurrences:   1     Expected Date:   5/22/2025     Expiration Date:   5/22/2026     Scheduling Instructions:      room 2     Reason for exam::   pain    CT LUMBAR SPINE WO CONTRAST     Standing Status:   Future     Expected Date:   5/22/2025     Expiration Date:   5/22/2026     Scheduling Instructions:      [facility] pt will call to schedule, [phone].      Debbie will obtain auth and fwd to your facility      Pt advised to f/u in clinic 1-2 days after MRI for results.     Reason for exam::   R/O HNP/ Stenosis/ Hardware Complication           Disclaimer:    \"This note was dictated with voice recognition software.

## 2025-06-02 ENCOUNTER — HOSPITAL ENCOUNTER (OUTPATIENT)
Dept: CT IMAGING | Age: 65
Discharge: HOME | End: 2025-06-02
Attending: INTERNAL MEDICINE
Payer: COMMERCIAL

## 2025-06-02 DIAGNOSIS — M51.369 LUMBAR ADJACENT SEGMENT DISEASE WITH SPONDYLOLISTHESIS: ICD-10-CM

## 2025-06-02 DIAGNOSIS — M47.816 LUMBAR FACET ARTHROPATHY: ICD-10-CM

## 2025-06-02 DIAGNOSIS — M43.16 LUMBAR ADJACENT SEGMENT DISEASE WITH SPONDYLOLISTHESIS: ICD-10-CM

## 2025-06-02 DIAGNOSIS — Z98.1 HISTORY OF FUSION OF LUMBAR SPINE: ICD-10-CM

## 2025-06-02 DIAGNOSIS — M47.816 LUMBAR SPONDYLOSIS: ICD-10-CM

## 2025-06-02 DIAGNOSIS — M53.9 MULTILEVEL DEGENERATIVE DISC DISEASE: ICD-10-CM

## 2025-06-02 PROCEDURE — 72131 CT LUMBAR SPINE W/O DYE: CPT

## 2025-06-16 ENCOUNTER — TELEMEDICINE (OUTPATIENT)
Dept: PULMONOLOGY | Age: 65
End: 2025-06-16
Payer: COMMERCIAL

## 2025-06-16 DIAGNOSIS — G47.33 OBSTRUCTIVE SLEEP APNEA: Primary | ICD-10-CM

## 2025-06-16 DIAGNOSIS — E66.9 OBESITY (BMI 30-39.9): ICD-10-CM

## 2025-06-16 DIAGNOSIS — I48.0 PAF (PAROXYSMAL ATRIAL FIBRILLATION) (HCC): ICD-10-CM

## 2025-06-16 DIAGNOSIS — I10 ESSENTIAL HYPERTENSION: Chronic | ICD-10-CM

## 2025-06-16 PROCEDURE — 99214 OFFICE O/P EST MOD 30 MIN: CPT | Performed by: NURSE PRACTITIONER

## 2025-06-16 PROCEDURE — G8427 DOCREV CUR MEDS BY ELIG CLIN: HCPCS | Performed by: NURSE PRACTITIONER

## 2025-06-16 PROCEDURE — 3017F COLORECTAL CA SCREEN DOC REV: CPT | Performed by: NURSE PRACTITIONER

## 2025-06-16 PROCEDURE — G2211 COMPLEX E/M VISIT ADD ON: HCPCS | Performed by: NURSE PRACTITIONER

## 2025-06-16 ASSESSMENT — SLEEP AND FATIGUE QUESTIONNAIRES
HOW LIKELY ARE YOU TO NOD OFF OR FALL ASLEEP WHILE SITTING AND TALKING TO SOMEONE: WOULD NEVER DOZE
HOW LIKELY ARE YOU TO NOD OFF OR FALL ASLEEP WHEN YOU ARE A PASSENGER IN A CAR FOR AN HOUR WITHOUT A BREAK: WOULD NEVER DOZE
HOW LIKELY ARE YOU TO NOD OFF OR FALL ASLEEP WHILE SITTING AND READING: WOULD NEVER DOZE
ESS TOTAL SCORE: 1
HOW LIKELY ARE YOU TO NOD OFF OR FALL ASLEEP WHILE LYING DOWN TO REST IN THE AFTERNOON WHEN CIRCUMSTANCES PERMIT: SLIGHT CHANCE OF DOZING
HOW LIKELY ARE YOU TO NOD OFF OR FALL ASLEEP IN A CAR, WHILE STOPPED FOR A FEW MINUTES IN TRAFFIC: WOULD NEVER DOZE
HOW LIKELY ARE YOU TO NOD OFF OR FALL ASLEEP WHILE WATCHING TV: WOULD NEVER DOZE
HOW LIKELY ARE YOU TO NOD OFF OR FALL ASLEEP WHILE SITTING INACTIVE IN A PUBLIC PLACE: WOULD NEVER DOZE
HOW LIKELY ARE YOU TO NOD OFF OR FALL ASLEEP WHILE SITTING QUIETLY AFTER LUNCH WITHOUT ALCOHOL: WOULD NEVER DOZE

## 2025-06-16 NOTE — PROGRESS NOTES
[facility]  [address]  [address]  [place] [address]  P- [phone]  F- [phone]   Video Visit- Follow up      Assessment/Plan:      1. Obstructive sleep apnea  Assessment & Plan:  Chronic-with progression/exacerbation: Reviewed and analyzed results of physiologic download from patient's machine and reviewed with patient.  Supplies and parts as needed for his machine.  These are medically necessary.  Limit caffeine use after 3pm. Based on the analyzed data will continue with current settings. Encouraged him to contact his DME to place an order for new supplies. Encouraged him to use his machine as much as possible. Will see him back in 2-3 months. Encouraged him to contact the office with any questions or concerns.    Encouraged consistent use of his machine each night, all night.  Discussed the importance of treating BUSTER from a physiological standpoint.  Instructed not to drive unless had 4 hrs of effective therapy for his BUSTER the night before.  No driving when sleepy.  Did review the risks of under or untreated BUSTER including, but not limited to, higher risks of motor vehicle accidents, stroke, heart attacks, and death.  He understands and accepts all these risks.    2. PAF (paroxysmal atrial fibrillation) (HCC)  Assessment & Plan:  Chronic- Stable.  Discussed the importance of treating obstructive sleep apnea as part of the management of this disorder.  Cont any meds per PCP and other physicians.    3. Essential hypertension  Assessment & Plan:  Chronic- Stable.  Discussed the importance of treating obstructive sleep apnea as part of the management of this disorder.  Cont any meds per PCP and other physicians.    4. Obesity (BMI 30-39.9)  Assessment & Plan:  Chronic-not stable:  Discussed importance of treating obstructive sleep apnea and getting sufficient sleep to assist with weight control.  Discussed weight gain and/or weight loss may require

## 2025-06-16 NOTE — PROGRESS NOTES
Diagnosis: [x] BUSTER (G47.33) [] CSA (G47.31) [] Apnea (G47.30)   Length of Need: [x] 15 Months [] 99 Months [] Other:   Machine (ABBE!): [] Respironics Dream Station      Auto [] ResMed AirSense     Auto [] Other:     []  CPAP () [] Bilevel ()   Mode: [] Auto [] Spontaneous    Mode: [] Auto [] Spontaneous            Comfort Settings:      Humidifier: [] Heated ()        [x] Water chamber replacement ()/ 1 per 6 months        Mask:   [x] Nasal () /1 per 3 months [] Full Face () /1 per 3 months   [x] Patient choice -Size and fit mask [] Patient Choice - Size and fit mask   [] Dispense: [] Dispense:   [x] Headgear () / 1 per 3 months [] Headgear () / 1 per 3 months   [x] Replacement Nasal Cushion ()/2 per month [] Interface Replacement ()/1 per month   [] Replacement Nasal Pillows ()/2 per month         Tubing: [x] Heated ()/1 per 3 months    [] Standard ()/1 per 3 months [] Other:           Filters: [x] Non-disposable ()/1 per 6 months     [x] Ultra-Fine, Disposable ()/2 per month        Miscellaneous: [x] Chin Strap ()/ 1 per 6 months [] O2 bleed-in:        LPM   [] Oxymetry on CPAP/Bilevel []  Other:         Start Order Date: 06/16/25    MEDICAL JUSTIFICATION:  I, the undersigned, certify that the above prescribed supplies are medically necessary for this patient’s wellbeing.  In my opinion, the supplies are both reasonable and necessary in reference to accepted standards of medicalpractice in treatment of this patient’s condition.    AMEYA REID NP    NPI: [id number]       Order Signed Date: 06/16/25  [facility]  Pulmonary, Sleep, and Critical Care    Pulmonary, Sleep, and Critical Care  [facility] [address]                          [address] [place], [place] [address], [place] [address]                                    [place] [address]  Phone: [phone]    Fax:

## 2025-06-17 ENCOUNTER — TELEPHONE (OUTPATIENT)
Age: 65
End: 2025-06-17

## 2025-06-17 NOTE — TELEPHONE ENCOUNTER
Alicia @ [facility] states pt is trying to order supplies order is for a Nasal Mask but pt is wanting to order what he had previously which is a Full Face Mask    Asking for new order to be faxed please

## 2025-06-17 NOTE — PROGRESS NOTES
Diagnosis: [x] BUSTER (G47.33) [] CSA (G47.31) [] Apnea (G47.30)   Length of Need: [x] 15 Months [] 99 Months [] Other:   Machine (ABBE!): [] Respironics Dream Station      Auto [] ResMed AirSense     Auto [] Other:     []  CPAP () [] Bilevel ()   Mode: [] Auto [] Spontaneous    Mode: [] Auto [] Spontaneous             Comfort Settings:      Humidifier: [] Heated ()        [x] Water chamber replacement ()/ 1 per 6 months        Mask:   [] Nasal () /1 per 3 months [x] Full Face () /1 per 3 months   [] Patient choice -Size and fit mask [x] Patient Choice - Size and fit mask   [] Dispense: [] Dispense:   [] Headgear () / 1 per 3 months [x] Headgear () / 1 per 3 months   [] Replacement Nasal Cushion ()/2 per month [x] Interface Replacement ()/1 per month   [] Replacement Nasal Pillows ()/2 per month         Tubing: [x] Heated ()/1 per 3 months    [] Standard ()/1 per 3 months [] Other:           Filters: [x] Non-disposable ()/1 per 6 months     [x] Ultra-Fine, Disposable ()/2 per month        Miscellaneous: [] Chin Strap ()/ 1 per 6 months [] O2 bleed-in:        LPM   [] Oxymetry on CPAP/Bilevel []  Other:         Start Order Date: 06/17/25    MEDICAL JUSTIFICATION:  I, the undersigned, certify that the above prescribed supplies are medically necessary for this patient’s wellbeing.  In my opinion, the supplies are both reasonable and necessary in reference to accepted standards of medicalpractice in treatment of this patient’s condition.    AMEYA REID NP    NPI: [id number]       Order Signed Date: 06/17/25  [facility]  Pulmonary, Sleep, and Critical Care    Pulmonary, Sleep, and Critical Care  [facility] [address]                          [address] [place] [address] [address]  [place] [address]                                    [place] [address]  Phone: [phone]    Fax:

## (undated) DEVICE — SURE SET-DOUBLE BASIN-LF: Brand: MEDLINE INDUSTRIES, INC.

## (undated) DEVICE — 3M™ STERI-STRIP™ REINFORCED ADHESIVE SKIN CLOSURES, R1547, 1/2 IN X 4 IN (12 MM X 100 MM), 6 STRIPS/ENVELOPE: Brand: 3M™ STERI-STRIP™

## (undated) DEVICE — KIT NDL INSRT 14GA L4IN CRV TIP

## (undated) DEVICE — STANDARD HYPODERMIC NEEDLE,POLYPROPYLENE HUB: Brand: MONOJECT

## (undated) DEVICE — TUNNELING TOOL KIT, 35CM: Brand: NEVRO®

## (undated) DEVICE — NEEDLE SPNL 22GA L5IN BLK HUB S STL W/ QNCKE PNT W/OUT

## (undated) DEVICE — PEN: MARKING STD 100/CS: Brand: MEDICAL ACTION INDUSTRIES

## (undated) DEVICE — ELEVATOR NEUROSTIMULATOR SPNL PASS FOR SPNL CRD STIM SYS

## (undated) DEVICE — SENZA®  CHARGER KIT: Brand: SENZA®

## (undated) DEVICE — PORT VLV 2 W NDL FREE SMRTSITE

## (undated) DEVICE — JEWISH HOSPITAL TURNOVER KIT: Brand: MEDLINE INDUSTRIES, INC.

## (undated) DEVICE — TOWEL,OR,DSP,ST,BLUE,STD,4/PK,20PK/CS: Brand: MEDLINE

## (undated) DEVICE — Z CONVERTED USE 2275871 SPONGE GZ W4XL4IN WHT 8 PLY CURITY

## (undated) DEVICE — MEDIA CONTRAST RX ISOVUE-300 61% 30ML VIALS

## (undated) DEVICE — STERILE POLYISOPRENE POWDER-FREE SURGICAL GLOVES: Brand: PROTEXIS

## (undated) DEVICE — Device: Brand: JELCO

## (undated) DEVICE — GARMENT,MEDLINE,DVT,INT,CALF,MED, GEN2: Brand: MEDLINE

## (undated) DEVICE — NEEDLE EPI 22GA L2IN CLR HUB N WNG PERIFIX TUOHY

## (undated) DEVICE — CATHETER IV 14GA L5.25IN PERIPH ORNG FEP POLYMER 3 BVL

## (undated) DEVICE — PLATE ES AD W 9FT CRD 2

## (undated) DEVICE — SET ADMIN PRIMING 7ML L30IN 7.35LB 20 GTT 2ND RLER CLMP

## (undated) DEVICE — SPONGE GZ W4XL4IN COT 12 PLY TYP VII WVN C FLD DSGN

## (undated) DEVICE — ADHESIVE SKIN CLSR 0.7ML TOP DERMBND ADV

## (undated) DEVICE — LAPAROTOMY DRAPE WITH POUCHES: Brand: CONVERTORS

## (undated) DEVICE — BLADE ES L4IN INSUL EDGE

## (undated) DEVICE — NEEDLE SPNL 22GA L3.5IN BLK HUB S STL REG WALL FIT STYL W/

## (undated) DEVICE — COVER LT HNDL CAM BLU DISP W/ SURG CTRL

## (undated) DEVICE — IV START KIT: Brand: MEDLINE INDUSTRIES, INC.

## (undated) DEVICE — STERILE DISPOSABLE EQUIPMENT COVER - DOME COVER 22" DEPTH: Brand: PREFERRED MEDICAL PRODUCTS, LLC

## (undated) DEVICE — CHLORAPREP 26ML ORANGE

## (undated) DEVICE — UNIVERSAL BLOCK TRAY: Brand: AVANOS*

## (undated) DEVICE — SHEET, ORTHO, SPLIT, STERILE: Brand: MEDLINE

## (undated) DEVICE — SYRINGE MED 3ML CLR PLAS STD N CTRL LUERLOCK TIP DISP

## (undated) DEVICE — SET EXTN L7IN PRIMING VOL 0.5ML PRSS RATE STD BOR 1 REM

## (undated) DEVICE — SYRINGE IRRIG 60ML SFT PLIABLE BLB EZ TO GRP 1 HND USE W/

## (undated) DEVICE — IPG2000 TEMPLATE KIT: Brand: NEVRO

## (undated) DEVICE — INTENDED FOR TISSUE SEPARATION, AND OTHER PROCEDURES THAT REQUIRE A SHARP SURGICAL BLADE TO PUNCTURE OR CUT.: Brand: BARD-PARKER ® DISPOSABLE SCALPELS

## (undated) DEVICE — NEEDLE EPI 14GA L15CM FOR SPNL CRD STIM

## (undated) DEVICE — DISPOSABLE OR TOWEL: Brand: CARDINAL HEALTH

## (undated) DEVICE — SUTURE VCRL SZ 3-0 L18IN ABSRB UD L26MM SH 1/2 CIR J864D

## (undated) DEVICE — BLANKET WRM W29.9XL79.1IN UP BODY FORC AIR MISTRAL-AIR

## (undated) DEVICE — LAMINECTOMY PK

## (undated) DEVICE — COVER,TABLE,HEAVY DUTY,77"X90",STRL: Brand: MEDLINE

## (undated) DEVICE — SUTURE VCRL SZ 2-0 L18IN ABSRB UD CT-1 L36MM 1/2 CIR J839D

## (undated) DEVICE — KIT POS PT CARE KT W/ GENTLE TCH WILSON +

## (undated) DEVICE — 3M™ TEGADERM™ +PAD FILM DRESSING WITH NON-ADHERENT PAD, 3588, 6 IN X 6 IN (15 CM X 15 CM), 25/CAR, 4 CAR/CS: Brand: 3M™ TEGADERM™

## (undated) DEVICE — SOLUTION IV 1000ML 0.9% SOD CHL

## (undated) DEVICE — FASTENER CATH SM 5-14 FR STRL

## (undated) DEVICE — PATIENT REMOTE KIT: Brand: SENZA®

## (undated) DEVICE — SHEET,DRAPE,53X77,STERILE: Brand: MEDLINE

## (undated) DEVICE — APPLICATOR MEDICATED 26 CC SOLUTION HI LT ORNG CHLORAPREP

## (undated) DEVICE — SET GRAV VENT NVENT CK VLV 3 NDL FREE PRT 10 GTT

## (undated) DEVICE — 3M™ TEGADERM™ TRANSPARENT FILM DRESSING FRAME STYLE, 1628, 6 IN X 8 IN (15 CM X 20 CM), 10/CT 8CT/CASE: Brand: 3M™ TEGADERM™

## (undated) DEVICE — MARKER SURG SKIN UTIL BLK REG TIP NONSMEARING W/ 6IN RUL

## (undated) DEVICE — GLOVE ORTHO 8   MSG9480

## (undated) DEVICE — SENZA®  PATIENT TRIAL KIT: Brand: SENZA®

## (undated) DEVICE — COVER,MAYO STAND,XL,STERILE: Brand: MEDLINE

## (undated) DEVICE — HYPODERMIC SAFETY NEEDLE: Brand: MAGELLAN

## (undated) DEVICE — SUTURE MCRYL SZ 4-0 L27IN ABSRB UD L19MM PS-2 1/2 CIR PRIM Y426H

## (undated) DEVICE — CABLE BPLR L12FT FLYING LD DISPOSABLE